# Patient Record
Sex: FEMALE | Race: BLACK OR AFRICAN AMERICAN | NOT HISPANIC OR LATINO | Employment: FULL TIME | URBAN - METROPOLITAN AREA
[De-identification: names, ages, dates, MRNs, and addresses within clinical notes are randomized per-mention and may not be internally consistent; named-entity substitution may affect disease eponyms.]

---
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
## 2019-01-04 ENCOUNTER — HOSPITAL ENCOUNTER (OUTPATIENT)
Dept: RADIOLOGY | Facility: HOSPITAL | Age: 19
Discharge: HOME | End: 2019-01-04
Attending: OBSTETRICS & GYNECOLOGY
Payer: COMMERCIAL

## 2019-01-04 DIAGNOSIS — N92.6 IRREGULAR MENSES: ICD-10-CM

## 2019-01-04 PROCEDURE — 76856 US EXAM PELVIC COMPLETE: CPT | Mod: 26,,, | Performed by: RADIOLOGY

## 2019-01-04 PROCEDURE — 76856 US PELVIS COMPLETE NON OB: ICD-10-PCS | Mod: 26,,, | Performed by: RADIOLOGY

## 2019-01-04 PROCEDURE — 76856 US EXAM PELVIC COMPLETE: CPT | Mod: TC

## 2019-01-07 ENCOUNTER — TELEPHONE (OUTPATIENT)
Dept: OBSTETRICS AND GYNECOLOGY | Facility: CLINIC | Age: 19
End: 2019-01-07

## 2019-01-07 NOTE — TELEPHONE ENCOUNTER
Called patient:    Message left to call us.    [Pelvic ultrasound without any abnormal findings, but limited study secondary to lack of vaginal ultrasound]

## 2019-01-08 NOTE — TELEPHONE ENCOUNTER
Called patient:    Spoke to patient's mother / patient:    Aware of negative sono.  Discussed that fact that findings were limited secondary to lack of vaginal aspect of study.

## 2020-01-03 ENCOUNTER — OFFICE VISIT (OUTPATIENT)
Dept: OBSTETRICS AND GYNECOLOGY | Facility: CLINIC | Age: 20
End: 2020-01-03
Attending: OBSTETRICS & GYNECOLOGY
Payer: COMMERCIAL

## 2020-01-03 VITALS
BODY MASS INDEX: 28.87 KG/M2 | SYSTOLIC BLOOD PRESSURE: 114 MMHG | HEIGHT: 65 IN | WEIGHT: 173.31 LBS | DIASTOLIC BLOOD PRESSURE: 72 MMHG

## 2020-01-03 DIAGNOSIS — Z01.419 WELL WOMAN EXAM WITH ROUTINE GYNECOLOGICAL EXAM: Primary | ICD-10-CM

## 2020-01-03 DIAGNOSIS — Z11.3 SCREEN FOR STD (SEXUALLY TRANSMITTED DISEASE): ICD-10-CM

## 2020-01-03 LAB
C TRACH DNA SPEC QL NAA+PROBE: NOT DETECTED
N GONORRHOEA DNA SPEC QL NAA+PROBE: NOT DETECTED

## 2020-01-03 PROCEDURE — 99395 PREV VISIT EST AGE 18-39: CPT | Mod: S$GLB,,, | Performed by: OBSTETRICS & GYNECOLOGY

## 2020-01-03 PROCEDURE — 99999 PR PBB SHADOW E&M-EST. PATIENT-LVL III: ICD-10-PCS | Mod: PBBFAC,,, | Performed by: OBSTETRICS & GYNECOLOGY

## 2020-01-03 PROCEDURE — 99395 PR PREVENTIVE VISIT,EST,18-39: ICD-10-PCS | Mod: S$GLB,,, | Performed by: OBSTETRICS & GYNECOLOGY

## 2020-01-03 PROCEDURE — 99999 PR PBB SHADOW E&M-EST. PATIENT-LVL III: CPT | Mod: PBBFAC,,, | Performed by: OBSTETRICS & GYNECOLOGY

## 2020-01-03 PROCEDURE — 87491 CHLMYD TRACH DNA AMP PROBE: CPT

## 2020-01-03 NOTE — PROGRESS NOTES
Karla Price is a 19 y.o. female  who presents for annual exam.  Previously, she had been experiencing irregular, infrequent periods.  However, with weight loss, her periods have become regular.  For the past year, she describes having menses occurring monthly, lasting 5 days in duration, without intermenstrual bleeding. She is now sexually active, using condoms for contraception.  Requests STD screening.  No gyn complaints.  Patient's last menstrual period was 2019 (exact date).       Sono 19:  FINDINGS:  Exam is markedly limited.  Transvaginal imaging was not obtained given the patient's sexually inactive state.  Transabdominal images demonstrate partial collapse of the urinary bladder.  Exam detail is limited secondary to habitus and shadowing from bowel gas.  Neither ovary could be visualized.  No free fluid was identified.  Uterus is poorly delineated.  Endometrial cavity could not be identified.      Impression       Markedly limited exam as described above.  Consider correlation with cross-sectional imaging as clinically warranted.         History reviewed. No pertinent past medical history.    History reviewed. No pertinent surgical history.    OB History        0    Para   0    Term   0       0    AB   0    Living   0       SAB   0    TAB   0    Ectopic   0    Multiple   0    Live Births                     ROS:  GENERAL: Reports weight loss with diet / exercise.   SKIN: Denies rash or lesions.   HEAD: Denies head injury or headache.   NODES: Denies enlarged lymph nodes.   CHEST: Denies chest pain or shortness of breath.   CARDIOVASCULAR: Denies palpitations or left sided chest pain.   ABDOMEN: No abdominal pain, nausea, vomiting or rectal bleeding.   URINARY: No dysuria or hematuria.  REPRODUCTIVE: See HPI.   BREASTS: Denies pain, lumps, or nipple discharge.   HEMATOLOGIC: No easy bruisability or excessive bleeding.   MUSCULOSKELETAL: Denies joint pain or swelling.    NEUROLOGIC: Denies syncope or weakness.   PSYCHIATRIC: Denies depression.    PE:   (chaperone present during entire exam)  APPEARANCE: Well nourished, well developed, in no acute distress.  BREASTS: Symmetrical, no skin changes or visible lesions. No palpable masses, nipple discharge or adenopathy bilaterally.  ABDOMEN: Soft. No tenderness or masses. No hernias. No CVA tenderness.  VULVA: No lesions. Normal female genitalia.  URETHRAL MEATUS: Normal size and location, no lesions, no prolapse.  URETHRA: No masses, tenderness, prolapse or scarring.  VAGINA: Moist and well rugated, no abnormal discharge, no significant cystocele or rectocele.  CERVIX: No lesions and discharge.   UTERUS: Normal size, regular shape, mobile, non-tender, bladder base nontender.  ADNEXA: No masses, tenderness or CDS nodularity.  ANUS PERINEUM: Normal.      Diagnosis:  1. Well woman exam with routine gynecological exam    2. Screen for STD (sexually transmitted disease)          PLAN:    Orders Placed This Encounter    C. trachomatis/N. gonorrhoeae by AMP DNA [facility]; Cervix       Patient was counseled today on the need for annual gyn exams.  We reviewed contraceptive options and she plans to continue with condoms.  We will contact her with results of today's cervical culture.    Follow-up in 1 year.

## 2020-01-04 ENCOUNTER — PATIENT MESSAGE (OUTPATIENT)
Dept: OBSTETRICS AND GYNECOLOGY | Facility: CLINIC | Age: 20
End: 2020-01-04

## 2020-12-21 ENCOUNTER — TELEPHONE (OUTPATIENT)
Dept: OBSTETRICS AND GYNECOLOGY | Facility: CLINIC | Age: 20
End: 2020-12-21

## 2021-04-26 ENCOUNTER — PATIENT MESSAGE (OUTPATIENT)
Dept: RESEARCH | Facility: HOSPITAL | Age: 21
End: 2021-04-26

## 2021-05-27 ENCOUNTER — OFFICE VISIT (OUTPATIENT)
Dept: OPTOMETRY | Facility: CLINIC | Age: 21
End: 2021-05-27
Payer: COMMERCIAL

## 2021-05-27 DIAGNOSIS — H11.442 CONJUNCTIVAL CYST, LEFT: Primary | ICD-10-CM

## 2021-05-27 PROCEDURE — 99999 PR PBB SHADOW E&M-EST. PATIENT-LVL II: CPT | Mod: PBBFAC,,, | Performed by: OPTOMETRIST

## 2021-05-27 PROCEDURE — 1126F AMNT PAIN NOTED NONE PRSNT: CPT | Mod: S$GLB,,, | Performed by: OPTOMETRIST

## 2021-05-27 PROCEDURE — 92002 INTRM OPH EXAM NEW PATIENT: CPT | Mod: S$GLB,,, | Performed by: OPTOMETRIST

## 2021-05-27 PROCEDURE — 1126F PR PAIN SEVERITY QUANTIFIED, NO PAIN PRESENT: ICD-10-PCS | Mod: S$GLB,,, | Performed by: OPTOMETRIST

## 2021-05-27 PROCEDURE — 92002 PR EYE EXAM, NEW PATIENT,INTERMED: ICD-10-PCS | Mod: S$GLB,,, | Performed by: OPTOMETRIST

## 2021-05-27 PROCEDURE — 99999 PR PBB SHADOW E&M-EST. PATIENT-LVL II: ICD-10-PCS | Mod: PBBFAC,,, | Performed by: OPTOMETRIST

## 2021-08-19 ENCOUNTER — LAB VISIT (OUTPATIENT)
Dept: PRIMARY CARE CLINIC | Facility: OTHER | Age: 21
End: 2021-08-19
Payer: COMMERCIAL

## 2021-08-19 DIAGNOSIS — Z20.822 ENCOUNTER FOR LABORATORY TESTING FOR COVID-19 VIRUS: ICD-10-CM

## 2021-08-19 PROCEDURE — U0003 INFECTIOUS AGENT DETECTION BY NUCLEIC ACID (DNA OR RNA); SEVERE ACUTE RESPIRATORY SYNDROME CORONAVIRUS 2 (SARS-COV-2) (CORONAVIRUS DISEASE [COVID-19]), AMPLIFIED PROBE TECHNIQUE, MAKING USE OF HIGH THROUGHPUT TECHNOLOGIES AS DESCRIBED BY CMS-2020-01-R: HCPCS | Performed by: INTERNAL MEDICINE

## 2021-08-21 LAB
SARS-COV-2 RNA RESP QL NAA+PROBE: NOT DETECTED
SARS-COV-2- CYCLE NUMBER: -1

## 2022-04-18 ENCOUNTER — OFFICE VISIT (OUTPATIENT)
Dept: FAMILY MEDICINE | Facility: CLINIC | Age: 22
End: 2022-04-18
Payer: COMMERCIAL

## 2022-04-18 VITALS
OXYGEN SATURATION: 97 % | WEIGHT: 194.69 LBS | BODY MASS INDEX: 36.76 KG/M2 | SYSTOLIC BLOOD PRESSURE: 100 MMHG | TEMPERATURE: 99 F | HEART RATE: 61 BPM | DIASTOLIC BLOOD PRESSURE: 60 MMHG | HEIGHT: 61 IN

## 2022-04-18 DIAGNOSIS — M25.50 ARTHRALGIA, UNSPECIFIED JOINT: ICD-10-CM

## 2022-04-18 DIAGNOSIS — L30.9 ECZEMA, UNSPECIFIED TYPE: ICD-10-CM

## 2022-04-18 DIAGNOSIS — N92.6 MISSED PERIODS: ICD-10-CM

## 2022-04-18 DIAGNOSIS — M25.562 LEFT KNEE PAIN, UNSPECIFIED CHRONICITY: ICD-10-CM

## 2022-04-18 DIAGNOSIS — Z00.00 ROUTINE PHYSICAL EXAMINATION: Primary | ICD-10-CM

## 2022-04-18 DIAGNOSIS — Z71.85 HPV VACCINE COUNSELING: ICD-10-CM

## 2022-04-18 PROCEDURE — 90651 HPV VACCINE 9-VALENT 3 DOSE IM: ICD-10-PCS | Mod: S$GLB,,, | Performed by: FAMILY MEDICINE

## 2022-04-18 PROCEDURE — 99203 PR OFFICE/OUTPT VISIT, NEW, LEVL III, 30-44 MIN: ICD-10-PCS | Mod: 25,S$GLB,, | Performed by: FAMILY MEDICINE

## 2022-04-18 PROCEDURE — 99385 PR PREVENTIVE VISIT,NEW,18-39: ICD-10-PCS | Mod: 25,1E,GY,S$GLB | Performed by: FAMILY MEDICINE

## 2022-04-18 PROCEDURE — 99999 PR PBB SHADOW E&M-EST. PATIENT-LVL III: ICD-10-PCS | Mod: PBBFAC,,, | Performed by: FAMILY MEDICINE

## 2022-04-18 PROCEDURE — 99385 PREV VISIT NEW AGE 18-39: CPT | Mod: 25,1E,GY,S$GLB | Performed by: FAMILY MEDICINE

## 2022-04-18 PROCEDURE — 90471 IMMUNIZATION ADMIN: CPT | Mod: S$GLB,,, | Performed by: FAMILY MEDICINE

## 2022-04-18 PROCEDURE — 90471 HPV VACCINE 9-VALENT 3 DOSE IM: ICD-10-PCS | Mod: S$GLB,,, | Performed by: FAMILY MEDICINE

## 2022-04-18 PROCEDURE — 90651 9VHPV VACCINE 2/3 DOSE IM: CPT | Mod: S$GLB,,, | Performed by: FAMILY MEDICINE

## 2022-04-18 PROCEDURE — 99999 PR PBB SHADOW E&M-EST. PATIENT-LVL III: CPT | Mod: PBBFAC,,, | Performed by: FAMILY MEDICINE

## 2022-04-18 PROCEDURE — 99203 OFFICE O/P NEW LOW 30 MIN: CPT | Mod: 25,S$GLB,, | Performed by: FAMILY MEDICINE

## 2022-04-18 RX ORDER — NAPROXEN 500 MG/1
500 TABLET ORAL 2 TIMES DAILY PRN
Qty: 30 TABLET | Refills: 0 | Status: SHIPPED | OUTPATIENT
Start: 2022-04-18 | End: 2023-03-13 | Stop reason: ALTCHOICE

## 2022-04-18 RX ORDER — TRIAMCINOLONE ACETONIDE 1 MG/G
CREAM TOPICAL 2 TIMES DAILY
Qty: 30 G | Refills: 0 | Status: SHIPPED | OUTPATIENT
Start: 2022-04-18 | End: 2023-03-13 | Stop reason: ALTCHOICE

## 2022-04-18 NOTE — PROGRESS NOTES
[facility]  Progress Note    SUBJECTIVE:     Chief Complaint   Patient presents with    Annual Exam       HPI   Karla Price  is a 21 y.o. female     Review of patient's allergies indicates:   Allergen Reactions    Nuts [tree nut] Hives       No past medical history on file.  No past surgical history on file.  Family History   Problem Relation Age of Onset    Breast cancer Maternal Aunt 30        maternal great aunt with breast cancer    Fibrocystic breast disease Mother     No Known Problems Sister     Liver cancer Maternal Grandmother     No Known Problems Father     No Known Problems Brother     No Known Problems Maternal Uncle     No Known Problems Paternal Aunt     No Known Problems Paternal Uncle     No Known Problems Maternal Grandfather     No Known Problems Paternal Grandmother     No Known Problems Paternal Grandfather     Colon cancer Neg Hx     Ovarian cancer Neg Hx     Amblyopia Neg Hx     Blindness Neg Hx     Cancer Neg Hx     Cataracts Neg Hx     Diabetes Neg Hx     Glaucoma Neg Hx     Hypertension Neg Hx     Macular degeneration Neg Hx     Retinal detachment Neg Hx     Strabismus Neg Hx     Stroke Neg Hx     Thyroid disease Neg Hx      Social History     Tobacco Use    Smoking status: Never Smoker    Smokeless tobacco: Never Used   Substance Use Topics    Alcohol use: No    Drug use: No        Review of Systems   Constitutional: Negative for chills, diaphoresis and fever.   HENT: Negative for congestion, ear pain and sore throat.    Eyes: Negative for photophobia and discharge.   Respiratory: Negative for cough, shortness of breath and wheezing.    Cardiovascular: Negative for chest pain and palpitations.   Gastrointestinal: Negative for abdominal pain, constipation, diarrhea, nausea and vomiting.   Genitourinary: Negative for dysuria and hematuria.   Musculoskeletal: Negative for back pain and myalgias.   Skin: Positive for itching and rash (behind knee).    Neurological: Negative for dizziness, sensory change, focal weakness, weakness and headaches.   All other systems reviewed and are negative.    OBJECTIVE:     Vitals:    04/18/22 1302   BP: 100/60   Pulse: 61   Temp: 98.7 °F (37.1 °C)     Body mass index is 36.78 kg/m².    Physical Exam  Constitutional:       General: She is not in acute distress.     Appearance: She is not diaphoretic.   HENT:      Head: Normocephalic and atraumatic.      Right Ear: Tympanic membrane and ear canal normal. No hemotympanum. Tympanic membrane is not perforated, erythematous or bulging.      Left Ear: Tympanic membrane and ear canal normal. No hemotympanum. Tympanic membrane is not perforated, erythematous or bulging.      Mouth/Throat:      Pharynx: No oropharyngeal exudate.   Eyes:      Conjunctiva/sclera: Conjunctivae normal.      Pupils: Pupils are equal, round, and reactive to light.   Neck:      Thyroid: No thyromegaly.   Cardiovascular:      Rate and Rhythm: Normal rate and regular rhythm.      Heart sounds: Normal heart sounds. No murmur heard.    No friction rub. No gallop.   Pulmonary:      Effort: Pulmonary effort is normal. No respiratory distress.      Breath sounds: Normal breath sounds. No wheezing or rales.   Abdominal:      General: Bowel sounds are normal. There is no distension.      Palpations: Abdomen is soft.      Tenderness: There is no abdominal tenderness. There is no guarding or rebound.   Musculoskeletal:         General: No tenderness. Normal range of motion.   Lymphadenopathy:      Cervical: No cervical adenopathy.   Skin:     General: Skin is warm.      Findings: Rash (behind R knee. dry, scaly.) present. No erythema.   Neurological:      Mental Status: She is alert and oriented to person, place, and time.         Old records were reviewed. Labs and/or images were independently reviewed.    ASSESSMENT     1. Routine physical examination    2. Eczema, unspecified type    3. Arthralgia, unspecified joint     4. Missed periods    5. HPV vaccine counseling    6. Left knee pain, unspecified chronicity        PLAN:     Routine physical examination  -     CBC Auto Differential; Future  -     Comprehensive Metabolic Panel; Future  -     Hemoglobin A1C; Future  -     Lipid Panel; Future  -     TSH; Future  -     T4, Free; Future  -     Hepatitis Panel, Acute; Future; Expected date: 04/18/2022  -     HIV 1/2 Ag/Ab (4th Gen); Future; Expected date: 04/18/2022  -     We briefly discussed diet, exercise, and routine preventive exams. All questions and comments addressed.    RTC PRCRISS Francisco MD  04/18/2022 2:20 PM    Additional Evaluation & Management issues:    In addition to today's Annual Physical, patient has other medical issues that need to be addressed, as well as their associated prescription management that is separate from today's physical, as documented separately below.     HPI  1. Pt c/o having skin rash especially behind R knee. It is very itchy. Tried otc creams without help. Has eczema,   2. Hasn't had a period in 4 months. Not on any birth control.   3. L knee pain. Had an injury many years ago. Rates pain as moderate, but does not radiate. Walking/standing on it does make it worst. Wearing knee brace which huang helps.     Review of Systems   Constitutional: Negative for chills, diaphoresis and fever.   HENT: Negative for congestion, ear pain and sore throat.    Eyes: Negative for photophobia and discharge.   Respiratory: Negative for cough, shortness of breath and wheezing.    Cardiovascular: Negative for chest pain and palpitations.   Gastrointestinal: Negative for abdominal pain, constipation, diarrhea, nausea and vomiting.   Genitourinary: Negative for dysuria and hematuria.   Musculoskeletal: Negative for back pain and myalgias.   Skin: Positive for itching and rash (behind knee).   Neurological: Negative for dizziness, sensory change, focal weakness, weakness and headaches.   All other systems  reviewed and are negative.    Physical Exam  Constitutional:       General: She is not in acute distress.     Appearance: She is not diaphoretic.   HENT:      Head: Normocephalic and atraumatic.      Right Ear: Tympanic membrane and ear canal normal. No hemotympanum. Tympanic membrane is not perforated, erythematous or bulging.      Left Ear: Tympanic membrane and ear canal normal. No hemotympanum. Tympanic membrane is not perforated, erythematous or bulging.      Mouth/Throat:      Pharynx: No oropharyngeal exudate.   Eyes:      Conjunctiva/sclera: Conjunctivae normal.      Pupils: Pupils are equal, round, and reactive to light.   Neck:      Thyroid: No thyromegaly.   Cardiovascular:      Rate and Rhythm: Normal rate and regular rhythm.      Heart sounds: Normal heart sounds. No murmur heard.    No friction rub. No gallop.   Pulmonary:      Effort: Pulmonary effort is normal. No respiratory distress.      Breath sounds: Normal breath sounds. No wheezing or rales.   Abdominal:      General: Bowel sounds are normal. There is no distension.      Palpations: Abdomen is soft.      Tenderness: There is no abdominal tenderness. There is no guarding or rebound.   Musculoskeletal:         General: No tenderness. Normal range of motion.   Lymphadenopathy:      Cervical: No cervical adenopathy.   Skin:     General: Skin is warm.      Findings: Rash (behind R knee. dry, scaly.) present. No erythema.   Neurological:      Mental Status: She is alert and oriented to person, place, and time.     Eczema, unspecified type  -     Start triamcinolone acetonide 0.1% (KENALOG) 0.1 % cream; Apply topically 2 (two) times daily.  Dispense: 30 g; Refill: 0    Arthralgia, unspecified joint  -     EDWINA Screen w/Reflex; Future; Expected date: 04/18/2022  -     Sedimentation rate; Future; Expected date: 04/18/2022  -     C-reactive protein; Future; Expected date: 04/18/2022  -     R/o autoimmune issues.    Missed periods  -     HCG,  Quantitative; Future; Expected date: 04/18/2022  -     Ambulatory referral/consult to Obstetrics / Gynecology; Future; Expected date: 04/25/2022  -     Check thyroid function. Also check pregnancy test. Refer to GYN for PAP.    HPV vaccine counseling  -     HPV Vaccine (9-Valent) (3 Dose) (IM)    Left knee pain, unspecified chronicity  -     naproxen (NAPROSYN) 500 MG tablet; Take 1 tablet (500 mg total) by mouth 2 (two) times daily as needed.  Dispense: 30 tablet; Refill: 0  -    I have discussed the common side effects of this medication with the patient and answered all of the questions they had at the time of this visit regarding this medication.  -    ICE, rest, knee brace.    RTC PRN

## 2022-04-19 ENCOUNTER — TELEPHONE (OUTPATIENT)
Dept: FAMILY MEDICINE | Facility: CLINIC | Age: 22
End: 2022-04-19
Payer: COMMERCIAL

## 2022-04-20 ENCOUNTER — TELEPHONE (OUTPATIENT)
Dept: FAMILY MEDICINE | Facility: CLINIC | Age: 22
End: 2022-04-20
Payer: COMMERCIAL

## 2022-05-16 ENCOUNTER — CLINICAL SUPPORT (OUTPATIENT)
Dept: FAMILY MEDICINE | Facility: CLINIC | Age: 22
End: 2022-05-16
Payer: COMMERCIAL

## 2022-05-16 DIAGNOSIS — Z23 NEED FOR HPV VACCINATION: Primary | ICD-10-CM

## 2022-05-16 PROCEDURE — 90651 9VHPV VACCINE 2/3 DOSE IM: CPT | Mod: S$GLB,,, | Performed by: FAMILY MEDICINE

## 2022-05-16 PROCEDURE — 90651 HPV VACCINE 9-VALENT 3 DOSE IM: ICD-10-PCS | Mod: S$GLB,,, | Performed by: FAMILY MEDICINE

## 2022-05-16 PROCEDURE — 90471 IMMUNIZATION ADMIN: CPT | Mod: S$GLB,,, | Performed by: FAMILY MEDICINE

## 2022-05-16 PROCEDURE — 90471 HPV VACCINE 9-VALENT 3 DOSE IM: ICD-10-PCS | Mod: S$GLB,,, | Performed by: FAMILY MEDICINE

## 2022-05-16 PROCEDURE — 99999 PR PBB SHADOW E&M-EST. PATIENT-LVL I: ICD-10-PCS | Mod: PBBFAC,,,

## 2022-05-16 PROCEDURE — 99999 PR PBB SHADOW E&M-EST. PATIENT-LVL I: CPT | Mod: PBBFAC,,,

## 2022-08-08 ENCOUNTER — OFFICE VISIT (OUTPATIENT)
Dept: DERMATOLOGY | Facility: CLINIC | Age: 22
End: 2022-08-08
Payer: COMMERCIAL

## 2022-08-08 DIAGNOSIS — Z76.89 ENCOUNTER FOR SKIN CARE: ICD-10-CM

## 2022-08-08 DIAGNOSIS — L65.9 ALOPECIA: ICD-10-CM

## 2022-08-08 DIAGNOSIS — L30.9 ECZEMA, UNSPECIFIED TYPE: Primary | ICD-10-CM

## 2022-08-08 DIAGNOSIS — L65.8 TRACTION ALOPECIA: ICD-10-CM

## 2022-08-08 PROCEDURE — 99204 PR OFFICE/OUTPT VISIT, NEW, LEVL IV, 45-59 MIN: ICD-10-PCS | Mod: S$GLB,,, | Performed by: DERMATOLOGY

## 2022-08-08 PROCEDURE — 99999 PR PBB SHADOW E&M-EST. PATIENT-LVL III: CPT | Mod: PBBFAC,,, | Performed by: DERMATOLOGY

## 2022-08-08 PROCEDURE — 99999 PR PBB SHADOW E&M-EST. PATIENT-LVL III: ICD-10-PCS | Mod: PBBFAC,,, | Performed by: DERMATOLOGY

## 2022-08-08 PROCEDURE — 99204 OFFICE O/P NEW MOD 45 MIN: CPT | Mod: S$GLB,,, | Performed by: DERMATOLOGY

## 2022-08-08 RX ORDER — TRIAMCINOLONE ACETONIDE 1 MG/G
CREAM TOPICAL
Qty: 60 G | Refills: 0 | Status: SHIPPED | OUTPATIENT
Start: 2022-08-08 | End: 2023-03-13 | Stop reason: ALTCHOICE

## 2022-08-08 NOTE — PROGRESS NOTES
Subjective:       Patient ID:  Karla Price is a 21 y.o. female who presents for   Chief Complaint   Patient presents with    Hair Loss     Scalp     Urticaria     Hair Loss - Initial  Affected locations: scalp  Duration: 3 weeks  Severity: mild to moderate  Timing: constant    Urticaria - Initial  Severity: mild to moderate  Timing: constant        Review of Systems   Constitutional: Negative.    HENT: Negative.    Respiratory: Negative.    Musculoskeletal: Negative.    Skin: Positive for itching, rash and dry skin.        Objective:    Physical Exam   Constitutional: She appears well-developed and well-nourished.   Eyes: No conjunctival no injection.   Neurological: She is alert and oriented to person, place, and time.   Psychiatric: She has a normal mood and affect.   Skin:   Areas Examined (abnormalities noted in diagram):   RUE Inspected                   Diagram Legend     Erythematous scaling macule/papule c/w actinic keratosis       Vascular papule c/w angioma      Pigmented verrucoid papule/plaque c/w seborrheic keratosis      Yellow umbilicated papule c/w sebaceous hyperplasia      Irregularly shaped tan macule c/w lentigo     1-2 mm smooth white papules consistent with Milia      Movable subcutaneous cyst with punctum c/w epidermal inclusion cyst      Subcutaneous movable cyst c/w pilar cyst      Firm pink to brown papule c/w dermatofibroma      Pedunculated fleshy papule(s) c/w skin tag(s)      Evenly pigmented macule c/w junctional nevus     Mildly variegated pigmented, slightly irregular-bordered macule c/w mildly atypical nevus      Flesh colored to evenly pigmented papule c/w intradermal nevus       Pink pearly papule/plaque c/w basal cell carcinoma      Erythematous hyperkeratotic cursted plaque c/w SCC      Surgical scar with no sign of skin cancer recurrence      Open and closed comedones      Inflammatory papules and pustules      Verrucoid papule consistent consistent with wart      Erythematous eczematous patches and plaques     Dystrophic onycholytic nail with subungual debris c/w onychomycosis     Umbilicated papule    Erythematous-base heme-crusted tan verrucoid plaque consistent with inflamed seborrheic keratosis     Erythematous Silvery Scaling Plaque c/w Psoriasis     See annotation          Assessment / Plan:        Eczema, unspecified type  -     triamcinolone acetonide 0.1% (KENALOG) 0.1 % cream; AAA bid prn rash arms and bid prn thin hair patches scalp  Dispense: 60 g; Refill: 0  Mild.  Reviewed with patient different treatment options and associated risks.  Proper application of medications and or care for affected area(s) and condition(s) reviewed.  Discussed with patient the risks of topical and injectable steroids, including, but not limited, to atrophy, rosacea, acne, glaucoma, cataracts, adrenal suppression, striae.  Do not touch eyes with medication.  Pt reports long hx.    Traction alopecia  Reviewed with patient etiology of traction and the need for looser hairstyles with less tension.  Patient and or guardian to monitor this area/lesion or these areas/lesions for changes or worsening or darkening (for moles and freckles).  Patient and or guardian to contact us if any changes are noted for such.    Alopecia  -     triamcinolone acetonide 0.1% (KENALOG) 0.1 % cream; AAA bid prn rash arms and bid prn thin hair patches scalp  Dispense: 60 g; Refill: 0  Reviewed with patient to eat protein daily, get labs done, wash with recommended shampoo or soap for the scalp, avoid hair coloring and chemicals and hot treatments, and potentially consider topical 5% minoxidil.  Avoid hot water.  Watch for AA but regrowing now.  Proper application of medications and or care for affected area(s) and condition(s) reviewed.  Reviewed with patient different treatment options and associated risks.  Patient and or guardian to monitor this area/lesion or these areas/lesions for changes or worsening or  darkening (for moles and freckles).  Patient and or guardian to contact us if any changes are noted for such.  Offered rogaine but pt doesn't want right now.    Encounter for skin care  No hot water bathing reviewed.             Follow up in about 3 months (around 11/8/2022).

## 2022-08-08 NOTE — PATIENT INSTRUCTIONS
Avoid hot water   Moisturize with coconut oil   Mild soap like Dove   Apply Triamcinolone cream as needed     40 grams of protein a day     Wash hair with Head and shoulders shampoo. Soak for 10mins once a week then rinse.     Loose hairstyles     Avoid hair coloring

## 2022-11-08 ENCOUNTER — OFFICE VISIT (OUTPATIENT)
Dept: DERMATOLOGY | Facility: CLINIC | Age: 22
End: 2022-11-08

## 2022-11-08 DIAGNOSIS — L65.9 ALOPECIA: ICD-10-CM

## 2022-11-08 DIAGNOSIS — L65.8 TRACTION ALOPECIA: Primary | ICD-10-CM

## 2022-11-08 PROCEDURE — 99213 PR OFFICE/OUTPT VISIT, EST, LEVL III, 20-29 MIN: ICD-10-PCS | Mod: S$GLB,,, | Performed by: DERMATOLOGY

## 2022-11-08 PROCEDURE — 99213 OFFICE O/P EST LOW 20 MIN: CPT | Mod: S$GLB,,, | Performed by: DERMATOLOGY

## 2022-11-08 PROCEDURE — 99213 OFFICE O/P EST LOW 20 MIN: CPT | Mod: PBBFAC,PN | Performed by: DERMATOLOGY

## 2022-11-08 PROCEDURE — 99999 PR PBB SHADOW E&M-EST. PATIENT-LVL III: CPT | Mod: PBBFAC,,, | Performed by: DERMATOLOGY

## 2022-11-08 PROCEDURE — 99999 PR PBB SHADOW E&M-EST. PATIENT-LVL III: ICD-10-PCS | Mod: PBBFAC,,, | Performed by: DERMATOLOGY

## 2022-11-08 NOTE — PATIENT INSTRUCTIONS
40 grams of protein daily     Avoid hot water     Loose hairstyles     Avoid hair coloring and heat

## 2022-11-08 NOTE — PROGRESS NOTES
Subjective:       Patient ID:  Karla Price is a 21 y.o. female who presents for   Chief Complaint   Patient presents with    Hair Loss     Follow up      Hair Loss - Follow-up  Symptom course: improving  Affected locations: scalp    Review of Systems   Constitutional: Negative.    HENT: Negative.     Respiratory: Negative.     Musculoskeletal: Negative.    Skin:  Negative for itching.      Objective:    Physical Exam   Constitutional: She appears well-developed and well-nourished.   Eyes: No conjunctival no injection.   Neurological: She is alert and oriented to person, place, and time.   Psychiatric: She has a normal mood and affect.   Skin:   Areas Examined (abnormalities noted in diagram):   Scalp / Hair Palpated and Inspected       Diagram Legend     Erythematous scaling macule/papule c/w actinic keratosis       Vascular papule c/w angioma      Pigmented verrucoid papule/plaque c/w seborrheic keratosis      Yellow umbilicated papule c/w sebaceous hyperplasia      Irregularly shaped tan macule c/w lentigo     1-2 mm smooth white papules consistent with Milia      Movable subcutaneous cyst with punctum c/w epidermal inclusion cyst      Subcutaneous movable cyst c/w pilar cyst      Firm pink to brown papule c/w dermatofibroma      Pedunculated fleshy papule(s) c/w skin tag(s)      Evenly pigmented macule c/w junctional nevus     Mildly variegated pigmented, slightly irregular-bordered macule c/w mildly atypical nevus      Flesh colored to evenly pigmented papule c/w intradermal nevus       Pink pearly papule/plaque c/w basal cell carcinoma      Erythematous hyperkeratotic cursted plaque c/w SCC      Surgical scar with no sign of skin cancer recurrence      Open and closed comedones      Inflammatory papules and pustules      Verrucoid papule consistent consistent with wart     Erythematous eczematous patches and plaques     Dystrophic onycholytic nail with subungual debris c/w onychomycosis     Umbilicated  papule    Erythematous-base heme-crusted tan verrucoid plaque consistent with inflamed seborrheic keratosis     Erythematous Silvery Scaling Plaque c/w Psoriasis     See annotation            Assessment / Plan:        Traction alopecia  Reviewed with patient etiology of traction and the need for looser hairstyles with less tension.  Some improvement.  Okay to stay with pt's tac at 3xwk.  Proper application of medications and or care for affected area(s) and condition(s) reviewed.  Patient and or guardian to monitor this area/lesion or these areas/lesions for changes or worsening or darkening (for moles and freckles).  Patient and or guardian to contact us if any changes are noted for such.    Alopecia  Reviewed with patient to eat protein daily, get labs done, wash with recommended shampoo or soap for the scalp, avoid hair coloring and chemicals and hot treatments, and potentially consider topical 5% minoxidil.  Avoid hot water.  Doing better with some regrowth of the perimeter.  Patient and or guardian to monitor this area/lesion or these areas/lesions for changes or worsening or darkening (for moles and freckles).  Patient and or guardian to contact us if any changes are noted for such.  Consider full lab rodgers if worsens.           Follow up in about 9 months (around 8/8/2023).

## 2022-11-15 ENCOUNTER — LAB VISIT (OUTPATIENT)
Dept: LAB | Facility: HOSPITAL | Age: 22
End: 2022-11-15
Attending: OBSTETRICS & GYNECOLOGY
Payer: COMMERCIAL

## 2022-11-15 ENCOUNTER — OFFICE VISIT (OUTPATIENT)
Dept: OBSTETRICS AND GYNECOLOGY | Facility: CLINIC | Age: 22
End: 2022-11-15
Payer: COMMERCIAL

## 2022-11-15 VITALS
BODY MASS INDEX: 38.22 KG/M2 | SYSTOLIC BLOOD PRESSURE: 124 MMHG | WEIGHT: 202.25 LBS | DIASTOLIC BLOOD PRESSURE: 70 MMHG

## 2022-11-15 DIAGNOSIS — N92.6 IRREGULAR PERIODS: ICD-10-CM

## 2022-11-15 DIAGNOSIS — Z11.3 SCREENING EXAMINATION FOR STD (SEXUALLY TRANSMITTED DISEASE): ICD-10-CM

## 2022-11-15 DIAGNOSIS — Z01.419 WELL WOMAN EXAM WITH ROUTINE GYNECOLOGICAL EXAM: Primary | ICD-10-CM

## 2022-11-15 PROCEDURE — 82670 ASSAY OF TOTAL ESTRADIOL: CPT | Performed by: OBSTETRICS & GYNECOLOGY

## 2022-11-15 PROCEDURE — 86592 SYPHILIS TEST NON-TREP QUAL: CPT | Performed by: OBSTETRICS & GYNECOLOGY

## 2022-11-15 PROCEDURE — 81514 NFCT DS BV&VAGINITIS DNA ALG: CPT | Performed by: OBSTETRICS & GYNECOLOGY

## 2022-11-15 PROCEDURE — 87389 HIV-1 AG W/HIV-1&-2 AB AG IA: CPT | Performed by: OBSTETRICS & GYNECOLOGY

## 2022-11-15 PROCEDURE — 80074 ACUTE HEPATITIS PANEL: CPT | Performed by: OBSTETRICS & GYNECOLOGY

## 2022-11-15 PROCEDURE — 82627 DEHYDROEPIANDROSTERONE: CPT | Performed by: OBSTETRICS & GYNECOLOGY

## 2022-11-15 PROCEDURE — 88175 CYTOPATH C/V AUTO FLUID REDO: CPT | Performed by: OBSTETRICS & GYNECOLOGY

## 2022-11-15 PROCEDURE — 99395 PR PREVENTIVE VISIT,EST,18-39: ICD-10-PCS | Mod: 25,S$GLB,, | Performed by: OBSTETRICS & GYNECOLOGY

## 2022-11-15 PROCEDURE — 83001 ASSAY OF GONADOTROPIN (FSH): CPT | Performed by: OBSTETRICS & GYNECOLOGY

## 2022-11-15 PROCEDURE — 36415 COLL VENOUS BLD VENIPUNCTURE: CPT | Performed by: OBSTETRICS & GYNECOLOGY

## 2022-11-15 PROCEDURE — 99999 PR PBB SHADOW E&M-EST. PATIENT-LVL III: ICD-10-PCS | Mod: PBBFAC,,, | Performed by: OBSTETRICS & GYNECOLOGY

## 2022-11-15 PROCEDURE — 99213 OFFICE O/P EST LOW 20 MIN: CPT | Mod: PBBFAC | Performed by: OBSTETRICS & GYNECOLOGY

## 2022-11-15 PROCEDURE — 84402 ASSAY OF FREE TESTOSTERONE: CPT | Performed by: OBSTETRICS & GYNECOLOGY

## 2022-11-15 PROCEDURE — 83002 ASSAY OF GONADOTROPIN (LH): CPT | Performed by: OBSTETRICS & GYNECOLOGY

## 2022-11-15 PROCEDURE — 87491 CHLMYD TRACH DNA AMP PROBE: CPT | Performed by: OBSTETRICS & GYNECOLOGY

## 2022-11-15 PROCEDURE — 99395 PREV VISIT EST AGE 18-39: CPT | Mod: 25,S$GLB,, | Performed by: OBSTETRICS & GYNECOLOGY

## 2022-11-15 PROCEDURE — 83498 ASY HYDROXYPROGESTERONE 17-D: CPT | Performed by: OBSTETRICS & GYNECOLOGY

## 2022-11-15 PROCEDURE — 87591 N.GONORRHOEAE DNA AMP PROB: CPT | Performed by: OBSTETRICS & GYNECOLOGY

## 2022-11-15 PROCEDURE — 99999 PR PBB SHADOW E&M-EST. PATIENT-LVL III: CPT | Mod: PBBFAC,,, | Performed by: OBSTETRICS & GYNECOLOGY

## 2022-11-15 NOTE — PROGRESS NOTES
History & Physical  Gynecology      SUBJECTIVE:     Chief Complaint: STD CHECK and Annual Exam       History of Present Illness:  Annual Exam-Premenopausal  Ms. Price is a 22 y/o female who presents for annual exam. The patient reports that she missed her period last month with only light spotting. She reports that she thinks that it was because of stress but does have a history of irregular periods in the past. She was last seen in  at which she was 173lbs and reported that her periods had normalized since the weight loss. She also reports that she has noticed some facial hair growth.    The patient is sexually active GYN screening history: no prior history of gyn screening tests. The patient wears seatbelts: yes. The patient participates in regular exercise: not asked. Has the patient ever been transfused or tattooed?: yes. The patient reports that there is not domestic violence in her life.      Review of patient's allergies indicates:   Allergen Reactions    Nuts [tree nut] Hives       History reviewed. No pertinent past medical history.  History reviewed. No pertinent surgical history.  OB History          0    Para   0    Term   0       0    AB   0    Living   0         SAB   0    IAB   0    Ectopic   0    Multiple   0    Live Births                   Family History   Problem Relation Age of Onset    Breast cancer Maternal Aunt 30        maternal great aunt with breast cancer    Fibrocystic breast disease Mother     No Known Problems Sister     Liver cancer Maternal Grandmother     No Known Problems Father     No Known Problems Brother     No Known Problems Maternal Uncle     No Known Problems Paternal Aunt     No Known Problems Paternal Uncle     No Known Problems Maternal Grandfather     No Known Problems Paternal Grandmother     No Known Problems Paternal Grandfather     Colon cancer Neg Hx     Ovarian cancer Neg Hx     Amblyopia Neg Hx     Blindness Neg Hx     Cancer Neg Hx     Cataracts  Neg Hx     Diabetes Neg Hx     Glaucoma Neg Hx     Hypertension Neg Hx     Macular degeneration Neg Hx     Retinal detachment Neg Hx     Strabismus Neg Hx     Stroke Neg Hx     Thyroid disease Neg Hx      Social History     Tobacco Use    Smoking status: Never    Smokeless tobacco: Never   Substance Use Topics    Alcohol use: No    Drug use: No       Current Outpatient Medications   Medication Sig    naproxen (NAPROSYN) 500 MG tablet Take 1 tablet (500 mg total) by mouth 2 (two) times daily as needed.    triamcinolone acetonide 0.1% (KENALOG) 0.1 % cream Apply topically 2 (two) times daily.    triamcinolone acetonide 0.1% (KENALOG) 0.1 % cream AAA bid prn rash arms and bid prn thin hair patches scalp     No current facility-administered medications for this visit.         Review of Systems:  Review of Systems   Constitutional:  Negative for chills and fever.   Eyes:  Negative for visual disturbance.   Respiratory:  Negative for cough and wheezing.    Cardiovascular:  Negative for chest pain and palpitations.   Gastrointestinal:  Negative for abdominal pain, nausea and vomiting.   Genitourinary:  Positive for menstrual problem. Negative for dysuria, frequency, hematuria, pelvic pain, vaginal bleeding, vaginal discharge and vaginal pain.   Integumentary:  Positive for hair changes.        Hair growth on her chin   Neurological:  Negative for headaches.   Psychiatric/Behavioral:  Negative for depression.       OBJECTIVE:     Physical Exam:  Physical Exam  Vitals and nursing note reviewed. Exam conducted with a chaperone present.   Constitutional:       Appearance: She is well-developed.   Cardiovascular:      Rate and Rhythm: Normal rate.   Pulmonary:      Effort: Pulmonary effort is normal. No respiratory distress.   Chest:   Breasts:     Breasts are symmetrical.   Abdominal:      General: There is no distension.      Palpations: Abdomen is soft.      Tenderness: There is no abdominal tenderness.   Genitourinary:      Vagina: No vaginal discharge.   Skin:     General: Skin is warm and dry.   Neurological:      Mental Status: She is alert and oriented to person, place, and time.      Sensory: Sensory deficit: .myplan.       ASSESSMENT:       ICD-10-CM ICD-9-CM    1. Well woman exam with routine gynecological exam  Z01.419 V72.31 Liquid-Based Pap Smear, Screening      2. Irregular periods  N92.6 626.4 17-HYDROXYPROGESTERONE      Testosterone, Free      Follicle Stimulating Hormone      LUTEINIZING HORMONE      Estradiol      DHEA-SULFATE      US Pelvis Comp with Transvag NON-OB (xpd      3. Screening examination for STD (sexually transmitted disease)  Z11.3 V74.5 HIV 1/2 Ag/Ab (4th Gen)      RPR      Hepatitis Panel, Acute      Vaginosis Screen by DNA Probe      C. trachomatis/N. gonorrhoeae by AMP DNA         Plan:      Karla was seen today for std check and annual exam.    Diagnoses and all orders for this visit:    Well woman exam with routine gynecological exam  -     Liquid-Based Pap Smear, Screening  - Contraception not needed    Irregular periods  -     17-HYDROXYPROGESTERONE; Future  -     Testosterone, Free; Future  -     Follicle Stimulating Hormone; Future  -     LUTEINIZING HORMONE; Future  -     Estradiol; Future  -     DHEA-SULFATE; Future  -     US Pelvis Comp with Transvag NON-OB (xpd; Future  - Spironolactone possible for facial hair growth  - Discussed PCOS with patient using [place] Criteria. Patient with facial hair growth and oligomenorrhea. Discussed lab tests and TVUS ordered to further explore the diagnosis.   - Discussed the possibility of OCPs to assists if periods do not improve. Patient understands that OCPs would assist with hair growth and menstrual abnormalities.  - Discussed insulin resistance and cardiovascular disease that has been linked to PCOS. Discussed the most important step to take at this time is lifestyle modifications with exercise and diet modification.       Screening examination for  STD (sexually transmitted disease)  -     HIV 1/2 Ag/Ab (4th Gen); Future  -     RPR; Future  -     Hepatitis Panel, Acute; Future  - Vaginosis and GC/CT done      Orders Placed This Encounter   Procedures    Vaginosis Screen by DNA Probe    C. trachomatis/N. gonorrhoeae by AMP DNA    US Pelvis Comp with Transvag NON-OB (xpd    17-HYDROXYPROGESTERONE    Testosterone, Free    Follicle Stimulating Hormone    LUTEINIZING HORMONE    Estradiol    DHEA-SULFATE    HIV 1/2 Ag/Ab (4th Gen)    RPR    Hepatitis Panel, Acute       Follow up if symptoms worsen or fail to improve, for Follow up.    Counseling time: 45 minutes    Ricco Perez

## 2022-11-16 LAB
C TRACH DNA SPEC QL NAA+PROBE: NOT DETECTED
DHEA-S SERPL-MCNC: 176.2 UG/DL (ref 134.2–407.4)
ESTRADIOL SERPL-MCNC: 38 PG/ML
FSH SERPL-ACNC: 4.45 MIU/ML
HAV IGM SERPL QL IA: NORMAL
HBV CORE IGM SERPL QL IA: NORMAL
HBV SURFACE AG SERPL QL IA: NORMAL
HCV AB SERPL QL IA: NORMAL
HIV 1+2 AB+HIV1 P24 AG SERPL QL IA: NORMAL
LH SERPL-ACNC: 5.3 MIU/ML
N GONORRHOEA DNA SPEC QL NAA+PROBE: NOT DETECTED

## 2022-11-17 LAB
BACTERIAL VAGINOSIS DNA: NEGATIVE
CANDIDA GLABRATA DNA: NEGATIVE
CANDIDA KRUSEI DNA: NEGATIVE
CANDIDA RRNA VAG QL PROBE: NEGATIVE
RPR SER QL: NORMAL
T VAGINALIS RRNA GENITAL QL PROBE: NEGATIVE

## 2022-11-18 LAB
17OHP SERPL-MCNC: 87 NG/DL (ref 35–413)
TESTOST FREE SERPL-MCNC: 1.1 PG/ML

## 2022-11-22 LAB
FINAL PATHOLOGIC DIAGNOSIS: NORMAL
Lab: NORMAL

## 2022-12-01 ENCOUNTER — PATIENT MESSAGE (OUTPATIENT)
Dept: OBSTETRICS AND GYNECOLOGY | Facility: CLINIC | Age: 22
End: 2022-12-01
Payer: COMMERCIAL

## 2022-12-05 ENCOUNTER — HOSPITAL ENCOUNTER (OUTPATIENT)
Dept: RADIOLOGY | Facility: HOSPITAL | Age: 22
Discharge: HOME | End: 2022-12-05
Attending: OBSTETRICS & GYNECOLOGY
Payer: COMMERCIAL

## 2022-12-05 DIAGNOSIS — N92.6 IRREGULAR PERIODS: ICD-10-CM

## 2022-12-05 PROCEDURE — 76830 TRANSVAGINAL US NON-OB: CPT | Mod: TC

## 2022-12-05 PROCEDURE — 76856 US PELVIS COMP WITH TRANSVAG NON-OB (XPD): ICD-10-PCS | Mod: 26,,, | Performed by: RADIOLOGY

## 2022-12-05 PROCEDURE — 76830 TRANSVAGINAL US NON-OB: CPT | Mod: 26,,, | Performed by: RADIOLOGY

## 2022-12-05 PROCEDURE — 76830 US PELVIS COMP WITH TRANSVAG NON-OB (XPD): ICD-10-PCS | Mod: 26,,, | Performed by: RADIOLOGY

## 2022-12-05 PROCEDURE — 76856 US EXAM PELVIC COMPLETE: CPT | Mod: 26,,, | Performed by: RADIOLOGY

## 2023-03-13 ENCOUNTER — HOSPITAL ENCOUNTER (OUTPATIENT)
Dept: RADIOLOGY | Facility: HOSPITAL | Age: 23
Discharge: HOME | End: 2023-03-13
Attending: FAMILY MEDICINE
Payer: COMMERCIAL

## 2023-03-13 ENCOUNTER — OFFICE VISIT (OUTPATIENT)
Dept: ORTHOPEDICS | Facility: CLINIC | Age: 23
End: 2023-03-13
Payer: COMMERCIAL

## 2023-03-13 ENCOUNTER — OFFICE VISIT (OUTPATIENT)
Dept: FAMILY MEDICINE | Facility: CLINIC | Age: 23
End: 2023-03-13
Payer: COMMERCIAL

## 2023-03-13 VITALS
SYSTOLIC BLOOD PRESSURE: 122 MMHG | BODY MASS INDEX: 38.92 KG/M2 | TEMPERATURE: 99 F | WEIGHT: 206.13 LBS | DIASTOLIC BLOOD PRESSURE: 88 MMHG | HEART RATE: 92 BPM | HEIGHT: 61 IN | OXYGEN SATURATION: 97 %

## 2023-03-13 VITALS — HEIGHT: 61 IN | BODY MASS INDEX: 38.89 KG/M2 | WEIGHT: 206 LBS

## 2023-03-13 DIAGNOSIS — Z00.00 ANNUAL PHYSICAL EXAM: Primary | ICD-10-CM

## 2023-03-13 DIAGNOSIS — M25.562 LEFT KNEE PAIN, UNSPECIFIED CHRONICITY: ICD-10-CM

## 2023-03-13 DIAGNOSIS — S83.282A: ICD-10-CM

## 2023-03-13 DIAGNOSIS — M23.52 RECURRENT LEFT KNEE INSTABILITY: Primary | ICD-10-CM

## 2023-03-13 PROCEDURE — 3074F SYST BP LT 130 MM HG: CPT | Mod: CPTII,S$GLB,, | Performed by: FAMILY MEDICINE

## 2023-03-13 PROCEDURE — 73562 X-RAY EXAM OF KNEE 3: CPT | Mod: TC,FY,PO,LT

## 2023-03-13 PROCEDURE — 3079F PR MOST RECENT DIASTOLIC BLOOD PRESSURE 80-89 MM HG: ICD-10-PCS | Mod: CPTII,S$GLB,, | Performed by: FAMILY MEDICINE

## 2023-03-13 PROCEDURE — 73562 XR KNEE 3 VIEW LEFT: ICD-10-PCS | Mod: 26,LT,, | Performed by: RADIOLOGY

## 2023-03-13 PROCEDURE — 99213 PR OFFICE/OUTPT VISIT, EST, LEVL III, 20-29 MIN: ICD-10-PCS | Mod: 25,S$GLB,, | Performed by: FAMILY MEDICINE

## 2023-03-13 PROCEDURE — 99999 PR PBB SHADOW E&M-EST. PATIENT-LVL V: ICD-10-PCS | Mod: PBBFAC,,, | Performed by: FAMILY MEDICINE

## 2023-03-13 PROCEDURE — 1159F PR MEDICATION LIST DOCUMENTED IN MEDICAL RECORD: ICD-10-PCS | Mod: CPTII,S$GLB,, | Performed by: FAMILY MEDICINE

## 2023-03-13 PROCEDURE — 1159F MED LIST DOCD IN RCRD: CPT | Mod: CPTII,S$GLB,, | Performed by: ORTHOPAEDIC SURGERY

## 2023-03-13 PROCEDURE — 99999 PR PBB SHADOW E&M-EST. PATIENT-LVL III: ICD-10-PCS | Mod: PBBFAC,,, | Performed by: ORTHOPAEDIC SURGERY

## 2023-03-13 PROCEDURE — 3008F BODY MASS INDEX DOCD: CPT | Mod: CPTII,S$GLB,, | Performed by: FAMILY MEDICINE

## 2023-03-13 PROCEDURE — 99395 PR PREVENTIVE VISIT,EST,18-39: ICD-10-PCS | Mod: S$GLB,,, | Performed by: FAMILY MEDICINE

## 2023-03-13 PROCEDURE — 3008F PR BODY MASS INDEX (BMI) DOCUMENTED: ICD-10-PCS | Mod: CPTII,S$GLB,, | Performed by: FAMILY MEDICINE

## 2023-03-13 PROCEDURE — 1160F PR REVIEW ALL MEDS BY PRESCRIBER/CLIN PHARMACIST DOCUMENTED: ICD-10-PCS | Mod: CPTII,S$GLB,, | Performed by: FAMILY MEDICINE

## 2023-03-13 PROCEDURE — 99999 PR PBB SHADOW E&M-EST. PATIENT-LVL III: CPT | Mod: PBBFAC,,, | Performed by: ORTHOPAEDIC SURGERY

## 2023-03-13 PROCEDURE — 99395 PREV VISIT EST AGE 18-39: CPT | Mod: S$GLB,,, | Performed by: FAMILY MEDICINE

## 2023-03-13 PROCEDURE — 1159F MED LIST DOCD IN RCRD: CPT | Mod: CPTII,S$GLB,, | Performed by: FAMILY MEDICINE

## 2023-03-13 PROCEDURE — 1160F RVW MEDS BY RX/DR IN RCRD: CPT | Mod: CPTII,S$GLB,, | Performed by: FAMILY MEDICINE

## 2023-03-13 PROCEDURE — 1159F PR MEDICATION LIST DOCUMENTED IN MEDICAL RECORD: ICD-10-PCS | Mod: CPTII,S$GLB,, | Performed by: ORTHOPAEDIC SURGERY

## 2023-03-13 PROCEDURE — 99203 PR OFFICE/OUTPT VISIT, NEW, LEVL III, 30-44 MIN: ICD-10-PCS | Mod: S$GLB,,, | Performed by: ORTHOPAEDIC SURGERY

## 2023-03-13 PROCEDURE — 73562 X-RAY EXAM OF KNEE 3: CPT | Mod: 26,LT,, | Performed by: RADIOLOGY

## 2023-03-13 PROCEDURE — 3079F DIAST BP 80-89 MM HG: CPT | Mod: CPTII,S$GLB,, | Performed by: FAMILY MEDICINE

## 2023-03-13 PROCEDURE — 3008F PR BODY MASS INDEX (BMI) DOCUMENTED: ICD-10-PCS | Mod: CPTII,S$GLB,, | Performed by: ORTHOPAEDIC SURGERY

## 2023-03-13 PROCEDURE — 99203 OFFICE O/P NEW LOW 30 MIN: CPT | Mod: S$GLB,,, | Performed by: ORTHOPAEDIC SURGERY

## 2023-03-13 PROCEDURE — 99213 OFFICE O/P EST LOW 20 MIN: CPT | Mod: 25,S$GLB,, | Performed by: FAMILY MEDICINE

## 2023-03-13 PROCEDURE — 99999 PR PBB SHADOW E&M-EST. PATIENT-LVL V: CPT | Mod: PBBFAC,,, | Performed by: FAMILY MEDICINE

## 2023-03-13 PROCEDURE — 3008F BODY MASS INDEX DOCD: CPT | Mod: CPTII,S$GLB,, | Performed by: ORTHOPAEDIC SURGERY

## 2023-03-13 PROCEDURE — 3074F PR MOST RECENT SYSTOLIC BLOOD PRESSURE < 130 MM HG: ICD-10-PCS | Mod: CPTII,S$GLB,, | Performed by: FAMILY MEDICINE

## 2023-03-13 RX ORDER — MELOXICAM 15 MG/1
15 TABLET ORAL DAILY
Qty: 30 TABLET | Refills: 1 | Status: SHIPPED | OUTPATIENT
Start: 2023-03-13 | End: 2023-05-12

## 2023-03-13 NOTE — PROGRESS NOTES
NEW PATIENT ORTHOPAEDIC: Knee    PRIMARY CARE PHYSICIAN: Hans Francisco MD   REFERRING PROVIDER: Camila Holcomb MD  [id number]  [place] [id number]     ASSESSMENT & PLAN:    Impression:  Left Knee Instability  Left Knee pain   Left knee concern for ACL injury  Left knee concern for MCL injury    Follow Up Plan: After MRI    Non operative care:    Karla Price has physical exam evidence of above and wishes to pursue an non-operative care. I am recommending the following: MRI, Brace, Mobic    Patient comes in with a acute on chronic issue.  She sustained a fall in 2016 onto her knee which she sought evaluation and was diagnosed with a knee sprain.  Ever since that period of time she is been in have episodes of recurrent knee buckling and instability.  The most recent event was a few days ago when she fell landing onto her knee.  Today she is in a fair amount of pain with associated swelling.  She has medial and lateral joint line pain.  She has pain with valgus stress.  She has subtle increased AP translation of her tibia on her femur concerning for possible ACL injury.  She has radiographs obtained that demonstrate possible avulsion fractures to her MCL and ACL.  I think with her concerning exam and radiographs along with this recurrent instability I think MRI is the next best step.  In the interim I will put her in a brace and treated with Mobic.  Pending the results of the MRI she may be somebody that benefits from surgical intervention in the future vs PT    The patient has been ordered:  Brace (DME)  Pain Prescription    CONSULTS:   None    ACTIVE PROBLEM LIST  Patient Active Problem List   Diagnosis   (none) - all problems resolved or deleted           SUBJECTIVE    CHIEF COMPLAINT: Knee Pain    HPI:   Karla Price is a 22 y.o. female here for evaluation and management of left knee pain. There is a specific incident that brought about this pain. she has had progressive problems with the knee(s) starting 8  years ago, acutely worsened yesterday but is now progressing to interfere with activities which include: walking, functional household ADL's, enjoying hobbies, exercise, rising from a sitting position, standing for prolonged periods of time, and climbing stairs     Currently the pain in the joint is rated at severe with activity. The pain is constant and is located in the knee, at level of joint line, located medially, located laterally, and with radiation. The pain is described as aching, severe, sharp, stiffness, shooting , and throbbing. Relieving factors include rest, ice or heat, and over the counter medication .     There is associated Catching and Clicking with instability      Karla Price has no additional complaints.     PROGRESSIVE SYMPTOMS:  Pain worsened by weight bearing  Pain limiting ability to stay fit and healthy    FUNCTIONAL STATUS:   Participate in recreational activities     PREVIOUS TREATMENTS:  Medical: OTC NSAIDS  Physical Therapy: Braces, Activities Modified , and Home Exercise Program  Previous Orthopaedic Surgery: None    REVIEW OF SYSTEMS:  PAIN ASSESSMENT:  See HPI.  MUSCULOSKELETAL: See HPI.  OTHER 10 point review of systems is negative except as stated in HPI above    PAST MEDICAL HISTORY   has no past medical history on file.     PAST SURGICAL HISTORY   has no past surgical history on file.     FAMILY HISTORY  family history includes Breast cancer (age of onset: 30) in her maternal aunt; Fibrocystic breast disease in her mother; Liver cancer in her maternal grandmother; No Known Problems in her brother, father, maternal grandfather, maternal uncle, paternal aunt, paternal grandfather, paternal grandmother, paternal uncle, and sister.     SOCIAL HISTORY   reports that she has never smoked. She has never used smokeless tobacco. She reports that she does not drink alcohol and does not use drugs.     ALLERGIES   Review of patient's allergies indicates:   Allergen Reactions    Nuts [tree  "nut] Hives        MEDICATIONS  Current Outpatient Medications on File Prior to Visit   Medication Sig Dispense Refill    [DISCONTINUED] naproxen (NAPROSYN) 500 MG tablet Take 1 tablet (500 mg total) by mouth 2 (two) times daily as needed. (Patient not taking: Reported on 3/13/2023) 30 tablet 0    [DISCONTINUED] triamcinolone acetonide 0.1% (KENALOG) 0.1 % cream Apply topically 2 (two) times daily. (Patient not taking: Reported on 3/13/2023) 30 g 0    [DISCONTINUED] triamcinolone acetonide 0.1% (KENALOG) 0.1 % cream AAA bid prn rash arms and bid prn thin hair patches scalp 60 g 0     No current facility-administered medications on file prior to visit.          PHYSICAL EXAM   height is 5' 1" (1.549 m) and weight is 93.4 kg (206 lb).   Body mass index is 38.92 kg/m².      All other systems deferred.  GENERAL:  No acute distress  HABITUS: Obese  GAIT: Antalgic  SKIN: Normal     KNEE EXAM:    left:   Effusion: Small joint effusion  TTP: yes over Medial Joint Line, Lateral Joint Line, and MCL   no crepitus with passive knee ROM  Passive ROM: Extension 5, Flexion 115  No pain with manipulation of patella  Stable to varus/valgus stress but pain with valgus stress. subtle increased laxity to anterior drawer testing  positive Andreina's test  No pain with IR/ER rotation of the hip  4/5 strength in knee flexion and extension, 5/5 ankle plantarflexion and dorsiflexion  Neurovascular Status: Sensation intact to light touch in Sural, Saphenous, SPN, DPN, Tibial nerve distribution  2+ pulse DP/PT, normal capillary refill, foot has normal warmth    DATA:  Diagnostic tests reviewed for today's visit:     4v of the knee reveal chronic appear avulsion fractures of the medial tibial plateau and tibial eminence. Otherwise, the obtained knee radiographs appears normal for age.  "

## 2023-03-13 NOTE — PROGRESS NOTES
"Routine Office Visit    Karla Price  2000  [id number]      Subjective     Karla is a 22 y.o. female who presents today for:    Annual physical   Modified visit - topics discussed outside of annual physical   Re-injury of left knee - Patient has history of left knee injury in the past.  Previous injury occurred in 2019 - imaging done, noted on care everywhere. She does not recally, how she fell exactly, but last night, she thinks, she had a pivotal shift, then fell forward and hit her knee onto the ground. She had immediate swelling of her left knee and severe pain. She iced her knee. It is difficult for her to bear weight. It is difficult for her to fully extend or flex her knee. No cuts or laceration are noted. She has been taking nsaids with minimal relief of symptoms.     Objective     Review of Systems   Constitutional:  Negative for chills and fever.   HENT:  Negative for congestion.    Eyes:  Negative for blurred vision.   Respiratory:  Negative for cough.    Cardiovascular:  Negative for chest pain.   Gastrointestinal:  Negative for abdominal pain, constipation, diarrhea, heartburn, nausea and vomiting.   Genitourinary:  Negative for dysuria.   Musculoskeletal:  Negative for myalgias.   Skin:  Negative for itching and rash.   Neurological:  Negative for dizziness and headaches.   Psychiatric/Behavioral:  Negative for depression.      /88   Pulse 92   Temp 98.6 °F (37 °C) (Oral)   Ht 5' 1" (1.549 m)   Wt 93.5 kg (206 lb 2.1 oz)   LMP 07/01/2022 (Approximate)   SpO2 97%   BMI 38.95 kg/m²   Physical Exam  Constitutional:       Appearance: She is well-developed.   HENT:      Head: Normocephalic and atraumatic.   Eyes:      Conjunctiva/sclera: Conjunctivae normal.      Pupils: Pupils are equal, round, and reactive to light.   Cardiovascular:      Rate and Rhythm: Normal rate and regular rhythm.      Heart sounds: Normal heart sounds. No murmur heard.    No friction rub. No gallop.   Pulmonary:      " Effort: No respiratory distress.      Breath sounds: Normal breath sounds.   Abdominal:      General: Bowel sounds are normal. There is no distension.      Palpations: Abdomen is soft.      Tenderness: There is no abdominal tenderness.   Musculoskeletal:      Cervical back: Normal range of motion and neck supple.      Left knee: Crepitus present. Decreased range of motion. Tenderness present over the lateral joint line. Abnormal alignment and abnormal meniscus.   Lymphadenopathy:      Cervical: No cervical adenopathy.   Skin:     General: Skin is warm.   Neurological:      Mental Status: She is alert and oriented to person, place, and time.           Assessment     Health Maintenance         Date Due Completion Date    Lipid Panel Never done ---    TETANUS VACCINE 12/28/2020 12/28/2010    COVID-19 Vaccine (4 - Booster for Moderna series) 03/01/2022 1/4/2022    Influenza Vaccine (1) 09/01/2022 11/1/2018    HPV Vaccines (3 - 3-dose series) 10/18/2022 5/16/2022    Chlamydia Screening 11/15/2023 11/15/2022    Pap Smear 11/15/2025 11/15/2022              Problem List Items Addressed This Visit    None  Visit Diagnoses       Annual physical exam    -  Primary    Relevant Orders    CBC Auto Differential    Lipid Panel    Comprehensive Metabolic Panel    Hemoglobin A1C    TSH  I addressed all major concerns as it related to health maintenance.  All were ordered and scheduled based on the patients wishes.  Any additional health maintenance will be readdressed at the next physical if declined or deferred by the patient.    Modified visit - topics discussed outside of annual physical   ROS: knee pain   PE: as above    Left knee pain, unspecified chronicity        Relevant Orders    X-Ray Knee 3 View Left (Completed)    Ambulatory referral/consult to Orthopedics    Ambulatory referral/consult to Physical/Occupational Therapy    RHEUMATOID FACTOR    URIC ACID    MRI Knee Without Contrast Left  RICE   Concern for meniscal  tear  Mom reports concern for hx of joint laxity      Oth tear of lat mensc, current injury, left knee, init        Relevant Orders    MRI Knee Without Contrast Left                  Follow up if symptoms worsen or fail to improve, for yearly exam.

## 2023-03-14 ENCOUNTER — LAB VISIT (OUTPATIENT)
Dept: LAB | Facility: HOSPITAL | Age: 23
End: 2023-03-14
Attending: FAMILY MEDICINE
Payer: COMMERCIAL

## 2023-03-14 DIAGNOSIS — M25.50 ARTHRALGIA, UNSPECIFIED JOINT: ICD-10-CM

## 2023-03-14 DIAGNOSIS — Z00.00 ROUTINE PHYSICAL EXAMINATION: ICD-10-CM

## 2023-03-14 DIAGNOSIS — N92.6 MISSED PERIODS: ICD-10-CM

## 2023-03-14 LAB
ALBUMIN SERPL BCP-MCNC: 3.4 G/DL (ref 3.5–5.2)
ALP SERPL-CCNC: 85 U/L (ref 55–135)
ALT SERPL W/O P-5'-P-CCNC: 29 U/L (ref 10–44)
ANION GAP SERPL CALC-SCNC: 9 MMOL/L (ref 8–16)
AST SERPL-CCNC: 29 U/L (ref 10–40)
BASOPHILS # BLD AUTO: 0.05 K/UL (ref 0–0.2)
BASOPHILS NFR BLD: 0.4 % (ref 0–1.9)
BILIRUB SERPL-MCNC: 0.5 MG/DL (ref 0.1–1)
BUN SERPL-MCNC: 9 MG/DL (ref 6–20)
CALCIUM SERPL-MCNC: 9.5 MG/DL (ref 8.7–10.5)
CHLORIDE SERPL-SCNC: 109 MMOL/L (ref 95–110)
CHOLEST SERPL-MCNC: 179 MG/DL (ref 120–199)
CHOLEST/HDLC SERPL: 3.4 {RATIO} (ref 2–5)
CO2 SERPL-SCNC: 23 MMOL/L (ref 23–29)
CREAT SERPL-MCNC: 0.9 MG/DL (ref 0.5–1.4)
CRP SERPL-MCNC: 31.8 MG/L (ref 0–8.2)
DIFFERENTIAL METHOD: ABNORMAL
EOSINOPHIL # BLD AUTO: 0.2 K/UL (ref 0–0.5)
EOSINOPHIL NFR BLD: 1.1 % (ref 0–8)
ERYTHROCYTE [DISTWIDTH] IN BLOOD BY AUTOMATED COUNT: 13.5 % (ref 11.5–14.5)
EST. GFR  (NO RACE VARIABLE): >60 ML/MIN/1.73 M^2
ESTIMATED AVG GLUCOSE: 103 MG/DL (ref 68–131)
GLUCOSE SERPL-MCNC: 77 MG/DL (ref 70–110)
HBA1C MFR BLD: 5.2 % (ref 4–5.6)
HCG INTACT+B SERPL-ACNC: <2.4 MIU/ML
HCT VFR BLD AUTO: 44.5 % (ref 37–48.5)
HDLC SERPL-MCNC: 53 MG/DL (ref 40–75)
HDLC SERPL: 29.6 % (ref 20–50)
HGB BLD-MCNC: 14 G/DL (ref 12–16)
IMM GRANULOCYTES # BLD AUTO: 0.12 K/UL (ref 0–0.04)
IMM GRANULOCYTES NFR BLD AUTO: 0.9 % (ref 0–0.5)
LDLC SERPL CALC-MCNC: 99.6 MG/DL (ref 63–159)
LYMPHOCYTES # BLD AUTO: 2.8 K/UL (ref 1–4.8)
LYMPHOCYTES NFR BLD: 19.7 % (ref 18–48)
MCH RBC QN AUTO: 30 PG (ref 27–31)
MCHC RBC AUTO-ENTMCNC: 31.5 G/DL (ref 32–36)
MCV RBC AUTO: 95 FL (ref 82–98)
MONOCYTES # BLD AUTO: 1.1 K/UL (ref 0.3–1)
MONOCYTES NFR BLD: 8.2 % (ref 4–15)
NEUTROPHILS # BLD AUTO: 9.8 K/UL (ref 1.8–7.7)
NEUTROPHILS NFR BLD: 69.7 % (ref 38–73)
NONHDLC SERPL-MCNC: 126 MG/DL
NRBC BLD-RTO: 0 /100 WBC
PLATELET # BLD AUTO: 294 K/UL (ref 150–450)
PMV BLD AUTO: 12.5 FL (ref 9.2–12.9)
POTASSIUM SERPL-SCNC: 4 MMOL/L (ref 3.5–5.1)
PROT SERPL-MCNC: 7.8 G/DL (ref 6–8.4)
RBC # BLD AUTO: 4.67 M/UL (ref 4–5.4)
SODIUM SERPL-SCNC: 141 MMOL/L (ref 136–145)
T4 FREE SERPL-MCNC: 0.74 NG/DL (ref 0.71–1.51)
TRIGL SERPL-MCNC: 132 MG/DL (ref 30–150)
TSH SERPL DL<=0.005 MIU/L-ACNC: 1.64 UIU/ML (ref 0.4–4)
WBC # BLD AUTO: 13.97 K/UL (ref 3.9–12.7)

## 2023-03-14 PROCEDURE — 85652 RBC SED RATE AUTOMATED: CPT | Performed by: FAMILY MEDICINE

## 2023-03-14 PROCEDURE — 86140 C-REACTIVE PROTEIN: CPT | Performed by: FAMILY MEDICINE

## 2023-03-14 PROCEDURE — 86235 NUCLEAR ANTIGEN ANTIBODY: CPT | Mod: 59 | Performed by: FAMILY MEDICINE

## 2023-03-14 PROCEDURE — 36415 COLL VENOUS BLD VENIPUNCTURE: CPT | Mod: PO | Performed by: FAMILY MEDICINE

## 2023-03-14 PROCEDURE — 80053 COMPREHEN METABOLIC PANEL: CPT | Performed by: FAMILY MEDICINE

## 2023-03-14 PROCEDURE — 84702 CHORIONIC GONADOTROPIN TEST: CPT | Performed by: FAMILY MEDICINE

## 2023-03-14 PROCEDURE — 84443 ASSAY THYROID STIM HORMONE: CPT | Performed by: FAMILY MEDICINE

## 2023-03-14 PROCEDURE — 85025 COMPLETE CBC W/AUTO DIFF WBC: CPT | Performed by: FAMILY MEDICINE

## 2023-03-14 PROCEDURE — 84439 ASSAY OF FREE THYROXINE: CPT | Performed by: FAMILY MEDICINE

## 2023-03-14 PROCEDURE — 83036 HEMOGLOBIN GLYCOSYLATED A1C: CPT | Performed by: FAMILY MEDICINE

## 2023-03-14 PROCEDURE — 80061 LIPID PANEL: CPT | Performed by: FAMILY MEDICINE

## 2023-03-14 PROCEDURE — 86039 ANTINUCLEAR ANTIBODIES (ANA): CPT | Performed by: FAMILY MEDICINE

## 2023-03-14 PROCEDURE — 86038 ANTINUCLEAR ANTIBODIES: CPT | Performed by: FAMILY MEDICINE

## 2023-03-15 ENCOUNTER — PATIENT MESSAGE (OUTPATIENT)
Dept: FAMILY MEDICINE | Facility: CLINIC | Age: 23
End: 2023-03-15
Payer: COMMERCIAL

## 2023-03-15 LAB
ANA PATTERN 1: NORMAL
ANA SER QL IF: POSITIVE
ANA TITR SER IF: NORMAL {TITER}
ERYTHROCYTE [SEDIMENTATION RATE] IN BLOOD BY PHOTOMETRIC METHOD: 30 MM/HR (ref 0–36)

## 2023-03-20 LAB
ANTI SM ANTIBODY: 0.09 RATIO (ref 0–0.99)
ANTI SM/RNP ANTIBODY: 0.1 RATIO (ref 0–0.99)
ANTI-SM INTERPRETATION: NEGATIVE
ANTI-SM/RNP INTERPRETATION: NEGATIVE
ANTI-SSA ANTIBODY: 0.06 RATIO (ref 0–0.99)
ANTI-SSA INTERPRETATION: NEGATIVE
ANTI-SSB ANTIBODY: 0.06 RATIO (ref 0–0.99)
ANTI-SSB INTERPRETATION: NEGATIVE
DSDNA AB SER-ACNC: NORMAL [IU]/ML

## 2023-03-22 ENCOUNTER — HOSPITAL ENCOUNTER (OUTPATIENT)
Dept: RADIOLOGY | Facility: HOSPITAL | Age: 23
Discharge: HOME | End: 2023-03-22
Attending: FAMILY MEDICINE
Payer: COMMERCIAL

## 2023-03-22 DIAGNOSIS — S83.282A: ICD-10-CM

## 2023-03-22 DIAGNOSIS — M25.562 LEFT KNEE PAIN, UNSPECIFIED CHRONICITY: ICD-10-CM

## 2023-03-22 PROCEDURE — 73721 MRI KNEE WITHOUT CONTRAST LEFT: ICD-10-PCS | Mod: 26,LT,, | Performed by: RADIOLOGY

## 2023-03-22 PROCEDURE — 73721 MRI JNT OF LWR EXTRE W/O DYE: CPT | Mod: 26,LT,, | Performed by: RADIOLOGY

## 2023-03-22 PROCEDURE — 73721 MRI JNT OF LWR EXTRE W/O DYE: CPT | Mod: TC,LT

## 2023-03-23 ENCOUNTER — TELEPHONE (OUTPATIENT)
Dept: ORTHOPEDICS | Facility: CLINIC | Age: 23
End: 2023-03-23
Payer: COMMERCIAL

## 2023-03-23 DIAGNOSIS — S83.519A RUPTURE OF ANTERIOR CRUCIATE LIGAMENT OF KNEE, UNSPECIFIED LATERALITY, INITIAL ENCOUNTER: Primary | ICD-10-CM

## 2023-03-23 NOTE — TELEPHONE ENCOUNTER
----- Message from Jessica Carolina sent at 3/23/2023 10:17 AM CDT -----  Type: Patient Call Back    Who called:pt     What is the request in detail pt calling to speak to nurse in regards to recent mri taken and she found out she tore her acl. Please call pt     Can the clinic reply by [facility]?    Would the patient rather a call back or a response via [facility]? call    Best call back number:[phone] (home)       Additional Information:

## 2023-03-24 ENCOUNTER — TELEPHONE (OUTPATIENT)
Dept: ORTHOPEDICS | Facility: CLINIC | Age: 23
End: 2023-03-24
Payer: COMMERCIAL

## 2023-03-24 ENCOUNTER — TELEPHONE (OUTPATIENT)
Dept: FAMILY MEDICINE | Facility: CLINIC | Age: 23
End: 2023-03-24
Payer: COMMERCIAL

## 2023-03-24 NOTE — TELEPHONE ENCOUNTER
[facility] for patient that MRI results have been received, and that Dr. Fraser has placed a referral to see Dr. Payton.  If the patient has any questions she can call the clinic at [phone].

## 2023-03-27 ENCOUNTER — TELEPHONE (OUTPATIENT)
Dept: SPORTS MEDICINE | Facility: CLINIC | Age: 23
End: 2023-03-27
Payer: COMMERCIAL

## 2023-03-27 NOTE — TELEPHONE ENCOUNTER
----- Message from Georgi Harvey MA sent at 3/23/2023  4:43 PM CDT -----  Regarding: Referral from Dr. Fraser  Can you schedule this with [facility] if ya haven't already please!  ----- Message -----  From: [facility]  Sent: 3/23/2023   3:34 PM CDT  To: [facility] Staff    Dr Fraser has placed a referral can you please call this patient to schedule.  Thanks,  Laura  ----- Message -----  From: Blake Fraser MD  Sent: 3/23/2023  12:39 PM CDT  To: [facility]    Ill send to [facility]   ----- Message -----  From: [facility]  Sent: 3/23/2023  12:27 PM CDT  To: Blake Fraser MD    Who do you want to refer her to?  Put the referral in and ill message their staff.  ----- Message -----  From: Jocelynn Salter MA  Sent: 3/23/2023  10:39 AM CDT  To: [facility]    I called her she said she just wanted to update dr fraser on her MRI results.   ----- Message -----  From: Jessica Carolina  Sent: 3/23/2023  10:18 AM CDT  To: [facility] Staff    Type: Patient Call Back    Who called:pt     What is the request in detail pt calling to speak to nurse in regards to recent mri taken and she found out she tore her acl. Please call pt     Can the clinic reply by [facility]?    Would the patient rather a call back or a response via [facility]? call    Best call back number:[phone] (home)       Additional Information:

## 2023-03-29 ENCOUNTER — OFFICE VISIT (OUTPATIENT)
Dept: SPORTS MEDICINE | Facility: CLINIC | Age: 23
End: 2023-03-29
Payer: COMMERCIAL

## 2023-03-29 VITALS
HEART RATE: 83 BPM | WEIGHT: 205 LBS | DIASTOLIC BLOOD PRESSURE: 83 MMHG | SYSTOLIC BLOOD PRESSURE: 128 MMHG | HEIGHT: 61 IN | BODY MASS INDEX: 38.71 KG/M2

## 2023-03-29 DIAGNOSIS — M25.362 INSTABILITY OF LEFT KNEE JOINT: ICD-10-CM

## 2023-03-29 DIAGNOSIS — S83.282A TEAR OF LATERAL MENISCUS OF LEFT KNEE, CURRENT, UNSPECIFIED TEAR TYPE, INITIAL ENCOUNTER: ICD-10-CM

## 2023-03-29 DIAGNOSIS — S83.519A RUPTURE OF ANTERIOR CRUCIATE LIGAMENT OF KNEE, UNSPECIFIED LATERALITY, INITIAL ENCOUNTER: Primary | ICD-10-CM

## 2023-03-29 DIAGNOSIS — S83.242A TEAR OF MEDIAL MENISCUS OF LEFT KNEE, CURRENT, UNSPECIFIED TEAR TYPE, INITIAL ENCOUNTER: ICD-10-CM

## 2023-03-29 PROCEDURE — 99999 PR PBB SHADOW E&M-EST. PATIENT-LVL III: ICD-10-PCS | Mod: PBBFAC,,, | Performed by: ORTHOPAEDIC SURGERY

## 2023-03-29 PROCEDURE — 1159F MED LIST DOCD IN RCRD: CPT | Mod: CPTII,S$GLB,, | Performed by: ORTHOPAEDIC SURGERY

## 2023-03-29 PROCEDURE — 3008F BODY MASS INDEX DOCD: CPT | Mod: CPTII,S$GLB,, | Performed by: ORTHOPAEDIC SURGERY

## 2023-03-29 PROCEDURE — 3074F SYST BP LT 130 MM HG: CPT | Mod: CPTII,S$GLB,, | Performed by: ORTHOPAEDIC SURGERY

## 2023-03-29 PROCEDURE — 3079F PR MOST RECENT DIASTOLIC BLOOD PRESSURE 80-89 MM HG: ICD-10-PCS | Mod: CPTII,S$GLB,, | Performed by: ORTHOPAEDIC SURGERY

## 2023-03-29 PROCEDURE — 3008F PR BODY MASS INDEX (BMI) DOCUMENTED: ICD-10-PCS | Mod: CPTII,S$GLB,, | Performed by: ORTHOPAEDIC SURGERY

## 2023-03-29 PROCEDURE — 99213 PR OFFICE/OUTPT VISIT, EST, LEVL III, 20-29 MIN: ICD-10-PCS | Mod: S$GLB,,, | Performed by: ORTHOPAEDIC SURGERY

## 2023-03-29 PROCEDURE — 3074F PR MOST RECENT SYSTOLIC BLOOD PRESSURE < 130 MM HG: ICD-10-PCS | Mod: CPTII,S$GLB,, | Performed by: ORTHOPAEDIC SURGERY

## 2023-03-29 PROCEDURE — 99213 OFFICE O/P EST LOW 20 MIN: CPT | Mod: S$GLB,,, | Performed by: ORTHOPAEDIC SURGERY

## 2023-03-29 PROCEDURE — 1159F PR MEDICATION LIST DOCUMENTED IN MEDICAL RECORD: ICD-10-PCS | Mod: CPTII,S$GLB,, | Performed by: ORTHOPAEDIC SURGERY

## 2023-03-29 PROCEDURE — 3079F DIAST BP 80-89 MM HG: CPT | Mod: CPTII,S$GLB,, | Performed by: ORTHOPAEDIC SURGERY

## 2023-03-29 PROCEDURE — 3044F PR MOST RECENT HEMOGLOBIN A1C LEVEL <7.0%: ICD-10-PCS | Mod: CPTII,S$GLB,, | Performed by: ORTHOPAEDIC SURGERY

## 2023-03-29 PROCEDURE — 3044F HG A1C LEVEL LT 7.0%: CPT | Mod: CPTII,S$GLB,, | Performed by: ORTHOPAEDIC SURGERY

## 2023-03-29 PROCEDURE — 99999 PR PBB SHADOW E&M-EST. PATIENT-LVL III: CPT | Mod: PBBFAC,,, | Performed by: ORTHOPAEDIC SURGERY

## 2023-03-29 NOTE — PROGRESS NOTES
NEW PATIENT    HISTORY OF PRESENT ILLNESS   22 y.o. Female with a history of left knee pain who is an . She states she as walking with her friends and her knee buckled on her. This is not the first occurrence. In 2016 she fell for the first time while a dog was chasing her. She states she has felt unstable ever since. She has been unable to play sports because of her knee and instability. She had another fall after the fall in 2016 and was put on crutches. She presents to clinic today in a brace that she has been wearing for a few weeks.        - mechanical symptoms, + instability    Is affecting ADLs.  Pain is 5/10 at it's worst.        PAST MEDICAL HISTORY    History reviewed. No pertinent past medical history.    PAST SURGICAL HISTORY     History reviewed. No pertinent surgical history.    FAMILY HISTORY    Family History   Problem Relation Age of Onset    Breast cancer Maternal Aunt 30        maternal great aunt with breast cancer    Fibrocystic breast disease Mother     No Known Problems Sister     Liver cancer Maternal Grandmother     No Known Problems Father     No Known Problems Brother     No Known Problems Maternal Uncle     No Known Problems Paternal Aunt     No Known Problems Paternal Uncle     No Known Problems Maternal Grandfather     No Known Problems Paternal Grandmother     No Known Problems Paternal Grandfather     Colon cancer Neg Hx     Ovarian cancer Neg Hx     Amblyopia Neg Hx     Blindness Neg Hx     Cancer Neg Hx     Cataracts Neg Hx     Diabetes Neg Hx     Glaucoma Neg Hx     Hypertension Neg Hx     Macular degeneration Neg Hx     Retinal detachment Neg Hx     Strabismus Neg Hx     Stroke Neg Hx     Thyroid disease Neg Hx        SOCIAL HISTORY    Social History     Socioeconomic History    Marital status: Single   Tobacco Use    Smoking status: Never    Smokeless tobacco: Never   Substance and Sexual Activity    Alcohol use: No    Drug use: No    Sexual activity:  "Never       MEDICATIONS      Current Outpatient Medications:     meloxicam (MOBIC) 15 MG tablet, Take 1 tablet (15 mg total) by mouth once daily., Disp: 30 tablet, Rfl: 1    ALLERGIES     Review of patient's allergies indicates:   Allergen Reactions    Nuts [tree nut] Hives         REVIEW OF SYSTEMS   Constitution: Negative. Negative for chills, fever and night sweats.   HENT: Negative for congestion and headaches.    Eyes: Negative for blurred vision, left vision loss and right vision loss.   Cardiovascular: Negative for chest pain and syncope.   Respiratory: Negative for cough and shortness of breath.    Endocrine: Negative for polydipsia, polyphagia and polyuria.   Hematologic/Lymphatic: Negative for bleeding problem. Does not bruise/bleed easily.   Skin: Negative for dry skin, itching and rash.   Musculoskeletal: Negative for falls. Positive for left knee pain  Gastrointestinal: Negative for abdominal pain and bowel incontinence.   Genitourinary: Negative for bladder incontinence and nocturia.   Neurological: Negative for disturbances in coordination, loss of balance and seizures.   Psychiatric/Behavioral: Negative for depression. The patient does not have insomnia.    Allergic/Immunologic: Negative for hives and persistent infections.     PHYSICAL EXAMINATION    Vitals: /83   Pulse 83   Ht 5' 1" (1.549 m)   Wt 93 kg (205 lb)   LMP 07/01/2022 (Approximate)   BMI 38.73 kg/m²     General: The patient appears active and healthy with no apparent physical problems.  No disturbance of mood or affect is demonstrated. Alert and Oriented.    HEENT: Eyes normal, pupils equally round, nose normal.    Resp: Equal and symmetrical chest rises. No wheezing    CV: Regular rate    Neck: Supple; nonpainful range of motion.    Extremities: no cyanosis, clubbing, edema, or diffuse swelling.  Palpable pulses, good capillary refill of the digits.  No coolness, discoloration, edema or obvious varicosities.    Skin: no " lesions noted.    Lymphatic: no detected adenopathy in the upper or lower extremities.    Neurologic: normal mental status, normal reflexes, normal gait and balance.  Patient is alert and oriented to person, place and time.  No flaccidity or spasticity is noted.  No motor or sensory deficits are noted.  Light touch is intact    Orthopaedic:     KNEE EXAM - LEFT    Inspection:   Normal skin color and appearance with no scars, no ecchymosis and no effusion. Valgus alignment noted      ROM:   5° - 145°.  *pain with terminal extension, hyperextension noted on the right    Palpation:   There is no tenderness along medial joint line, medial plica, medial collateral ligament, pes bursa, lateral joint line, iliotibial band, lateral collateral ligament, popliteal fossa, patellar tendon, or quadriceps tendon.    Stability: + anterior drawer, + Lachman, - pivot shift and - posterior drawer.      Grade II Varus stress testing at 0 and 30 degrees    Tests:   - Andreinas test.  - patellar compression - grind test, - crepitus.  There is no patellar apprehension.     - J Sign. - Victor Manuel's. - patellar tilt. - Ana. Lateral patella translation  2 quadrants. Medial patella translation 2 quadrants    Motor:   Quadriceps strength is 5/5 and hamstrings strength is 5/5. Hamstrings show no tightness.      Neuro:   Distal neurovascular status is normal    Vascular: Negative Homans and no palpable popliteal cords. 2+ pedal pulse with brisk cap refill    Gait Slightly antalgic    Beighton scale: 8/9    IMAGING    MRI Knee Without Contrast Left  Narrative: EXAMINATION:  MRI KNEE WITHOUT CONTRAST LEFT    CLINICAL HISTORY:  Meniscal tear, untreated, new symptoms;Pain in left knee    TECHNIQUE:  Multiplanar, multisequence images were performed of the left knee.    COMPARISON:  X-ray 03/13/2023    FINDINGS:  Menisci:Vertical longitudinal tear posterior horn medial meniscus extending to the root attachment disrupting the superior articular surface.   There is attenuation of the free edge of the lateral meniscus concerning for a radial tear.    Ligaments:Complete tear ACL.  PCL fibers are intact.  Fibular collateral distal biceps femoris and IT band are intact.  Small posterolateral corner ligaments not identifiable with certainty and prominent pericapsular soft tissue edema raises suspicion for posterolateral corner injury.    Extensor Mechanism:The quadriceps tendon is intact.  There is complete disruption of the medial patellofemoral ligament at the MCL attachment associated with mild/minimal medial subluxation of the patella.    Bone and Joint including articular cartilage:There is a displaced osteochondral fracture of the inferomedial pole of patella with the cortical fragment seen inferiorly in the anterior aspect of the intercondylar notch.  Associated contrecoup osteochondral impaction injury of the lateral trochlear surface of the femur noted.  Fracture lines extend through the articular aspect of the medial and lateral tibial plateaus, with approximately 5 mm of articular surface depression of the posteromedial tibial plateau.  Large joint effusion with lipohemarthrosis.    Soft Tissues:Prominent periarticular soft tissue edema and grade 1 popliteus strain.    Miscellaneous:None  Impression: Complete ACL tear with associated posterolateral corner injury.    Mildly depressed medial tibial plateau fracture and nondepressed posterolateral tibial plateau fracture    Findings of transient lateral patellar dislocation with large displaced osteochondral fragment from the inferomedial pole of the patella.  Partial tear of the MPFL at the MCL origin.    Medial and lateral meniscal tears    This report was flagged in Epic as abnormal.    Electronically signed by: Josh Dupree Jr  Date:    03/22/2023  Time:    15:46        IMPRESSION       ICD-10-CM ICD-9-CM   1. Rupture of anterior cruciate ligament of knee, unspecified laterality, initial encounter   S83.519A 844.2   2. Tear of medial meniscus of left knee, current, unspecified tear type, initial encounter  S83.242A 836.0   3. Tear of lateral meniscus of left knee, current, unspecified tear type, initial encounter  S83.282A 836.1   4. Instability of left knee joint  M25.362 718.86       MEDICATIONS PRESCRIBED      None    RECOMMENDATIONS     Surgical versus non-surgical options discussed today; left knee arthroscopy with anterior cruciate ligament reconstruction using bone patellar tendon bone autograft with medial and lateral meniscus repair versus menisectomy, loose body removal and possible posterolateral corner reconstruction  Referral to physical therapy placed today  Patient given T-Scope brace  RTC in 1 month.  I advised her that we need to get her nondisplaced tibial plateau fracture to heal 1st.  We will then reassess.  She has a very complex knee injury in addition to significant hyperlaxity.  I advised her as well that this could be a staged procedure.      All questions were answered, pt will contact us for questions or concerns in the interim.

## 2023-03-31 ENCOUNTER — CLINICAL SUPPORT (OUTPATIENT)
Dept: REHABILITATION | Facility: HOSPITAL | Age: 23
End: 2023-03-31
Attending: FAMILY MEDICINE
Payer: COMMERCIAL

## 2023-03-31 DIAGNOSIS — M62.81 MUSCLE WEAKNESS: ICD-10-CM

## 2023-03-31 DIAGNOSIS — M25.662 KNEE STIFFNESS, LEFT: ICD-10-CM

## 2023-03-31 DIAGNOSIS — M25.562 LEFT KNEE PAIN, UNSPECIFIED CHRONICITY: ICD-10-CM

## 2023-03-31 PROCEDURE — 97110 THERAPEUTIC EXERCISES: CPT | Mod: PN

## 2023-03-31 PROCEDURE — 97161 PT EVAL LOW COMPLEX 20 MIN: CPT | Mod: PN

## 2023-03-31 PROCEDURE — 97530 THERAPEUTIC ACTIVITIES: CPT | Mod: PN

## 2023-03-31 NOTE — PROGRESS NOTES
[facility]   Physical Therapy Initial Evaluation     Date: 3/31/2023   Name: Karla Price  [facility] Number: [id number]    Therapy Diagnosis:   Encounter Diagnoses   Name Primary?    Left knee pain, unspecified chronicity     Knee stiffness, left     Muscle weakness      Physician: Camila Holcomb MD    Physician Orders: PT Eval and Treat   Medical Diagnosis from Referral:   S83.519A (ICD-10-CM) - Rupture of anterior cruciate ligament of knee, unspecified laterality, initial encounter   S83.242A (ICD-10-CM) - Tear of medial meniscus of left knee, current, unspecified tear type, initial encounter   S83.282A (ICD-10-CM) - Tear of lateral meniscus of left knee, current, unspecified tear type, initial encounter     Evaluation Date: 3/31/2023  Authorization Period Expiration: 3/12/2024  Plan of Care Expiration: 6/23/2023  Progress Note Due: 5/1/2023  Visit # / Visits authorized: 1/ 1     Precautions: Standard     Time In: 200p  Time Out: 300p  Total Appointment Time (timed & untimed codes): 60 minutes      SUBJECTIVE     Pt presents to PT w/ reports of L knee. She has confirmed L ACL complete rupture and medial and lateral meniscus tear. Pt is to complete PT over the next month to try and improve L knee ROM and strength before undergoing surgery to repair torn structures. States she also has an osteochondral lesion to which the Dr wants to see how much this can heel on its own while having her braced before surgery as well. She is unsure of exactly when symptoms began but states in 2016 she fell tripping over a water grate and ever since then has had instability in her knee resulting in several falls. Currently states she has pain along the medial/anterior knee. Main aggravating factors include stairs, walking, and prolonged sitting. She has been wearing a compression sleeve on her own and avoiding activity to help with the symptoms. Pt works as an  which requires a lot of  desk work primarily but also includes carrying boxes, walking, and going up/down stairs. She does not play any sports but wants to be able to return to doing normal activity at the gym. Goes back to see the Dr on 5/5.     Imaging, MRI studies: L knee, 3/22/23- Complete ACL tear with associated posterolateral corner injury.     Mildly depressed medial tibial plateau fracture and nondepressed posterolateral tibial plateau fracture     Findings of transient lateral patellar dislocation with large displaced osteochondral fragment from the inferomedial pole of the patella.  Partial tear of the MPFL at the MCL origin.     Medial and lateral meniscal tears        Pain:  Current 4/10, worst 10/10, best 4/10     Patients goals: To return to normal walking and go back to the gym     Medical History:   No past medical history on file.    Surgical History:   Karla Price  has no past surgical history on file.    Medications:   Karla has a current medication list which includes the following prescription(s): meloxicam.    Allergies:   Review of patient's allergies indicates:   Allergen Reactions    Nuts [tree nut] Hives          OBJECTIVE     Observation: gait, wearing long-runner brace on L knee w/ decreased L knee TKE during midstance and terminal stance      Range of Motion (Active):   Knee Right  Left    Flexion 135 95   Extension +12 -5       Lower Extremity Strength  Right LE  Left LE    Quadriceps: 5/5 Quadriceps: 4/5   Hamstrings: 5/5 Hamstrings: 4/5   Hip flexion (seated): 5/5 Hip flexion (seated): 4-/5   Hip extension:  5/5 Hip extension: 4/5   PGM: 5/5 PGM:  4/5     SLS: R 30sec, L 15sec      Joint Mobility: Hypomobile L TFJ AP and IR mob     Palpation: TTP along L patella tendon, medial joint line      TREATMENT     Total Treatment time (time-based codes) separate from Evaluation: 30 minutes      Karla received the treatments listed below:      therapeutic exercises to develop strength and ROM for 20 minutes  including:  Heel prop  Quad set  HS str  SLR  S/L SLR      therapeutic activities to improve functional performance for 10  minutes, including:  Pt education about current status, what to expect with upcoming surgery, typical healing time frames and prognosis      PATIENT EDUCATION AND HOME EXERCISES     Education provided:   - Pt educated in dx, prognosis, POC, and HEP to include activity modification to aide in symptom modulation.     Written Home Exercises Provided: yes. Exercises were reviewed and Karla was able to demonstrate them prior to the end of the session.  Karla demonstrated good  understanding of the education provided. See EMR under Patient Instructions for exercises provided during therapy sessions.    ASSESSMENT     Ms. Price is a 21 y/o F presenting to PT w/ reports of L knee pain. Objective examination revealed decreased L knee ROM, strength, tenderness to palpation, aberrant gait and imaging confirmed ACL, medial, and lateral meniscus tears.  Pt is coming into PT for prehab for upcoming surgery to be performed most likely in May of 2023.  She is currently limited in ability to negotiate stairs, walk, prolonged sitting, and performing normal recreational activities. Pt would benefit from skilled PT to address above impairments to improve outcomes for upcoming surgery.     Patient prognosis is Good.   Patient will benefit from skilled outpatient Physical Therapy to address the deficits stated above and in the chart below, provide patient /family education, and to maximize patientt's level of independence.     Plan of care discussed with patient: Yes  Patient's spiritual, cultural and educational needs considered and patient is agreeable to the plan of care and goals as stated below:     Anticipated Barriers for therapy: none    Medical Necessity is demonstrated by the following  History  Co-morbidities and personal factors that may impact the plan of care Co-morbidities:   none    Personal Factors:    no deficits     low   Examination  Body Structures and Functions, activity limitations and participation restrictions that may impact the plan of care Body Regions:   lower extremities    Body Systems:    ROM  strength  gross coordinated movement  balance  gait  transfers  motor control    Participation Restrictions:   Walking, recreational activity    Activity limitations:   Learning and applying knowledge  no deficits    General Tasks and Commands  no deficits    Communication  no deficits    Mobility  lifting and carrying objects  walking    Self care  washing oneself (bathing, drying, washing hands)  dressing    Domestic Life  shopping  cooking  doing house work (cleaning house, washing dishes, laundry)    Interactions/Relationships  no deficits    Life Areas  no deficits    Community and Social Life  no deficits         low   Clinical Presentation stable and uncomplicated low   Decision Making/ Complexity Score: low     Goals:  Short Term Goals:  Pt will be ind w/ HEP w/in 2 wks  Pt will report a dec of at least 2pts on 0-10 scale (per MCID) in L knee pain within 3 wks for symptom modulation  3.   Pt will demonstrate at least -10deg L knee ext for improved ROM to better outcomes for surgery within 3 weeks.     Long Term Goals:  Pt will demonstrate an inc of at least 1 MMT grade in L LE strength within 6 wks   Pt will demonstrate improved L knee flexion ROM to at least 130deg to better outcomes for surgery within 6 wks.   Pt will demonstrate ability to perform 20 Sup SLR w/ 2 pound weight showing improved quad activation and strength for L knee support within 6 wks.      PLAN   Plan of care Certification: 3/31/2023 to 6/23/2023.    Outpatient Physical Therapy 2 times weekly for 12 weeks to include the following interventions: Electrical Stimulation  , Gait Training, Manual Therapy, Moist Heat/ Ice, Neuromuscular Re-ed, Patient Education, Self Care, Therapeutic Activities, and Therapeutic Exercise.     Gildardo  Eren, PT, DPT, OCS      I CERTIFY THE NEED FOR THESE SERVICES FURNISHED UNDER THIS PLAN OF TREATMENT AND WHILE UNDER MY CARE   Physician's comments:     Physician's Signature: ___________________________________________________

## 2023-04-03 PROBLEM — M25.662 KNEE STIFFNESS, LEFT: Status: ACTIVE | Noted: 2023-04-03

## 2023-04-03 PROBLEM — M62.81 MUSCLE WEAKNESS: Status: ACTIVE | Noted: 2023-04-03

## 2023-04-03 NOTE — PLAN OF CARE
[facility]   Physical Therapy Initial Evaluation     Date: 3/31/2023   Name: Karla Price  [facility] Number: [id number]    Therapy Diagnosis:   Encounter Diagnoses   Name Primary?    Left knee pain, unspecified chronicity     Knee stiffness, left     Muscle weakness      Physician: Camila Holcomb MD    Physician Orders: PT Eval and Treat   Medical Diagnosis from Referral:   S83.519A (ICD-10-CM) - Rupture of anterior cruciate ligament of knee, unspecified laterality, initial encounter   S83.242A (ICD-10-CM) - Tear of medial meniscus of left knee, current, unspecified tear type, initial encounter   S83.282A (ICD-10-CM) - Tear of lateral meniscus of left knee, current, unspecified tear type, initial encounter     Evaluation Date: 3/31/2023  Authorization Period Expiration: 3/12/2024  Plan of Care Expiration: 6/23/2023  Progress Note Due: 5/1/2023  Visit # / Visits authorized: 1/ 1     Precautions: Standard     Time In: 200p  Time Out: 300p  Total Appointment Time (timed & untimed codes): 60 minutes      SUBJECTIVE     Pt presents to PT w/ reports of L knee. She has confirmed L ACL complete rupture and medial and lateral meniscus tear. Pt is to complete PT over the next month to try and improve L knee ROM and strength before undergoing surgery to repair torn structures. States she also has an osteochondral lesion to which the Dr wants to see how much this can heel on its own while having her braced before surgery as well. She is unsure of exactly when symptoms began but states in 2016 she fell tripping over a water grate and ever since then has had instability in her knee resulting in several falls. Currently states she has pain along the medial/anterior knee. Main aggravating factors include stairs, walking, and prolonged sitting. She has been wearing a compression sleeve on her own and avoiding activity to help with the symptoms. Pt works as an  which requires a lot of  desk work primarily but also includes carrying boxes, walking, and going up/down stairs. She does not play any sports but wants to be able to return to doing normal activity at the gym. Goes back to see the Dr on 5/5.     Imaging, MRI studies: L knee, 3/22/23- Complete ACL tear with associated posterolateral corner injury.     Mildly depressed medial tibial plateau fracture and nondepressed posterolateral tibial plateau fracture     Findings of transient lateral patellar dislocation with large displaced osteochondral fragment from the inferomedial pole of the patella.  Partial tear of the MPFL at the MCL origin.     Medial and lateral meniscal tears        Pain:  Current 4/10, worst 10/10, best 4/10     Patients goals: To return to normal walking and go back to the gym     Medical History:   No past medical history on file.    Surgical History:   Karla Price  has no past surgical history on file.    Medications:   Karla has a current medication list which includes the following prescription(s): meloxicam.    Allergies:   Review of patient's allergies indicates:   Allergen Reactions    Nuts [tree nut] Hives          OBJECTIVE     Observation: gait, wearing long-runner brace on L knee w/ decreased L knee TKE during midstance and terminal stance      Range of Motion (Active):   Knee Right  Left    Flexion 135 95   Extension +12 -5       Lower Extremity Strength  Right LE  Left LE    Quadriceps: 5/5 Quadriceps: 4/5   Hamstrings: 5/5 Hamstrings: 4/5   Hip flexion (seated): 5/5 Hip flexion (seated): 4-/5   Hip extension:  5/5 Hip extension: 4/5   PGM: 5/5 PGM:  4/5     SLS: R 30sec, L 15sec      Joint Mobility: Hypomobile L TFJ AP and IR mob     Palpation: TTP along L patella tendon, medial joint line      TREATMENT     Total Treatment time (time-based codes) separate from Evaluation: 30 minutes      Karla received the treatments listed below:      therapeutic exercises to develop strength and ROM for 20 minutes  including:  Heel prop  Quad set  HS str  SLR  S/L SLR      therapeutic activities to improve functional performance for 10  minutes, including:  Pt education about current status, what to expect with upcoming surgery, typical healing time frames and prognosis      PATIENT EDUCATION AND HOME EXERCISES     Education provided:   - Pt educated in dx, prognosis, POC, and HEP to include activity modification to aide in symptom modulation.     Written Home Exercises Provided: yes. Exercises were reviewed and Karla was able to demonstrate them prior to the end of the session.  Karla demonstrated good  understanding of the education provided. See EMR under Patient Instructions for exercises provided during therapy sessions.    ASSESSMENT     Ms. Price is a 23 y/o F presenting to PT w/ reports of L knee pain. Objective examination revealed decreased L knee ROM, strength, tenderness to palpation, aberrant gait and imaging confirmed ACL, medial, and lateral meniscus tears.  Pt is coming into PT for prehab for upcoming surgery to be performed most likely in May of 2023.  She is currently limited in ability to negotiate stairs, walk, prolonged sitting, and performing normal recreational activities. Pt would benefit from skilled PT to address above impairments to improve outcomes for upcoming surgery.     Patient prognosis is Good.   Patient will benefit from skilled outpatient Physical Therapy to address the deficits stated above and in the chart below, provide patient /family education, and to maximize patientt's level of independence.     Plan of care discussed with patient: Yes  Patient's spiritual, cultural and educational needs considered and patient is agreeable to the plan of care and goals as stated below:     Anticipated Barriers for therapy: none    Medical Necessity is demonstrated by the following  History  Co-morbidities and personal factors that may impact the plan of care Co-morbidities:   none    Personal Factors:    no deficits     low   Examination  Body Structures and Functions, activity limitations and participation restrictions that may impact the plan of care Body Regions:   lower extremities    Body Systems:    ROM  strength  gross coordinated movement  balance  gait  transfers  motor control    Participation Restrictions:   Walking, recreational activity    Activity limitations:   Learning and applying knowledge  no deficits    General Tasks and Commands  no deficits    Communication  no deficits    Mobility  lifting and carrying objects  walking    Self care  washing oneself (bathing, drying, washing hands)  dressing    Domestic Life  shopping  cooking  doing house work (cleaning house, washing dishes, laundry)    Interactions/Relationships  no deficits    Life Areas  no deficits    Community and Social Life  no deficits         low   Clinical Presentation stable and uncomplicated low   Decision Making/ Complexity Score: low     Goals:  Short Term Goals:  Pt will be ind w/ HEP w/in 2 wks  Pt will report a dec of at least 2pts on 0-10 scale (per MCID) in L knee pain within 3 wks for symptom modulation  3.   Pt will demonstrate at least -10deg L knee ext for improved ROM to better outcomes for surgery within 3 weeks.     Long Term Goals:  Pt will demonstrate an inc of at least 1 MMT grade in L LE strength within 6 wks   Pt will demonstrate improved L knee flexion ROM to at least 130deg to better outcomes for surgery within 6 wks.   Pt will demonstrate ability to perform 20 Sup SLR w/ 2 pound weight showing improved quad activation and strength for L knee support within 6 wks.      PLAN   Plan of care Certification: 3/31/2023 to 6/23/2023.    Outpatient Physical Therapy 2 times weekly for 12 weeks to include the following interventions: Electrical Stimulation , Gait Training, Manual Therapy, Moist Heat/ Ice, Neuromuscular Re-ed, Patient Education, Self Care, Therapeutic Activities, and Therapeutic Exercise.     Gildardo  Eren, PT, DPT, OCS      I CERTIFY THE NEED FOR THESE SERVICES FURNISHED UNDER THIS PLAN OF TREATMENT AND WHILE UNDER MY CARE   Physician's comments:     Physician's Signature: ___________________________________________________

## 2023-04-05 ENCOUNTER — CLINICAL SUPPORT (OUTPATIENT)
Dept: REHABILITATION | Facility: HOSPITAL | Age: 23
End: 2023-04-05
Payer: COMMERCIAL

## 2023-04-05 DIAGNOSIS — M62.81 MUSCLE WEAKNESS: Primary | ICD-10-CM

## 2023-04-05 DIAGNOSIS — M25.662 KNEE STIFFNESS, LEFT: ICD-10-CM

## 2023-04-05 DIAGNOSIS — G89.29 CHRONIC PAIN OF LEFT KNEE: ICD-10-CM

## 2023-04-05 DIAGNOSIS — M25.562 CHRONIC PAIN OF LEFT KNEE: ICD-10-CM

## 2023-04-05 PROCEDURE — 97110 THERAPEUTIC EXERCISES: CPT | Mod: PN

## 2023-04-05 PROCEDURE — 97140 MANUAL THERAPY 1/> REGIONS: CPT | Mod: PN

## 2023-04-05 PROCEDURE — 97112 NEUROMUSCULAR REEDUCATION: CPT | Mod: PN

## 2023-04-05 NOTE — PROGRESS NOTES
"[facility]   Physical Therapy Treatment Note     Name: Karla Price  [facility] Number: [id number]    Therapy Diagnosis:   Encounter Diagnoses   Name Primary?    Chronic pain of left knee     Knee stiffness, left     Muscle weakness Yes   Physician: Camila Holcomb MD     Physician Orders: PT Eval and Treat   Medical Diagnosis from Referral:   S83.519A (ICD-10-CM) - Rupture of anterior cruciate ligament of knee, unspecified laterality, initial encounter   S83.242A (ICD-10-CM) - Tear of medial meniscus of left knee, current, unspecified tear type, initial encounter   S83.282A (ICD-10-CM) - Tear of lateral meniscus of left knee, current, unspecified tear type, initial encounter      Evaluation Date: 3/31/2023  Authorization Period Expiration: 3/12/2024  Plan of Care Expiration: 6/23/2023  Progress Note Due: 5/1/2023  Visit # / Visits authorized: 1/ 1      Precautions: Standard       Time In: 1220p  Time Out: 120p  Total Billable Time: 60 minutes    SUBJECTIVE     Pt reports: Pt states that walking is doing a little better. Was able to do her HEP a couple of times but admits wasn't super consistent with it. Knee is feeling so-so today.  She was compliant with home exercise program.  Response to previous treatment: improved gait  Functional change: first follow up    Pain: 6/10  Location: left knee      OBJECTIVE     L knee AROM: 0:6:111    Treatment     Karla received the treatments listed below:      therapeutic exercises to develop strength and ROM for 30 minutes including:  Upright bike 10'  Slant board str 3x30"  HS Str 3x30"  Heel prop: 3'  Heel slides 5"x15  SB HS curl 3x10  TRX Squat 3x8    manual therapy techniques: Joint mobilizations and Soft tissue Mobilization were applied to the: L knee for 15 minutes, including:  L knee STM  L TFJ ext mob gr4    neuromuscular re-education activities to improve: Balance and Proprioception for 15 minutes. The following activities were included:  Quad set " "5"x20x  SLR: 2x10  S/L SLR 2x10  SLS 3x30"        Patient Education and Home Exercises     Home Exercises Provided and Patient Education Provided     Education provided:   - To continue with current HEP    Written Home Exercises Provided: Patient instructed to cont prior HEP. Exercises were reviewed and Karla was able to demonstrate them prior to the end of the session.  Karla demonstrated good  understanding of the education provided. See EMR under Patient Instructions for exercises provided during therapy sessions    ASSESSMENT     Pt showing improved flexion ROM following stretching today but not much change into ext. Believe she is primarily limited by pain as she will continue to get a sharp ant knee pain at end range flexion. This to be due to underlying bone fracture present. Will continue to work on building up quad strength and working on improving to full knee ext to improve outcomes for upcoming knee surgery.     Karla Is progressing well towards her goals.   Pt prognosis is Good.     Pt will continue to benefit from skilled outpatient physical therapy to address the deficits listed in the problem list box on initial evaluation, provide pt/family education and to maximize pt's level of independence in the home and community environment.     Pt's spiritual, cultural and educational needs considered and pt agreeable to plan of care and goals.     Anticipated barriers to physical therapy: none    Goals:   Short Term Goals:  Pt will be ind w/ HEP w/in 2 wks  Pt will report a dec of at least 2pts on 0-10 scale (per MCID) in L knee pain within 3 wks for symptom modulation  3.   Pt will demonstrate at least -10deg L knee ext for improved ROM to better outcomes for surgery within 3 weeks.      Long Term Goals:  Pt will demonstrate an inc of at least 1 MMT grade in L LE strength within 6 wks   Pt will demonstrate improved L knee flexion ROM to at least 130deg to better outcomes for surgery within 6 wks.   Pt will " demonstrate ability to perform 20 Sup SLR w/ 2 pound weight showing improved quad activation and strength for L knee support within 6 wks.    PLAN     Plan of care Certification: 3/31/2023 to 6/23/2023.    Gildardo Jason, PT

## 2023-04-12 ENCOUNTER — CLINICAL SUPPORT (OUTPATIENT)
Dept: REHABILITATION | Facility: HOSPITAL | Age: 23
End: 2023-04-12
Payer: COMMERCIAL

## 2023-04-12 DIAGNOSIS — M25.562 CHRONIC PAIN OF LEFT KNEE: ICD-10-CM

## 2023-04-12 DIAGNOSIS — M62.81 MUSCLE WEAKNESS: Primary | ICD-10-CM

## 2023-04-12 DIAGNOSIS — G89.29 CHRONIC PAIN OF LEFT KNEE: ICD-10-CM

## 2023-04-12 PROCEDURE — 97112 NEUROMUSCULAR REEDUCATION: CPT | Mod: PN

## 2023-04-12 PROCEDURE — 97110 THERAPEUTIC EXERCISES: CPT | Mod: PN

## 2023-04-12 NOTE — PROGRESS NOTES
"[facility]   Physical Therapy Treatment Note     Name: Karla Price  [facility] Number: [id number]    Therapy Diagnosis:   Encounter Diagnoses   Name Primary?    Muscle weakness Yes    Chronic pain of left knee    Physician: Camila Holcomb MD     Physician Orders: PT Eval and Treat   Medical Diagnosis from Referral:   S83.519A (ICD-10-CM) - Rupture of anterior cruciate ligament of knee, unspecified laterality, initial encounter   S83.242A (ICD-10-CM) - Tear of medial meniscus of left knee, current, unspecified tear type, initial encounter   S83.282A (ICD-10-CM) - Tear of lateral meniscus of left knee, current, unspecified tear type, initial encounter      Evaluation Date: 3/31/2023  Authorization Period Expiration: 12/31/2023  Plan of Care Expiration: 6/23/2023  Progress Note Due: 5/1/2023  Visit # / Visits authorized: 2/20 (+Evaluation)     Precautions: Standard       Time In: 12:15 PM  Time Out: 1:20 PM  Total Billable Time: 65 minutes    SUBJECTIVE     Pt reports: She went to [facility] where she did a lot more walking than she intended to and can feel an increase in pain and swelling. Continues to have pain and difficulty with knee extension.   She was compliant with home exercise program.  Response to previous treatment: improved gait  Functional change: first follow up    Pain: 6/10  Location: left knee      OBJECTIVE     4/12/2023:    L knee AROM: 0-5-114    Treatment     Karla received the treatments listed below:      manual therapy techniques: Joint mobilizations and Soft tissue Mobilization were applied to the: L knee for 5 minutes, including:  Grade III patellar mobilizations in all directions  Infrapatellar fat pad mobilizations in extension    therapeutic exercises to develop strength and ROM for 30 minutes including:  Heel Prop: 10 minutes total with rest breaks in between, 1/2 foam  Heel Slides: 3 minutes, 5" holds in flexion, 5" quad sets in extension  +DL Gluteal bridges: 2x10, 5" " "holds  Upright bike: 6 minutes, Level 1.0, for ROM and swelling    Not performed today:  S/L SLR 2x10  Slant board str 3x30"  HS Str 3x30"  SB HS curl 3x10  TRX Squat 3x8      neuromuscular re-education activities to improve: Balance and Proprioception for 25 minutes. The following activities were included:  Quad Sets: 3x10, 5" holds, towel under knee  SAQ's: 3x10, 3" holds, 1/2 foam under knee  SLR: 3x10, 3" holds  SLS: 20"x3, on FOAM pad        Patient Education and Home Exercises     Home Exercises Provided and Patient Education Provided     Education provided:   - To continue with current HEP    Written Home Exercises Provided: Patient instructed to cont prior HEP. Exercises were reviewed and Karla was able to demonstrate them prior to the end of the session.  Karla demonstrated good  understanding of the education provided. See EMR under Patient Instructions for exercises provided during therapy sessions    ASSESSMENT     Karla presented to therapy with reports of continued pain an difficulty with obtaining full knee extension. Hesitation and some fear noted with performance of heel prop; required increased rest breaks between 10 minute bout. Full extension limited by pain near medial portion of infrapatellar fat pad, slightly improved with fat pad mobilization. Continued to focus on improving quadriceps activation and strength with above exercises. Progressed SLS to uneven surface with good control noted. Overall patient remains a good candidate for pre-hab in order to continue to address ROM deficits and improving strength.     Karla Is progressing well towards her goals.   Pt prognosis is Good.     Pt will continue to benefit from skilled outpatient physical therapy to address the deficits listed in the problem list box on initial evaluation, provide pt/family education and to maximize pt's level of independence in the home and community environment.     Pt's spiritual, cultural and educational needs " considered and pt agreeable to plan of care and goals.     Anticipated barriers to physical therapy: none    Goals:   Short Term Goals:  Pt will be ind w/ HEP w/in 2 wks  Pt will report a dec of at least 2pts on 0-10 scale (per MCID) in L knee pain within 3 wks for symptom modulation  3.   Pt will demonstrate at least -10deg L knee ext for improved ROM to better outcomes for surgery within 3 weeks.      Long Term Goals:  Pt will demonstrate an inc of at least 1 MMT grade in L LE strength within 6 wks   Pt will demonstrate improved L knee flexion ROM to at least 130deg to better outcomes for surgery within 6 wks.   Pt will demonstrate ability to perform 20 Sup SLR w/ 2 pound weight showing improved quad activation and strength for L knee support within 6 wks.    PLAN     Plan of care Certification: 3/31/2023 to 6/23/2023.    Waleska rBandon, PT

## 2023-04-14 ENCOUNTER — CLINICAL SUPPORT (OUTPATIENT)
Dept: REHABILITATION | Facility: HOSPITAL | Age: 23
End: 2023-04-14
Payer: COMMERCIAL

## 2023-04-14 ENCOUNTER — OFFICE VISIT (OUTPATIENT)
Dept: FAMILY MEDICINE | Facility: CLINIC | Age: 23
End: 2023-04-14
Payer: COMMERCIAL

## 2023-04-14 ENCOUNTER — LAB VISIT (OUTPATIENT)
Dept: LAB | Facility: HOSPITAL | Age: 23
End: 2023-04-14
Attending: FAMILY MEDICINE
Payer: COMMERCIAL

## 2023-04-14 VITALS
SYSTOLIC BLOOD PRESSURE: 118 MMHG | WEIGHT: 207.31 LBS | BODY MASS INDEX: 39.14 KG/M2 | HEART RATE: 60 BPM | HEIGHT: 61 IN | DIASTOLIC BLOOD PRESSURE: 72 MMHG | TEMPERATURE: 99 F | OXYGEN SATURATION: 99 %

## 2023-04-14 DIAGNOSIS — D72.829 LEUKOCYTOSIS, UNSPECIFIED TYPE: ICD-10-CM

## 2023-04-14 DIAGNOSIS — D72.829 LEUKOCYTOSIS, UNSPECIFIED TYPE: Primary | ICD-10-CM

## 2023-04-14 DIAGNOSIS — M62.81 MUSCLE WEAKNESS: Primary | ICD-10-CM

## 2023-04-14 DIAGNOSIS — G89.29 CHRONIC PAIN OF LEFT KNEE: ICD-10-CM

## 2023-04-14 DIAGNOSIS — L50.9 HIVES: ICD-10-CM

## 2023-04-14 DIAGNOSIS — M25.562 CHRONIC PAIN OF LEFT KNEE: ICD-10-CM

## 2023-04-14 LAB
BASOPHILS # BLD AUTO: 0.06 K/UL (ref 0–0.2)
BASOPHILS NFR BLD: 0.7 % (ref 0–1.9)
DIFFERENTIAL METHOD: ABNORMAL
EOSINOPHIL # BLD AUTO: 0.1 K/UL (ref 0–0.5)
EOSINOPHIL NFR BLD: 1.1 % (ref 0–8)
ERYTHROCYTE [DISTWIDTH] IN BLOOD BY AUTOMATED COUNT: 13.4 % (ref 11.5–14.5)
HCT VFR BLD AUTO: 49.2 % (ref 37–48.5)
HGB BLD-MCNC: 15.7 G/DL (ref 12–16)
IMM GRANULOCYTES # BLD AUTO: 0.05 K/UL (ref 0–0.04)
IMM GRANULOCYTES NFR BLD AUTO: 0.5 % (ref 0–0.5)
LYMPHOCYTES # BLD AUTO: 2.8 K/UL (ref 1–4.8)
LYMPHOCYTES NFR BLD: 30.6 % (ref 18–48)
MCH RBC QN AUTO: 30.3 PG (ref 27–31)
MCHC RBC AUTO-ENTMCNC: 31.9 G/DL (ref 32–36)
MCV RBC AUTO: 95 FL (ref 82–98)
MONOCYTES # BLD AUTO: 0.6 K/UL (ref 0.3–1)
MONOCYTES NFR BLD: 6.9 % (ref 4–15)
NEUTROPHILS # BLD AUTO: 5.5 K/UL (ref 1.8–7.7)
NEUTROPHILS NFR BLD: 60.2 % (ref 38–73)
NRBC BLD-RTO: 0 /100 WBC
PATH REV BLD -IMP: NORMAL
PLATELET # BLD AUTO: 256 K/UL (ref 150–450)
PMV BLD AUTO: 11.9 FL (ref 9.2–12.9)
RBC # BLD AUTO: 5.19 M/UL (ref 4–5.4)
WBC # BLD AUTO: 9.17 K/UL (ref 3.9–12.7)

## 2023-04-14 PROCEDURE — 3078F PR MOST RECENT DIASTOLIC BLOOD PRESSURE < 80 MM HG: ICD-10-PCS | Mod: CPTII,S$GLB,, | Performed by: FAMILY MEDICINE

## 2023-04-14 PROCEDURE — 99999 PR PBB SHADOW E&M-EST. PATIENT-LVL III: ICD-10-PCS | Mod: PBBFAC,,, | Performed by: FAMILY MEDICINE

## 2023-04-14 PROCEDURE — 3074F PR MOST RECENT SYSTOLIC BLOOD PRESSURE < 130 MM HG: ICD-10-PCS | Mod: CPTII,S$GLB,, | Performed by: FAMILY MEDICINE

## 2023-04-14 PROCEDURE — 85060 PATHOLOGIST REVIEW: ICD-10-PCS | Mod: ,,, | Performed by: PATHOLOGY

## 2023-04-14 PROCEDURE — 3074F SYST BP LT 130 MM HG: CPT | Mod: CPTII,S$GLB,, | Performed by: FAMILY MEDICINE

## 2023-04-14 PROCEDURE — 36415 COLL VENOUS BLD VENIPUNCTURE: CPT | Mod: PO | Performed by: FAMILY MEDICINE

## 2023-04-14 PROCEDURE — 99999 PR PBB SHADOW E&M-EST. PATIENT-LVL III: CPT | Mod: PBBFAC,,, | Performed by: FAMILY MEDICINE

## 2023-04-14 PROCEDURE — 1159F MED LIST DOCD IN RCRD: CPT | Mod: CPTII,S$GLB,, | Performed by: FAMILY MEDICINE

## 2023-04-14 PROCEDURE — 97112 NEUROMUSCULAR REEDUCATION: CPT | Mod: PN

## 2023-04-14 PROCEDURE — 99214 PR OFFICE/OUTPT VISIT, EST, LEVL IV, 30-39 MIN: ICD-10-PCS | Mod: S$GLB,,, | Performed by: FAMILY MEDICINE

## 2023-04-14 PROCEDURE — 97110 THERAPEUTIC EXERCISES: CPT | Mod: PN

## 2023-04-14 PROCEDURE — 85060 BLOOD SMEAR INTERPRETATION: CPT | Mod: ,,, | Performed by: PATHOLOGY

## 2023-04-14 PROCEDURE — 3044F PR MOST RECENT HEMOGLOBIN A1C LEVEL <7.0%: ICD-10-PCS | Mod: CPTII,S$GLB,, | Performed by: FAMILY MEDICINE

## 2023-04-14 PROCEDURE — 3078F DIAST BP <80 MM HG: CPT | Mod: CPTII,S$GLB,, | Performed by: FAMILY MEDICINE

## 2023-04-14 PROCEDURE — 85025 COMPLETE CBC W/AUTO DIFF WBC: CPT | Performed by: FAMILY MEDICINE

## 2023-04-14 PROCEDURE — 3008F PR BODY MASS INDEX (BMI) DOCUMENTED: ICD-10-PCS | Mod: CPTII,S$GLB,, | Performed by: FAMILY MEDICINE

## 2023-04-14 PROCEDURE — 99214 OFFICE O/P EST MOD 30 MIN: CPT | Mod: S$GLB,,, | Performed by: FAMILY MEDICINE

## 2023-04-14 PROCEDURE — 3008F BODY MASS INDEX DOCD: CPT | Mod: CPTII,S$GLB,, | Performed by: FAMILY MEDICINE

## 2023-04-14 PROCEDURE — 3044F HG A1C LEVEL LT 7.0%: CPT | Mod: CPTII,S$GLB,, | Performed by: FAMILY MEDICINE

## 2023-04-14 PROCEDURE — 1159F PR MEDICATION LIST DOCUMENTED IN MEDICAL RECORD: ICD-10-PCS | Mod: CPTII,S$GLB,, | Performed by: FAMILY MEDICINE

## 2023-04-14 NOTE — PROGRESS NOTES
"[facility]   Physical Therapy Treatment Note     Name: Karla Price  [facility] Number: [id number]    Therapy Diagnosis:   Encounter Diagnoses   Name Primary?    Muscle weakness Yes    Chronic pain of left knee    Physician: Camila Holcomb MD     Physician Orders: PT Eval and Treat   Medical Diagnosis from Referral:   S83.519A (ICD-10-CM) - Rupture of anterior cruciate ligament of knee, unspecified laterality, initial encounter   S83.242A (ICD-10-CM) - Tear of medial meniscus of left knee, current, unspecified tear type, initial encounter   S83.282A (ICD-10-CM) - Tear of lateral meniscus of left knee, current, unspecified tear type, initial encounter      Evaluation Date: 3/31/2023  Authorization Period Expiration: 12/31/2023  Plan of Care Expiration: 6/23/2023  Progress Note Due: 5/1/2023  Visit # / Visits authorized: 4/20 (+Evaluation)     Precautions: Standard       Time In: 4:00 PM  Time Out: 4:55 PM  Total Billable Time: 55 minutes    SUBJECTIVE     Pt reports: the knee is doing okay so far today.     She was compliant with home exercise program.  Response to previous treatment: improved gait  Functional change: first follow up    Pain: 6/10  Location: left knee      OBJECTIVE     4/12/2023:    L knee AROM: 0-5-114    Treatment     Karla received the treatments listed below:      manual therapy techniques: Joint mobilizations and Soft tissue Mobilization were applied to the: L knee for 00 minutes, including:    Not today:  Grade III patellar mobilizations in all directions  Infrapatellar fat pad mobilizations in extension    therapeutic exercises to develop strength and ROM for 30 minutes including:    Upright bike: 6 minutes, Level 1.0, for ROM and swelling  DL Gluteal bridges: 3x10, 5" holds  TRX Squat 2x10  SB HS curl 3x10    Not performed today:  S/L SLR 2x10  Slant board str 3x30"  HS Str 3x30"  Heel Prop: 10 minutes total with rest breaks in between, 1/2 foam  Heel Slides: 3 minutes, " "5" holds in flexion, 5" quad sets in extension    neuromuscular re-education activities to improve: Balance and Proprioception for 25 minutes. The following activities were included:    Quad Sets: 3x15, 5" holds, 1/2 foam roll under knee  SAQ's: 3x10, 3" holds, foam under knee  SLR: 3x10   SLS: 30"x3, on FOAM pad        Patient Education and Home Exercises     Home Exercises Provided and Patient Education Provided     Education provided:   - To continue with current HEP    Written Home Exercises Provided: Patient instructed to cont prior HEP. Exercises were reviewed and Karla was able to demonstrate them prior to the end of the session.  Karla demonstrated good  understanding of the education provided. See EMR under Patient Instructions for exercises provided during therapy sessions    ASSESSMENT     Karla presented to therapy with reports of continued pain but feels like getting straight is easier. She was able to complete a good SLR today w/ good quad control and contraction. She was able to complete SL stance for over 30sec and can likely progress to SL ball toss on foam at next visit. Will continue to progress quad control and ROM as able.    Karla Is progressing well towards her goals.   Pt prognosis is Good.     Pt will continue to benefit from skilled outpatient physical therapy to address the deficits listed in the problem list box on initial evaluation, provide pt/family education and to maximize pt's level of independence in the home and community environment.     Pt's spiritual, cultural and educational needs considered and pt agreeable to plan of care and goals.     Anticipated barriers to physical therapy: none    Goals:   Short Term Goals:  Pt will be ind w/ HEP w/in 2 wks  Pt will report a dec of at least 2pts on 0-10 scale (per MCID) in L knee pain within 3 wks for symptom modulation  3.   Pt will demonstrate at least -10deg L knee ext for improved ROM to better outcomes for surgery within 3 weeks. "      Long Term Goals:  Pt will demonstrate an inc of at least 1 MMT grade in L LE strength within 6 wks   Pt will demonstrate improved L knee flexion ROM to at least 130deg to better outcomes for surgery within 6 wks.   Pt will demonstrate ability to perform 20 Sup SLR w/ 2 pound weight showing improved quad activation and strength for L knee support within 6 wks.    PLAN     Plan of care Certification: 3/31/2023 to 6/23/2023.    Bernabe Curry, PT, DPT

## 2023-04-17 LAB — PATH REV BLD -IMP: NORMAL

## 2023-04-18 ENCOUNTER — CLINICAL SUPPORT (OUTPATIENT)
Dept: REHABILITATION | Facility: HOSPITAL | Age: 23
End: 2023-04-18
Payer: COMMERCIAL

## 2023-04-18 DIAGNOSIS — G89.29 CHRONIC PAIN OF LEFT KNEE: ICD-10-CM

## 2023-04-18 DIAGNOSIS — M25.562 CHRONIC PAIN OF LEFT KNEE: ICD-10-CM

## 2023-04-18 DIAGNOSIS — M62.81 MUSCLE WEAKNESS: Primary | ICD-10-CM

## 2023-04-18 PROCEDURE — 97110 THERAPEUTIC EXERCISES: CPT | Mod: PN

## 2023-04-18 PROCEDURE — 97112 NEUROMUSCULAR REEDUCATION: CPT | Mod: PN

## 2023-04-18 NOTE — PROGRESS NOTES
"[facility]   Physical Therapy Treatment Note     Name: Karla Price  [facility] Number: [id number]    Therapy Diagnosis:   Encounter Diagnoses   Name Primary?    Muscle weakness Yes    Chronic pain of left knee    Physician: Camila Holcomb MD     Physician Orders: PT Eval and Treat   Medical Diagnosis from Referral:   S83.519A (ICD-10-CM) - Rupture of anterior cruciate ligament of knee, unspecified laterality, initial encounter   S83.242A (ICD-10-CM) - Tear of medial meniscus of left knee, current, unspecified tear type, initial encounter   S83.282A (ICD-10-CM) - Tear of lateral meniscus of left knee, current, unspecified tear type, initial encounter      Evaluation Date: 3/31/2023  Authorization Period Expiration: 12/31/2023  Plan of Care Expiration: 6/23/2023  Progress Note Due: 5/1/2023  Visit # / Visits authorized: 4/20 (+Evaluation)     Precautions: Standard       Time In: 4:05 PM  Time Out: 5:00 PM  Total Billable Time: 55 minutes    SUBJECTIVE     Pt reports: knee feels good today and has been for the last few days.    She was compliant with home exercise program.  Response to previous treatment: improved gait  Functional change: first follow up    Pain: 2/10  Location: left knee      OBJECTIVE     4/12/2023:    L knee AROM: 0-5-114    Treatment     Karla received the treatments listed below:      manual therapy techniques: Joint mobilizations and Soft tissue Mobilization were applied to the: L knee for 00 minutes, including:    Not today:  Grade III patellar mobilizations in all directions  Infrapatellar fat pad mobilizations in extension    therapeutic exercises to develop strength and ROM for 30 minutes including:    Upright bike: 5 minutes, Level 1.0, for ROM and swelling  DL Gluteal bridges: 3x10, 5" holds  TRX Squat 3x10  SB HS curl 3x10  DL RDL w/ 20lb KB, 3x10    neuromuscular re-education activities to improve: Balance and Proprioception for 25 minutes. The following activities " 1/20/2020       RE: Kyara Davis  [id number] [facility] [address]     Dear Colleague,    Thank you for referring your patient, Kyara Davis, to the [facility] at [facility]. Please see a copy of my visit note below.    CHIEF COMPLAINT:  Right shoulder status post superior labral tear repair with paralabral cyst removal.  Date of surgery 07/31/2019.       HISTORY OF PRESENT ILLNESS:  Ms. Davis returns today for followup.  She has done pretty well.  She has been training for a marathon.  Overall, she is happy with the shoulder and it feels stable.  She did have an episode recently where her daughter leaned against it and it hurt when her daughter stopped.      PHYSICAL EXAMINATION:  On exam today, she has 180 degrees of forward elevation, 165 degrees of lateral elevation, 60 degrees of external rotation at side and internal rotation of the back to T10.  She has 5/5 forward elevator and external rotator strength and no pain with posteriorly directed force and good scapular stabilization.      ASSESSMENT:  Right shoulder status post above procedure, doing well.      PLAN:  I told Ms. Davis that she can do activities as tolerated and that she should continue to do her home program as this will likely improve her shoulder pain with running.  I told her that I will see her back at the anniversary of her surgery with radiographs or sooner should any additional problems arise.         Again, thank you for allowing me to participate in the care of your patient.      Sincerely,    Matheus Resendiz MD       "were included:    Quad Sets: 3x15, 5" holds  SAQ's: 3x10, 3" holds, medium bolster under knee  SLR: 3x10   SLS: 30"x3, on FOAM pad        Patient Education and Home Exercises     Home Exercises Provided and Patient Education Provided     Education provided:   - To continue with current HEP    Written Home Exercises Provided: Patient instructed to cont prior HEP. Exercises were reviewed and Karla was able to demonstrate them prior to the end of the session.  Karla demonstrated good  understanding of the education provided. See EMR under Patient Instructions for exercises provided during therapy sessions    ASSESSMENT     Karla presented to therapy with reports of decreased overall pain since last session. ROM and quad control continue to improve. Can trial SL balance on foam with ball toss at next visit.     Karla Is progressing well towards her goals.   Pt prognosis is Good.     Pt will continue to benefit from skilled outpatient physical therapy to address the deficits listed in the problem list box on initial evaluation, provide pt/family education and to maximize pt's level of independence in the home and community environment.     Pt's spiritual, cultural and educational needs considered and pt agreeable to plan of care and goals.     Anticipated barriers to physical therapy: none    Goals:   Short Term Goals:  Pt will be ind w/ HEP w/in 2 wks  Pt will report a dec of at least 2pts on 0-10 scale (per MCID) in L knee pain within 3 wks for symptom modulation  3.   Pt will demonstrate at least -10deg L knee ext for improved ROM to better outcomes for surgery within 3 weeks.      Long Term Goals:  Pt will demonstrate an inc of at least 1 MMT grade in L LE strength within 6 wks   Pt will demonstrate improved L knee flexion ROM to at least 130deg to better outcomes for surgery within 6 wks.   Pt will demonstrate ability to perform 20 Sup SLR w/ 2 pound weight showing improved quad activation and strength for L knee " support within 6 wks.    PLAN     Plan of care Certification: 3/31/2023 to 6/23/2023.    Bernabe Curry, PT, DPT

## 2023-04-18 NOTE — PROGRESS NOTES
[facility]  Progress Note    SUBJECTIVE:     Chief Complaint   Patient presents with    Results       HPI   Karla Price  is a 22 y.o. female here for follow-up of her lab results. Also has some concerns about sporadic hives. Unsure what the trigger is. No chest pain, SOB. Patient has no other new complaints/problems at this time.      Review of patient's allergies indicates:   Allergen Reactions    Nuts [tree nut] Hives       No past medical history on file.  No past surgical history on file.  Family History   Problem Relation Age of Onset    Breast cancer Maternal Aunt 30        maternal great aunt with breast cancer    Fibrocystic breast disease Mother     No Known Problems Sister     Liver cancer Maternal Grandmother     No Known Problems Father     No Known Problems Brother     No Known Problems Maternal Uncle     No Known Problems Paternal Aunt     No Known Problems Paternal Uncle     No Known Problems Maternal Grandfather     No Known Problems Paternal Grandmother     No Known Problems Paternal Grandfather     Colon cancer Neg Hx     Ovarian cancer Neg Hx     Amblyopia Neg Hx     Blindness Neg Hx     Cancer Neg Hx     Cataracts Neg Hx     Diabetes Neg Hx     Glaucoma Neg Hx     Hypertension Neg Hx     Macular degeneration Neg Hx     Retinal detachment Neg Hx     Strabismus Neg Hx     Stroke Neg Hx     Thyroid disease Neg Hx      Social History     Tobacco Use    Smoking status: Never    Smokeless tobacco: Never   Substance Use Topics    Alcohol use: No    Drug use: No        Review of Systems   Constitutional:  Negative for chills and fever.   HENT: Negative.     Respiratory: Negative.  Negative for shortness of breath.    Cardiovascular: Negative.  Negative for chest pain.   Gastrointestinal: Negative.  Negative for abdominal pain, nausea and vomiting.   Genitourinary: Negative.    Skin:         + hives   Neurological:  Negative for headaches.   All other systems reviewed and are  negative.  OBJECTIVE:     Vitals:    04/14/23 1259   BP: 118/72   Pulse: 60   Temp: 99 °F (37.2 °C)     Body mass index is 39.18 kg/m².    Physical Exam  Constitutional:       General: She is not in acute distress.     Appearance: Normal appearance. She is not diaphoretic.   HENT:      Head: Normocephalic and atraumatic.   Eyes:      Conjunctiva/sclera: Conjunctivae normal.   Pulmonary:      Effort: Pulmonary effort is normal.   Skin:     Findings: No rash (no rash today).   Neurological:      Mental Status: She is alert and oriented to person, place, and time.       Old records were reviewed. Labs and/or images were independently reviewed.    ASSESSMENT     1. Leukocytosis, unspecified type    2. Hives        PLAN:     Leukocytosis, unspecified type  -     CBC Auto Differential; Future  -     Pathologist Interpretation Differential; Future; Expected date: 04/14/2023  -     suspect reactive. Repeat CBC.    Hives  -     Ambulatory referral/consult to Allergy; Future; Expected date: 04/21/2023  -     refer to allergy for possible allergy testing. Discussed at length which patient is not using any new lotions, shampoos, fragrances, detergents. No specific food trigger known.       RTC PRN  30 minutes of total time spent on the encounter, which includes face to face time and non-face to face time preparing to see the patient (eg, review of tests), Obtaining and/or reviewing separately obtained history, Documenting clinical information in the electronic or other health record, Independently interpreting results (not separately reported), communicating results to the patient/family/caregiver, and/or Care coordination (not separately reported).       Hans Francisco MD  04/17/2023 7:33 PM

## 2023-04-21 ENCOUNTER — CLINICAL SUPPORT (OUTPATIENT)
Dept: REHABILITATION | Facility: HOSPITAL | Age: 23
End: 2023-04-21
Payer: COMMERCIAL

## 2023-04-21 DIAGNOSIS — M25.562 CHRONIC PAIN OF LEFT KNEE: ICD-10-CM

## 2023-04-21 DIAGNOSIS — M62.81 MUSCLE WEAKNESS: Primary | ICD-10-CM

## 2023-04-21 DIAGNOSIS — G89.29 CHRONIC PAIN OF LEFT KNEE: ICD-10-CM

## 2023-04-21 PROCEDURE — 97110 THERAPEUTIC EXERCISES: CPT | Mod: PN

## 2023-04-21 PROCEDURE — 97112 NEUROMUSCULAR REEDUCATION: CPT | Mod: PN

## 2023-04-21 NOTE — PROGRESS NOTES
"[facility]   Physical Therapy Treatment Note     Name: Karla Price  [facility] Number: [id number]    Therapy Diagnosis:   Encounter Diagnoses   Name Primary?    Muscle weakness Yes    Chronic pain of left knee    Physician: Camila Holcomb MD     Physician Orders: PT Eval and Treat   Medical Diagnosis from Referral:   S83.519A (ICD-10-CM) - Rupture of anterior cruciate ligament of knee, unspecified laterality, initial encounter   S83.242A (ICD-10-CM) - Tear of medial meniscus of left knee, current, unspecified tear type, initial encounter   S83.282A (ICD-10-CM) - Tear of lateral meniscus of left knee, current, unspecified tear type, initial encounter      Evaluation Date: 3/31/2023  Authorization Period Expiration: 12/31/2023  Plan of Care Expiration: 6/23/2023  Progress Note Due: 5/1/2023  Visit # / Visits authorized: 5/20 (+Evaluation)     Precautions: Standard       Time In: 11:50  Time Out: 12:45  Total Billable Time: 55 minutes    SUBJECTIVE     Pt reports: knee feels a little more pain today but just woke up and feels like the rain is making it a little more rough.     She was compliant with home exercise program.  Response to previous treatment: improved gait  Functional change: first follow up    Pain: 5/10  Location: left knee      OBJECTIVE     4/12/2023:    L knee AROM: 0-5-114    Treatment     Karla received the treatments listed below:      manual therapy techniques: Joint mobilizations and Soft tissue Mobilization were applied to the: L knee for 00 minutes, including:    Not today:  Grade III patellar mobilizations in all directions  Infrapatellar fat pad mobilizations in extension    therapeutic exercises to develop strength and ROM for 30 minutes including:    Upright bike: 5 minutes, Level 1.0, for ROM and swelling  DL Gluteal bridges: 3x10, 5" holds  TRX Squat 3x10  SB HS curl 3x10  DL RDL w/ 20lb KB, 3x10  Lateral steps w/ YTB around ankles, 3 laps along white " "tape    neuromuscular re-education activities to improve: Balance and Proprioception for 25 minutes. The following activities were included:    Quad Sets: 3x15, 5" holds  SAQ's: 3x10, 3" holds, medium bolster under knee  SLR: 3x10   SLS: 60"x3, on FOAM pad        Patient Education and Home Exercises     Home Exercises Provided and Patient Education Provided     Education provided:   - To continue with current HEP    Written Home Exercises Provided: Patient instructed to cont prior HEP. Exercises were reviewed and Karla was able to demonstrate them prior to the end of the session.  Karla demonstrated good  understanding of the education provided. See EMR under Patient Instructions for exercises provided during therapy sessions    ASSESSMENT     Karla presented to therapy with reports of mild increase in overall pain today but reports prior to waking up today has felt really good the two days after last session. ROM and quad control continue to improve. She tolerated increased time for SLS on foam today along with addition of lateral walks w/o issue. She reports 3/10 pain in L knee at end of session.    Karla Is progressing well towards her goals.   Pt prognosis is Good.     Pt will continue to benefit from skilled outpatient physical therapy to address the deficits listed in the problem list box on initial evaluation, provide pt/family education and to maximize pt's level of independence in the home and community environment.     Pt's spiritual, cultural and educational needs considered and pt agreeable to plan of care and goals.     Anticipated barriers to physical therapy: none    Goals:   Short Term Goals:  Pt will be ind w/ HEP w/in 2 wks  Pt will report a dec of at least 2pts on 0-10 scale (per MCID) in L knee pain within 3 wks for symptom modulation  3.   Pt will demonstrate at least -10deg L knee ext for improved ROM to better outcomes for surgery within 3 weeks.      Long Term Goals:  Pt will demonstrate an " inc of at least 1 MMT grade in L LE strength within 6 wks   Pt will demonstrate improved L knee flexion ROM to at least 130deg to better outcomes for surgery within 6 wks.   Pt will demonstrate ability to perform 20 Sup SLR w/ 2 pound weight showing improved quad activation and strength for L knee support within 6 wks.    PLAN     Plan of care Certification: 3/31/2023 to 6/23/2023.    Bernabe Curry, PT, DPT

## 2023-04-24 ENCOUNTER — TELEPHONE (OUTPATIENT)
Dept: DERMATOLOGY | Facility: CLINIC | Age: 23
End: 2023-04-24

## 2023-04-24 ENCOUNTER — OFFICE VISIT (OUTPATIENT)
Dept: DERMATOLOGY | Facility: CLINIC | Age: 23
End: 2023-04-24
Payer: COMMERCIAL

## 2023-04-24 DIAGNOSIS — L20.89 OTHER ATOPIC DERMATITIS: ICD-10-CM

## 2023-04-24 DIAGNOSIS — L73.9 FOLLICULITIS: Primary | ICD-10-CM

## 2023-04-24 PROCEDURE — 1159F MED LIST DOCD IN RCRD: CPT | Mod: CPTII,95,, | Performed by: DERMATOLOGY

## 2023-04-24 PROCEDURE — 1160F RVW MEDS BY RX/DR IN RCRD: CPT | Mod: CPTII,95,, | Performed by: DERMATOLOGY

## 2023-04-24 PROCEDURE — 99213 OFFICE O/P EST LOW 20 MIN: CPT | Mod: 95,,, | Performed by: DERMATOLOGY

## 2023-04-24 PROCEDURE — 1160F PR REVIEW ALL MEDS BY PRESCRIBER/CLIN PHARMACIST DOCUMENTED: ICD-10-PCS | Mod: CPTII,95,, | Performed by: DERMATOLOGY

## 2023-04-24 PROCEDURE — 99213 PR OFFICE/OUTPT VISIT, EST, LEVL III, 20-29 MIN: ICD-10-PCS | Mod: 95,,, | Performed by: DERMATOLOGY

## 2023-04-24 PROCEDURE — 3044F PR MOST RECENT HEMOGLOBIN A1C LEVEL <7.0%: ICD-10-PCS | Mod: CPTII,95,, | Performed by: DERMATOLOGY

## 2023-04-24 PROCEDURE — 1159F PR MEDICATION LIST DOCUMENTED IN MEDICAL RECORD: ICD-10-PCS | Mod: CPTII,95,, | Performed by: DERMATOLOGY

## 2023-04-24 PROCEDURE — 3044F HG A1C LEVEL LT 7.0%: CPT | Mod: CPTII,95,, | Performed by: DERMATOLOGY

## 2023-04-24 RX ORDER — BENZOYL PEROXIDE 100 MG/ML
LIQUID TOPICAL
Qty: 227 G | Refills: 12 | Status: SHIPPED | OUTPATIENT
Start: 2023-04-24 | End: 2023-08-08

## 2023-04-24 RX ORDER — CLINDAMYCIN PHOSPHATE 10 MG/ML
SOLUTION TOPICAL 2 TIMES DAILY
Qty: 60 EACH | Refills: 5 | Status: ON HOLD | OUTPATIENT
Start: 2023-04-24 | End: 2023-06-13 | Stop reason: HOSPADM

## 2023-04-24 RX ORDER — TRIAMCINOLONE ACETONIDE 1 MG/G
CREAM TOPICAL
Qty: 454 G | Refills: 3 | Status: ON HOLD | OUTPATIENT
Start: 2023-04-24 | End: 2023-06-13 | Stop reason: HOSPADM

## 2023-04-24 RX ORDER — CRISABOROLE 20 MG/G
OINTMENT TOPICAL
Qty: 60 G | Refills: 3 | Status: ON HOLD | OUTPATIENT
Start: 2023-04-24 | End: 2023-06-13 | Stop reason: HOSPADM

## 2023-04-24 NOTE — TELEPHONE ENCOUNTER
Called and spoke to pt. Pt scheduled for another virtual visit with Dr. Kinsey in June. Verbalized understanding.   ----- Message from Radha Kinsey MD sent at 4/24/2023  1:58 PM CDT -----  Please call to set up 6 week f/u

## 2023-04-24 NOTE — PATIENT INSTRUCTIONS
New Skin Care Regimen  Soap: Dove sensitive skin bar or liquid  Moisturizer: vanicream, ceraVe or cetaphil cream  Detergent: Tide Free, All Free, or Cheer Free   Fabric softener: do not use  Colognes/Perfumes/Fragrances: do not use  Bathing: shower or bathe with lukewarm water < 10 minutes    If your pharmacy does not carry benzoyl peroxide wash 10%, then purchase over the counter Pan-Oxyl 10% cleanser

## 2023-04-24 NOTE — PROGRESS NOTES
Subjective:      Patient ID:  Karla Price is a 22 y.o. female who presents for No chief complaint on file.    The patient location is: car  The chief complaint leading to consultation is: recurrent boils    Visit type: audiovisual    Face to Face time with patient: 20  25 minutes of total time spent on the encounter, which includes face to face time and non-face to face time preparing to see the patient (eg, review of tests), Obtaining and/or reviewing separately obtained history, Documenting clinical information in the electronic or other health record, Independently interpreting results (not separately reported) and communicating results to the patient/family/caregiver, or Care coordination (not separately reported).         Each patient to whom he or she provides medical services by telemedicine is:  (1) informed of the relationship between the physician and patient and the respective role of any other health care provider with respect to management of the patient; and (2) notified that he or she may decline to receive medical services by telemedicine and may withdraw from such care at any time.    Notes:     History of Present Illness: The patient presents with chief complaint of boils.  Location: groin/thighs  Duration: several years  Signs/Symptoms: sweating    Prior treatments: increased showers    She also c/o hair loss and facial hair and eczema    Current Skin Care Regimen  Soap: Dr. Carrera, oil of olay  Moisturizer: cocoa butter, vaseline body oil  Detergent:suncertain   Fabric softener: bounce dryer sheets  Colognes/Perfumes/Fragrances: unknown  Bathing: showers, 10 min, hot          Review of Systems   Constitutional:  Negative for fever and chills.   Gastrointestinal:  Negative for nausea and vomiting.   Skin:  Positive for activity-related sunscreen use. Negative for daily sunscreen use and recent sunburn.   Hematologic/Lymphatic: Does not bruise/bleed easily.     Objective:   Physical Exam    Constitutional: She appears well-developed and well-nourished. No distress.   Neurological: She is alert and oriented to person, place, and time. She is not disoriented.   Psychiatric: She has a normal mood and affect.   Skin:   Areas Examined (abnormalities noted in diagram):   Head / Face Inspection Performed  Neck Inspection Performed  RUE Inspected  LUE Inspection Performed  Nails and Digits Inspection Performed                        Assessment / Plan:        Folliculitis  -     benzoyl peroxide (BP WASH) 10 % external wash; Use daily as wash to affected areas. For boils. Rinse completely.  May bleach clothing  Dispense: 227 g; Refill: 12  -     clindamycin (CLEOCIN T) 1 % Swab; Apply topically 2 (two) times daily. For boils  Dispense: 60 each; Refill: 5  -     will start above above medications. Discussed diagnosis.    Other atopic dermatitis  -     triamcinolone acetonide 0.1% (KENALOG) 0.1 % cream; AAA bid prn for eczema. Do not use on face, underarms or groin.  Dispense: 454 g; Refill: 3  -     crisaborole (EUCRISA) 2 % Oint; AAA bid prn for eczema.  Non-steroid.  Safe to use long-term.  Dispense: 60 g; Refill: 3  -     will start above med, reviewed sensitive skin care, recommend change in soap, lotion, detergent. D/c fabric softeners and perfumes.  Will start above meds.     Hair loss  Discussed addressing at future visit.  Discussed start of OTC biotin. The patient acknowledged understanding.          Follow up in about 4 weeks (around 5/22/2023).

## 2023-04-26 ENCOUNTER — CLINICAL SUPPORT (OUTPATIENT)
Dept: REHABILITATION | Facility: HOSPITAL | Age: 23
End: 2023-04-26
Payer: COMMERCIAL

## 2023-04-26 DIAGNOSIS — M62.81 MUSCLE WEAKNESS: Primary | ICD-10-CM

## 2023-04-26 DIAGNOSIS — M25.562 CHRONIC PAIN OF LEFT KNEE: ICD-10-CM

## 2023-04-26 DIAGNOSIS — G89.29 CHRONIC PAIN OF LEFT KNEE: ICD-10-CM

## 2023-04-26 PROCEDURE — 97112 NEUROMUSCULAR REEDUCATION: CPT | Mod: PN

## 2023-04-26 PROCEDURE — 97110 THERAPEUTIC EXERCISES: CPT | Mod: PN

## 2023-04-26 NOTE — PROGRESS NOTES
"[facility]   Physical Therapy Treatment Note     Name: Karla [facility] Number: [id number]    Therapy Diagnosis:   Encounter Diagnoses   Name Primary?    Muscle weakness Yes    Chronic pain of left knee    Physician: Camila Holcomb MD     Physician Orders: PT Eval and Treat   Medical Diagnosis from Referral:   S83.519A (ICD-10-CM) - Rupture of anterior cruciate ligament of knee, unspecified laterality, initial encounter   S83.242A (ICD-10-CM) - Tear of medial meniscus of left knee, current, unspecified tear type, initial encounter   S83.282A (ICD-10-CM) - Tear of lateral meniscus of left knee, current, unspecified tear type, initial encounter      Evaluation Date: 3/31/2023  Authorization Period Expiration: 12/31/2023  Plan of Care Expiration: 6/23/2023  Progress Note Due: 5/1/2023  Visit # / Visits authorized: 6/20 (+Evaluation)     Precautions: Standard       Time In: 12:30  Time Out: 1:30  Total Billable Time: 60 minutes    SUBJECTIVE     Pt reports: knee feels fine today. No pain just stiffness upon standing.     She was compliant with home exercise program.  Response to previous treatment: improved gait  Functional change: first follow up    Pain: 0/10  Location: left knee      OBJECTIVE     4/12/2023:    L knee AROM: 0-5-114    Treatment     Karla received the treatments listed below:      manual therapy techniques: Joint mobilizations and Soft tissue Mobilization were applied to the: L knee for 00 minutes, including:    Not today:  Grade III patellar mobilizations in all directions  Infrapatellar fat pad mobilizations in extension    therapeutic exercises to develop strength and ROM for 30 minutes including:    Upright bike: 5 minutes, Level 1.0, for ROM and swelling  DL Gluteal bridges: 3x10, 5" holds  TRX Squat 3x10  Machine HS curl , 35lbs  DL RDL w/ 20lb KB, 3x10  Lateral steps w/ RTB around ankles, 3 laps along white tape    neuromuscular re-education activities to improve: " "Balance and Proprioception for 30 minutes. The following activities were included:    Quad Sets: 3x15, 5" holds  SAQ's: 3x10, 3" holds, medium bolster under knee  SLR: 3x10   SLS: 60"x3, on FOAM pad        Patient Education and Home Exercises     Home Exercises Provided and Patient Education Provided     Education provided:   - To continue with current HEP    Written Home Exercises Provided: Patient instructed to cont prior HEP. Exercises were reviewed and Karla was able to demonstrate them prior to the end of the session.  Karla demonstrated good  understanding of the education provided. See EMR under Patient Instructions for exercises provided during therapy sessions    ASSESSMENT     Karla presented to therapy without pain just some stiffness after sitting for awhile. ROM and quad control continue to improve. She tolerated  machine hamstring curls today without issue. She reports 0/10 pain in L knee at end of session. We scheduled more sessions today in preporation for reassessment by sports medicine team and likely nearing surgery.     Karla Is progressing well towards her goals.   Pt prognosis is Good.     Pt will continue to benefit from skilled outpatient physical therapy to address the deficits listed in the problem list box on initial evaluation, provide pt/family education and to maximize pt's level of independence in the home and community environment.     Pt's spiritual, cultural and educational needs considered and pt agreeable to plan of care and goals.     Anticipated barriers to physical therapy: none    Goals:   Short Term Goals:  Pt will be ind w/ HEP w/in 2 wks  Pt will report a dec of at least 2pts on 0-10 scale (per MCID) in L knee pain within 3 wks for symptom modulation  3.   Pt will demonstrate at least -10deg L knee ext for improved ROM to better outcomes for surgery within 3 weeks.      Long Term Goals:  Pt will demonstrate an inc of at least 1 MMT grade in L LE strength within 6 wks   Pt " will demonstrate improved L knee flexion ROM to at least 130deg to better outcomes for surgery within 6 wks.   Pt will demonstrate ability to perform 20 Sup SLR w/ 2 pound weight showing improved quad activation and strength for L knee support within 6 wks.    PLAN     Plan of care Certification: 3/31/2023 to 6/23/2023.    Bernabe Curry, PT, DPT

## 2023-04-28 ENCOUNTER — CLINICAL SUPPORT (OUTPATIENT)
Dept: REHABILITATION | Facility: HOSPITAL | Age: 23
End: 2023-04-28
Payer: COMMERCIAL

## 2023-04-28 DIAGNOSIS — G89.29 CHRONIC PAIN OF LEFT KNEE: ICD-10-CM

## 2023-04-28 DIAGNOSIS — M25.562 CHRONIC PAIN OF LEFT KNEE: ICD-10-CM

## 2023-04-28 DIAGNOSIS — M62.81 MUSCLE WEAKNESS: Primary | ICD-10-CM

## 2023-04-28 PROCEDURE — 97110 THERAPEUTIC EXERCISES: CPT | Mod: PN

## 2023-04-28 PROCEDURE — 97112 NEUROMUSCULAR REEDUCATION: CPT | Mod: PN

## 2023-04-28 NOTE — PROGRESS NOTES
"[facility]   Physical Therapy Treatment Note     Name: Karla Price  [facility] Number: [id number]    Therapy Diagnosis:   Encounter Diagnoses   Name Primary?    Muscle weakness Yes    Chronic pain of left knee    Physician: Camila Holcomb MD     Physician Orders: PT Eval and Treat   Medical Diagnosis from Referral:   S83.519A (ICD-10-CM) - Rupture of anterior cruciate ligament of knee, unspecified laterality, initial encounter   S83.242A (ICD-10-CM) - Tear of medial meniscus of left knee, current, unspecified tear type, initial encounter   S83.282A (ICD-10-CM) - Tear of lateral meniscus of left knee, current, unspecified tear type, initial encounter      Evaluation Date: 3/31/2023  Authorization Period Expiration: 12/31/2023  Plan of Care Expiration: 6/23/2023  Progress Note Due: 5/1/2023  Visit # / Visits authorized: 7/20 (+Evaluation)     Precautions: Standard       Time In: 9:15  Time Out: 10:15  Total Billable Time: 60 minutes    SUBJECTIVE     Pt reports: knee feels fine today. No pain.     She was compliant with home exercise program.  Response to previous treatment: improved gait  Functional change: first follow up    Pain: 0/10  Location: left knee      OBJECTIVE     4/12/2023:    L knee AROM: 0-5-114    Treatment     Karla received the treatments listed below:      manual therapy techniques: Joint mobilizations and Soft tissue Mobilization were applied to the: L knee for 00 minutes, including:    Not today:  Grade III patellar mobilizations in all directions  Infrapatellar fat pad mobilizations in extension    therapeutic exercises to develop strength and ROM for 30 minutes including:    Upright bike: 5 minutes, Level 1.0, for ROM and swelling  DL Gluteal bridges: 3x10, 5" holds  TRX Squat 3x10  Machine HS curl , 35lbs  DL RDL w/ 20lb KB, 3x10  Lateral steps w/ BTB around ankles, 3 laps along white tape    neuromuscular re-education activities to improve: Balance and Proprioception for " "30 minutes. The following activities were included:    Quad Sets: 3x15, 5" holds  SAQ's: 3x10, 3" holds, medium bolster under knee  SLR: 3x10   SLS: 60"x3, on FOAM pad        Patient Education and Home Exercises     Home Exercises Provided and Patient Education Provided     Education provided:   - To continue with current HEP    Written Home Exercises Provided: Patient instructed to cont prior HEP. Exercises were reviewed and Karla was able to demonstrate them prior to the end of the session.  Karla demonstrated good  understanding of the education provided. See EMR under Patient Instructions for exercises provided during therapy sessions    ASSESSMENT     Karla presented to therapy without pain jtoday. ROM and quad control continue to improve. She reports 0/10 pain in L knee at end of session. She will see surgeon for follow up next Friday after PT session to determine surgical date.    Karla Is progressing well towards her goals.   Pt prognosis is Good.     Pt will continue to benefit from skilled outpatient physical therapy to address the deficits listed in the problem list box on initial evaluation, provide pt/family education and to maximize pt's level of independence in the home and community environment.     Pt's spiritual, cultural and educational needs considered and pt agreeable to plan of care and goals.     Anticipated barriers to physical therapy: none    Goals:   Short Term Goals:  Pt will be ind w/ HEP w/in 2 wks  Pt will report a dec of at least 2pts on 0-10 scale (per MCID) in L knee pain within 3 wks for symptom modulation  3.   Pt will demonstrate at least -10deg L knee ext for improved ROM to better outcomes for surgery within 3 weeks.      Long Term Goals:  Pt will demonstrate an inc of at least 1 MMT grade in L LE strength within 6 wks   Pt will demonstrate improved L knee flexion ROM to at least 130deg to better outcomes for surgery within 6 wks.   Pt will demonstrate ability to perform 20 Sup " SLR w/ 2 pound weight showing improved quad activation and strength for L knee support within 6 wks.    PLAN     Plan of care Certification: 3/31/2023 to 6/23/2023.    Bernabe Curry, PT, DPT

## 2023-05-03 ENCOUNTER — CLINICAL SUPPORT (OUTPATIENT)
Dept: REHABILITATION | Facility: HOSPITAL | Age: 23
End: 2023-05-03
Payer: COMMERCIAL

## 2023-05-03 DIAGNOSIS — M62.81 MUSCLE WEAKNESS: Primary | ICD-10-CM

## 2023-05-03 DIAGNOSIS — G89.29 CHRONIC PAIN OF LEFT KNEE: ICD-10-CM

## 2023-05-03 DIAGNOSIS — M25.562 CHRONIC PAIN OF LEFT KNEE: ICD-10-CM

## 2023-05-03 PROCEDURE — 97110 THERAPEUTIC EXERCISES: CPT | Mod: PN

## 2023-05-03 NOTE — PROGRESS NOTES
[facility]   Physical Therapy Treatment Note     Name: Karla [facility] Number: [id number]    Therapy Diagnosis:   Encounter Diagnoses   Name Primary?    Muscle weakness Yes    Chronic pain of left knee    Physician: Camila Holcomb MD     Physician Orders: PT Eval and Treat   Medical Diagnosis from Referral:   S83.519A (ICD-10-CM) - Rupture of anterior cruciate ligament of knee, unspecified laterality, initial encounter   S83.242A (ICD-10-CM) - Tear of medial meniscus of left knee, current, unspecified tear type, initial encounter   S83.282A (ICD-10-CM) - Tear of lateral meniscus of left knee, current, unspecified tear type, initial encounter      Evaluation Date: 3/31/2023  Authorization Period Expiration: 2023  Plan of Care Expiration: 2023  Progress Note Due: 2023  Visit # / Visits authorized:  (+Evaluation)     Precautions: Standard     Time In: 12:20 PM  Time Out: 1:15 PM  Total Billable Time: 55 minutes    SUBJECTIVE     Pt reports: Has follow-up appointment on Friday to see about potentially scheduling her surgery.     She was compliant with home exercise program.  Response to previous treatment: No adverse reactions  Functional change: Ongoing    Pain: 0/10  Location: left knee      OBJECTIVE     5/3/2023:    L knee AROM: 0-3-130    All below testing performed in seated position. Gait belt used for quadriceps testing.    Lower extremity strength with GistET handheld dynamometer Right  (Kgf) Left  (Kgf) Pain/dysfunction with movement   (approx 4 sec hold w/ max contraction)   Hip abduction 20.7   21.3  19.8    AV.6 21.0   20.0  19.4    AV.1 98%   Quadriceps 25.7  24.9  24.4    AV.0 13.5  12.0  15.2    AV.6 55%   Hamstrings 18.1  17.3  18.5    AV.0 13.6   13.1  12.5    AV.1 73%         Treatment     Karla received the treatments listed below:      Therapeutic exercises to develop strength and ROM for 55 minutes including:  Upright  "bike: 8 minutes, Level 5.0, for ROM and swelling  Objective measurements (see above)  Upright SLR's: 3x10, 3#  SL Bridges: 3x5, alternating, 3" holds  +DL Matrix Knee Extension: 3x10, 10#, increased effort from LLE  DL Matrix HS Curls: 3x10, 35#, increased effort from LLE  DL TRX Squats: 3x10  Patient education        Patient Education and Home Exercises     Home Exercises Provided and Patient Education Provided     Education provided:   - To continue with current HEP    Written Home Exercises Provided: Patient instructed to cont prior HEP. Exercises were reviewed and Karla was able to demonstrate them prior to the end of the session.  Karla demonstrated good  understanding of the education provided. See EMR under Patient Instructions for exercises provided during therapy sessions    ASSESSMENT     Karla presented to therapy with reports of an overall improvement in pain and function. Objective measurements of ROM and strength LSI taken today. Patient displayed improved ROM to 130 degrees of flexion actively and near full extension. Assessed strength LSI with patient displaying 98% LSI for hip abduction, 55% LSI for quadriceps, and 73% LSI for hamstrings, indicated continued hamstring and quadriceps weakness, but improvements. Tolerated remainder of session well with continued focus on improving quad and hamstring strength. Progressed to SL strengthening as tolerated this session.     Karla Is progressing well towards her goals.   Pt prognosis is Good.     Pt will continue to benefit from skilled outpatient physical therapy to address the deficits listed in the problem list box on initial evaluation, provide pt/family education and to maximize pt's level of independence in the home and community environment.     Pt's spiritual, cultural and educational needs considered and pt agreeable to plan of care and goals.     Anticipated barriers to physical therapy: none    Goals:   Short Term Goals:  Pt will be ind w/ HEP " "w/in 2 wks  Pt will report a dec of at least 2pts on 0-10 scale (per MCID) in L knee pain within 3 wks for symptom modulation. Progressing, Not Met  3.   Pt will demonstrate at least -10deg L knee ext for improved ROM to better outcomes for surgery within 3 weeks. Progressing, Not Met     Long Term Goals:  Pt will demonstrate an inc of at least 1 MMT grade in L LE strength within 6 wks. Progressing, Not Met  Pt will demonstrate improved L knee flexion ROM to at least 130deg to better outcomes for surgery within 6 wks. Progressing, Not Met  Pt will demonstrate ability to perform 20 Sup SLR w/ 2 pound weight showing improved quad activation and strength for L knee support within 6 wks.Progressing, Not Met    PLAN     Plan of care Certification: 3/31/2023 to 6/23/2023.    Not Performed Today:     DL RDL w/ 20lb KB, 3x10  Lateral steps w/ BTB around ankles, 3 laps along white tape    neuromuscular re-education activities to improve: Balance and Proprioception for 0 minutes. The following activities were included:  SLS: 60"x3, on FOAM pad      Waleska Brandon, PT, DPT    "

## 2023-05-05 ENCOUNTER — CLINICAL SUPPORT (OUTPATIENT)
Dept: REHABILITATION | Facility: HOSPITAL | Age: 23
End: 2023-05-05
Payer: COMMERCIAL

## 2023-05-05 ENCOUNTER — TELEPHONE (OUTPATIENT)
Dept: OBSTETRICS AND GYNECOLOGY | Facility: CLINIC | Age: 23
End: 2023-05-05
Payer: COMMERCIAL

## 2023-05-05 ENCOUNTER — OFFICE VISIT (OUTPATIENT)
Dept: SPORTS MEDICINE | Facility: CLINIC | Age: 23
End: 2023-05-05
Payer: COMMERCIAL

## 2023-05-05 VITALS — BODY MASS INDEX: 39.08 KG/M2 | HEIGHT: 61 IN | WEIGHT: 207 LBS

## 2023-05-05 DIAGNOSIS — M62.81 MUSCLE WEAKNESS: Primary | ICD-10-CM

## 2023-05-05 DIAGNOSIS — G89.29 CHRONIC PAIN OF LEFT KNEE: ICD-10-CM

## 2023-05-05 DIAGNOSIS — S83.512A RUPTURE OF ANTERIOR CRUCIATE LIGAMENT OF LEFT KNEE, INITIAL ENCOUNTER: Primary | ICD-10-CM

## 2023-05-05 DIAGNOSIS — M25.562 CHRONIC PAIN OF LEFT KNEE: ICD-10-CM

## 2023-05-05 DIAGNOSIS — M25.362 INSTABILITY OF LEFT KNEE JOINT: ICD-10-CM

## 2023-05-05 DIAGNOSIS — S83.519A RUPTURE OF ANTERIOR CRUCIATE LIGAMENT OF KNEE, UNSPECIFIED LATERALITY, INITIAL ENCOUNTER: Primary | ICD-10-CM

## 2023-05-05 DIAGNOSIS — S83.282A TEAR OF LATERAL MENISCUS OF LEFT KNEE, CURRENT, UNSPECIFIED TEAR TYPE, INITIAL ENCOUNTER: ICD-10-CM

## 2023-05-05 DIAGNOSIS — S83.242A TEAR OF MEDIAL MENISCUS OF LEFT KNEE, CURRENT, UNSPECIFIED TEAR TYPE, INITIAL ENCOUNTER: ICD-10-CM

## 2023-05-05 PROCEDURE — 97110 THERAPEUTIC EXERCISES: CPT | Mod: PN

## 2023-05-05 PROCEDURE — 3008F PR BODY MASS INDEX (BMI) DOCUMENTED: ICD-10-PCS | Mod: CPTII,S$GLB,, | Performed by: ORTHOPAEDIC SURGERY

## 2023-05-05 PROCEDURE — 99215 OFFICE O/P EST HI 40 MIN: CPT | Mod: S$GLB,,, | Performed by: ORTHOPAEDIC SURGERY

## 2023-05-05 PROCEDURE — 3008F BODY MASS INDEX DOCD: CPT | Mod: CPTII,S$GLB,, | Performed by: ORTHOPAEDIC SURGERY

## 2023-05-05 PROCEDURE — 3044F HG A1C LEVEL LT 7.0%: CPT | Mod: CPTII,S$GLB,, | Performed by: ORTHOPAEDIC SURGERY

## 2023-05-05 PROCEDURE — 99999 PR PBB SHADOW E&M-EST. PATIENT-LVL III: ICD-10-PCS | Mod: PBBFAC,,, | Performed by: ORTHOPAEDIC SURGERY

## 2023-05-05 PROCEDURE — 1159F PR MEDICATION LIST DOCUMENTED IN MEDICAL RECORD: ICD-10-PCS | Mod: CPTII,S$GLB,, | Performed by: ORTHOPAEDIC SURGERY

## 2023-05-05 PROCEDURE — 99999 PR PBB SHADOW E&M-EST. PATIENT-LVL III: CPT | Mod: PBBFAC,,, | Performed by: ORTHOPAEDIC SURGERY

## 2023-05-05 PROCEDURE — 1159F MED LIST DOCD IN RCRD: CPT | Mod: CPTII,S$GLB,, | Performed by: ORTHOPAEDIC SURGERY

## 2023-05-05 PROCEDURE — 3044F PR MOST RECENT HEMOGLOBIN A1C LEVEL <7.0%: ICD-10-PCS | Mod: CPTII,S$GLB,, | Performed by: ORTHOPAEDIC SURGERY

## 2023-05-05 PROCEDURE — 99215 PR OFFICE/OUTPT VISIT, EST, LEVL V, 40-54 MIN: ICD-10-PCS | Mod: S$GLB,,, | Performed by: ORTHOPAEDIC SURGERY

## 2023-05-05 NOTE — TELEPHONE ENCOUNTER
----- Message from Jewell Royal sent at 5/5/2023 11:08 AM CDT -----  Regarding: medication concerns  Name of caller: karlie       What is the requesting detail: pt is requesting a call back regarding medication. Please give her a call back to further discuss.      Can the clinic reply by [facility]: yes       What number to call back:[phone]

## 2023-05-05 NOTE — PROGRESS NOTES
"ESTABLISHED PATIENT    History 5/5/2023:  Karla returns to clinic today for follow-up of left knee pain. She has been doing physical therapy and is ready to proceed with surgery.    History 3/29/2023:   22 y.o. Female with a history of left knee pain who is an . She states she as walking with her friends and her knee buckled on her. This is not the first occurrence. In 2016 she fell for the first time while a dog was chasing her. She states she has felt unstable ever since. She has been unable to play sports because of her knee and instability. She had another fall after the fall in 2016 and was put on crutches. She presents to clinic today in a brace that she has been wearing for a few weeks.        - mechanical symptoms, + instability    Is affecting ADLs.  Pain is 5/10 at it's worst.      REVIEW OF SYSTEMS   Constitution: Negative. Negative for chills, fever and night sweats.   HENT: Negative for congestion and headaches.    Eyes: Negative for blurred vision, left vision loss and right vision loss.   Cardiovascular: Negative for chest pain and syncope.   Respiratory: Negative for cough and shortness of breath.    Endocrine: Negative for polydipsia, polyphagia and polyuria.   Hematologic/Lymphatic: Negative for bleeding problem. Does not bruise/bleed easily.   Skin: Negative for dry skin, itching and rash.   Musculoskeletal: Negative for falls. Positive for left knee pain  Gastrointestinal: Negative for abdominal pain and bowel incontinence.   Genitourinary: Negative for bladder incontinence and nocturia.   Neurological: Negative for disturbances in coordination, loss of balance and seizures.   Psychiatric/Behavioral: Negative for depression. The patient does not have insomnia.    Allergic/Immunologic: Negative for hives and persistent infections.     PHYSICAL EXAMINATION    Vitals: Ht 5' 1" (1.549 m)   Wt 93.9 kg (207 lb)   BMI 39.11 kg/m²     General: The patient appears active and " healthy with no apparent physical problems.  No disturbance of mood or affect is demonstrated. Alert and Oriented.    HEENT: Eyes normal, pupils equally round, nose normal.    Resp: Equal and symmetrical chest rises. No wheezing    CV: Regular rate    Neck: Supple; nonpainful range of motion.    Extremities: no cyanosis, clubbing, edema, or diffuse swelling.  Palpable pulses, good capillary refill of the digits.  No coolness, discoloration, edema or obvious varicosities.    Skin: no lesions noted.    Lymphatic: no detected adenopathy in the upper or lower extremities.    Neurologic: normal mental status, normal reflexes, normal gait and balance.  Patient is alert and oriented to person, place and time.  No flaccidity or spasticity is noted.  No motor or sensory deficits are noted.  Light touch is intact    Orthopaedic:     KNEE EXAM - LEFT    Inspection:   Normal skin color and appearance with no scars, no ecchymosis and no effusion. Valgus alignment noted      ROM:   5° - 145°.  *pain with terminal extension, hyperextension noted on the right    Palpation:   There is no tenderness along medial joint line, medial plica, medial collateral ligament, pes bursa, lateral joint line, iliotibial band, lateral collateral ligament, popliteal fossa, patellar tendon, or quadriceps tendon.    Stability: + anterior drawer, + Lachman, - pivot shift and - posterior drawer.      Grade II Varus stress testing at 0 and 30 degrees which is slightly asymmetric to her right lower extremity    Tests:   - Andreinas test.  - patellar compression - grind test, - crepitus.  There is no patellar apprehension.     - J Sign. - Victor Manuel's. - patellar tilt. - Ana. Lateral patella translation  2 quadrants. Medial patella translation 2 quadrants    Motor:   Quadriceps strength is 5/5 and hamstrings strength is 5/5. Hamstrings show no tightness.      Neuro:   Distal neurovascular status is normal    Vascular: Negative Homans and no palpable popliteal  cords. 2+ pedal pulse with brisk cap refill    Gait Slightly antalgic    Beighton scale: 8/9    IMAGING    MRI Knee Without Contrast Left  Narrative: EXAMINATION:  MRI KNEE WITHOUT CONTRAST LEFT    CLINICAL HISTORY:  Meniscal tear, untreated, new symptoms;Pain in left knee    TECHNIQUE:  Multiplanar, multisequence images were performed of the left knee.    COMPARISON:  X-ray 03/13/2023    FINDINGS:  Menisci:Vertical longitudinal tear posterior horn medial meniscus extending to the root attachment disrupting the superior articular surface.  There is attenuation of the free edge of the lateral meniscus concerning for a radial tear.    Ligaments:Complete tear ACL.  PCL fibers are intact.  Fibular collateral distal biceps femoris and IT band are intact.  Small posterolateral corner ligaments not identifiable with certainty and prominent pericapsular soft tissue edema raises suspicion for posterolateral corner injury.    Extensor Mechanism:The quadriceps tendon is intact.  There is complete disruption of the medial patellofemoral ligament at the MCL attachment associated with mild/minimal medial subluxation of the patella.    Bone and Joint including articular cartilage:There is a displaced osteochondral fracture of the inferomedial pole of patella with the cortical fragment seen inferiorly in the anterior aspect of the intercondylar notch.  Associated contrecoup osteochondral impaction injury of the lateral trochlear surface of the femur noted.  Fracture lines extend through the articular aspect of the medial and lateral tibial plateaus, with approximately 5 mm of articular surface depression of the posteromedial tibial plateau.  Large joint effusion with lipohemarthrosis.    Soft Tissues:Prominent periarticular soft tissue edema and grade 1 popliteus strain.    Miscellaneous:None  Impression: Complete ACL tear with associated posterolateral corner injury.    Mildly depressed medial tibial plateau fracture and  nondepressed posterolateral tibial plateau fracture    Findings of transient lateral patellar dislocation with large displaced osteochondral fragment from the inferomedial pole of the patella.  Partial tear of the MPFL at the MCL origin.    Medial and lateral meniscal tears    This report was flagged in Epic as abnormal.    Electronically signed by: Josh Dupree Jr  Date:    03/22/2023  Time:    15:46        IMPRESSION       ICD-10-CM ICD-9-CM   1. Rupture of anterior cruciate ligament of knee, unspecified laterality, initial encounter  S83.519A 844.2   2. Tear of medial meniscus of left knee, current, unspecified tear type, initial encounter  S83.242A 836.0   3. Tear of lateral meniscus of left knee, current, unspecified tear type, initial encounter  S83.282A 836.1   4. Instability of left knee joint  M25.362 718.86         MEDICATIONS PRESCRIBED      None    RECOMMENDATIONS     Surgical versus non-surgical options discussed today; left knee arthroscopy with anterior cruciate ligament reconstruction using quadriceps tendon autograft, lateral extra-articular tenodesis with a iliotibial band autograft, medial and lateral meniscus repair versus menisectomy, loose body removal and possible posterolateral corner reconstruction using allografts.  RTC for pre-op with Bernabe Allen PA-C  We had a long discussion concerning staging the procedure.  I advised her that we will do the majority of the surgery and only reconstruct her posterolateral corner if indicated at the time of surgery.  The patient elected to proceed with operative intervention after all risks, benefits, and alternatives were discussed with the patient.  The risks of surgery include bleeding, scarring, injuries to nerves, arteries and veins, need for additional surgeries, blood clots, infections, and the risk of anesthesia.  I personally obtained informed consent from the patient and documented full history and physical, which has been placed on the  chart.        All questions were answered, pt will contact us for questions or concerns in the interim.

## 2023-05-05 NOTE — PROGRESS NOTES
"[facility]   Physical Therapy Treatment Note     Name: Karla [facility] Number: [id number]    Therapy Diagnosis:   Encounter Diagnoses   Name Primary?    Muscle weakness Yes    Chronic pain of left knee    Physician: Camila Holcomb MD     Physician Orders: PT Eval and Treat   Medical Diagnosis from Referral:   S83.519A (ICD-10-CM) - Rupture of anterior cruciate ligament of knee, unspecified laterality, initial encounter   S83.242A (ICD-10-CM) - Tear of medial meniscus of left knee, current, unspecified tear type, initial encounter   S83.282A (ICD-10-CM) - Tear of lateral meniscus of left knee, current, unspecified tear type, initial encounter      Evaluation Date: 3/31/2023  Authorization Period Expiration: 12/31/2023  Plan of Care Expiration: 6/23/2023  Progress Note Due: 5/1/2023  Visit # / Visits authorized: 8/20 (+Evaluation)     Precautions: Standard     Time In: 7:15 AM  Time Out: 8:00 AM  Total Billable Time: 45 minutes    SUBJECTIVE     Pt reports: Was a little sore after last session but no increase in pain or discomfort.     She was compliant with home exercise program.  Response to previous treatment: No adverse reactions  Functional change: Ongoing    Pain: 0/10  Location: left knee      OBJECTIVE     5/3/2023:    L knee AROM: 0-3-130    Strength last tested on 5/3/2023.       Treatment     Karla received the treatments listed below:      Therapeutic exercises to develop strength and ROM for 40 minutes including:  Upright bike: 8 minutes, Level 5.0, for ROM and swelling  Upright SLR's: 3x10, 3#  SL Bridges: 3x5, alternating, 3" holds  DL RDL w/ 20lb KB, 2x20  Lateral steps w/ BTB around ankles, 3 laps along white tape  Patient education    Not performed today/OOT:  DL Matrix Knee Extension: 3x10, 10#, increased effort from LLE  DL Matrix HS Curls: 3x10, 35#, increased effort from LLE  DL TRX Squats: 3x10      neuromuscular re-education activities to improve: Balance and " Proprioception for 5 minutes. The following activities were included:  Single Leg ball toss to trampoline: 4x10, red weighted ball        Patient Education and Home Exercises     Home Exercises Provided and Patient Education Provided     Education provided:   - To continue with current HEP    Written Home Exercises Provided: Patient instructed to cont prior HEP. Exercises were reviewed and Karla was able to demonstrate them prior to the end of the session.  Karla demonstrated good  understanding of the education provided. See EMR under Patient Instructions for exercises provided during therapy sessions    ASSESSMENT     Session limited due to patient late arrival. Tolerated session well with continued focus on improving quadriceps, hamstring, and gluteal strength. Tolerated increases in repetitions and resistance without increase in pain. Some cueing for proper body mechanics required, but able to correct upon cueing. Patient has follow-up with MD to discuss progress and possible surgery.     Karla Is progressing well towards her goals.   Pt prognosis is Good.     Pt will continue to benefit from skilled outpatient physical therapy to address the deficits listed in the problem list box on initial evaluation, provide pt/family education and to maximize pt's level of independence in the home and community environment.     Pt's spiritual, cultural and educational needs considered and pt agreeable to plan of care and goals.     Anticipated barriers to physical therapy: none    Goals:   Short Term Goals:  Pt will be ind w/ HEP w/in 2 wks  Pt will report a dec of at least 2pts on 0-10 scale (per MCID) in L knee pain within 3 wks for symptom modulation. Progressing, Not Met  3.   Pt will demonstrate at least -10deg L knee ext for improved ROM to better outcomes for surgery within 3 weeks. Progressing, Not Met     Long Term Goals:  Pt will demonstrate an inc of at least 1 MMT grade in L LE strength within 6 wks.  Progressing, Not Met  Pt will demonstrate improved L knee flexion ROM to at least 130deg to better outcomes for surgery within 6 wks. Progressing, Not Met  Pt will demonstrate ability to perform 20 Sup SLR w/ 2 pound weight showing improved quad activation and strength for L knee support within 6 wks.Progressing, Not Met    PLAN     Plan of care Certification: 3/31/2023 to 6/23/2023.      Waleska Brandon, PT, DPT

## 2023-05-09 ENCOUNTER — CLINICAL SUPPORT (OUTPATIENT)
Dept: REHABILITATION | Facility: HOSPITAL | Age: 23
End: 2023-05-09
Payer: COMMERCIAL

## 2023-05-09 DIAGNOSIS — M62.81 MUSCLE WEAKNESS: Primary | ICD-10-CM

## 2023-05-09 DIAGNOSIS — G89.29 CHRONIC PAIN OF LEFT KNEE: ICD-10-CM

## 2023-05-09 DIAGNOSIS — M25.562 CHRONIC PAIN OF LEFT KNEE: ICD-10-CM

## 2023-05-09 PROCEDURE — 97112 NEUROMUSCULAR REEDUCATION: CPT | Mod: PN

## 2023-05-09 PROCEDURE — 97110 THERAPEUTIC EXERCISES: CPT | Mod: PN

## 2023-05-09 NOTE — PROGRESS NOTES
"[facility]   Physical Therapy Treatment Note     Name: Karla [facility] Number: [id number]    Therapy Diagnosis:   Encounter Diagnoses   Name Primary?    Muscle weakness Yes    Chronic pain of left knee    Physician: Camila Holcomb MD     Physician Orders: PT Eval and Treat   Medical Diagnosis from Referral:   S83.519A (ICD-10-CM) - Rupture of anterior cruciate ligament of knee, unspecified laterality, initial encounter   S83.242A (ICD-10-CM) - Tear of medial meniscus of left knee, current, unspecified tear type, initial encounter   S83.282A (ICD-10-CM) - Tear of lateral meniscus of left knee, current, unspecified tear type, initial encounter      Evaluation Date: 3/31/2023  Authorization Period Expiration: 12/31/2023  Plan of Care Expiration: 6/23/2023  Progress Note Due: 5/1/2023  Visit # / Visits authorized: 10/20 (+Evaluation)     Precautions: Standard     Time In: 4:25 PM  Time Out: 5:20 PM  Total Billable Time: 55 minutes    SUBJECTIVE     Pt reports: No pain or other issues, surgery date is set for the middle of June.     She was compliant with home exercise program.  Response to previous treatment: No adverse reactions  Functional change: Ongoing    Pain: 0/10  Location: left knee      OBJECTIVE           Treatment     Karla received the treatments listed below:      Therapeutic exercises to develop strength and ROM for 45 minutes including:  Upright bike: 5 minutes, Level 5.0, for ROM and swelling  SLR's: 3x10, 3#  SL Bridges: 3x10,  3" holds  DL RDL w/ 25lb KB, 3x15  Lateral steps w/ BTB around ankles, 3 laps along white tape  DL Matrix Knee Extension: 3x10, 30#, increased effort from LLE  DL Matrix HS Curls: 3x10, 40#, increased effort from LLE  DL TRX Squats: 3x15      neuromuscular re-education activities to improve: Balance and Proprioception for 10 minutes. The following activities were included:  Single Leg ball toss to trampoline: 4x20, red weighted ball        Patient " Education and Home Exercises     Home Exercises Provided and Patient Education Provided     Education provided:   - To continue with current HEP    Written Home Exercises Provided: Patient instructed to cont prior HEP. Exercises were reviewed and Karla was able to demonstrate them prior to the end of the session.  Karla demonstrated good  understanding of the education provided. See EMR under Patient Instructions for exercises provided during therapy sessions    ASSESSMENT     Patient tolerated session well without issues. Strength/stability in RLE seems to be improving as she was able to improve weight or reps for each exercise today. Knee surgery scheduled for middle of June at this time.     Karla Is progressing well towards her goals.   Pt prognosis is Good.     Pt will continue to benefit from skilled outpatient physical therapy to address the deficits listed in the problem list box on initial evaluation, provide pt/family education and to maximize pt's level of independence in the home and community environment.     Pt's spiritual, cultural and educational needs considered and pt agreeable to plan of care and goals.     Anticipated barriers to physical therapy: none    Goals:   Short Term Goals:  Pt will be ind w/ HEP w/in 2 wks  Pt will report a dec of at least 2pts on 0-10 scale (per MCID) in L knee pain within 3 wks for symptom modulation. MET  3.   Pt will demonstrate at least -10deg L knee ext for improved ROM to better outcomes for surgery within 3 weeks. MET     Long Term Goals:  Pt will demonstrate an inc of at least 1 MMT grade in L LE strength within 6 wks. Progressing, Not Met  Pt will demonstrate improved L knee flexion ROM to at least 130deg to better outcomes for surgery within 6 wks. Progressing, Not Met  Pt will demonstrate ability to perform 20 Sup SLR w/ 2 pound weight showing improved quad activation and strength for L knee support within 6 wks.Progressing, Not Met    PLAN     Plan of care  Certification: 3/31/2023 to 6/23/2023.      Bernabe Curry, PT, DPT

## 2023-05-12 ENCOUNTER — OFFICE VISIT (OUTPATIENT)
Dept: OBSTETRICS AND GYNECOLOGY | Facility: CLINIC | Age: 23
End: 2023-05-12
Attending: OBSTETRICS & GYNECOLOGY
Payer: COMMERCIAL

## 2023-05-12 ENCOUNTER — CLINICAL SUPPORT (OUTPATIENT)
Dept: REHABILITATION | Facility: HOSPITAL | Age: 23
End: 2023-05-12
Payer: COMMERCIAL

## 2023-05-12 DIAGNOSIS — N92.6 IRREGULAR PERIODS: Primary | ICD-10-CM

## 2023-05-12 DIAGNOSIS — M25.562 CHRONIC PAIN OF LEFT KNEE: ICD-10-CM

## 2023-05-12 DIAGNOSIS — G89.29 CHRONIC PAIN OF LEFT KNEE: ICD-10-CM

## 2023-05-12 DIAGNOSIS — M62.81 MUSCLE WEAKNESS: Primary | ICD-10-CM

## 2023-05-12 PROCEDURE — 1160F PR REVIEW ALL MEDS BY PRESCRIBER/CLIN PHARMACIST DOCUMENTED: ICD-10-PCS | Mod: CPTII,95,, | Performed by: OBSTETRICS & GYNECOLOGY

## 2023-05-12 PROCEDURE — 1160F RVW MEDS BY RX/DR IN RCRD: CPT | Mod: CPTII,95,, | Performed by: OBSTETRICS & GYNECOLOGY

## 2023-05-12 PROCEDURE — 99213 PR OFFICE/OUTPT VISIT, EST, LEVL III, 20-29 MIN: ICD-10-PCS | Mod: 95,,, | Performed by: OBSTETRICS & GYNECOLOGY

## 2023-05-12 PROCEDURE — 97110 THERAPEUTIC EXERCISES: CPT | Mod: PN

## 2023-05-12 PROCEDURE — 1159F MED LIST DOCD IN RCRD: CPT | Mod: CPTII,95,, | Performed by: OBSTETRICS & GYNECOLOGY

## 2023-05-12 PROCEDURE — 3044F PR MOST RECENT HEMOGLOBIN A1C LEVEL <7.0%: ICD-10-PCS | Mod: CPTII,95,, | Performed by: OBSTETRICS & GYNECOLOGY

## 2023-05-12 PROCEDURE — 99213 OFFICE O/P EST LOW 20 MIN: CPT | Mod: 95,,, | Performed by: OBSTETRICS & GYNECOLOGY

## 2023-05-12 PROCEDURE — 3044F HG A1C LEVEL LT 7.0%: CPT | Mod: CPTII,95,, | Performed by: OBSTETRICS & GYNECOLOGY

## 2023-05-12 PROCEDURE — 97112 NEUROMUSCULAR REEDUCATION: CPT | Mod: PN

## 2023-05-12 PROCEDURE — 1159F PR MEDICATION LIST DOCUMENTED IN MEDICAL RECORD: ICD-10-PCS | Mod: CPTII,95,, | Performed by: OBSTETRICS & GYNECOLOGY

## 2023-05-12 RX ORDER — MEDROXYPROGESTERONE ACETATE 10 MG/1
10 TABLET ORAL DAILY
Qty: 7 TABLET | Refills: 6 | Status: ON HOLD | OUTPATIENT
Start: 2023-05-12 | End: 2023-06-13 | Stop reason: HOSPADM

## 2023-05-12 NOTE — PROGRESS NOTES
"[facility]   Physical Therapy Treatment Note     Name: Karla [facility] Number: [id number]    Therapy Diagnosis:   Encounter Diagnoses   Name Primary?    Muscle weakness Yes    Chronic pain of left knee    Physician: Camila Holcomb MD     Physician Orders: PT Eval and Treat   Medical Diagnosis from Referral:   S83.519A (ICD-10-CM) - Rupture of anterior cruciate ligament of knee, unspecified laterality, initial encounter   S83.242A (ICD-10-CM) - Tear of medial meniscus of left knee, current, unspecified tear type, initial encounter   S83.282A (ICD-10-CM) - Tear of lateral meniscus of left knee, current, unspecified tear type, initial encounter      Evaluation Date: 3/31/2023  Authorization Period Expiration: 12/31/2023  Plan of Care Expiration: 6/23/2023  Progress Note Due: 5/1/2023  Visit # / Visits authorized: 12/20 (+Evaluation)     Precautions: Standard     Time In: 11:55 PM  Time Out:  12:50 PM  Total Billable Time: 40 minutes    SUBJECTIVE     Pt reports: No pain or other issues    She was compliant with home exercise program.  Response to previous treatment: No adverse reactions  Functional change: Ongoing    Pain: 0/10  Location: left knee      OBJECTIVE           Treatment     Karla received the treatments listed below:      Therapeutic exercises to develop strength and ROM for 45 minutes including:    Upright bike: 5 minutes, Level 5.0, for ROM and swelling  SLR's: 3x10, 3#  SL Bridges: 3x10,  3" holds  DL RDL w/ 25lb KB, 3x15  Lateral steps w/ BTB around ankles, 3 laps along white tape  DL Matrix Knee Extension: 3x10, 30#, increased effort from LLE  DL Matrix HS Curls: 3x10, 40#, increased effort from LLE  DL TRX Squats: 3x15      neuromuscular re-education activities to improve: Balance and Proprioception for 10 minutes. The following activities were included:  Single Leg ball toss to trampoline: 4x20, red weighted ball        Patient Education and Home Exercises     Home " Exercises Provided and Patient Education Provided     Education provided:   - To continue with current HEP    Written Home Exercises Provided: Patient instructed to cont prior HEP. Exercises were reviewed and Karla was able to demonstrate them prior to the end of the session.  Karla demonstrated good  understanding of the education provided. See EMR under Patient Instructions for exercises provided during therapy sessions    ASSESSMENT     Patient tolerated session well without issues. Continue strengthening LLE. Can look at implementing SL activities next session.    Karla Is progressing well towards her goals.   Pt prognosis is Good.     Pt will continue to benefit from skilled outpatient physical therapy to address the deficits listed in the problem list box on initial evaluation, provide pt/family education and to maximize pt's level of independence in the home and community environment.     Pt's spiritual, cultural and educational needs considered and pt agreeable to plan of care and goals.     Anticipated barriers to physical therapy: none    Goals:   Short Term Goals:  Pt will be ind w/ HEP w/in 2 wks  Pt will report a dec of at least 2pts on 0-10 scale (per MCID) in L knee pain within 3 wks for symptom modulation. MET  3.   Pt will demonstrate at least -10deg L knee ext for improved ROM to better outcomes for surgery within 3 weeks. MET     Long Term Goals:  Pt will demonstrate an inc of at least 1 MMT grade in L LE strength within 6 wks. Progressing, Not Met  Pt will demonstrate improved L knee flexion ROM to at least 130deg to better outcomes for surgery within 6 wks. Progressing, Not Met  Pt will demonstrate ability to perform 20 Sup SLR w/ 2 pound weight showing improved quad activation and strength for L knee support within 6 wks.Progressing, Not Met    PLAN     Plan of care Certification: 3/31/2023 to 6/23/2023.      Bernabe Curry, PT, DPT

## 2023-05-12 NOTE — PROGRESS NOTES
TELEMEDICINE VISIT:    The patient location is: home  The chief complaint leading to consultation is: Irregular periods  Visit type: audiovisual  Each patient to whom he or she provides medical services by telemedicine is:  (1) informed of the relationship between the physician and patient and the respective role of any other health care provider with respect to management of the patient; and (2) notified that he or she may decline to receive medical services by telemedicine and may withdraw from such care at any time.    CC: Irregular periods    HPI:  Karla Price is a 22 y.o. female patient  who requests a telemedicine visit to discuss management options for her irregular periods.  She has a long history of irregular periods, sometimes occurring every 4-6 months, for which, in the past, she has used Provera for menstrual regulation.  She reports having a period 2022 and then experiencing some light bleeding since 23.  Denies pelvic pain.  She is in a same-sex relationship and is sure that pregnancy is impossible.  Reports some recent weight gain.  Plans to have ACL repair 23.    Patient's last menstrual period was 2023.    23 H/H 15.7/49.2    3/14/23 BHCG: Negative    11/15/22   E2 38,  FSH 4.45,  LH 5.3,  E2 38,  free testosterone 1.1,  Pap: Negative    History reviewed. No pertinent past medical history.    History reviewed. No pertinent surgical history.      Diagnosis:  1. Irregular periods          PLAN:    Orders Placed This Encounter    medroxyPROGESTERone (PROVERA) 10 MG tablet       Patient was counseled today on her irregular periods and the various etiologies.  She has recently had hormonal evaluation.  We reviewed management options for menstrual regulation and she would like to return to Provera.  We discussed Provera: r / b / correct usage.  She will take Provera 10 mg daily for 7 days each month and let us know her progress in several months.    Follow-up with  progress in 3 months    This note was created with voice recognition software.  Grammatical, syntax and spelling errors may be inevitable.

## 2023-05-16 ENCOUNTER — CLINICAL SUPPORT (OUTPATIENT)
Dept: REHABILITATION | Facility: HOSPITAL | Age: 23
End: 2023-05-16
Payer: COMMERCIAL

## 2023-05-16 DIAGNOSIS — M25.562 CHRONIC PAIN OF LEFT KNEE: ICD-10-CM

## 2023-05-16 DIAGNOSIS — M62.81 MUSCLE WEAKNESS: Primary | ICD-10-CM

## 2023-05-16 DIAGNOSIS — G89.29 CHRONIC PAIN OF LEFT KNEE: ICD-10-CM

## 2023-05-16 PROCEDURE — 97112 NEUROMUSCULAR REEDUCATION: CPT | Mod: PN

## 2023-05-16 PROCEDURE — 97110 THERAPEUTIC EXERCISES: CPT | Mod: PN

## 2023-05-16 NOTE — PROGRESS NOTES
"[facility]   Physical Therapy Treatment Note     Name: Karla [facility] Number: [id number]    Therapy Diagnosis:   Encounter Diagnoses   Name Primary?    Muscle weakness Yes    Chronic pain of left knee    Physician: Camila Holcomb MD     Physician Orders: PT Eval and Treat   Medical Diagnosis from Referral:   S83.519A (ICD-10-CM) - Rupture of anterior cruciate ligament of knee, unspecified laterality, initial encounter   S83.242A (ICD-10-CM) - Tear of medial meniscus of left knee, current, unspecified tear type, initial encounter   S83.282A (ICD-10-CM) - Tear of lateral meniscus of left knee, current, unspecified tear type, initial encounter      Evaluation Date: 3/31/2023  Authorization Period Expiration: 12/31/2023  Plan of Care Expiration: 6/23/2023  Progress Note Due: 5/1/2023  Visit # / Visits authorized: 12/20 (+Evaluation)     Precautions: Standard     Time In: 4:15 PM  Time Out: 5:10 PM  Total Billable Time: 55 minutes    SUBJECTIVE     Pt reports: No pain or instability. Tired from work.    She was compliant with home exercise program.  Response to previous treatment: No adverse reactions  Functional change: Ongoing    Pain: 0/10  Location: left knee      OBJECTIVE           Treatment     Karla received the treatments listed below:      Therapeutic exercises to develop strength and ROM for 45 minutes including:    Upright bike: 5 minutes, Level 5.0, for ROM and swelling  SLR's: 3x10, 5#  SL Bridges: 3x10,  3" holds  SL RDL w/ 10lb KB, 3x10 (progressed to SL today)  Lateral steps w/ green theraloop around ankles, 3 laps along white tape  DL Matrix Knee Extension: 3x10, 30#, increased effort from LLE  DL Matrix HS Curls: 3x10, 40#, increased effort from LLE  SL leg press, 3x10 15lbs    neuromuscular re-education activities to improve: Balance and Proprioception for 10 minutes. The following activities were included:  Single Leg ball toss to trampoline: 4x20, red weighted " ball        Patient Education and Home Exercises     Home Exercises Provided and Patient Education Provided     Education provided:   - To continue with current HEP    Written Home Exercises Provided: Patient instructed to cont prior HEP. Exercises were reviewed and Karla was able to demonstrate them prior to the end of the session.  Karla demonstrated good  understanding of the education provided. See EMR under Patient Instructions for exercises provided during therapy sessions    ASSESSMENT     Patient tolerated session well without issues. We added SL leg press and SL RDL without issue today.  Continue strengthening LLE.  Karla Is progressing well towards her goals.   Pt prognosis is Good.     Pt will continue to benefit from skilled outpatient physical therapy to address the deficits listed in the problem list box on initial evaluation, provide pt/family education and to maximize pt's level of independence in the home and community environment.     Pt's spiritual, cultural and educational needs considered and pt agreeable to plan of care and goals.     Anticipated barriers to physical therapy: none    Goals:   Short Term Goals:  Pt will be ind w/ HEP w/in 2 wks  Pt will report a dec of at least 2pts on 0-10 scale (per MCID) in L knee pain within 3 wks for symptom modulation. MET  3.   Pt will demonstrate at least -10deg L knee ext for improved ROM to better outcomes for surgery within 3 weeks. MET     Long Term Goals:  Pt will demonstrate an inc of at least 1 MMT grade in L LE strength within 6 wks. Progressing, Not Met  Pt will demonstrate improved L knee flexion ROM to at least 130deg to better outcomes for surgery within 6 wks. Progressing, Not Met  Pt will demonstrate ability to perform 20 Sup SLR w/ 2 pound weight showing improved quad activation and strength for L knee support within 6 wks.Progressing, Not Met    PLAN     Plan of care Certification: 3/31/2023 to 6/23/2023.      Bernabe Curry, PT,  DPT

## 2023-05-19 ENCOUNTER — HOSPITAL ENCOUNTER (EMERGENCY)
Facility: HOSPITAL | Age: 23
Discharge: HOME | End: 2023-05-19
Attending: STUDENT IN AN ORGANIZED HEALTH CARE EDUCATION/TRAINING PROGRAM
Payer: COMMERCIAL

## 2023-05-19 ENCOUNTER — CLINICAL SUPPORT (OUTPATIENT)
Dept: REHABILITATION | Facility: HOSPITAL | Age: 23
End: 2023-05-19
Payer: COMMERCIAL

## 2023-05-19 VITALS
HEIGHT: 61 IN | HEART RATE: 56 BPM | TEMPERATURE: 98 F | WEIGHT: 205 LBS | BODY MASS INDEX: 38.71 KG/M2 | OXYGEN SATURATION: 98 % | DIASTOLIC BLOOD PRESSURE: 92 MMHG | SYSTOLIC BLOOD PRESSURE: 162 MMHG | RESPIRATION RATE: 18 BRPM

## 2023-05-19 DIAGNOSIS — T78.3XXA ANGIOEDEMA, INITIAL ENCOUNTER: Primary | ICD-10-CM

## 2023-05-19 DIAGNOSIS — M25.562 CHRONIC PAIN OF LEFT KNEE: ICD-10-CM

## 2023-05-19 DIAGNOSIS — M62.81 MUSCLE WEAKNESS: Primary | ICD-10-CM

## 2023-05-19 DIAGNOSIS — G89.29 CHRONIC PAIN OF LEFT KNEE: ICD-10-CM

## 2023-05-19 DIAGNOSIS — T78.3XXA ANGIOEDEMA OF LIPS, INITIAL ENCOUNTER: ICD-10-CM

## 2023-05-19 LAB
ALBUMIN SERPL BCP-MCNC: 3.7 G/DL (ref 3.5–5.2)
ALP SERPL-CCNC: 85 U/L (ref 55–135)
ALT SERPL W/O P-5'-P-CCNC: 42 U/L (ref 10–44)
ANION GAP SERPL CALC-SCNC: 12 MMOL/L (ref 8–16)
AST SERPL-CCNC: 36 U/L (ref 10–40)
BASOPHILS # BLD AUTO: 0.06 K/UL (ref 0–0.2)
BASOPHILS NFR BLD: 0.4 % (ref 0–1.9)
BILIRUB SERPL-MCNC: 0.1 MG/DL (ref 0.1–1)
BUN SERPL-MCNC: 14 MG/DL (ref 6–20)
CALCIUM SERPL-MCNC: 9.3 MG/DL (ref 8.7–10.5)
CHLORIDE SERPL-SCNC: 109 MMOL/L (ref 95–110)
CO2 SERPL-SCNC: 18 MMOL/L (ref 23–29)
CREAT SERPL-MCNC: 0.7 MG/DL (ref 0.5–1.4)
DIFFERENTIAL METHOD: ABNORMAL
EOSINOPHIL # BLD AUTO: 0.3 K/UL (ref 0–0.5)
EOSINOPHIL NFR BLD: 1.6 % (ref 0–8)
ERYTHROCYTE [DISTWIDTH] IN BLOOD BY AUTOMATED COUNT: 13.2 % (ref 11.5–14.5)
EST. GFR  (NO RACE VARIABLE): >60 ML/MIN/1.73 M^2
GLUCOSE SERPL-MCNC: 77 MG/DL (ref 70–110)
HCT VFR BLD AUTO: 40.9 % (ref 37–48.5)
HGB BLD-MCNC: 13.8 G/DL (ref 12–16)
IMM GRANULOCYTES # BLD AUTO: 0.14 K/UL (ref 0–0.04)
IMM GRANULOCYTES NFR BLD AUTO: 0.9 % (ref 0–0.5)
LYMPHOCYTES # BLD AUTO: 3.9 K/UL (ref 1–4.8)
LYMPHOCYTES NFR BLD: 25.6 % (ref 18–48)
MCH RBC QN AUTO: 30.8 PG (ref 27–31)
MCHC RBC AUTO-ENTMCNC: 33.7 G/DL (ref 32–36)
MCV RBC AUTO: 91 FL (ref 82–98)
MONOCYTES # BLD AUTO: 1 K/UL (ref 0.3–1)
MONOCYTES NFR BLD: 6.3 % (ref 4–15)
NEUTROPHILS # BLD AUTO: 9.9 K/UL (ref 1.8–7.7)
NEUTROPHILS NFR BLD: 65.2 % (ref 38–73)
NRBC BLD-RTO: 0 /100 WBC
PLATELET # BLD AUTO: 309 K/UL (ref 150–450)
PMV BLD AUTO: 11.3 FL (ref 9.2–12.9)
POTASSIUM SERPL-SCNC: 4.2 MMOL/L (ref 3.5–5.1)
PROT SERPL-MCNC: 8.3 G/DL (ref 6–8.4)
RBC # BLD AUTO: 4.48 M/UL (ref 4–5.4)
SODIUM SERPL-SCNC: 139 MMOL/L (ref 136–145)
WBC # BLD AUTO: 15.18 K/UL (ref 3.9–12.7)

## 2023-05-19 PROCEDURE — 85025 COMPLETE CBC W/AUTO DIFF WBC: CPT | Performed by: PHYSICIAN ASSISTANT

## 2023-05-19 PROCEDURE — 97110 THERAPEUTIC EXERCISES: CPT | Mod: PN

## 2023-05-19 PROCEDURE — 96374 THER/PROPH/DIAG INJ IV PUSH: CPT

## 2023-05-19 PROCEDURE — 86160 COMPLEMENT ANTIGEN: CPT | Performed by: PHYSICIAN ASSISTANT

## 2023-05-19 PROCEDURE — 86160 COMPLEMENT ANTIGEN: CPT | Mod: 59 | Performed by: PHYSICIAN ASSISTANT

## 2023-05-19 PROCEDURE — 63600175 PHARM REV CODE 636 W HCPCS: Performed by: PHYSICIAN ASSISTANT

## 2023-05-19 PROCEDURE — 99284 EMERGENCY DEPT VISIT MOD MDM: CPT

## 2023-05-19 PROCEDURE — 80053 COMPREHEN METABOLIC PANEL: CPT | Performed by: PHYSICIAN ASSISTANT

## 2023-05-19 PROCEDURE — 25000003 PHARM REV CODE 250: Performed by: PHYSICIAN ASSISTANT

## 2023-05-19 PROCEDURE — 96375 TX/PRO/DX INJ NEW DRUG ADDON: CPT

## 2023-05-19 RX ORDER — DIPHENHYDRAMINE HCL 25 MG
50 CAPSULE ORAL EVERY 6 HOURS PRN
Qty: 30 CAPSULE | Refills: 0 | Status: SHIPPED | OUTPATIENT
Start: 2023-05-19 | End: 2023-05-24

## 2023-05-19 RX ORDER — FAMOTIDINE 10 MG/ML
20 INJECTION INTRAVENOUS
Status: COMPLETED | OUTPATIENT
Start: 2023-05-19 | End: 2023-05-19

## 2023-05-19 RX ORDER — PREDNISONE 20 MG/1
60 TABLET ORAL DAILY
Qty: 15 TABLET | Refills: 0 | Status: SHIPPED | OUTPATIENT
Start: 2023-05-19 | End: 2023-05-24

## 2023-05-19 RX ORDER — EPINEPHRINE 0.3 MG/.3ML
1 INJECTION SUBCUTANEOUS
Qty: 1 EACH | Refills: 0 | Status: SHIPPED | OUTPATIENT
Start: 2023-05-19 | End: 2024-05-18

## 2023-05-19 RX ORDER — METHYLPREDNISOLONE SOD SUCC 125 MG
125 VIAL (EA) INJECTION
Status: COMPLETED | OUTPATIENT
Start: 2023-05-19 | End: 2023-05-19

## 2023-05-19 RX ORDER — FAMOTIDINE 20 MG/1
20 TABLET, FILM COATED ORAL 2 TIMES DAILY
Qty: 10 TABLET | Refills: 0 | Status: ON HOLD | OUTPATIENT
Start: 2023-05-19 | End: 2023-06-13 | Stop reason: HOSPADM

## 2023-05-19 RX ADMIN — FAMOTIDINE 20 MG: 10 INJECTION INTRAVENOUS at 09:05

## 2023-05-19 RX ADMIN — METHYLPREDNISOLONE SODIUM SUCCINATE 125 MG: 125 INJECTION, POWDER, FOR SOLUTION INTRAMUSCULAR; INTRAVENOUS at 09:05

## 2023-05-19 NOTE — PROGRESS NOTES
"[facility]   Physical Therapy Treatment Note     Name: Karla [facility] Number: [id number]    Therapy Diagnosis:   Encounter Diagnoses   Name Primary?    Muscle weakness Yes    Chronic pain of left knee    Physician: Camila Holcomb MD     Physician Orders: PT Eval and Treat   Medical Diagnosis from Referral:   S83.519A (ICD-10-CM) - Rupture of anterior cruciate ligament of knee, unspecified laterality, initial encounter   S83.242A (ICD-10-CM) - Tear of medial meniscus of left knee, current, unspecified tear type, initial encounter   S83.282A (ICD-10-CM) - Tear of lateral meniscus of left knee, current, unspecified tear type, initial encounter      Evaluation Date: 3/31/2023  Authorization Period Expiration: 12/31/2023  Plan of Care Expiration: 6/23/2023  Progress Note Due: 5/1/2023  Visit # / Visits authorized: 12/20 (+Evaluation)     Precautions: Standard     Time In: 12:05  Time Out: 12:55  Total Billable Time: 40 minutes    SUBJECTIVE     Pt reports: No pain or instability.    She was compliant with home exercise program.  Response to previous treatment: No adverse reactions  Functional change: Ongoing    Pain: 0/10  Location: left knee      OBJECTIVE           Treatment     Karla received the treatments listed below:      Therapeutic exercises to develop strength and ROM for 50 minutes including:    Upright bike: 5 minutes, Level 5.0, for ROM and swelling  SLR's: 3x10, 5#  SL Bridges: 3x10,  3" holds  SL RDL w/ 25lb KB, 3x10 (progressed to SL today)  Lateral steps w/ green theraloop around ankles, 3 laps along white tape  DL Matrix Knee Extension: 3x10, 30#, increased effort from LLE  DL Matrix HS Curls: 3x10, 40#, increased effort from LLE  SL leg press, 3x10 15lbs    neuromuscular re-education activities to improve: Balance and Proprioception for 00 minutes. The following activities were included:    Not today:  Single Leg ball toss to trampoline: 4x20, red weighted " ball        Patient Education and Home Exercises     Home Exercises Provided and Patient Education Provided     Education provided:   - To continue with current HEP    Written Home Exercises Provided: Patient instructed to cont prior HEP. Exercises were reviewed and Karla was able to demonstrate them prior to the end of the session.  Karal demonstrated good  understanding of the education provided. See EMR under Patient Instructions for exercises provided during therapy sessions    ASSESSMENT     Patient tolerated session well without issues. Continues to improve SL strength and balance. Can increase resistance next week.     Karla Is progressing well towards her goals.   Pt prognosis is Good.     Pt will continue to benefit from skilled outpatient physical therapy to address the deficits listed in the problem list box on initial evaluation, provide pt/family education and to maximize pt's level of independence in the home and community environment.     Pt's spiritual, cultural and educational needs considered and pt agreeable to plan of care and goals.     Anticipated barriers to physical therapy: none    Goals:   Short Term Goals:  Pt will be ind w/ HEP w/in 2 wks  Pt will report a dec of at least 2pts on 0-10 scale (per MCID) in L knee pain within 3 wks for symptom modulation. MET  3.   Pt will demonstrate at least -10deg L knee ext for improved ROM to better outcomes for surgery within 3 weeks. MET     Long Term Goals:  Pt will demonstrate an inc of at least 1 MMT grade in L LE strength within 6 wks. Progressing, Not Met  Pt will demonstrate improved L knee flexion ROM to at least 130deg to better outcomes for surgery within 6 wks. Progressing, Not Met  Pt will demonstrate ability to perform 20 Sup SLR w/ 2 pound weight showing improved quad activation and strength for L knee support within 6 wks.Progressing, Not Met    PLAN     Plan of care Certification: 3/31/2023 to 6/23/2023.      Bernabe Curry, PT,  DPT

## 2023-05-20 LAB
C3 SERPL-MCNC: 137 MG/DL (ref 50–180)
C4 SERPL-MCNC: 38 MG/DL (ref 11–44)

## 2023-05-20 NOTE — ED TRIAGE NOTES
Pt presents to ED via POV with c/o lip swelling x1.5 hours. Denies any allergic contact that she knows of. No new products or foods used. Took 15mL benadryl PTA. Denies tongue swelling, SOB, difficulty swallowing.

## 2023-05-20 NOTE — ED PROVIDER NOTES
Encounter Date: 5/19/2023    SCRIBE #1 NOTE: I, Nito Carnes, am scribing for, and in the presence of,  Barbara Mccauley MD. I have scribed the following portions of the note - Other sections scribed: HPI, ROS, PE.     History     Chief Complaint   Patient presents with    Oral Swelling     Pt presents to the ED with c/o lip swelling x20 minutes. Denies exposure to any known allergens. Denies swelling to tongue or airway or SOB. Endorses taking around 15ml of benadryl pta     Karla Price is a 22 y.o female with a PMHx of eczema, who presents to the ED for evaluation of lip swelling beginning 1.5 hours ago. Patient reports she was doing her hair when she started having complaints of swelling and itchiness to her lip . She reports attempting treatment with benadryl PTA with no immediate relief noted. This morning she did notice swelling and itching to the dorsum of her R hand at approx 0900 which is now resolving.     She denies any recent chemical exposure, new make -up products, new soaps or detergents, and no new changes in her diet. She additionally reports she is on amoxicillin for an ear infection, noting she has been taking it for a week. Additional history provided by independent historian, patient's mother, who reports patient has a history of eczema and environmental allergies, which she endorses have exacerbated in the past couple of years, with most recent episodes presenting with hives. She also notes the patient was recently tested 2 months ago and had a positive EDWINA and elevated WBC. Family history of SLE in maternal great aunt. No other medications taken PTA. No alleviating or exacerbating factors noted. Denies CP, SOB, sore throat, or other associated symptoms. NKDA.    The history is provided by the patient and a parent. No  was used.   Review of patient's allergies indicates:   Allergen Reactions    Nuts [tree nut] Hives     History reviewed. No pertinent past medical  history.  No past surgical history on file.  Family History   Problem Relation Age of Onset    Breast cancer Maternal Aunt 30        maternal great aunt with breast cancer    Fibrocystic breast disease Mother     No Known Problems Sister     Liver cancer Maternal Grandmother     No Known Problems Father     No Known Problems Brother     No Known Problems Maternal Uncle     No Known Problems Paternal Aunt     No Known Problems Paternal Uncle     No Known Problems Maternal Grandfather     No Known Problems Paternal Grandmother     No Known Problems Paternal Grandfather     Colon cancer Neg Hx     Ovarian cancer Neg Hx     Amblyopia Neg Hx     Blindness Neg Hx     Cancer Neg Hx     Cataracts Neg Hx     Diabetes Neg Hx     Glaucoma Neg Hx     Hypertension Neg Hx     Macular degeneration Neg Hx     Retinal detachment Neg Hx     Strabismus Neg Hx     Stroke Neg Hx     Thyroid disease Neg Hx      Social History     Tobacco Use    Smoking status: Never    Smokeless tobacco: Never   Substance Use Topics    Alcohol use: No    Drug use: No     Review of Systems   Constitutional:  Negative for fever.   HENT:  Positive for facial swelling (lip). Negative for congestion, sore throat and trouble swallowing.    Respiratory:  Negative for cough and shortness of breath.    Cardiovascular:  Negative for chest pain.   Gastrointestinal:  Negative for abdominal pain, constipation, diarrhea, nausea and vomiting.   Genitourinary:  Negative for dysuria, flank pain, frequency and urgency.   Musculoskeletal:  Negative for back pain.   Skin:  Negative for rash.   Neurological:  Negative for headaches.     Physical Exam     Initial Vitals [05/19/23 2036]   BP Pulse Resp Temp SpO2   (!) 142/98 (!) 56 16 98.9 °F (37.2 °C) 99 %      MAP       --         Physical Exam    Nursing note and vitals reviewed.  Constitutional: She appears well-developed and well-nourished.   HENT:   Head: Normocephalic.   Right Ear: External ear normal.   Left Ear:  External ear normal.   Nose: Nose normal.   Mouth/Throat: Uvula is midline, oropharynx is clear and moist and mucous membranes are normal. No oropharyngeal exudate, posterior oropharyngeal edema, posterior oropharyngeal erythema or tonsillar abscesses.   Swelling to upper and lower lipsTolerating secretions. No peritonsillar swelling ir uvular deviation    Eyes: Conjunctivae are normal.   Cardiovascular:  Normal rate and regular rhythm.     Exam reveals no gallop and no friction rub.       No murmur heard.  Pulmonary/Chest: Breath sounds normal. She has no wheezes. She has no rhonchi. She has no rales.   Abdominal: Abdomen is soft. Bowel sounds are normal. She exhibits no distension. There is no abdominal tenderness. There is no rebound, no guarding, no tenderness at McBurney's point and negative Moore's sign.   Musculoskeletal:         General: Normal range of motion.     Lymphadenopathy:     She has no cervical adenopathy.   Neurological: She is alert. She has normal strength. No cranial nerve deficit or sensory deficit.   Skin: Skin is warm and dry.   Psychiatric: She has a normal mood and affect.       ED Course   Critical Care    Date/Time: 5/19/2023 9:15 PM  Performed by: Barbara Mccauley PA-C  Authorized by: Matthew Mooney MD   Direct patient critical care time: 15 minutes  Additional history critical care time: 5 minutes  Ordering / reviewing critical care time: 5 minutes  Documentation critical care time: 5 minutes  Total critical care time (exclusive of procedural time) : 30 minutes  Critical care time was exclusive of separately billable procedures and treating other patients and teaching time.  Critical care was necessary to treat or prevent imminent or life-threatening deterioration of the following conditions: cardiac failure, circulatory failure, respiratory failure and shock.  Critical care was time spent personally by me on the following activities: blood draw for specimens, development  of treatment plan with patient or surrogate, evaluation of patient's response to treatment, examination of patient, obtaining history from patient or surrogate, ordering and performing treatments and interventions, ordering and review of laboratory studies, ordering and review of radiographic studies, pulse oximetry, re-evaluation of patient's condition and review of old charts.      Labs Reviewed   CBC W/ AUTO DIFFERENTIAL - Abnormal; Notable for the following components:       Result Value    WBC 15.18 (*)     Immature Granulocytes 0.9 (*)     Gran # (ANC) 9.9 (*)     Immature Grans (Abs) 0.14 (*)     All other components within normal limits   COMPREHENSIVE METABOLIC PANEL - Abnormal; Notable for the following components:    CO2 18 (*)     All other components within normal limits   C3 COMPLEMENT   C4 COMPLEMENT          Imaging Results    None          Medications   famotidine (PF) injection 20 mg (20 mg Intravenous Given 5/19/23 2111)   methylPREDNISolone sodium succinate injection 125 mg (125 mg Intravenous Given 5/19/23 2111)     Medical Decision Making:   History:   Old Medical Records: I decided to obtain old medical records.  Clinical Tests:   Lab Tests: Ordered and Reviewed  ED Management:   22-year-old female presenting for lip swelling that began approximately 1.5 hours prior to arrival.  Patient did notice itching and swelling to the dorsum of the right hand this morning.  She is been on amoxicillin for ear infection for the past week.  Denies any rash, chest pain shortness breath, difficulty breathing or swallowing, abdominal pain, nausea vomiting, diarrhea or other associated symptoms.  Patient has attempted treatment with 15 cc of Benadryl prior to arrival.  She does have swelling to the lips consistent with what appears to be angioedema.  No personal or family history of angioedema or hereditary angioedema.  Mother provides additional history saying that the patient has been having hives  intermittently for years.  Has not been evaluated by Allergy immunology.  History and exam consistent with angioedema.  IV Solu-Medrol and Pepcid given in the ED as well as CBC CMP C3 and C4 ordered.  CBC with mild leukocytosis.  CMP with no significant electrolyte abnormality or renal insufficiency.  C3-C4 pending.  Patient was re-evaluated multiple times throughout ED visit for cardiac pulmonary and oropharyngeal exam.  No deterioration.  No further complaints.  She does feel that her lip swelling has improved.  Will refer to Allergy immunology.  She is not anaphylactic.  Will discharge with an EpiPen if symptoms of anaphylaxis develop.  Will have the patient follow-up with primary care in allergy and return to the ER for worsening symptoms or as needed.        Scribe Attestation:   Scribe #1: I performed the above scribed service and the documentation accurately describes the services I performed. I attest to the accuracy of the note.                   Clinical Impression:   Final diagnoses:  [T78.3XXA] Angioedema, initial encounter (Primary)  [T78.3XXA] Angioedema of lips, initial encounter        ED Disposition Condition    Discharge Stable          ED Prescriptions       Medication Sig Dispense Start Date End Date Auth. Provider    diphenhydrAMINE (BENADRYL) 25 mg capsule Take 2 capsules (50 mg total) by mouth every 6 (six) hours as needed for Itching or Allergies. 30 capsule 5/19/2023 5/24/2023 Barbara Mccauley PA-C    famotidine (PEPCID) 20 MG tablet Take 1 tablet (20 mg total) by mouth 2 (two) times daily. for 5 days 10 tablet 5/19/2023 5/24/2023 Barbara Mccauley PA-C    predniSONE (DELTASONE) 20 MG tablet Take 3 tablets (60 mg total) by mouth once daily. for 5 days 15 tablet 5/19/2023 5/24/2023 Barbara Mccauley PA-C    EPINEPHrine (EPIPEN) 0.3 mg/0.3 mL AtIn Inject 0.3 mLs (0.3 mg total) into the muscle as needed (for anaphylaxis). 1 each 5/19/2023 5/18/2024 Barbara Mccauley PA-C           Follow-up Information       Follow up With Specialties Details Why Contact Info    Hans Francisco MD Family Medicine Schedule an appointment as soon as possible for a visit in 2 days for follow up [address] [facility]  [place] [address]  [phone]      [facility] Emergency [facility] Emergency Medicine Go to  As needed, If symptoms worsen [address] [place] [address] [phone]            I, Barbara Mccauley PA-C, personally performed the services described in this documentation. All medical record entries made by the scribe were at my direction and in my presence. I have reviewed the chart and agree that the record reflects my personal performance and is accurate and complete.       Barbara Mccauley PA-C  05/20/23 1310

## 2023-05-20 NOTE — DISCHARGE INSTRUCTIONS

## 2023-05-23 ENCOUNTER — OFFICE VISIT (OUTPATIENT)
Dept: ALLERGY | Facility: CLINIC | Age: 23
End: 2023-05-23
Payer: COMMERCIAL

## 2023-05-23 ENCOUNTER — LAB VISIT (OUTPATIENT)
Dept: LAB | Facility: HOSPITAL | Age: 23
End: 2023-05-23
Payer: COMMERCIAL

## 2023-05-23 VITALS
DIASTOLIC BLOOD PRESSURE: 90 MMHG | HEART RATE: 81 BPM | HEIGHT: 61 IN | WEIGHT: 213.88 LBS | SYSTOLIC BLOOD PRESSURE: 162 MMHG | BODY MASS INDEX: 40.38 KG/M2

## 2023-05-23 DIAGNOSIS — L20.9 ATOPIC DERMATITIS, UNSPECIFIED TYPE: Primary | ICD-10-CM

## 2023-05-23 DIAGNOSIS — Z91.018 HISTORY OF FOOD ALLERGY: ICD-10-CM

## 2023-05-23 DIAGNOSIS — T78.3XXA ANGIOEDEMA, INITIAL ENCOUNTER: ICD-10-CM

## 2023-05-23 DIAGNOSIS — L20.9 ATOPIC DERMATITIS, UNSPECIFIED TYPE: ICD-10-CM

## 2023-05-23 PROCEDURE — 99999 PR PBB SHADOW E&M-EST. PATIENT-LVL III: ICD-10-PCS | Mod: PBBFAC,,, | Performed by: STUDENT IN AN ORGANIZED HEALTH CARE EDUCATION/TRAINING PROGRAM

## 2023-05-23 PROCEDURE — 3008F BODY MASS INDEX DOCD: CPT | Mod: CPTII,S$GLB,, | Performed by: STUDENT IN AN ORGANIZED HEALTH CARE EDUCATION/TRAINING PROGRAM

## 2023-05-23 PROCEDURE — 3044F HG A1C LEVEL LT 7.0%: CPT | Mod: CPTII,S$GLB,, | Performed by: STUDENT IN AN ORGANIZED HEALTH CARE EDUCATION/TRAINING PROGRAM

## 2023-05-23 PROCEDURE — 99999 PR PBB SHADOW E&M-EST. PATIENT-LVL III: CPT | Mod: PBBFAC,,, | Performed by: STUDENT IN AN ORGANIZED HEALTH CARE EDUCATION/TRAINING PROGRAM

## 2023-05-23 PROCEDURE — 1159F PR MEDICATION LIST DOCUMENTED IN MEDICAL RECORD: ICD-10-PCS | Mod: CPTII,S$GLB,, | Performed by: STUDENT IN AN ORGANIZED HEALTH CARE EDUCATION/TRAINING PROGRAM

## 2023-05-23 PROCEDURE — 86003 ALLG SPEC IGE CRUDE XTRC EA: CPT | Mod: 59 | Performed by: STUDENT IN AN ORGANIZED HEALTH CARE EDUCATION/TRAINING PROGRAM

## 2023-05-23 PROCEDURE — 1160F PR REVIEW ALL MEDS BY PRESCRIBER/CLIN PHARMACIST DOCUMENTED: ICD-10-PCS | Mod: CPTII,S$GLB,, | Performed by: STUDENT IN AN ORGANIZED HEALTH CARE EDUCATION/TRAINING PROGRAM

## 2023-05-23 PROCEDURE — 3080F DIAST BP >= 90 MM HG: CPT | Mod: CPTII,S$GLB,, | Performed by: STUDENT IN AN ORGANIZED HEALTH CARE EDUCATION/TRAINING PROGRAM

## 2023-05-23 PROCEDURE — 3008F PR BODY MASS INDEX (BMI) DOCUMENTED: ICD-10-PCS | Mod: CPTII,S$GLB,, | Performed by: STUDENT IN AN ORGANIZED HEALTH CARE EDUCATION/TRAINING PROGRAM

## 2023-05-23 PROCEDURE — 86003 ALLG SPEC IGE CRUDE XTRC EA: CPT | Performed by: STUDENT IN AN ORGANIZED HEALTH CARE EDUCATION/TRAINING PROGRAM

## 2023-05-23 PROCEDURE — 3044F PR MOST RECENT HEMOGLOBIN A1C LEVEL <7.0%: ICD-10-PCS | Mod: CPTII,S$GLB,, | Performed by: STUDENT IN AN ORGANIZED HEALTH CARE EDUCATION/TRAINING PROGRAM

## 2023-05-23 PROCEDURE — 3077F PR MOST RECENT SYSTOLIC BLOOD PRESSURE >= 140 MM HG: ICD-10-PCS | Mod: CPTII,S$GLB,, | Performed by: STUDENT IN AN ORGANIZED HEALTH CARE EDUCATION/TRAINING PROGRAM

## 2023-05-23 PROCEDURE — 1159F MED LIST DOCD IN RCRD: CPT | Mod: CPTII,S$GLB,, | Performed by: STUDENT IN AN ORGANIZED HEALTH CARE EDUCATION/TRAINING PROGRAM

## 2023-05-23 PROCEDURE — 36415 COLL VENOUS BLD VENIPUNCTURE: CPT | Mod: PO | Performed by: STUDENT IN AN ORGANIZED HEALTH CARE EDUCATION/TRAINING PROGRAM

## 2023-05-23 PROCEDURE — 99205 OFFICE O/P NEW HI 60 MIN: CPT | Mod: S$GLB,,, | Performed by: STUDENT IN AN ORGANIZED HEALTH CARE EDUCATION/TRAINING PROGRAM

## 2023-05-23 PROCEDURE — 99205 PR OFFICE/OUTPT VISIT, NEW, LEVL V, 60-74 MIN: ICD-10-PCS | Mod: S$GLB,,, | Performed by: STUDENT IN AN ORGANIZED HEALTH CARE EDUCATION/TRAINING PROGRAM

## 2023-05-23 PROCEDURE — 1160F RVW MEDS BY RX/DR IN RCRD: CPT | Mod: CPTII,S$GLB,, | Performed by: STUDENT IN AN ORGANIZED HEALTH CARE EDUCATION/TRAINING PROGRAM

## 2023-05-23 PROCEDURE — 3077F SYST BP >= 140 MM HG: CPT | Mod: CPTII,S$GLB,, | Performed by: STUDENT IN AN ORGANIZED HEALTH CARE EDUCATION/TRAINING PROGRAM

## 2023-05-23 PROCEDURE — 3080F PR MOST RECENT DIASTOLIC BLOOD PRESSURE >= 90 MM HG: ICD-10-PCS | Mod: CPTII,S$GLB,, | Performed by: STUDENT IN AN ORGANIZED HEALTH CARE EDUCATION/TRAINING PROGRAM

## 2023-05-23 NOTE — PROGRESS NOTES
ALLERGY & IMMUNOLOGY CLINIC - INITIAL CONSULTATION      HISTORY OF PRESENT ILLNESS     Patient ID: Karla Price is a 22 y.o. female    CC: atopic dermatitis, angioedema     HPI: Karla Price is a 22 y.o. female with a history of food allergy, presenting for atopic dermatitis and recent angioedema.  She was referred by Barbara Mccauley PA-C (EM) and Hans Francisco MD (pcp).  Patient is here with her mother. History is from both the patient and her mother.     Per chart review.   She went to ED on 5/19/23 for lip angioedema.  She has a history of atopic dermatitis, prescribed triamcinolone 0.1% cream and eucrisa by derm 4/24/23.    Mom says she has been diagnosed with eczema since a young child, and within the last 4 years, her eczema worsened. She would also get boils on inside of thighs. Thick dark eczema patches. Her skin would be sensitive to sun, burned easily. Currently, her eczema is not flaring. Question of possible hives, but they describe eczematous patches rather than urticaria. She says the flares would last a adolfo days typically.  The eczema gets worse in spring, summer, and winter when her skin gets dry. Fall seems a little better.   Hydrocortisone helps sometimes. She was prescribed triamcinolone cream about a month ago. She is finding it helpful. She was using it BID, now about daily. She doesn't think she got the eucrisa.   She has been using aveeno baby lotion.   She has used benadryl before, but she doesn't think it helped.   Mom wonders if dairy is worsening her eczema, because she eats it so much.     She also describes episodes where her face would get red puffy, inflamed looking. Had a burning sensation, felt very dry and itchy. She recalls it not lasting very long. She would put hydrocortisone. She thinks it helped. She doesn't think these symptoms lasted more than a day, but symptoms would return.     Recently, she got an ear ache. Treated for ear infection with augmentin (prescribed on  5/10/23 for a 10 day course). She got the angioedema toward the end of her amoxicillin course.   The lip swelling had started Friday night (5/19/23). Her wrist and hand were swollen that morning upon wakening, and it went away by that afternoon. The hand swelling was itchy. Then a few hours later after the hand swelling resolved, the lip swelling started. This had never happened before. She is still on prednisone from the ED visit. She is also taking benadryl.   She didn't eat anything within the hour before symptom onset.     She has a history of tree nut allergy. When she was 13 or 14 yo, she was told she had a mild allergy to tree nuts, even though she used to tolerate them. She thinks she might have been tested due to random hives she got. She still eats pistachios which she tolerates, but avoids other tree nuts. She avoids peanuts, because she wasn't sure if it was okay to eat them.     She doesn't get rhinitis often.  No shortness of breath or wheezing.    Mom says she also has irregular menstrual cycles. She can go a long time without having a period.     MEDICAL HISTORY     Vaccines:   Immunization History   Administered Date(s) Administered    COVID-19, MRNA, LN-S, PF (MODERNA FULL 0.5 ML DOSE) 04/09/2021, 05/07/2021, 01/04/2022    DTaP 02/12/2000, 04/03/2000, 06/12/2000, 06/18/2001, 12/23/2003    HPV 9-Valent 04/18/2022, 05/16/2022    Hepatitis A, Pediatric/Adolescent, 2 Dose 10/11/2010, 07/30/2015    Hepatitis B 06/12/2000, 09/29/2000, 03/20/2001    Hib-HbOC 02/12/2000, 04/03/2000, 06/12/2000, 03/20/2001    IPV 02/12/2000, 04/03/2000, 06/18/2001, 12/23/2003    Influenza - Quadrivalent - PF *Preferred* (6 months and older) 11/09/2002, 10/11/2010, 11/01/2018    Influenza - Trivalent (ADULT) 11/09/2002    Influenza - Trivalent - PF (ADULT) 10/11/2010    MMR 12/18/2000, 12/23/2003    Meningococcal B, OMV 10/13/2016, 04/19/2018    Meningococcal B, recombinant 07/12/2016, 10/13/2016    Meningococcal Conjugate  (MCV4P) 12/28/2010, 07/12/2016    PPD Test 06/18/2001, 07/02/2002, 09/03/2004    Pneumococcal Conjugate - 7 Valent 09/29/2000, 12/18/2000, 03/20/2001    Tdap 12/28/2010    Varicella 12/18/2000, 10/11/2010     Medical Hx:   Patient Active Problem List   Diagnosis    Knee stiffness, left    Muscle weakness    Left knee pain     Surgical Hx:   History reviewed. No pertinent surgical history.    Medications:   Current Outpatient Medications on File Prior to Visit   Medication Sig Dispense Refill    benzoyl peroxide (BP WASH) 10 % external wash Use daily as wash to affected areas. For boils. Rinse completely.  May bleach clothing 227 g 12    clindamycin (CLEOCIN T) 1 % Swab Apply topically 2 (two) times daily. For boils 60 each 5    crisaborole (EUCRISA) 2 % Oint AAA bid prn for eczema.  Non-steroid.  Safe to use long-term. 60 g 3    diphenhydrAMINE (BENADRYL) 25 mg capsule Take 2 capsules (50 mg total) by mouth every 6 (six) hours as needed for Itching or Allergies. 30 capsule 0    EPINEPHrine (EPIPEN) 0.3 mg/0.3 mL AtIn Inject 0.3 mLs (0.3 mg total) into the muscle as needed (for anaphylaxis). 1 each 0    famotidine (PEPCID) 20 MG tablet Take 1 tablet (20 mg total) by mouth 2 (two) times daily. for 5 days 10 tablet 0    medroxyPROGESTERone (PROVERA) 10 MG tablet Take 1 tablet (10 mg total) by mouth once daily. for 7 days 7 tablet 6    predniSONE (DELTASONE) 20 MG tablet Take 3 tablets (60 mg total) by mouth once daily. for 5 days 15 tablet 0    triamcinolone acetonide 0.1% (KENALOG) 0.1 % cream AAA bid prn for eczema. Do not use on face, underarms or groin. 454 g 3     No current facility-administered medications on file prior to visit.     H/o Eczema: endorses  H/o Rhinitis: denies    Drug Allergies:   Review of patient's allergies indicates:   Allergen Reactions    Nuts [tree nut] Hives     Social Hx:   Social History     Tobacco Use    Smoking status: Never    Smokeless tobacco: Never   Substance Use Topics     "Alcohol use: No    Drug use: No     Family Hx:   Family History   Problem Relation Age of Onset    Breast cancer Maternal Aunt 30        maternal great aunt with breast cancer    Fibrocystic breast disease Mother     No Known Problems Sister     Liver cancer Maternal Grandmother     No Known Problems Father     No Known Problems Brother     No Known Problems Maternal Uncle     No Known Problems Paternal Aunt     No Known Problems Paternal Uncle     No Known Problems Maternal Grandfather     No Known Problems Paternal Grandmother     No Known Problems Paternal Grandfather     Colon cancer Neg Hx     Ovarian cancer Neg Hx     Amblyopia Neg Hx     Blindness Neg Hx     Cancer Neg Hx     Cataracts Neg Hx     Diabetes Neg Hx     Glaucoma Neg Hx     Hypertension Neg Hx     Macular degeneration Neg Hx     Retinal detachment Neg Hx     Strabismus Neg Hx     Stroke Neg Hx     Thyroid disease Neg Hx       PHYSICAL EXAM     VS: BP (!) 162/90   Pulse 81   Ht 5' 1" (1.549 m)   Wt 97 kg (213 lb 13.5 oz)   LMP 05/09/2023   BMI 40.41 kg/m²   GENERAL: Alert, NAD, well-appearing  EYES: EOMI, no conjunctival injection, no discharge, no infraorbital shiners  EARS: external auditory canals normal B/L, TM normal B/L  ORAL: MMM, no ulcers, no thrush  LUNGS: CTAB, no w/r/c, no increased WOB  HEART: RRR, normal S1/S2, no m/g/r  DERM: no rashes, no skin breaks  NEURO: normal speech, normal gait, no facial asymmetry     LABORATORY STUDIES     Component      Latest Ref Rng & Units 5/19/2023 4/14/2023 3/14/2023   WBC      3.90 - 12.70 K/uL 15.18 (H) 9.17 13.97 (H)   RBC      4.00 - 5.40 M/uL 4.48 5.19 4.67   Hemoglobin      12.0 - 16.0 g/dL 13.8 15.7 14.0   Hematocrit      37.0 - 48.5 % 40.9 49.2 (H) 44.5   MCV      82 - 98 fL 91 95 95   MCH      27.0 - 31.0 pg 30.8 30.3 30.0   MCHC      32.0 - 36.0 g/dL 33.7 31.9 (L) 31.5 (L)   RDW      11.5 - 14.5 % 13.2 13.4 13.5   Platelets      150 - 450 K/uL 309 256 294   MPV      9.2 - 12.9 fL 11.3 " 11.9 12.5   Immature Granulocytes      0.0 - 0.5 % 0.9 (H) 0.5 0.9 (H)   Gran # (ANC)      1.8 - 7.7 K/uL 9.9 (H) 5.5 9.8 (H)   Immature Grans (Abs)      0.00 - 0.04 K/uL 0.14 (H) 0.05 (H) 0.12 (H)   Lymph #      1.0 - 4.8 K/uL 3.9 2.8 2.8   Mono #      0.3 - 1.0 K/uL 1.0 0.6 1.1 (H)   Eos #      0.0 - 0.5 K/uL 0.3 0.1 0.2   Baso #      0.00 - 0.20 K/uL 0.06 0.06 0.05   Differential Method       Automated Automated Automated   Sodium      136 - 145 mmol/L 139  141   Potassium      3.5 - 5.1 mmol/L 4.2  4.0   Chloride      95 - 110 mmol/L 109  109   CO2      23 - 29 mmol/L 18 (L)  23   Glucose      70 - 110 mg/dL 77  77   BUN      6 - 20 mg/dL 14  9   Creatinine      0.5 - 1.4 mg/dL 0.7  0.9   Calcium      8.7 - 10.5 mg/dL 9.3  9.5   PROTEIN TOTAL      6.0 - 8.4 g/dL 8.3  7.8   Albumin      3.5 - 5.2 g/dL 3.7  3.4 (L)   BILIRUBIN TOTAL      0.1 - 1.0 mg/dL 0.1  0.5   Alkaline Phosphatase      55 - 135 U/L 85  85   AST      10 - 40 U/L 36  29   ALT      10 - 44 U/L 42  29   Hemoglobin A1C External      4.0 - 5.6 %   5.2   TSH      0.400 - 4.000 uIU/mL   1.641   Free T4      0.71 - 1.51 ng/dL   0.74     Pathologist interpretation peripheral blood smear 4/14/23:      Mild leukocytosis shows absolute and relative neutrophilia, favor   reactive. Red cells and platelets show no diagnostic morphologic abnormalities   on scanning.     Component      Latest Ref Rng & Units 5/19/2023 3/14/2023   Anti Sm Antibody      0.00 - 0.99 Ratio  0.09   Anti-Sm Interpretation      Negative  Negative   Anti-SSA Antibody      0.00 - 0.99 Ratio  0.06   Anti-SSA Interpretation      Negative  Negative   Anti-SSB Antibody      0.00 - 0.99 Ratio  0.06   Anti-SSB Interpretation      Negative  Negative   ds DNA Ab      Negative 1:10  Negative 1:10   Anti Sm/RNP Antibody      0.00 - 0.99 Ratio  0.10   Anti-Sm/RNP Interpretation      Negative  Negative   EDWINA Screen      Negative <1:80  Positive (A)   Sed Rate      0 - 36 mm/Hr  30   CRP      0.0 -  8.2 mg/L  31.8 (H)   EDWINA PATTERN 1        Speckled   EDWINA Titer 1        1:160   Complement (C-3)      50 - 180 mg/dL 137    Complement (C-4)      11 - 44 mg/dL 38       ALLERGEN TESTING     Immunocaps: ordered     CHART REVIEW     Reviewed ED note, pcp note, derm note, labs.     ASSESSMENT & PLAN     Karla Price is a 22 y.o. female with     # Atopic dermatitis: Worse in spring, summer, and winter (better in fall). She was using hydrocortisone cream. She saw derm 1 month ago (4/2023), who prescribed triamcinolone cream and eucrisa. She doesn't think she has the eucrisa, but has noticed improvement with triamcinolone, which she was using BID, now daily.   -recommend she decrease use of triamcinolone 0.1% cream to prn, but okay to use on hot spots a couple of times per week as preventative measure.   -recommend frequent moisturization with thick creams and ointments.  -immunocaps for aeroallergens ordered.     # Angioedema: Occurred 4 days ago on 5/19/23 (at the end of a 10-day augmentin course for OM). She woke up with pruritic wrist and hand swelling which resolved after a few hours, and then she got lip angioedema that evening, for which she went to ED. Still on prednisone and benadryl, and symptoms haven't returned since. Low suspicion for amoxicillin allergy given timing of symptoms. Timing isn't consistent with food allergy either. Likely spontaneous angioedema, possibly related to her infection.   -advised her that if symptoms return after finishing prednisone, she should start cetirizine 10 mg daily, up to 20 mg BID if needed (instead of benadryl).     # History of tree nut allergy: She reports she was tested for food allergies around 14 yo after she had an episode of urticaria, and was told she has a mild allergy to tree nuts, which she had always previously tolerated. She continues to tolerate pistachios, but avoids other tree nuts and peanuts. Low suspicion for clinical allergy based on this  history.  -immunocaps for peanuts and tree nuts ordered. Counseled that positive result doesn't equate to clinical allergy.   -if immunocaps negative, can reintroduce peanuts and tree nuts at home. If positive, will further discuss.       Follow up: 2 months.     I spent a total of 60 minutes on the day of the visit.  This includes face to face time and non-face to face time preparing to see the patient (eg, review of tests), obtaining and/or reviewing separately obtained history, documenting clinical information in the electronic or other health record, independently interpreting results and communicating results to the patient/family/caregiver, or care coordinator.    Magalys Steen MD  Allergy/Immunology       English

## 2023-05-26 ENCOUNTER — PATIENT MESSAGE (OUTPATIENT)
Dept: PREADMISSION TESTING | Facility: HOSPITAL | Age: 23
End: 2023-05-26
Payer: COMMERCIAL

## 2023-05-26 LAB
A ALTERNATA IGE QN: <0.1 KU/L
A FUMIGATUS IGE QN: <0.1 KU/L
ALLERGEN BOXELDER MAPLE TREE IGE: <0.1 KU/L
ALLERGEN MAPLE (BOX ELDER) CLASS: NORMAL
ALLERGEN PIGWEED IGE: <0.1 KU/L
ALLERGEN WHITE ASH TREE IGE: <0.1 KU/L
ALMOND IGE QN: <0.1 KU/L
BAHIA GRASS IGE QN: <0.1 KU/L
BERMUDA GRASS IGE QN: <0.1 KU/L
CASHEW NUT IGE QN: <0.1 KU/L
CAT DANDER IGE QN: <0.1 KU/L
CEDAR IGE QN: <0.1 KU/L
COMMON PIGWEED CLASS: NORMAL
COTTONWOOD IGE QN: <0.1 KU/L
D FARINAE IGE QN: 30.2 KU/L
D PTERONYSS IGE QN: 21.5 KU/L
DEPRECATED A ALTERNATA IGE RAST QL: NORMAL
DEPRECATED A FUMIGATUS IGE RAST QL: NORMAL
DEPRECATED ALMOND IGE RAST QL: NORMAL
DEPRECATED BAHIA GRASS IGE RAST QL: NORMAL
DEPRECATED BERMUDA GRASS IGE RAST QL: NORMAL
DEPRECATED CASHEW NUT IGE RAST QL: NORMAL
DEPRECATED CAT DANDER IGE RAST QL: NORMAL
DEPRECATED CEDAR IGE RAST QL: NORMAL
DEPRECATED COTTONWOOD IGE RAST QL: NORMAL
DEPRECATED D FARINAE IGE RAST QL: ABNORMAL
DEPRECATED D PTERONYSS IGE RAST QL: ABNORMAL
DEPRECATED DOG DANDER IGE RAST QL: ABNORMAL
DEPRECATED ELDER IGE RAST QL: NORMAL
DEPRECATED ENGL PLANTAIN IGE RAST QL: NORMAL
DEPRECATED HAZELNUT IGE RAST QL: NORMAL
DEPRECATED JOHNSON GRASS IGE RAST QL: NORMAL
DEPRECATED MACADAMIA IGE RAST QL: NORMAL
DEPRECATED PEANUT IGE RAST QL: NORMAL
DEPRECATED PECAN/HICK TREE IGE RAST QL: NORMAL
DEPRECATED ROACH IGE RAST QL: ABNORMAL
DEPRECATED SALTWORT IGE RAST QL: NORMAL
DEPRECATED SHEEP SORREL IGE RAST QL: NORMAL
DEPRECATED SILVER BIRCH IGE RAST QL: NORMAL
DEPRECATED TIMOTHY IGE RAST QL: NORMAL
DEPRECATED WALNUT IGE RAST QL: NORMAL
DEPRECATED WEST RAGWEED IGE RAST QL: NORMAL
DEPRECATED WHITE OAK IGE RAST QL: NORMAL
DOG DANDER IGE QN: 0.53 KU/L
ELDER IGE QN: <0.1 KU/L
ENGL PLANTAIN IGE QN: <0.1 KU/L
HAZELNUT IGE QN: <0.1 KU/L
JOHNSON GRASS IGE QN: <0.1 KU/L
MACADAMIA IGE QN: <0.1 KU/L
PEANUT IGE QN: <0.1 KU/L
PECAN/HICK TREE IGE QN: <0.1 KU/L
ROACH IGE QN: 0.16 KU/L
SALTWORT IGE QN: <0.1 KU/L
SHEEP SORREL IGE QN: <0.1 KU/L
SILVER BIRCH IGE QN: <0.1 KU/L
TIMOTHY IGE QN: <0.1 KU/L
WALNUT IGE QN: <0.1 KU/L
WEST RAGWEED IGE QN: <0.1 KU/L
WHITE ASH CLASS: NORMAL
WHITE OAK IGE QN: <0.1 KU/L

## 2023-05-26 RX ORDER — NAPROXEN 500 MG/1
500 TABLET ORAL 2 TIMES DAILY
COMMUNITY
Start: 2023-05-10 | End: 2023-08-08

## 2023-05-26 NOTE — ANESTHESIA PAT ROS NOTE
05/26/2023  Karla Price is a 22 y.o., female.      Pre-op Assessment    I have reviewed the Patient Summary Reports.       I have reviewed the Medications.     Review of Systems  Anesthesia Hx:  No previous Anesthesia   Neg history of prior surgery.             Social:  Non-Smoker, No Alcohol Use       Hematology/Oncology:  Hematology Normal   Oncology Normal                                   EENT/Dental:   Environmental allergies,   Angioedema, History of food allergy            Cardiovascular:  Cardiovascular Normal Exercise tolerance: good        Denies Dysrhythmias.         Denies ALONSO.                            Pulmonary:     Denies Asthma.   Denies Shortness of breath.                  Renal/:   Denies Chronic Renal Disease.                Hepatic/GI:  Hepatic/GI Normal     Denies GERD. Denies Liver Disease.            Neurological:  Neurology Normal      Denies Headaches. Denies Seizures.                                Endocrine:  Denies Diabetes. Denies Hypothyroidism.        Morbid Obesity / BMI > 40  Dermatological:  Eczema, Atopic dermatitis   Psych:  Psychiatric Normal                   Past Medical History:   Diagnosis Date    Eczema      No past surgical history on file.    Anesthesia Assessment: Preoperative EQUATION    Planned Procedure: Procedure(s) (LRB):  RECONSTRUCTION, LIGAMENT (Left)  RECONSTRUCTION, LIGAMENT, MEDIAL PATELLOFEMORAL (Left)  ARTHROSCOPY,KNEE,WITH MENISCUS REPAIR (Left)  Requested Anesthesia Type:General  Surgeon: Cecilio Payton MD  Service: Orthopedics  Known or anticipated Date of Surgery:6/13/2023    Surgeon notes: reviewed    Electronic QUestionnaire Assessment completed via nurse interview with patient.        Triage considerations:     The patient has no apparent active cardiac condition (No unstable coronary Syndrome such as severe unstable angina or recent [<1  month] myocardial infarction, decompensated CHF, severe valvular   disease or significant arrhythmia)    Previous anesthesia records:None    Last PCP note: within 3 months , within [facility]   Subspecialty notes: Allergy    Other important co-morbidities: Morbid Obesity                Instructions given. (See in Nurse's note)      Ht: 5'1  Wt: 213 lb  BMI: 40.41  Vaccinated

## 2023-05-30 ENCOUNTER — CLINICAL SUPPORT (OUTPATIENT)
Dept: REHABILITATION | Facility: HOSPITAL | Age: 23
End: 2023-05-30
Payer: COMMERCIAL

## 2023-05-30 DIAGNOSIS — M25.562 CHRONIC PAIN OF LEFT KNEE: ICD-10-CM

## 2023-05-30 DIAGNOSIS — G89.29 CHRONIC PAIN OF LEFT KNEE: ICD-10-CM

## 2023-05-30 DIAGNOSIS — M62.81 MUSCLE WEAKNESS: Primary | ICD-10-CM

## 2023-05-30 PROCEDURE — 97110 THERAPEUTIC EXERCISES: CPT | Mod: PN

## 2023-05-30 PROCEDURE — 97112 NEUROMUSCULAR REEDUCATION: CPT | Mod: PN

## 2023-05-30 NOTE — PROGRESS NOTES
"[facility]   Physical Therapy Treatment Note     Name: Karla [facility] Number: [id number]    Therapy Diagnosis:   Encounter Diagnoses   Name Primary?    Muscle weakness Yes    Chronic pain of left knee    Physician: Camila Holcomb MD     Physician Orders: PT Eval and Treat   Medical Diagnosis from Referral:   S83.519A (ICD-10-CM) - Rupture of anterior cruciate ligament of knee, unspecified laterality, initial encounter   S83.242A (ICD-10-CM) - Tear of medial meniscus of left knee, current, unspecified tear type, initial encounter   S83.282A (ICD-10-CM) - Tear of lateral meniscus of left knee, current, unspecified tear type, initial encounter      Evaluation Date: 3/31/2023  Authorization Period Expiration: 12/31/2023  Plan of Care Expiration: 6/23/2023  Progress Note Due: 5/1/2023  Visit # / Visits authorized: 14/20 (+Evaluation)     Precautions: Standard     Time In: 3:00  Time Out: 3:55  Total Billable Time: 55 minutes    SUBJECTIVE     Pt reports: No pain or instability.    She was compliant with home exercise program.  Response to previous treatment: No adverse reactions  Functional change: Ongoing    Pain: 0/10  Location: left knee      OBJECTIVE           Treatment     Karla received the treatments listed below:      Therapeutic exercises to develop strength and ROM for 45 minutes including:    Upright bike: 5 minutes, Level 5.0, for ROM and swelling  SLR's: 3x10, 5#  SL Bridges: 3x10,  3" holds  SL RDL w/ 25lb KB, 3x10   Lateral steps w/ green theraloop around ankles, 3 laps along white tape  DL Matrix Knee Extension: 3x10, 40#, increased effort from LLE  DL Matrix HS Curls: 3x10, 40#, increased effort from LLE  SL leg press, 3x10 40lbs  SL extension on shuttle, 1 black band 1 red band, 3x10    neuromuscular re-education activities to improve: Balance and Proprioception for 10 minutes. The following activities were included:    Single Leg ball toss to trampoline: 4x20, red " weighted ball        Patient Education and Home Exercises     Home Exercises Provided and Patient Education Provided     Education provided:   - To continue with current HEP    Written Home Exercises Provided: Patient instructed to cont prior HEP. Exercises were reviewed and Karla was able to demonstrate them prior to the end of the session.  Karla demonstrated good  understanding of the education provided. See EMR under Patient Instructions for exercises provided during therapy sessions    ASSESSMENT     Patient tolerated session well without issues. Continues to improve SL strength and balance. She was able to handle increased resistance this session. We scheduled her post-op evaluation today.    Karla Is progressing well towards her goals.   Pt prognosis is Good.     Pt will continue to benefit from skilled outpatient physical therapy to address the deficits listed in the problem list box on initial evaluation, provide pt/family education and to maximize pt's level of independence in the home and community environment.     Pt's spiritual, cultural and educational needs considered and pt agreeable to plan of care and goals.     Anticipated barriers to physical therapy: none    Goals:   Short Term Goals:  Pt will be ind w/ HEP w/in 2 wks  Pt will report a dec of at least 2pts on 0-10 scale (per MCID) in L knee pain within 3 wks for symptom modulation. MET  3.   Pt will demonstrate at least -10deg L knee ext for improved ROM to better outcomes for surgery within 3 weeks. MET     Long Term Goals:  Pt will demonstrate an inc of at least 1 MMT grade in L LE strength within 6 wks. Progressing, Not Met  Pt will demonstrate improved L knee flexion ROM to at least 130deg to better outcomes for surgery within 6 wks. Progressing, Not Met  Pt will demonstrate ability to perform 20 Sup SLR w/ 2 pound weight showing improved quad activation and strength for L knee support within 6 wks.Progressing, Not Met    PLAN     Plan of  care Certification: 3/31/2023 to 6/23/2023.      Bernabe Curry, PT, DPT

## 2023-06-02 ENCOUNTER — PATIENT MESSAGE (OUTPATIENT)
Dept: ALLERGY | Facility: CLINIC | Age: 23
End: 2023-06-02
Payer: COMMERCIAL

## 2023-06-02 ENCOUNTER — CLINICAL SUPPORT (OUTPATIENT)
Dept: REHABILITATION | Facility: HOSPITAL | Age: 23
End: 2023-06-02
Payer: COMMERCIAL

## 2023-06-02 DIAGNOSIS — M25.562 CHRONIC PAIN OF LEFT KNEE: ICD-10-CM

## 2023-06-02 DIAGNOSIS — M62.81 MUSCLE WEAKNESS: Primary | ICD-10-CM

## 2023-06-02 DIAGNOSIS — G89.29 CHRONIC PAIN OF LEFT KNEE: ICD-10-CM

## 2023-06-02 PROCEDURE — 97110 THERAPEUTIC EXERCISES: CPT | Mod: PN

## 2023-06-02 PROCEDURE — 97112 NEUROMUSCULAR REEDUCATION: CPT | Mod: PN

## 2023-06-02 NOTE — PROGRESS NOTES
"[facility]   Physical Therapy Treatment Note     Name: Karla [facility] Number: [id number]    Therapy Diagnosis:   Encounter Diagnoses   Name Primary?    Muscle weakness Yes    Chronic pain of left knee    Physician: Camila Holcomb MD     Physician Orders: PT Eval and Treat   Medical Diagnosis from Referral:   S83.519A (ICD-10-CM) - Rupture of anterior cruciate ligament of knee, unspecified laterality, initial encounter   S83.242A (ICD-10-CM) - Tear of medial meniscus of left knee, current, unspecified tear type, initial encounter   S83.282A (ICD-10-CM) - Tear of lateral meniscus of left knee, current, unspecified tear type, initial encounter      Evaluation Date: 3/31/2023  Authorization Period Expiration: 12/31/2023  Plan of Care Expiration: 6/23/2023  Progress Note Due: 5/1/2023  Visit # / Visits authorized: 14/20 (+Evaluation)     Precautions: Standard     Time In: 11:55  Time Out: 12:50  Total Billable Time: 40 minutes    SUBJECTIVE     Pt reports: No pain or instability.    She was compliant with home exercise program.  Response to previous treatment: No adverse reactions  Functional change: Ongoing    Pain: 0/10  Location: left knee      OBJECTIVE         Treatment     Karla received the treatments listed below:      Therapeutic exercises to develop strength and ROM for 45 minutes including:    Upright bike: 5 minutes, Level 5.0, for ROM and swelling  SLR's: 3x10, 5#  SL Bridges: 3x10,  3" holds  SL RDL w/ 25lb KB, 3x10   Lateral steps w/ green theraloop around ankles, 3 laps along white tape  DL Matrix Knee Extension: 3x10, 40#, increased effort from LLE  DL Matrix HS Curls: 3x10, 45#, increased effort from LLE  SL leg press, 3x10 40lbs  SL glute kick back on shuttle, 1 black band 1 red band, 3x15    neuromuscular re-education activities to improve: Balance and Proprioception for 10 minutes. The following activities were included:    Single Leg ball toss to trampoline: 4x30, red " weighted ball        Patient Education and Home Exercises     Home Exercises Provided and Patient Education Provided     Education provided:   - To continue with current HEP    Written Home Exercises Provided: Patient instructed to cont prior HEP. Exercises were reviewed and Karla was able to demonstrate them prior to the end of the session.  Karla demonstrated good  understanding of the education provided. See EMR under Patient Instructions for exercises provided during therapy sessions    ASSESSMENT     Patient tolerated session well without issues. Continues to improve SL strength, coordination, and balance. She was able to handle increased resistance this session. She has post op evaluation scheduled for 2 days after surgery.     Karla Is progressing well towards her goals.   Pt prognosis is Good.     Pt will continue to benefit from skilled outpatient physical therapy to address the deficits listed in the problem list box on initial evaluation, provide pt/family education and to maximize pt's level of independence in the home and community environment.     Pt's spiritual, cultural and educational needs considered and pt agreeable to plan of care and goals.     Anticipated barriers to physical therapy: none    Goals:   Short Term Goals:  Pt will be ind w/ HEP w/in 2 wks  Pt will report a dec of at least 2pts on 0-10 scale (per MCID) in L knee pain within 3 wks for symptom modulation. MET  3.   Pt will demonstrate at least -10deg L knee ext for improved ROM to better outcomes for surgery within 3 weeks. MET     Long Term Goals:  Pt will demonstrate an inc of at least 1 MMT grade in L LE strength within 6 wks. Progressing, Not Met  Pt will demonstrate improved L knee flexion ROM to at least 130deg to better outcomes for surgery within 6 wks. Progressing, Not Met  Pt will demonstrate ability to perform 20 Sup SLR w/ 2 pound weight showing improved quad activation and strength for L knee support within 6  wks.Progressing, Not Met    PLAN     Plan of care Certification: 3/31/2023 to 6/23/2023.      Bernabe Curry, PT, DPT

## 2023-06-09 ENCOUNTER — OFFICE VISIT (OUTPATIENT)
Dept: SPORTS MEDICINE | Facility: CLINIC | Age: 23
End: 2023-06-09
Payer: COMMERCIAL

## 2023-06-09 VITALS
HEIGHT: 61 IN | BODY MASS INDEX: 39.65 KG/M2 | WEIGHT: 210 LBS | HEART RATE: 75 BPM | DIASTOLIC BLOOD PRESSURE: 75 MMHG | SYSTOLIC BLOOD PRESSURE: 114 MMHG

## 2023-06-09 DIAGNOSIS — S83.282A TEAR OF LATERAL MENISCUS OF LEFT KNEE, CURRENT, UNSPECIFIED TEAR TYPE, INITIAL ENCOUNTER: ICD-10-CM

## 2023-06-09 DIAGNOSIS — S83.242A TEAR OF MEDIAL MENISCUS OF LEFT KNEE, CURRENT, UNSPECIFIED TEAR TYPE, INITIAL ENCOUNTER: ICD-10-CM

## 2023-06-09 DIAGNOSIS — S83.512A RUPTURE OF ANTERIOR CRUCIATE LIGAMENT OF LEFT KNEE, INITIAL ENCOUNTER: Primary | ICD-10-CM

## 2023-06-09 DIAGNOSIS — M25.362 INSTABILITY OF LEFT KNEE JOINT: ICD-10-CM

## 2023-06-09 PROCEDURE — 99499 UNLISTED E&M SERVICE: CPT | Mod: S$GLB,,, | Performed by: PHYSICIAN ASSISTANT

## 2023-06-09 PROCEDURE — 99499 NO LOS: ICD-10-PCS | Mod: S$GLB,,, | Performed by: PHYSICIAN ASSISTANT

## 2023-06-09 PROCEDURE — 99999 PR PBB SHADOW E&M-EST. PATIENT-LVL III: ICD-10-PCS | Mod: PBBFAC,,, | Performed by: PHYSICIAN ASSISTANT

## 2023-06-09 PROCEDURE — 99999 PR PBB SHADOW E&M-EST. PATIENT-LVL III: CPT | Mod: PBBFAC,,, | Performed by: PHYSICIAN ASSISTANT

## 2023-06-09 RX ORDER — HYDROCODONE BITARTRATE AND ACETAMINOPHEN 7.5; 325 MG/1; MG/1
1 TABLET ORAL EVERY 6 HOURS PRN
Qty: 20 TABLET | Refills: 0 | Status: SHIPPED | OUTPATIENT
Start: 2023-06-09 | End: 2023-08-08

## 2023-06-09 RX ORDER — HYDROCODONE BITARTRATE AND ACETAMINOPHEN 7.5; 325 MG/1; MG/1
1 TABLET ORAL EVERY 6 HOURS PRN
Qty: 20 TABLET | Refills: 0 | Status: SHIPPED | OUTPATIENT
Start: 2023-06-09 | End: 2023-06-09

## 2023-06-09 RX ORDER — MUPIROCIN 20 MG/G
OINTMENT TOPICAL
Status: CANCELLED | OUTPATIENT
Start: 2023-06-09

## 2023-06-09 RX ORDER — ASPIRIN 81 MG/1
81 TABLET ORAL DAILY
Qty: 28 TABLET | Refills: 0 | Status: SHIPPED | OUTPATIENT
Start: 2023-06-14 | End: 2023-06-09

## 2023-06-09 RX ORDER — CEFAZOLIN SODIUM 2 G/50ML
2 SOLUTION INTRAVENOUS
Status: CANCELLED | OUTPATIENT
Start: 2023-06-09

## 2023-06-09 RX ORDER — ASPIRIN 81 MG/1
81 TABLET ORAL DAILY
Qty: 28 TABLET | Refills: 0 | Status: SHIPPED | OUTPATIENT
Start: 2023-06-14 | End: 2023-08-08

## 2023-06-09 NOTE — PROGRESS NOTES
PREOPERATIVE APPOINTMENT    History 6/9/2023:  Karla returns here today for follow-up of her left knee and to complete her preoperative paperwork.  She continues to have pain and functional limitations despite conservative treatment.  Surgical intervention has been recommended and she is scheduled to undergo a left knee arthroscopy with anterior cruciate ligament reconstruction using quadriceps tendon autograft, lateral extra-articular tenodesis with a iliotibial band autograft, medial and lateral meniscus repair versus menisectomy, loose body removal and possible posterolateral corner reconstruction using allografts on 6/13/2023.    History 5/5/2023:  Karla returns to clinic today for follow-up of left knee pain. She has been doing physical therapy and is ready to proceed with surgery.    History 3/29/2023:   22 y.o. Female with a history of left knee pain who is an . She states she as walking with her friends and her knee buckled on her. This is not the first occurrence. In 2016 she fell for the first time while a dog was chasing her. She states she has felt unstable ever since. She has been unable to play sports because of her knee and instability. She had another fall after the fall in 2016 and was put on crutches. She presents to clinic today in a brace that she has been wearing for a few weeks.        - mechanical symptoms, + instability    Is affecting ADLs.  Pain is 5/10 at it's worst.      REVIEW OF SYSTEMS   Constitution: Negative. Negative for chills, fever and night sweats.   HENT: Negative for congestion and headaches.    Eyes: Negative for blurred vision, left vision loss and right vision loss.   Cardiovascular: Negative for chest pain and syncope.   Respiratory: Negative for cough and shortness of breath.    Endocrine: Negative for polydipsia, polyphagia and polyuria.   Hematologic/Lymphatic: Negative for bleeding problem. Does not bruise/bleed easily.   Skin: Negative for dry  "skin, itching and rash.   Musculoskeletal: Negative for falls. Positive for left knee pain  Gastrointestinal: Negative for abdominal pain and bowel incontinence.   Genitourinary: Negative for bladder incontinence and nocturia.   Neurological: Negative for disturbances in coordination, loss of balance and seizures.   Psychiatric/Behavioral: Negative for depression. The patient does not have insomnia.    Allergic/Immunologic: Negative for hives and persistent infections.     PHYSICAL EXAMINATION    Vitals: /75   Pulse 75   Ht 5' 1" (1.549 m)   Wt 95.3 kg (210 lb)   LMP 05/09/2023   BMI 39.68 kg/m²     General: The patient appears active and healthy with no apparent physical problems.  No disturbance of mood or affect is demonstrated. Alert and Oriented.    HEENT: Eyes normal, pupils equally round, nose normal.    Resp: Equal and symmetrical chest rises. No wheezing    CV: Regular rate    Neck: Supple; nonpainful range of motion.    Extremities: no cyanosis, clubbing, edema, or diffuse swelling.  Palpable pulses, good capillary refill of the digits.  No coolness, discoloration, edema or obvious varicosities.    Skin: no lesions noted.    Lymphatic: no detected adenopathy in the upper or lower extremities.    Neurologic: normal mental status, normal reflexes, normal gait and balance.  Patient is alert and oriented to person, place and time.  No flaccidity or spasticity is noted.  No motor or sensory deficits are noted.  Light touch is intact    Orthopaedic:     KNEE EXAM - LEFT    Inspection:   Normal skin color and appearance with no scars, no ecchymosis and no effusion. Valgus alignment noted      ROM:   5° - 145°.  *pain with terminal extension, hyperextension noted on the right    Palpation:   There is no tenderness along medial joint line, medial plica, medial collateral ligament, pes bursa, lateral joint line, iliotibial band, lateral collateral ligament, popliteal fossa, patellar tendon, or quadriceps " tendon.    Stability: + anterior drawer, + Lachman, - pivot shift and - posterior drawer.      Grade II Varus stress testing at 0 and 30 degrees which is slightly asymmetric to her right lower extremity    Tests:   - Andreinas test.  - patellar compression - grind test, - crepitus.  There is no patellar apprehension.     - J Sign. - Victor Manuel's. - patellar tilt. - Ana. Lateral patella translation  2 quadrants. Medial patella translation 2 quadrants    Motor:   Quadriceps strength is 5/5 and hamstrings strength is 5/5. Hamstrings show no tightness.      Neuro:   Distal neurovascular status is normal    Vascular: Negative Homans and no palpable popliteal cords. 2+ pedal pulse with brisk cap refill    Gait Slightly antalgic    Beighton scale: 8/9    IMAGING    MRI Knee Without Contrast Left  Narrative: EXAMINATION:  MRI KNEE WITHOUT CONTRAST LEFT    CLINICAL HISTORY:  Meniscal tear, untreated, new symptoms;Pain in left knee    TECHNIQUE:  Multiplanar, multisequence images were performed of the left knee.    COMPARISON:  X-ray 03/13/2023    FINDINGS:  Menisci:Vertical longitudinal tear posterior horn medial meniscus extending to the root attachment disrupting the superior articular surface.  There is attenuation of the free edge of the lateral meniscus concerning for a radial tear.    Ligaments:Complete tear ACL.  PCL fibers are intact.  Fibular collateral distal biceps femoris and IT band are intact.  Small posterolateral corner ligaments not identifiable with certainty and prominent pericapsular soft tissue edema raises suspicion for posterolateral corner injury.    Extensor Mechanism:The quadriceps tendon is intact.  There is complete disruption of the medial patellofemoral ligament at the MCL attachment associated with mild/minimal medial subluxation of the patella.    Bone and Joint including articular cartilage:There is a displaced osteochondral fracture of the inferomedial pole of patella with the cortical fragment  seen inferiorly in the anterior aspect of the intercondylar notch.  Associated contrecoup osteochondral impaction injury of the lateral trochlear surface of the femur noted.  Fracture lines extend through the articular aspect of the medial and lateral tibial plateaus, with approximately 5 mm of articular surface depression of the posteromedial tibial plateau.  Large joint effusion with lipohemarthrosis.    Soft Tissues:Prominent periarticular soft tissue edema and grade 1 popliteus strain.    Miscellaneous:None  Impression: Complete ACL tear with associated posterolateral corner injury.    Mildly depressed medial tibial plateau fracture and nondepressed posterolateral tibial plateau fracture    Findings of transient lateral patellar dislocation with large displaced osteochondral fragment from the inferomedial pole of the patella.  Partial tear of the MPFL at the MCL origin.    Medial and lateral meniscal tears    This report was flagged in Epic as abnormal.    Electronically signed by: Josh Dupree Jr  Date:    03/22/2023  Time:    15:46        IMPRESSION       ICD-10-CM ICD-9-CM   1. Rupture of anterior cruciate ligament of left knee, initial encounter  S83.512A 844.2   2. Tear of medial meniscus of left knee, current, unspecified tear type, initial encounter  S83.242A 836.0   3. Tear of lateral meniscus of left knee, current, unspecified tear type, initial encounter  S83.282A 836.1   4. Instability of left knee joint  M25.362 718.86           MEDICATIONS PRESCRIBED      Aspirin 81 mg  Hydrocodone 7.5/325 mg    RECOMMENDATIONS     Surgical versus non-surgical options discussed today; left knee arthroscopy with anterior cruciate ligament reconstruction using quadriceps tendon autograft, lateral extra-articular tenodesis with a iliotibial band autograft, medial and lateral meniscus repair versus menisectomy, loose body removal and possible posterolateral corner reconstruction using allografts.  The patient  elected to proceed with operative intervention after all risks, benefits, and alternatives were discussed with the patient.  The risks of surgery include bleeding, scar formation, injuries to nerves, arteries and veins, need for additional surgeries, blood clots, infections and the risk of anesthesia. I personally obtained informed consent from the patient and documented a full history and physical.  Physical therapy ordered - [facility]  We had a long discussion concerning staging the procedure.  I advised her that we will do the majority of the surgery and only reconstruct her posterolateral corner if indicated at the time of surgery.      All questions were answered, pt will contact us for questions or concerns in the interim.

## 2023-06-09 NOTE — H&P
H&P    History 6/9/2023:  Karla returns here today for follow-up of her left knee and to complete her preoperative paperwork.  She continues to have pain and functional limitations despite conservative treatment.  Surgical intervention has been recommended and she is scheduled to undergo a left knee arthroscopy with anterior cruciate ligament reconstruction using quadriceps tendon autograft, lateral extra-articular tenodesis with a iliotibial band autograft, medial and lateral meniscus repair versus menisectomy, loose body removal and possible posterolateral corner reconstruction using allografts on 6/13/2023.    History 5/5/2023:  Karla returns to clinic today for follow-up of left knee pain. She has been doing physical therapy and is ready to proceed with surgery.    History 3/29/2023:   22 y.o. Female with a history of left knee pain who is an . She states she as walking with her friends and her knee buckled on her. This is not the first occurrence. In 2016 she fell for the first time while a dog was chasing her. She states she has felt unstable ever since. She has been unable to play sports because of her knee and instability. She had another fall after the fall in 2016 and was put on crutches. She presents to clinic today in a brace that she has been wearing for a few weeks.        - mechanical symptoms, + instability    Is affecting ADLs.  Pain is 5/10 at it's worst.      REVIEW OF SYSTEMS   Constitution: Negative. Negative for chills, fever and night sweats.   HENT: Negative for congestion and headaches.    Eyes: Negative for blurred vision, left vision loss and right vision loss.   Cardiovascular: Negative for chest pain and syncope.   Respiratory: Negative for cough and shortness of breath.    Endocrine: Negative for polydipsia, polyphagia and polyuria.   Hematologic/Lymphatic: Negative for bleeding problem. Does not bruise/bleed easily.   Skin: Negative for dry skin, itching and  "rash.   Musculoskeletal: Negative for falls. Positive for left knee pain  Gastrointestinal: Negative for abdominal pain and bowel incontinence.   Genitourinary: Negative for bladder incontinence and nocturia.   Neurological: Negative for disturbances in coordination, loss of balance and seizures.   Psychiatric/Behavioral: Negative for depression. The patient does not have insomnia.    Allergic/Immunologic: Negative for hives and persistent infections.     PHYSICAL EXAMINATION    Vitals: /75   Pulse 75   Ht 5' 1" (1.549 m)   Wt 95.3 kg (210 lb)   LMP 05/09/2023   BMI 39.68 kg/m²     General: The patient appears active and healthy with no apparent physical problems.  No disturbance of mood or affect is demonstrated. Alert and Oriented.    HEENT: Eyes normal, pupils equally round, nose normal.    Resp: Equal and symmetrical chest rises. No wheezing    CV: Regular rate    Neck: Supple; nonpainful range of motion.    Extremities: no cyanosis, clubbing, edema, or diffuse swelling.  Palpable pulses, good capillary refill of the digits.  No coolness, discoloration, edema or obvious varicosities.    Skin: no lesions noted.    Lymphatic: no detected adenopathy in the upper or lower extremities.    Neurologic: normal mental status, normal reflexes, normal gait and balance.  Patient is alert and oriented to person, place and time.  No flaccidity or spasticity is noted.  No motor or sensory deficits are noted.  Light touch is intact    Orthopaedic:     KNEE EXAM - LEFT    Inspection:   Normal skin color and appearance with no scars, no ecchymosis and no effusion. Valgus alignment noted      ROM:   5° - 145°.  *pain with terminal extension, hyperextension noted on the right    Palpation:   There is no tenderness along medial joint line, medial plica, medial collateral ligament, pes bursa, lateral joint line, iliotibial band, lateral collateral ligament, popliteal fossa, patellar tendon, or quadriceps " tendon.    Stability: + anterior drawer, + Lachman, - pivot shift and - posterior drawer.      Grade II Varus stress testing at 0 and 30 degrees which is slightly asymmetric to her right lower extremity    Tests:   - Andreinas test.  - patellar compression - grind test, - crepitus.  There is no patellar apprehension.     - J Sign. - Victor Manuel's. - patellar tilt. - Ana. Lateral patella translation  2 quadrants. Medial patella translation 2 quadrants    Motor:   Quadriceps strength is 5/5 and hamstrings strength is 5/5. Hamstrings show no tightness.      Neuro:   Distal neurovascular status is normal    Vascular: Negative Homans and no palpable popliteal cords. 2+ pedal pulse with brisk cap refill    Gait Slightly antalgic    Beighton scale: 8/9    IMAGING    MRI Knee Without Contrast Left  Narrative: EXAMINATION:  MRI KNEE WITHOUT CONTRAST LEFT    CLINICAL HISTORY:  Meniscal tear, untreated, new symptoms;Pain in left knee    TECHNIQUE:  Multiplanar, multisequence images were performed of the left knee.    COMPARISON:  X-ray 03/13/2023    FINDINGS:  Menisci:Vertical longitudinal tear posterior horn medial meniscus extending to the root attachment disrupting the superior articular surface.  There is attenuation of the free edge of the lateral meniscus concerning for a radial tear.    Ligaments:Complete tear ACL.  PCL fibers are intact.  Fibular collateral distal biceps femoris and IT band are intact.  Small posterolateral corner ligaments not identifiable with certainty and prominent pericapsular soft tissue edema raises suspicion for posterolateral corner injury.    Extensor Mechanism:The quadriceps tendon is intact.  There is complete disruption of the medial patellofemoral ligament at the MCL attachment associated with mild/minimal medial subluxation of the patella.    Bone and Joint including articular cartilage:There is a displaced osteochondral fracture of the inferomedial pole of patella with the cortical fragment  seen inferiorly in the anterior aspect of the intercondylar notch.  Associated contrecoup osteochondral impaction injury of the lateral trochlear surface of the femur noted.  Fracture lines extend through the articular aspect of the medial and lateral tibial plateaus, with approximately 5 mm of articular surface depression of the posteromedial tibial plateau.  Large joint effusion with lipohemarthrosis.    Soft Tissues:Prominent periarticular soft tissue edema and grade 1 popliteus strain.    Miscellaneous:None  Impression: Complete ACL tear with associated posterolateral corner injury.    Mildly depressed medial tibial plateau fracture and nondepressed posterolateral tibial plateau fracture    Findings of transient lateral patellar dislocation with large displaced osteochondral fragment from the inferomedial pole of the patella.  Partial tear of the MPFL at the MCL origin.    Medial and lateral meniscal tears    This report was flagged in Epic as abnormal.    Electronically signed by: Josh Dupree Jr  Date:    03/22/2023  Time:    15:46        IMPRESSION       ICD-10-CM ICD-9-CM   1. Rupture of anterior cruciate ligament of left knee, initial encounter  S83.512A 844.2   2. Tear of medial meniscus of left knee, current, unspecified tear type, initial encounter  S83.242A 836.0   3. Tear of lateral meniscus of left knee, current, unspecified tear type, initial encounter  S83.282A 836.1   4. Instability of left knee joint  M25.362 718.86           MEDICATIONS PRESCRIBED      Aspirin 81 mg  Hydrocodone 7.5/325 mg    RECOMMENDATIONS     Surgical versus non-surgical options discussed today; left knee arthroscopy with anterior cruciate ligament reconstruction using quadriceps tendon autograft, lateral extra-articular tenodesis with a iliotibial band autograft, medial and lateral meniscus repair versus menisectomy, loose body removal and possible posterolateral corner reconstruction using allografts.  The patient  elected to proceed with operative intervention after all risks, benefits, and alternatives were discussed with the patient.  The risks of surgery include bleeding, scar formation, injuries to nerves, arteries and veins, need for additional surgeries, blood clots, infections and the risk of anesthesia. I personally obtained informed consent from the patient and documented a full history and physical.  Physical therapy ordered - [facility]  We had a long discussion concerning staging the procedure.  I advised her that we will do the majority of the surgery and only reconstruct her posterolateral corner if indicated at the time of surgery.      All questions were answered, pt will contact us for questions or concerns in the interim.

## 2023-06-12 ENCOUNTER — TELEPHONE (OUTPATIENT)
Dept: SPORTS MEDICINE | Facility: CLINIC | Age: 23
End: 2023-06-12
Payer: COMMERCIAL

## 2023-06-12 ENCOUNTER — ANESTHESIA EVENT (OUTPATIENT)
Dept: SURGERY | Facility: HOSPITAL | Age: 23
End: 2023-06-12
Payer: COMMERCIAL

## 2023-06-12 NOTE — TELEPHONE ENCOUNTER
Spoke with the patient. Notified of 10:30 AM arrival time to the [facility], [facility]. Informed of current visitor policy.  Reminded of NPO. Patient verbalized understanding to all.    Missy Shea MS, OTC  Clinical/OR Assistant to Cecilio Payton MD  [facility]

## 2023-06-13 ENCOUNTER — HOSPITAL ENCOUNTER (OUTPATIENT)
Facility: HOSPITAL | Age: 23
Discharge: HOME | End: 2023-06-13
Attending: ORTHOPAEDIC SURGERY | Admitting: ORTHOPAEDIC SURGERY
Payer: COMMERCIAL

## 2023-06-13 ENCOUNTER — ANESTHESIA (OUTPATIENT)
Dept: SURGERY | Facility: HOSPITAL | Age: 23
End: 2023-06-13
Payer: COMMERCIAL

## 2023-06-13 VITALS
TEMPERATURE: 98 F | HEIGHT: 61 IN | DIASTOLIC BLOOD PRESSURE: 83 MMHG | SYSTOLIC BLOOD PRESSURE: 138 MMHG | BODY MASS INDEX: 39.65 KG/M2 | WEIGHT: 210 LBS | HEART RATE: 99 BPM | RESPIRATION RATE: 15 BRPM | OXYGEN SATURATION: 100 %

## 2023-06-13 DIAGNOSIS — S83.242A TEAR OF MEDIAL MENISCUS OF LEFT KNEE, CURRENT, UNSPECIFIED TEAR TYPE, INITIAL ENCOUNTER: ICD-10-CM

## 2023-06-13 DIAGNOSIS — S83.282A TEAR OF LATERAL MENISCUS OF LEFT KNEE, CURRENT, UNSPECIFIED TEAR TYPE, INITIAL ENCOUNTER: ICD-10-CM

## 2023-06-13 DIAGNOSIS — M23.52: Chronic | ICD-10-CM

## 2023-06-13 DIAGNOSIS — S83.512A RUPTURE OF ANTERIOR CRUCIATE LIGAMENT OF LEFT KNEE, INITIAL ENCOUNTER: Primary | ICD-10-CM

## 2023-06-13 LAB
B-HCG UR QL: NEGATIVE
CTP QC/QA: YES

## 2023-06-13 PROCEDURE — 25000003 PHARM REV CODE 250: Performed by: NURSE ANESTHETIST, CERTIFIED REGISTERED

## 2023-06-13 PROCEDURE — 37000009 HC ANESTHESIA EA ADD 15 MINS: Performed by: ORTHOPAEDIC SURGERY

## 2023-06-13 PROCEDURE — 99900035 HC TECH TIME PER 15 MIN (STAT)

## 2023-06-13 PROCEDURE — 63600175 PHARM REV CODE 636 W HCPCS: Performed by: ANESTHESIOLOGY

## 2023-06-13 PROCEDURE — 64448 LEFT ADDUCTOR CANAL CATHETER: ICD-10-PCS | Mod: 59,LT,, | Performed by: ANESTHESIOLOGY

## 2023-06-13 PROCEDURE — 81025 URINE PREGNANCY TEST: CPT | Performed by: ORTHOPAEDIC SURGERY

## 2023-06-13 PROCEDURE — 71000033 HC RECOVERY, INTIAL HOUR: Performed by: ORTHOPAEDIC SURGERY

## 2023-06-13 PROCEDURE — 94761 N-INVAS EAR/PLS OXIMETRY MLT: CPT

## 2023-06-13 PROCEDURE — 71000015 HC POSTOP RECOV 1ST HR: Performed by: ORTHOPAEDIC SURGERY

## 2023-06-13 PROCEDURE — 27405 REPAIR OF KNEE LIGAMENT: CPT | Mod: 51,LT,, | Performed by: ORTHOPAEDIC SURGERY

## 2023-06-13 PROCEDURE — 29888 ARTHRS AID ACL RPR/AGMNTJ: CPT | Mod: AS,LT,, | Performed by: PHYSICIAN ASSISTANT

## 2023-06-13 PROCEDURE — D9220A PRA ANESTHESIA: ICD-10-PCS | Mod: ANES,,, | Performed by: STUDENT IN AN ORGANIZED HEALTH CARE EDUCATION/TRAINING PROGRAM

## 2023-06-13 PROCEDURE — D9220A PRA ANESTHESIA: Mod: CRNA,,, | Performed by: NURSE ANESTHETIST, CERTIFIED REGISTERED

## 2023-06-13 PROCEDURE — C1713 ANCHOR/SCREW BN/BN,TIS/BN: HCPCS | Performed by: ORTHOPAEDIC SURGERY

## 2023-06-13 PROCEDURE — 29888 PR KNEE SCOPE,AID ANT CRUCIATE REPAIR: ICD-10-PCS | Mod: LT,,, | Performed by: ORTHOPAEDIC SURGERY

## 2023-06-13 PROCEDURE — 63600175 PHARM REV CODE 636 W HCPCS: Performed by: STUDENT IN AN ORGANIZED HEALTH CARE EDUCATION/TRAINING PROGRAM

## 2023-06-13 PROCEDURE — 63600175 PHARM REV CODE 636 W HCPCS: Performed by: ORTHOPAEDIC SURGERY

## 2023-06-13 PROCEDURE — 63600175 PHARM REV CODE 636 W HCPCS: Performed by: NURSE ANESTHETIST, CERTIFIED REGISTERED

## 2023-06-13 PROCEDURE — 29888 ARTHRS AID ACL RPR/AGMNTJ: CPT | Mod: LT,,, | Performed by: ORTHOPAEDIC SURGERY

## 2023-06-13 PROCEDURE — 29883 PR KNEE SCOPE,MED+LAT MENIS REPAIR: ICD-10-PCS | Mod: 51,LT,, | Performed by: ORTHOPAEDIC SURGERY

## 2023-06-13 PROCEDURE — 25000003 PHARM REV CODE 250: Performed by: ORTHOPAEDIC SURGERY

## 2023-06-13 PROCEDURE — D9220A PRA ANESTHESIA: ICD-10-PCS | Mod: CRNA,,, | Performed by: NURSE ANESTHETIST, CERTIFIED REGISTERED

## 2023-06-13 PROCEDURE — 29888 PR KNEE SCOPE,AID ANT CRUCIATE REPAIR: ICD-10-PCS | Mod: AS,LT,, | Performed by: PHYSICIAN ASSISTANT

## 2023-06-13 PROCEDURE — 27201423 OPTIME MED/SURG SUP & DEVICES STERILE SUPPLY: Performed by: ORTHOPAEDIC SURGERY

## 2023-06-13 PROCEDURE — 64448 NJX AA&/STRD FEM NRV NFS IMG: CPT | Mod: 59,LT,, | Performed by: ANESTHESIOLOGY

## 2023-06-13 PROCEDURE — 29883 ARTHRS KNE SRG MNISC RPR M&L: CPT | Mod: 51,LT,, | Performed by: ORTHOPAEDIC SURGERY

## 2023-06-13 PROCEDURE — 37000008 HC ANESTHESIA 1ST 15 MINUTES: Performed by: ORTHOPAEDIC SURGERY

## 2023-06-13 PROCEDURE — 27405 PR REPAIR COLLAT LIGAMT/CAPSULE,KNEE: ICD-10-PCS | Mod: 51,LT,, | Performed by: ORTHOPAEDIC SURGERY

## 2023-06-13 PROCEDURE — D9220A PRA ANESTHESIA: Mod: ANES,,, | Performed by: STUDENT IN AN ORGANIZED HEALTH CARE EDUCATION/TRAINING PROGRAM

## 2023-06-13 PROCEDURE — 64448 NJX AA&/STRD FEM NRV NFS IMG: CPT | Performed by: STUDENT IN AN ORGANIZED HEALTH CARE EDUCATION/TRAINING PROGRAM

## 2023-06-13 PROCEDURE — 36000710: Performed by: ORTHOPAEDIC SURGERY

## 2023-06-13 PROCEDURE — 25000003 PHARM REV CODE 250: Performed by: STUDENT IN AN ORGANIZED HEALTH CARE EDUCATION/TRAINING PROGRAM

## 2023-06-13 PROCEDURE — 36000711: Performed by: ORTHOPAEDIC SURGERY

## 2023-06-13 PROCEDURE — 25000003 PHARM REV CODE 250: Performed by: ANESTHESIOLOGY

## 2023-06-13 PROCEDURE — C1889 IMPLANT/INSERT DEVICE, NOC: HCPCS | Performed by: ORTHOPAEDIC SURGERY

## 2023-06-13 PROCEDURE — C1769 GUIDE WIRE: HCPCS | Performed by: ORTHOPAEDIC SURGERY

## 2023-06-13 DEVICE — IMPLANTABLE DEVICE: Type: IMPLANTABLE DEVICE | Site: KNEE | Status: FUNCTIONAL

## 2023-06-13 DEVICE — DEVICE FIX TIGHTROPE FIBERTAG: Type: IMPLANTABLE DEVICE | Site: KNEE | Status: FUNCTIONAL

## 2023-06-13 DEVICE — IMP ACL FIBER TAG TIGHTROPE: Type: IMPLANTABLE DEVICE | Site: KNEE | Status: FUNCTIONAL

## 2023-06-13 DEVICE — SYS NDL DELIVERY FAST FIX 360: Type: IMPLANTABLE DEVICE | Site: KNEE | Status: FUNCTIONAL

## 2023-06-13 DEVICE — CARTRIDGE NOVOSTITCH PLUS 2-0: Type: IMPLANTABLE DEVICE | Site: KNEE | Status: FUNCTIONAL

## 2023-06-13 DEVICE — SYS NOVOSTITCH PRO MENIS 2-0: Type: IMPLANTABLE DEVICE | Site: KNEE | Status: FUNCTIONAL

## 2023-06-13 DEVICE — IMP SEC FIX PEEK SWIVEL LOCK: Type: IMPLANTABLE DEVICE | Site: KNEE | Status: FUNCTIONAL

## 2023-06-13 DEVICE — KIT FIBERTAK ANCHR DRILL 1.9MM: Type: IMPLANTABLE DEVICE | Site: KNEE | Status: FUNCTIONAL

## 2023-06-13 RX ORDER — DIAZEPAM 5 MG/1
5 TABLET ORAL EVERY 6 HOURS PRN
Status: DISCONTINUED | OUTPATIENT
Start: 2023-06-13 | End: 2023-06-13 | Stop reason: HOSPADM

## 2023-06-13 RX ORDER — SODIUM CHLORIDE 0.9 % (FLUSH) 0.9 %
3 SYRINGE (ML) INJECTION
Status: DISCONTINUED | OUTPATIENT
Start: 2023-06-13 | End: 2023-06-13 | Stop reason: HOSPADM

## 2023-06-13 RX ORDER — CEFAZOLIN SODIUM 1 G/3ML
INJECTION, POWDER, FOR SOLUTION INTRAMUSCULAR; INTRAVENOUS
Status: DISCONTINUED | OUTPATIENT
Start: 2023-06-13 | End: 2023-06-13

## 2023-06-13 RX ORDER — OXYCODONE HYDROCHLORIDE 5 MG/1
10 TABLET ORAL EVERY 4 HOURS PRN
Status: DISCONTINUED | OUTPATIENT
Start: 2023-06-13 | End: 2023-06-13 | Stop reason: HOSPADM

## 2023-06-13 RX ORDER — KETAMINE HCL IN 0.9 % NACL 50 MG/5 ML
SYRINGE (ML) INTRAVENOUS
Status: DISCONTINUED | OUTPATIENT
Start: 2023-06-13 | End: 2023-06-13

## 2023-06-13 RX ORDER — BACITRACIN ZINC 500 UNIT/G
OINTMENT (GRAM) TOPICAL
Status: DISCONTINUED | OUTPATIENT
Start: 2023-06-13 | End: 2023-06-13 | Stop reason: HOSPADM

## 2023-06-13 RX ORDER — DEXMEDETOMIDINE HYDROCHLORIDE 100 UG/ML
INJECTION, SOLUTION INTRAVENOUS
Status: DISCONTINUED | OUTPATIENT
Start: 2023-06-13 | End: 2023-06-13

## 2023-06-13 RX ORDER — DEXAMETHASONE SODIUM PHOSPHATE 4 MG/ML
INJECTION, SOLUTION INTRA-ARTICULAR; INTRALESIONAL; INTRAMUSCULAR; INTRAVENOUS; SOFT TISSUE
Status: DISCONTINUED | OUTPATIENT
Start: 2023-06-13 | End: 2023-06-13

## 2023-06-13 RX ORDER — FENTANYL CITRATE 50 UG/ML
25-200 INJECTION, SOLUTION INTRAMUSCULAR; INTRAVENOUS
Status: DISCONTINUED | OUTPATIENT
Start: 2023-06-13 | End: 2023-06-13 | Stop reason: HOSPADM

## 2023-06-13 RX ORDER — MIDAZOLAM HYDROCHLORIDE 1 MG/ML
.5-4 INJECTION INTRAMUSCULAR; INTRAVENOUS
Status: DISCONTINUED | OUTPATIENT
Start: 2023-06-13 | End: 2023-06-13 | Stop reason: HOSPADM

## 2023-06-13 RX ORDER — ACETAMINOPHEN 500 MG
1000 TABLET ORAL
Status: COMPLETED | OUTPATIENT
Start: 2023-06-13 | End: 2023-06-13

## 2023-06-13 RX ORDER — NEOSTIGMINE METHYLSULFATE 0.5 MG/ML
INJECTION, SOLUTION INTRAVENOUS
Status: DISCONTINUED | OUTPATIENT
Start: 2023-06-13 | End: 2023-06-13

## 2023-06-13 RX ORDER — SODIUM CHLORIDE 9 MG/ML
INJECTION, SOLUTION INTRAVENOUS CONTINUOUS
Status: DISCONTINUED | OUTPATIENT
Start: 2023-06-13 | End: 2023-06-13 | Stop reason: HOSPADM

## 2023-06-13 RX ORDER — ONDANSETRON 2 MG/ML
4 INJECTION INTRAMUSCULAR; INTRAVENOUS ONCE AS NEEDED
Status: DISCONTINUED | OUTPATIENT
Start: 2023-06-13 | End: 2023-06-13 | Stop reason: HOSPADM

## 2023-06-13 RX ORDER — ONDANSETRON 2 MG/ML
INJECTION INTRAMUSCULAR; INTRAVENOUS
Status: DISCONTINUED | OUTPATIENT
Start: 2023-06-13 | End: 2023-06-13

## 2023-06-13 RX ORDER — MIDAZOLAM HYDROCHLORIDE 1 MG/ML
INJECTION INTRAMUSCULAR; INTRAVENOUS
Status: DISCONTINUED | OUTPATIENT
Start: 2023-06-13 | End: 2023-06-13

## 2023-06-13 RX ORDER — ESMOLOL HYDROCHLORIDE 10 MG/ML
INJECTION INTRAVENOUS
Status: DISCONTINUED | OUTPATIENT
Start: 2023-06-13 | End: 2023-06-13

## 2023-06-13 RX ORDER — FENTANYL CITRATE 50 UG/ML
INJECTION, SOLUTION INTRAMUSCULAR; INTRAVENOUS
Status: DISCONTINUED | OUTPATIENT
Start: 2023-06-13 | End: 2023-06-13

## 2023-06-13 RX ORDER — HYDROMORPHONE HYDROCHLORIDE 1 MG/ML
0.2 INJECTION, SOLUTION INTRAMUSCULAR; INTRAVENOUS; SUBCUTANEOUS EVERY 5 MIN PRN
Status: DISCONTINUED | OUTPATIENT
Start: 2023-06-13 | End: 2023-06-13 | Stop reason: HOSPADM

## 2023-06-13 RX ORDER — PROPOFOL 10 MG/ML
VIAL (ML) INTRAVENOUS
Status: DISCONTINUED | OUTPATIENT
Start: 2023-06-13 | End: 2023-06-13

## 2023-06-13 RX ORDER — LIDOCAINE HYDROCHLORIDE 10 MG/ML
INJECTION, SOLUTION INTRAVENOUS
Status: DISCONTINUED | OUTPATIENT
Start: 2023-06-13 | End: 2023-06-13

## 2023-06-13 RX ORDER — FAMOTIDINE 10 MG/ML
INJECTION INTRAVENOUS
Status: DISCONTINUED | OUTPATIENT
Start: 2023-06-13 | End: 2023-06-13

## 2023-06-13 RX ORDER — ROPIVACAINE HYDROCHLORIDE 5 MG/ML
INJECTION, SOLUTION EPIDURAL; INFILTRATION; PERINEURAL
Status: COMPLETED | OUTPATIENT
Start: 2023-06-13 | End: 2023-06-13

## 2023-06-13 RX ORDER — ROCURONIUM BROMIDE 10 MG/ML
INJECTION, SOLUTION INTRAVENOUS
Status: DISCONTINUED | OUTPATIENT
Start: 2023-06-13 | End: 2023-06-13

## 2023-06-13 RX ORDER — METHOCARBAMOL 500 MG/1
1000 TABLET, FILM COATED ORAL ONCE AS NEEDED
Status: COMPLETED | OUTPATIENT
Start: 2023-06-13 | End: 2023-06-13

## 2023-06-13 RX ORDER — HYDROMORPHONE HYDROCHLORIDE 2 MG/ML
INJECTION, SOLUTION INTRAMUSCULAR; INTRAVENOUS; SUBCUTANEOUS
Status: DISCONTINUED | OUTPATIENT
Start: 2023-06-13 | End: 2023-06-13

## 2023-06-13 RX ORDER — MUPIROCIN 20 MG/G
OINTMENT TOPICAL
Status: DISCONTINUED | OUTPATIENT
Start: 2023-06-13 | End: 2023-06-13 | Stop reason: HOSPADM

## 2023-06-13 RX ORDER — KETOROLAC TROMETHAMINE 30 MG/ML
30 INJECTION, SOLUTION INTRAMUSCULAR; INTRAVENOUS ONCE
Status: COMPLETED | OUTPATIENT
Start: 2023-06-13 | End: 2023-06-13

## 2023-06-13 RX ORDER — EPINEPHRINE 1 MG/ML
INJECTION, SOLUTION, CONCENTRATE INTRAVENOUS
Status: DISCONTINUED | OUTPATIENT
Start: 2023-06-13 | End: 2023-06-13 | Stop reason: HOSPADM

## 2023-06-13 RX ORDER — ROPIVACAINE HYDROCHLORIDE 2 MG/ML
INJECTION, SOLUTION EPIDURAL; INFILTRATION; PERINEURAL CONTINUOUS
Status: DISCONTINUED | OUTPATIENT
Start: 2023-06-13 | End: 2023-06-13 | Stop reason: HOSPADM

## 2023-06-13 RX ADMIN — HYDROMORPHONE HYDROCHLORIDE 0.4 MG: 2 INJECTION, SOLUTION INTRAMUSCULAR; INTRAVENOUS; SUBCUTANEOUS at 03:06

## 2023-06-13 RX ADMIN — CEFAZOLIN 2 G: 330 INJECTION, POWDER, FOR SOLUTION INTRAMUSCULAR; INTRAVENOUS at 01:06

## 2023-06-13 RX ADMIN — LIDOCAINE HYDROCHLORIDE 100 MG: 10 INJECTION, SOLUTION INTRAVENOUS at 01:06

## 2023-06-13 RX ADMIN — FENTANYL CITRATE 100 MCG: 50 INJECTION, SOLUTION INTRAMUSCULAR; INTRAVENOUS at 11:06

## 2023-06-13 RX ADMIN — HYDROMORPHONE HYDROCHLORIDE 0.4 MG: 2 INJECTION, SOLUTION INTRAMUSCULAR; INTRAVENOUS; SUBCUTANEOUS at 02:06

## 2023-06-13 RX ADMIN — PROPOFOL 150 MG: 10 INJECTION, EMULSION INTRAVENOUS at 01:06

## 2023-06-13 RX ADMIN — FENTANYL CITRATE 100 MCG: 50 INJECTION, SOLUTION INTRAMUSCULAR; INTRAVENOUS at 01:06

## 2023-06-13 RX ADMIN — ROPIVACAINE HYDROCHLORIDE 10 ML: 5 INJECTION EPIDURAL; INFILTRATION; PERINEURAL at 12:06

## 2023-06-13 RX ADMIN — SODIUM CHLORIDE: 0.9 INJECTION, SOLUTION INTRAVENOUS at 10:06

## 2023-06-13 RX ADMIN — DEXAMETHASONE SODIUM PHOSPHATE 8 MG: 4 INJECTION, SOLUTION INTRAMUSCULAR; INTRAVENOUS at 01:06

## 2023-06-13 RX ADMIN — DIAZEPAM 5 MG: 5 TABLET ORAL at 04:06

## 2023-06-13 RX ADMIN — SODIUM CHLORIDE, SODIUM GLUCONATE, SODIUM ACETATE, POTASSIUM CHLORIDE, MAGNESIUM CHLORIDE, SODIUM PHOSPHATE, DIBASIC, AND POTASSIUM PHOSPHATE: .53; .5; .37; .037; .03; .012; .00082 INJECTION, SOLUTION INTRAVENOUS at 03:06

## 2023-06-13 RX ADMIN — Medication 30 MG: at 01:06

## 2023-06-13 RX ADMIN — HYDROMORPHONE HYDROCHLORIDE 0.2 MG: 2 INJECTION, SOLUTION INTRAMUSCULAR; INTRAVENOUS; SUBCUTANEOUS at 02:06

## 2023-06-13 RX ADMIN — PROPOFOL 40 MG: 10 INJECTION, EMULSION INTRAVENOUS at 01:06

## 2023-06-13 RX ADMIN — MIDAZOLAM HYDROCHLORIDE 2 MG: 2 INJECTION, SOLUTION INTRAMUSCULAR; INTRAVENOUS at 11:06

## 2023-06-13 RX ADMIN — SODIUM CHLORIDE, SODIUM GLUCONATE, SODIUM ACETATE, POTASSIUM CHLORIDE, MAGNESIUM CHLORIDE, SODIUM PHOSPHATE, DIBASIC, AND POTASSIUM PHOSPHATE: .53; .5; .37; .037; .03; .012; .00082 INJECTION, SOLUTION INTRAVENOUS at 01:06

## 2023-06-13 RX ADMIN — OXYCODONE HYDROCHLORIDE 5 MG: 5 TABLET ORAL at 04:06

## 2023-06-13 RX ADMIN — Medication 10 MG: at 03:06

## 2023-06-13 RX ADMIN — FAMOTIDINE 20 MG: 10 INJECTION, SOLUTION INTRAVENOUS at 01:06

## 2023-06-13 RX ADMIN — DEXMEDETOMIDINE HYDROCHLORIDE 20 MCG: 100 INJECTION, SOLUTION INTRAVENOUS at 01:06

## 2023-06-13 RX ADMIN — Medication: at 04:06

## 2023-06-13 RX ADMIN — SODIUM CHLORIDE: 9 INJECTION, SOLUTION INTRAVENOUS at 12:06

## 2023-06-13 RX ADMIN — ACETAMINOPHEN 1000 MG: 500 TABLET ORAL at 10:06

## 2023-06-13 RX ADMIN — KETOROLAC TROMETHAMINE 30 MG: 30 INJECTION, SOLUTION INTRAMUSCULAR; INTRAVENOUS at 04:06

## 2023-06-13 RX ADMIN — ESMOLOL HYDROCHLORIDE 30 MG: 100 INJECTION, SOLUTION INTRAVENOUS at 02:06

## 2023-06-13 RX ADMIN — FENTANYL CITRATE 50 MCG: 50 INJECTION, SOLUTION INTRAMUSCULAR; INTRAVENOUS at 02:06

## 2023-06-13 RX ADMIN — SUGAMMADEX 200 MG: 100 INJECTION, SOLUTION INTRAVENOUS at 04:06

## 2023-06-13 RX ADMIN — NEOSTIGMINE METHYLSULFATE 4 MG: 0.5 INJECTION INTRAVENOUS at 03:06

## 2023-06-13 RX ADMIN — DEXMEDETOMIDINE HYDROCHLORIDE 8 MCG: 100 INJECTION, SOLUTION INTRAVENOUS at 03:06

## 2023-06-13 RX ADMIN — ROCURONIUM BROMIDE 50 MG: 10 INJECTION, SOLUTION INTRAVENOUS at 01:06

## 2023-06-13 RX ADMIN — GLYCOPYRROLATE 0.4 MG: 0.2 INJECTION, SOLUTION INTRAMUSCULAR; INTRAVENOUS at 03:06

## 2023-06-13 RX ADMIN — FENTANYL CITRATE 50 MCG: 50 INJECTION, SOLUTION INTRAMUSCULAR; INTRAVENOUS at 01:06

## 2023-06-13 RX ADMIN — Medication 10 MG: at 02:06

## 2023-06-13 RX ADMIN — METHOCARBAMOL 1000 MG: 500 TABLET ORAL at 04:06

## 2023-06-13 RX ADMIN — ROCURONIUM BROMIDE 10 MG: 10 INJECTION, SOLUTION INTRAVENOUS at 01:06

## 2023-06-13 RX ADMIN — ONDANSETRON 4 MG: 2 INJECTION, SOLUTION INTRAMUSCULAR; INTRAVENOUS at 01:06

## 2023-06-13 RX ADMIN — MIDAZOLAM HYDROCHLORIDE 2 MG: 1 INJECTION, SOLUTION INTRAMUSCULAR; INTRAVENOUS at 12:06

## 2023-06-13 NOTE — TRANSFER OF CARE
"Anesthesia Transfer of Care Note    Patient: Karla Price    Procedure(s) Performed: Procedure(s) (LRB):  RECONSTRUCTION, ACL-QUAD AUTO (Left)  FIXATION, TENDON-LATERAL EXTRA ARTICULAR TENODESIS (Left)  ARTHROSCOPY,KNEE,WITH MENISCUS REPAIR (Left)    Patient location: PACU    Anesthesia Type: general    Transport from OR: Transported from OR on 6-10 L/min O2 by face mask with adequate spontaneous ventilation    Post pain: adequate analgesia    Post assessment: no apparent anesthetic complications and tolerated procedure well    Post vital signs: stable    Level of consciousness: sedated    Nausea/Vomiting: no nausea/vomiting    Complications: none    Transfer of care protocol was followed      Last vitals:   Visit Vitals  /79 (BP Location: Right arm, Patient Position: Lying)   Pulse 62   Temp 36.5 °C (97.7 °F) (Oral)   Resp 16   Ht 5' 1" (1.549 m)   Wt 95.3 kg (210 lb)   LMP 05/02/2023 (Approximate)   SpO2 99%   Breastfeeding No   BMI 39.68 kg/m²     "

## 2023-06-13 NOTE — PLAN OF CARE
Patient is AAO and VSS. Tolerating PO and states pain is tolerable. Dressing CDI.  Patient states they are ready for d/c.  IV removed. Catheter tip intact. Family at bedside. Discharge instructions reviewed and copy given to the patient and family.  uestions answered. Both verbalized understanding. Medication delivered to bedside. DME provided prior(crutches). Patient wheeled to car by Shira JIMENEZ

## 2023-06-13 NOTE — ANESTHESIA POSTPROCEDURE EVALUATION
Anesthesia Post Evaluation    Patient: Karla Price    Procedure(s) Performed: Procedure(s) (LRB):  RECONSTRUCTION, ACL-QUAD AUTO (Left)  FIXATION, TENDON-LATERAL EXTRA ARTICULAR TENODESIS (Left)  ARTHROSCOPY,KNEE,WITH MENISCUS REPAIR (Left)    Final Anesthesia Type: general      Patient location during evaluation: PACU  Patient participation: Yes- Able to Participate  Level of consciousness: awake and alert  Post-procedure vital signs: reviewed and stable  Pain management: adequate  Airway patency: patent    PONV status at discharge: No PONV  Anesthetic complications: no      Cardiovascular status: blood pressure returned to baseline  Respiratory status: unassisted  Hydration status: euvolemic  Follow-up not needed.          Vitals Value Taken Time   /83 06/13/23 1701   Temp 36.4 °C (97.6 °F) 06/13/23 1612   Pulse 98 06/13/23 1703   Resp 13 06/13/23 1703   SpO2 99 % 06/13/23 1703   Vitals shown include unvalidated device data.      Event Time   Out of Recovery 16:45:00         Pain/Joshua Score: Pain Rating Prior to Med Admin: 5 (6/13/2023  4:35 PM)  Joshua Score: 9 (6/13/2023  4:30 PM)

## 2023-06-13 NOTE — PLAN OF CARE
Preop complete. Pt resting comfortably. Mother at BS. Call light in reach. Bed in lowest position. Side rails up x2. Pt states she will need crutches for discharge

## 2023-06-13 NOTE — ANESTHESIA PREPROCEDURE EVALUATION
2023  Pre-operative evaluation for Procedure(s) (LRB):  RECONSTRUCTION, ACL-QUAD AUTO (Left)  FIXATION, TENDON-LATERAL EXTRA ARTICULAR TENODESIS (Left)  ARTHROSCOPY,KNEE,WITH MENISCUS REPAIR (Left)    Karla Price is a 22 y.o. female     Patient Active Problem List   Diagnosis    Knee stiffness, left    Muscle weakness    Left knee pain       Review of patient's allergies indicates:   Allergen Reactions    Nuts [tree nut] Hives       No current facility-administered medications on file prior to encounter.     Current Outpatient Medications on File Prior to Encounter   Medication Sig Dispense Refill    naproxen (NAPROSYN) 500 MG tablet Take 500 mg by mouth 2 (two) times daily.      benzoyl peroxide (BP WASH) 10 % external wash Use daily as wash to affected areas. For boils. Rinse completely.  May bleach clothing (Patient not taking: Reported on 2023) 227 g 12    clindamycin (CLEOCIN T) 1 % Swab Apply topically 2 (two) times daily. For boils (Patient not taking: Reported on 2023) 60 each 5    crisaborole (EUCRISA) 2 % Oint AAA bid prn for eczema.  Non-steroid.  Safe to use long-term. (Patient not taking: Reported on 2023) 60 g 3    triamcinolone acetonide 0.1% (KENALOG) 0.1 % cream AAA bid prn for eczema. Do not use on face, underarms or groin. (Patient not taking: Reported on 2023) 454 g 3       History reviewed. No pertinent surgical history.    Social History     Socioeconomic History    Marital status: Single   Tobacco Use    Smoking status: Never    Smokeless tobacco: Never   Substance and Sexual Activity    Alcohol use: No    Drug use: No    Sexual activity: Never     EKD Echo:  No results found for this or any previous visit.        Pre-op Assessment    I have reviewed the Patient Summary Reports.     I have reviewed the Nursing Notes. I have reviewed the NPO  Status.   I have reviewed the Medications.     Review of Systems  Anesthesia Hx:  Denies Family Hx of Anesthesia complications.   Denies Personal Hx of Anesthesia complications.   Cardiovascular:   Exercise tolerance: good Denies Dysrhythmias.   Denies Angina.  Denies ALONSO.    Pulmonary:   Denies COPD.  Denies Asthma.    Renal/:   Denies Chronic Renal Disease.     Hepatic/GI:   Denies GERD.    Neurological:   Denies CVA. Denies Seizures.    Endocrine:   Denies Diabetes.  Obesity / BMI > 30      Physical Exam  General: Well nourished, Cooperative, Alert and Oriented    Airway:  Mallampati: II / I  Mouth Opening: Normal  TM Distance: Normal  Tongue: Normal  Neck ROM: Normal ROM    Dental:  Intact    Chest/Lungs:  Clear to auscultation, Normal Respiratory Rate    Heart:  Rate: Normal  Rhythm: Regular Rhythm        Anesthesia Plan  Type of Anesthesia, risks & benefits discussed:    Anesthesia Type: Gen ETT, Regional  Intra-op Monitoring Plan: Standard ASA Monitors  Post Op Pain Control Plan: multimodal analgesia and IV/PO Opioids PRN  Induction:  IV  Airway Plan: Direct, Post-Induction  Informed Consent: Informed consent signed with the Patient and all parties understand the risks and agree with anesthesia plan.  All questions answered.   ASA Score: 2  Day of Surgery Review of History & Physical: H&P Update referred to the surgeon/provider.    Ready For Surgery From Anesthesia Perspective.     .

## 2023-06-13 NOTE — ANESTHESIA PROCEDURE NOTES
Left adductor canal catheter    Patient location during procedure: pre-op   Block not for primary anesthetic.  Reason for block: at surgeon's request and post-op pain management   Post-op Pain Location: Left leg pain   Start time: 6/13/2023 12:11 PM  Timeout: 6/13/2023 12:10 PM   End time: 6/13/2023 12:20 PM    Staffing  Authorizing Provider: Albert Harding MD  Performing Provider: Chucky Bruner MD    Preanesthetic Checklist  Completed: patient identified, IV checked, site marked, risks and benefits discussed, surgical consent, monitors and equipment checked, pre-op evaluation and timeout performed  Peripheral Block  Patient position: supine  Prep: ChloraPrep and site prepped and draped  Patient monitoring: heart rate, cardiac monitor, continuous pulse ox, continuous capnometry and frequent blood pressure checks  Block type: adductor canal  Laterality: left  Injection technique: continuous  Needle  Needle type: Tuohy   Needle gauge: 17 G  Needle length: 3.5 in  Needle localization: anatomical landmarks and ultrasound guidance  Catheter type: spring wound  Catheter size: 19 G  Test dose: lidocaine 1.5% with Epi 1-to-200,000 and negative   -ultrasound image captured on disc.  Assessment  Injection assessment: negative aspiration, negative parasthesia and local visualized surrounding nerve  Paresthesia pain: none  Heart rate change: no  Slow fractionated injection: yes  Pain Tolerance: comfortable throughout block and no complaints  Medications:    Medications: ropivacaine (NAROPIN) injection 0.5% - Perineural   10 mL - 6/13/2023 12:20:00 PM    Additional Notes  VSS.  [facility] RN monitoring vitals throughout procedure.  Patient tolerated procedure well.

## 2023-06-13 NOTE — OP NOTE
[facility])  Surgery Department  Operative Note    SUMMARY     Date of Procedure: 6/13/2023     Pre-Operative Diagnosis:   Left Knee Anterior Cruciate Ligament Tear S83.510  Left Knee  chronic lateral collateral ligament sprain  Left Knee Tear, Lateral meniscus, old M23.202  Left Knee Tear, Medial meniscus, old M23.205  Left Knee Loose Body M23.40      Post-Operative Diagnosis:   Left Knee Anterior Cruciate Ligament Tear S83.510  Left Knee chronic lateral collateral ligament sprain  Left Knee Tear, Lateral meniscus, old M23.202  Left Knee Tear, Medial meniscus, old M23.205  Left Knee Loose Body M23.40    Procedure:  1. Left Knee Arthroscopy, anterior cruciate ligament reconstruction 16880  2.  Left Knee open lateral extra-articular tenodesis (modified Irma procedure)  3.  Left Knee Arthroscopy, with meniscus repair (medial AND lateral) 30393  4.  Left Knee open lateral collateral ligament repair  5.  Left Knee  arthroscopic loose body removal      Surgeon(s) and Role:     * Cecilio Payton MD - Primary    Assistant: Bernabe Allen PA-C    No resident or fellow was available throughout the entire procedure as a result it was medically necessary for Bernabe Allen PA-C to perform first assist duties.    Anesthesia: General    Complications: None    Tourniquet: 120 minutes    Implants:   Implant Name Type Inv. Item Serial No.  Lot No. LRB No. Used Action   ENDOBUTTON 1.2 CL ULTRA DEVICE - [id number]  ENDOBUTTON 1.2 CL ULTRA DEVICE  [employer]  Left 1 Implanted and Explanted   ENDOBUTTON DRILLTIP [id number] - [id number]  ENDOBUTTON DRILLTIP [id number]  [employer] [id number] Left 1 Implanted and Explanted   GUIDEWIRE 1.2MM X 18 STER - [id number]  GUIDEWIRE 1.2MM X 18 STER  [employer] [id number] Left 1 Implanted and Explanted   IMP ACL FIBER TAG TIGHTROPE - [id number]  IMP ACL FIBER TAG TIGHTROPE  [employer] [id number] Left 1 Implanted   DEVICE FIX TIGHTROPE FIBERTAG - [id number]   DEVICE FIX TIGHTROPE FIBERTAG  [employer] [id number] Left 1 Implanted   DEVICE FIX TIGHTROPE FIBERTAG - [id number]  DEVICE FIX TIGHTROPE FIBERTAG  [employer] [id number] Left 1 Implanted   SYS NDL DELIVERY FAST  - [id number]  SYS NDL DELIVERY FAST   [employer] [id number] Left 1 Implanted   SYS NOVOSTITCH PRO MENIS 2-0 - [id number]  SYS NOVOSTITCH PRO MENIS 2-0  [employer] [id number] Left 1 Implanted   CARTRIDGE NOVOSTITCH PLUS 2-0 - [id number]  CARTRIDGE NOVOSTITCH PLUS 2-0  [employer] [id number] Left 1 Implanted   4mm drill pin, ACL tightrope    ARTHREX [id number] Left 1 Implanted   BIORCI-HA screw [id number]    [employer] [id number] Left 1 Implanted   IMP SEC FIX PEEK SWIVEL LOCK - [id number]  IMP SEC FIX PEEK SWIVEL LOCK  [employer] [id number] Left 1 Implanted   self-punching knotless 2.6 fibertak anchor    [employer] [id number] Left 1 Implanted   KIT FIBERTAK ANCHR DRILL 1.9MM - [id number]  KIT FIBERTAK ANCHR DRILL 1.9MM  [employer] [id number] Left 1 Implanted   PEEK swivelock suture anchor double loaded 4.75 x 22mm     [id number] Left 1 Implanted       Arthroscopic Findings:   Patellofemoral Compartment  Medial Patella Cartilage: Intact  Central Patella Cartilage:Intact  Lateral Patella Cartilage: Intact  Trochlea Cartilage:Intact  Plica:  None  Medial Gutter: Clear of loose bodies or debris  Lateral Gutter:Clear of loose bodies or debris  Overall lateral tracking of the patella with chronic incompetency of the medial patellofemoral ligament    Ligaments  ACL chronically torn ACL with only a remnant stump at the tibial insertion  PCL:Intact    Medial Compartment:  Femoral Cartilage: Intact  Tibial Cartilage:Intact  Meniscus:  Vertical tear posterior horn medial meniscus involving the red-white zone for proximally 1.5 cm    Lateral Compartment:  Femoral Cartilage: Intact  Tibial Cartilage:Intact  Meniscus:  Vertical tear involving the posterior horn of the lateral meniscus from the near root attachment extending to  the popliteal hiatus involving the red-white zone.    Exam Under Anesthesia:  3+ pivot shift, 2B Lachman's, positive anterior drawer, negative posterior drawer.  Stable to varus and valgus stress at 0 and at 30°.  Mildly increased laxity at 30° compared to the right contralateral lower extremity.  Negative dial test.    Indication for Procedure: 22 y.o. Female with a history of left knee pain who is an . She states she as walking with her friends and her knee buckled on her. This is not the first occurrence. In 2016 she fell for the first time while a dog was chasing her. She states she has felt unstable ever since. She has been unable to play sports because of her knee and instability. She had another fall after the fall in 2016 and was put on crutches. She presents to clinic today in a brace that she has been wearing for a few weeks.  She states she is tired of her knee giving out.  She states instability as her primary complaint versus pain.  She states she does not think anything new has occurred since 2016 and is confident that the injury was back in 2016.  She is just never been formally treated or had any further imaging prior to her visit with me.  Her history, physical and imaging were consistent with appeared to be a chronic ACL rupture, medial and lateral meniscus tear, possible laxity to the posterolateral corner and possible evidence of patellofemoral instability.  She was significantly hyper lax with beighton score of 8/9.  We discussed the complexity of her knee problem and discussed the risks, benefits and alternatives of surgical intervention.  She elected proceed with surgery .       Surgery in Detail:  The patient was marked and identified in the holding area room.  She was brought back to the operating room placed in supine position.  After general endotracheal anesthesia was administered his .  lower extremity was prepped and draped in usual fashion for an ACL  reconstruction.  Preoperative antibiotics were given within 1 hour of the procedure.  A time-out was taken prior starting.      Using a small midline incision at the superior pole of the patella incision was carried down through the subcutaneous tissue.  Prepatellar fat was excised.  We marked a 10 mm width using our quad probe.  Full-thickness incisions were made down to the capsule.  Quad probe was then used to harvest a 75 mm near full-thickness quad tendon harvest.  This was taken to the back table and prepared with the Arthrex quad tendon system.  Quad tendon harvest site was closed with 0 Vicryl sutures.    A diagnostic arthroscopy was completed with the above-stated findings.  We then brought in our Asim and debrided the stump of the ACL.  A notchplasty was completed in usual fashion.  We moved to the medial compartment and identified the vertical tear in the medial meniscus.  A gold handle rasp was brought in to prepare the area.  We then placed 1 fast fix suture in a vertical mattress fashion.    We then moved to the lateral compartment and brought in a gold handle rasp to debride the area of the vertical tear of the lateral meniscus.  We also used needle trephination around the periphery for better biologic healing. We placed 2 vertical sutures around the tear site using a Riana stitch.  We had good compression of the tear site.      A loose body was encountered underneath the posterolateral horn of the meniscus near the popliteal hiatus.  The loose body was removed via the anterolateral portal by extending the lateral portal a few mm to get the approximately 6 mm loose body out.    We then placed our tibial guide and reamed our tibial tunnel with a 10 mm Reamer.  We then freehanded femoral tunnel with a 10 mm Reamer in a curved fashion.  The graft was then passed with assistance of a Beath pin.  It was then secured on the femoral side with a tight rope button.  At this point we had a good tug test with  great stability of the graft.  The knee was cycled multiple times to make sure there was no impingement on the lateral wall or the roof.  The knee was able to be brought to full passive extension without any impingement of the graft.  We then secured the graft on the tibial side with an 10 x 25 mm BioComposite interference screw with reverse Lachman.  We also backed up the fixation using a 4.75 mm peek SwiveLock anchor at the anterior medial face of the tibia.  This was done in approximately 20-30 degrees of knee flexion.      We made an open incision on the lateral aspect of the knee.  Incision was carried down through the subcutaneous tissue.  The iliotibial band was identified and a 10 mm graft knife was used to harvest a 10 mm strip of the iliotibial band from Gerdy's tubercle extending proximally for 10 cm.  We then dissected out the lateral collateral ligament and found it to be incompetent but intact.  It was stretched out.  At this point we decided to place a 4.75 peek SwiveLock anchor at the lateral epicondyle origin and ran a Krackow suture along the lateral collateral ligament to help imbricate and take the redundancy out of the ligament.  This was then secured at the lateral epicondyle and proximally 30° of knee flexion with varus stress.  We then secured with the same anchor are iliotibial band to assist in rotational control in addition to protect her ACL considering her significant preoperative hyperlaxity.    The deep layers were then closed with 0 Vicryl suture.  The subcutaneous layer was closed with 2-0 Vicryl suture and the skin was closed with a running 3-0 Monocryl suture. Adaptic bacitracin 4 x 4 Steri-Strips were then placed to dress the wound.  She was then placed in a knee brace locked in extension and extubated taken recovery.    Post-Op Plan:  ACL reconstruction with quad tendon autograft and medial and lateral meniscal repairs.  Toe-touch weight-bearing for 2-4 weeks followed by partial  and full weight-bearing by 6 weeks.  She will require a hinged brace for 6 weeks.  We will start a CPM with passive motion immediately.    Attestation: I was present and scrubbed for the entire procedure.

## 2023-06-13 NOTE — PATIENT INSTRUCTIONS
POST-OPERATIVE DISCHARGE INSTRUCTIONS    Diet: Ice chips, clear liquids, and then diet as tolerated.  Drink plenty of liquids after surgery.  Ice the area at least three times a day (20 minutes per session) if possible.    Elevate the extremity above the level of the heart to help reduce swelling.  Remain non-weightbearing with left leg in brace locked in extension until further notice.  Pain medication can be taken every four to six hours as needed.  It is helpful to take pain medication prior to physical therapy. If pain is not controlled, please take 800 mg ibuprofen every 8 hours as needed unless contraindicated (NSAID allergy, kidney disease, heart disease, currently taking blood thinners, etc.).  Any activity that requires precise thinking or accuracy should be avoided for a minimum of 72 hours after surgery and while on narcotic pain medication.  This includes operating machinery and/or driving a vehicle.  All sutures will be removed approximately 10-14 days from the time of surgery. Leave steri-strips (skin tapes) in place until sutures are removed.  If skin glue is used instead of stitches, do not apply any ointments or solutions to the incision.  Keep the incision dry.  The skin glue will peel off in 3-4 weeks.  Dressing change directions, unless otherwise instructed:   ?  Anterior Cruciate Ligament: Change dressing on post op day #3 and then daily thereafter. Use gauze with an ACE wrap, and then the brace.    All casts, splints, braces, slings, crutches, abduction pillows, etc. are to be worn as instructed.  Franklin Hose or Sequential Compression Devices are often used following surgery to help with swelling and prevent blood clots.  They can be removed for 2-3 hours daily as needed.  If the hose becomes uncomfortable after a few days, switch to an Ace Wrap if desired.  Keep the incision dry for 10-14 days.  A waterproof dressing ([facility] or [facility]) can be used to shower.  No bath, pool, or hot tub until  instructed.  You should notify our office if you notice any of the following:  A temperature greater than 101 F  Severe or increasing pain  Excessive drainage or redness of the incision  Calf swelling and pain  Chest pain or shortness of breath

## 2023-06-13 NOTE — BRIEF OP NOTE
[facility]  Brief Operative Note    Surgery Date: 6/13/2023     Surgeon(s) and Role:     * Cecilio Payton MD - Primary    Assisting Provider:     * Bernabe Allen PA-C - First Assist    No resident or fellow was available throughout the entire procedure as a result it was medically necessary for Bernabe Allen PA-C to perform first assistant duties.    Pre-Operative Diagnosis: Rupture of anterior cruciate ligament of left knee, initial encounter [S83.512A]    Post-Operative Diagnosis: Post-Op Diagnosis Codes:     * Rupture of anterior cruciate ligament of left knee, initial encounter [S83.512A]    Procedure(s) (LRB):  RECONSTRUCTION, ACL-QUAD AUTO (Left)  FIXATION, TENDON-LATERAL EXTRA ARTICULAR TENODESIS (Left)  ARTHROSCOPY,KNEE,WITH MENISCUS REPAIR (Left)    Anesthesia: General    Operative Findings: See Op note.    Estimated Blood Loss: * No values recorded between 6/13/2023  1:17 PM and 6/13/2023  3:55 PM *         Specimens:   Specimen (24h ago, onward)      None              Discharge Note    OUTCOME: Patient tolerated treatment/procedure well without complication and is now ready for discharge.    DISPOSITION: Home or Self Care    FINAL DIAGNOSIS:  Post-Op Diagnosis Codes:     * Rupture of anterior cruciate ligament of left knee, initial encounter [S83.512A]    FOLLOWUP: In clinic    DISCHARGE INSTRUCTIONS: See attached.

## 2023-06-13 NOTE — ANESTHESIA PROCEDURE NOTES
Intubation    Date/Time: 6/13/2023 1:02 PM  Performed by: Magdalena Thomas CRNA  Authorized by: Julia Bryant MD     Intubation:     Induction:  Intravenous    Intubated:  Postinduction    Mask Ventilation:  Easy mask    Attempts:  1    Attempted By:  CRNA    Method of Intubation:  Video laryngoscopy    Blade:  Hoskins 3    Laryngeal View Grade: Grade I - full view of cords      Difficult Airway Encountered?: No      Complications:  None    Airway Device:  Oral endotracheal tube    Airway Device Size:  7.0    Style/Cuff Inflation:  Cuffed    Inflation Amount (mL):  6    Tube secured:  21    Secured at:  The lips    Placement Verified By:  Capnometry    Complicating Factors:  None    Findings Post-Intubation:  BS equal bilateral

## 2023-06-14 NOTE — ANESTHESIA POST-OP PAIN MANAGEMENT
"Acute Pain Service Progress Note    6/14/2023 1346    Patient contacted regarding [facility] infusion follow up. Reports that pain has been well controlled with the infusion pump. Denies signs of local anesthetic toxicity. PNC dressing remains clean, dry, and intact. All questions answered. Reminded patient that the infusion should be discontinued and PNC removed whenever the "infusion complete" alert is seen on the display screen or by POD 5 (6/18/23) at 12pm, whichever comes first. Encouraged patient to call the [facility] 24/7 support line at [phone] or the [facility] line at [phone] for any [facility] related questions/issues. Verbalizes understanding. Will continue to follow up.        "

## 2023-06-15 ENCOUNTER — TELEPHONE (OUTPATIENT)
Dept: SPORTS MEDICINE | Facility: CLINIC | Age: 23
End: 2023-06-15
Payer: COMMERCIAL

## 2023-06-15 ENCOUNTER — CLINICAL SUPPORT (OUTPATIENT)
Dept: REHABILITATION | Facility: HOSPITAL | Age: 23
End: 2023-06-15
Payer: COMMERCIAL

## 2023-06-15 DIAGNOSIS — Z98.890 S/P ACL RECONSTRUCTION: Primary | ICD-10-CM

## 2023-06-15 DIAGNOSIS — M25.562 ACUTE PAIN OF LEFT KNEE: ICD-10-CM

## 2023-06-15 DIAGNOSIS — M25.662 KNEE STIFFNESS, LEFT: Primary | ICD-10-CM

## 2023-06-15 DIAGNOSIS — S83.512A RUPTURE OF ANTERIOR CRUCIATE LIGAMENT OF LEFT KNEE, INITIAL ENCOUNTER: ICD-10-CM

## 2023-06-15 DIAGNOSIS — S83.242A TEAR OF MEDIAL MENISCUS OF LEFT KNEE, CURRENT, UNSPECIFIED TEAR TYPE, INITIAL ENCOUNTER: ICD-10-CM

## 2023-06-15 DIAGNOSIS — S83.282A TEAR OF LATERAL MENISCUS OF LEFT KNEE, CURRENT, UNSPECIFIED TEAR TYPE, INITIAL ENCOUNTER: ICD-10-CM

## 2023-06-15 PROCEDURE — 97161 PT EVAL LOW COMPLEX 20 MIN: CPT | Mod: PN

## 2023-06-15 PROCEDURE — 97112 NEUROMUSCULAR REEDUCATION: CPT | Mod: PN

## 2023-06-15 NOTE — PLAN OF CARE
[facility]   Physical Therapy Initial Evaluation      Name: Karla Price  [facility] Number: [id number]    Therapy Diagnosis:   Encounter Diagnoses   Name Primary?    Rupture of anterior cruciate ligament of left knee, initial encounter     Tear of medial meniscus of left knee, current, unspecified tear type, initial encounter     Tear of lateral meniscus of left knee, current, unspecified tear type, initial encounter     Acute pain of left knee     Knee stiffness, left Yes        Physician: Camila Holcomb MD    Physician Orders: PT Eval and Treat  Medical Diagnosis from Referral:   S83.512A (ICD-10-CM) - Rupture of anterior cruciate ligament of left knee, initial encounter   S83.242A (ICD-10-CM) - Tear of medial meniscus of left knee, current, unspecified tear type, initial encounter   S83.282A (ICD-10-CM) - Tear of lateral meniscus of left knee, current, unspecified tear type, initial encounter     Evaluation Date: 6/15/2023  Authorization Period Expiration: 12/31/23  Plan of Care Expiration: 3/29/24  Progress Note Due: 7/15/23  Visit # / Visits authorized: 16/40  FOTO: 1/3    Precautions: DOS 6/13/23  Procedure:  1. Left Knee Arthroscopy, anterior cruciate ligament reconstruction 81220  2.  Left Knee open lateral extra-articular tenodesis (modified Irma procedure)  3.  Left Knee Arthroscopy, with meniscus repair (medial AND lateral) 63238  4.  Left Knee open lateral collateral ligament repair  5.  Left Knee  arthroscopic loose body removal     vertical tear in the medial meniscus with 1 suture, vertical tear of lateral meniscus with 2 sutures    Post-Op Plan:  ACL reconstruction with quad tendon autograft and medial and lateral meniscal repairs.  Toe-touch weight-bearing for 2-4 weeks followed by partial and full weight-bearing by 6 weeks.  She will require a hinged brace for 6 weeks.  We will start a CPM with passive motion immediately.    Time In: 12:45 pm (late arrival)  Time Out: 1:35  pm  Total Appointment Time (timed & untimed codes): 50 minutes    Subjective     Date of onset: DOS 6/13/23    History of current condition - Karla reports: 2016 she had a fall and sustained an ACL tear. She did not have PT for this until she made the decision to have an ACL repair this year and she had she did pre-hab with Bernabe. Since the surgery she is doing well but is in high levels of pain. No red flags are present at this time.     Falls: 2016    Imaging: Impression:     Complete ACL tear with associated posterolateral corner injury.     Mildly depressed medial tibial plateau fracture and nondepressed posterolateral tibial plateau fracture     Findings of transient lateral patellar dislocation with large displaced osteochondral fragment from the inferomedial pole of the patella.  Partial tear of the MPFL at the MCL origin.     Medial and lateral meniscal tears     This report was flagged in Epic as abnormal.    Prior Therapy: yes   Social History:  lives with their family  Occupation: not working  Prior Level of Function: knee pain  Current Level of Function: post operative limitations    Pain:  Current 8/10, worst 10/10, best 7/10   Location: left knee  Description: Aching and Dull  Aggravating Factors: movement  Easing Factors: pain medication    Patients goals: return to the gym     Medical History:   Past Medical History:   Diagnosis Date    Eczema        Surgical History:   Karla Price  has a past surgical history that includes Reconstruction of ligament (Left, 6/13/2023); Fixation of tendon (Left, 6/13/2023); and arthroscopy,knee,with meniscus repair (Left, 6/13/2023).    Medications:   Karla has a current medication list which includes the following prescription(s): aspirin, benzoyl peroxide, epinephrine, hydrocodone-acetaminophen, and naproxen.    Allergies:   Review of patient's allergies indicates:   Allergen Reactions    Nuts [tree nut] Hives        Objective      Observation: alert and oriented,  arrives in a w/c with a T scope brace locked in extension     Gait: arrives in w/c, able to slowly     Knee Active Range of Motion:   Right  Left    Flexion NT NT   Extension NT NT     Knee Passive Range of Motion:   Right  Left    Flexion WNL 60   Extension 10 hyper 3 hyper       Ankle Active Range of Motion: WNL      Quad Set: fair      Joint Mobility: hypomobile patella       Palpation: slight warmth around incisions      Sensation: diminished around incision as expected per post op      Pulses: normal      Edema: elevated but not excessive     Special Tests: Not performed today due to post-op status   Strength: Not performed today due to post-op status.      Intake Outcome Measure for FOTO knee Survey    Therapist reviewed FOTO scores for Karla Price on 6/15/2023.   FOTO documents entered into EPIC - see Media section.    Intake Score: see chart%         Treatment     Total Treatment time (time-based codes) separate from Evaluation: 31 minutes     Karla received the treatments listed below:      neuromuscular re-education activities to improve: Kinesthetic and Proprioception for 23 minutes. The following activities were included:  Quad sets with towel 20x5s   Heel slides with towel - x20  Self assisted knee flexion EOM - x20     therapeutic activities to improve functional performance for 08 minutes, including:  Patient education on s/s of infection and DVT, 5 post op goals, TTWB education      Patient Education and Home Exercises     Education provided:   - see above    Written Home Exercises Provided: yes. Exercises were reviewed and Karla was able to demonstrate them prior to the end of the session.  Karla demonstrated good  understanding of the education provided. See EMR under Patient Instructions for exercises provided during therapy sessions.    Assessment     Karla is a 22 y.o. female referred to outpatient Physical Therapy with a medical diagnosis of   S83.512A (ICD-10-CM) - Rupture of anterior cruciate  ligament of left knee, initial encounter   S83.242A (ICD-10-CM) - Tear of medial meniscus of left knee, current, unspecified tear type, initial encounter   S83.282A (ICD-10-CM) - Tear of lateral meniscus of left knee, current, unspecified tear type, initial encounter   Patient presents with complaints of continued L knee pain and stiffness  consistent with referring dx limiting ADL's and functional activities 2/2 post-operative status. Upon evaluation patient presents with decreased ROM, joint mobility and flexibility restrictions, decreased strength and motor control contributing to limited functional status at this time. Patient would benefit from appropriate manual therapy, mobility, flexibility, strengthening and NM re-education in order to address the before-mentioned deficits and return to PLOF with ADLs and returning to the gym.       Patient prognosis is Excellent.   Patient will benefit from skilled outpatient Physical Therapy to address the deficits stated above and in the chart below, provide patient /family education, and to maximize patientt's level of independence.     Plan of care discussed with patient: Yes  Patient's spiritual, cultural and educational needs considered and patient is agreeable to the plan of care and goals as stated below:     Anticipated Barriers for therapy: none    Medical Necessity is demonstrated by the following  History  Co-morbidities and personal factors that may impact the plan of care [x] LOW: no personal factors / co-morbidities  [] MODERATE: 1-2 personal factors / co-morbidities  [] HIGH: 3+ personal factors / co-morbidities    Moderate / High Support Documentation:   Co-morbidities affecting plan of care: none    Personal Factors:   no deficits     Examination  Body Structures and Functions, activity limitations and participation restrictions that may impact the plan of care [x] LOW: addressing 1-2 elements  [] MODERATE: 3+ elements  [] HIGH: 4+ elements (please  support below)    Moderate / High Support Documentation: N/A     Clinical Presentation [x] LOW: stable  [] MODERATE: Evolving  [] HIGH: Unstable     Decision Making/ Complexity Score: low       Post-op Knee   Short Term Goals ( 4 Weeks ):   1. Pt will report reduced pain by 20 to 40% or greater for improved mobility, sleep  and ambulation.   2. Patients knee edema reduced by 1/4 to 1/2 inch or greater to enhance flexion ROM and knee comfort.   3. Pt to report minimal to no pain to palpation for improved level of comfort.   4. Pt to demonstrate symmetrical weight bearing w/o increased pain for stability and functional ambulation .  5. Pts neuromuscular response will be enhanced for unilateral standing stability and balance   6. Pt to achieve 90 degrees of passive knee flexion for restoring functional knee mobility, ambulating with proper gait pattern and sit to stand ability.   7 Pt to report understanding of all instruction pertinent to patient safety , gait instruction and HEP.    8. Pt to exhibit correct return demonstration of exercises for self-management and independence with HEP    Long Term Goals (32 Weeks):  1. Pt will demonstrate increased knee flexion AROM to 120 degrees or greater for return to functional activity  2. Pt will demonstrate increased knee extension AROM to 0 degrees for standing stability   3. Pt will demonstrate increased RLE strength by 1/2 to 1 grade for improved functional stability  4. Pt to demonstrate standing stability unsupported on dynamic surfaces for functional return to ADL and recreational activities.   5.The patient will be independent amb with no assistive device on all surfaces for community distances.  6. Pt to demonstrate independence with HEP for self management  7. Patient will return to exercising in gym for fitness and recreation 3x/wk within 6 months.  8. Patient will improve FOTO score to </= 15% limited to decrease perceived limitation with mobility.  Plan     Plan  of care Certification: 6/15/2023 to 3/29/24.    Outpatient Physical Therapy 2 times weekly for 32 weeks to include the following interventions: Gait Training, Manual Therapy, Neuromuscular Re-ed, Therapeutic Activities, and Therapeutic Exercise.     Vernon Hart PT  Co treated with Shreyas Astorga PT

## 2023-06-15 NOTE — OPERATIVE NOTE ADDENDUM
Certification of Assistant at Surgery       Surgery Date: 6/13/2023     Participating Surgeons:  Surgeon(s) and Role:     * Cecilio Payton MD - Primary    Assistant:   Bernabe Allen PA-C    No resident or fellow was available throughout the entire procedure as a result it was medically necessary for Bernabe Allen PA-C to perform first assist duties.      Procedures:  Procedure(s) (LRB):  RECONSTRUCTION, ACL-QUAD AUTO (Left)  FIXATION, TENDON-LATERAL EXTRA ARTICULAR TENODESIS (Left)  ARTHROSCOPY,KNEE,WITH MENISCUS REPAIR (Left)    Assistant Surgeon's Certification of Necessity:  I understand that section 1842 (b) (6) (d) of the Social Security Act generally prohibits Medicare Part B reasonable charge payment for the services of assistants at surgery in teaching hospitals when qualified residents are available to furnish such services. I certify that the services for which payment is claimed were medically necessary, and that no qualified resident was available to perform the services. I further understand that these services are subject to post-payment review by the Medicare carrier.      Bernabe Allen PA-C    06/15/2023  8:06 AM

## 2023-06-18 NOTE — ANESTHESIA POST-OP PAIN MANAGEMENT
Attempted to call Karla Price at 5:00 PM on 06/18/2023 regarding the PNC and Nimbus pump and to remind her that the PNC should be removed today. I was unable to leave a voicemail encouraging the patient to call with any problems or questions.      Chucky Bruner MD   Fellow  Regional Anesthesiology and Acute Pain Medicine  [facility]

## 2023-06-20 ENCOUNTER — CLINICAL SUPPORT (OUTPATIENT)
Dept: REHABILITATION | Facility: HOSPITAL | Age: 23
End: 2023-06-20
Payer: COMMERCIAL

## 2023-06-20 DIAGNOSIS — M25.662 KNEE STIFFNESS, LEFT: ICD-10-CM

## 2023-06-20 DIAGNOSIS — M25.562 ACUTE PAIN OF LEFT KNEE: Primary | ICD-10-CM

## 2023-06-20 PROCEDURE — 97140 MANUAL THERAPY 1/> REGIONS: CPT | Mod: PN

## 2023-06-20 PROCEDURE — 97110 THERAPEUTIC EXERCISES: CPT | Mod: PN

## 2023-06-20 PROCEDURE — 97112 NEUROMUSCULAR REEDUCATION: CPT | Mod: PN

## 2023-06-20 NOTE — PROGRESS NOTES
[facility]   Physical Therapy Treatment Note      Name: Karla Price  [facility] Number: [id number]    Therapy Diagnosis:   Encounter Diagnoses   Name Primary?    Acute pain of left knee Yes    Knee stiffness, left      Physician: Camila Holcomb MD    Visit Date: 6/20/2023    Physician Orders: PT Eval and Treat  Medical Diagnosis from Referral:   S83.512A (ICD-10-CM) - Rupture of anterior cruciate ligament of left knee, initial encounter   S83.242A (ICD-10-CM) - Tear of medial meniscus of left knee, current, unspecified tear type, initial encounter   S83.282A (ICD-10-CM) - Tear of lateral meniscus of left knee, current, unspecified tear type, initial encounter      Evaluation Date: 6/15/2023  Authorization Period Expiration: 12/31/23  Plan of Care Expiration: 3/29/24  Progress Note Due: 7/15/23  Visit # / Visits authorized: 17/40  FOTO: 1/3     Precautions: DOS 6/13/23  Procedure:  1. Left Knee Arthroscopy, anterior cruciate ligament reconstruction 44304  2.  Left Knee open lateral extra-articular tenodesis (modified Irma procedure)  3.  Left Knee Arthroscopy, with meniscus repair (medial AND lateral) 96997  4.  Left Knee open lateral collateral ligament repair  5.  Left Knee  arthroscopic loose body removal      vertical tear in the medial meniscus with 1 suture, vertical tear of lateral meniscus with 2 sutures     Post-Op Plan:  ACL reconstruction with quad tendon autograft and medial and lateral meniscal repairs.  Toe-touch weight-bearing for 2-4 weeks followed by partial and full weight-bearing by 6 weeks.  She will require a hinged brace for 6 weeks.  We will start a CPM with passive motion immediately.      PTA Visit #: 0/5     Time In: 2:45 pm  Time Out: 3:45 pm  Total Billable Time: 60 minutes    Subjective     Pt reports: her pain levels are much lower and she's feeling a lot better.  She was compliant with home exercise program.  Response to previous treatment: juan murray  well  Functional change: ongoing    Pain: 2/10  Location: left knee      Objective      Objective Measures updated at progress report unless specified.     Treatment     Karla received the treatments listed below:    therapeutic exercises to develop strength and ROM for 12 minutes including:  LLLD knee extension with heel prop 8 mins   Heel slides 2x10     manual therapy techniques: Joint mobilizations and Soft tissue Mobilization were applied to the: L knee for 10 minutes, including:  Extension hinge   Patellar mobs all directions   Fat pat mobilization  PROM flex EOM    neuromuscular re-education activities to improve: Kinesthetic and Proprioception for 38 minutes. The following activities were included:  NMES 58 intensity, 10s on 10s off including the following:  -Quad sets with towel 5 mins   -Quad sets with strap hinge 5 mins  -Standing SLR (brace donned) - 5 min  -S/L hip abd (brace donned)- 5 mins (5s on 5s off)     therapeutic activities to improve functional performance for 00 minutes, including:    Patient Education and Home Exercises       Education provided:   - reviewed 5 post op goals, educated to do more quads sets for HEP    Written Home Exercises Provided: yes. Exercises were reviewed and Karla was able to demonstrate them prior to the end of the session.  Karla demonstrated good  understanding of the education provided. See EMR under Patient Instructions for exercises provided during therapy sessions    Assessment     Karla did well today. She presents today with free and easy hyper extension with no pain at end range. Flexion improved to ~80 degrees. Quad activation is still poor but was improved significantly with addition of NMES today. Pt able to recite WB precautions and post op goals at the end of session.     Karla Is progressing well towards her goals.   Pt prognosis is Excellent.     Pt will continue to benefit from skilled outpatient physical therapy to address the deficits listed in the  problem list box on initial evaluation, provide pt/family education and to maximize pt's level of independence in the home and community environment.     Pt's spiritual, cultural and educational needs considered and pt agreeable to plan of care and goals.     Anticipated barriers to physical therapy: none    Post-op Knee   Short Term Goals ( 4 Weeks ):   1. Pt will report reduced pain by 20 to 40% or greater for improved mobility, sleep  and ambulation.   2. Patients knee edema reduced by 1/4 to 1/2 inch or greater to enhance flexion ROM and knee comfort.   3. Pt to report minimal to no pain to palpation for improved level of comfort.   4. Pt to demonstrate symmetrical weight bearing w/o increased pain for stability and functional ambulation .  5. Pts neuromuscular response will be enhanced for unilateral standing stability and balance   6. Pt to achieve 90 degrees of passive knee flexion for restoring functional knee mobility, ambulating with proper gait pattern and sit to stand ability.   7 Pt to report understanding of all instruction pertinent to patient safety , gait instruction and HEP.    8. Pt to exhibit correct return demonstration of exercises for self-management and independence with HEP     Long Term Goals (32 Weeks):  1. Pt will demonstrate increased knee flexion AROM to 120 degrees or greater for return to functional activity  2. Pt will demonstrate increased knee extension AROM to 0 degrees for standing stability   3. Pt will demonstrate increased RLE strength by 1/2 to 1 grade for improved functional stability  4. Pt to demonstrate standing stability unsupported on dynamic surfaces for functional return to ADL and recreational activities.   5.The patient will be independent amb with no assistive device on all surfaces for community distances.  6. Pt to demonstrate independence with HEP for self management  7. Patient will return to exercising in gym for fitness and recreation 3x/wk within 6  months.  8. Patient will improve FOTO score to </= 15% limited to decrease perceived limitation with mobility.    Plan     Cont POC    Vernon Hart, PT

## 2023-06-22 ENCOUNTER — CLINICAL SUPPORT (OUTPATIENT)
Dept: REHABILITATION | Facility: HOSPITAL | Age: 23
End: 2023-06-22
Payer: COMMERCIAL

## 2023-06-22 DIAGNOSIS — M25.662 KNEE STIFFNESS, LEFT: ICD-10-CM

## 2023-06-22 DIAGNOSIS — M25.562 ACUTE PAIN OF LEFT KNEE: Primary | ICD-10-CM

## 2023-06-22 PROCEDURE — 97112 NEUROMUSCULAR REEDUCATION: CPT | Mod: PN

## 2023-06-22 PROCEDURE — 97110 THERAPEUTIC EXERCISES: CPT | Mod: PN

## 2023-06-22 PROCEDURE — 97140 MANUAL THERAPY 1/> REGIONS: CPT | Mod: PN

## 2023-06-22 NOTE — PROGRESS NOTES
[facility]   Physical Therapy Treatment Note      Name: Karla Price  [facility] Number: [id number]    Therapy Diagnosis:   Encounter Diagnoses   Name Primary?    Acute pain of left knee Yes    Knee stiffness, left        Physician: Camila Holcomb MD    Visit Date: 6/22/2023    Physician Orders: PT Eval and Treat  Medical Diagnosis from Referral:   S83.512A (ICD-10-CM) - Rupture of anterior cruciate ligament of left knee, initial encounter   S83.242A (ICD-10-CM) - Tear of medial meniscus of left knee, current, unspecified tear type, initial encounter   S83.282A (ICD-10-CM) - Tear of lateral meniscus of left knee, current, unspecified tear type, initial encounter      Evaluation Date: 6/15/2023  Authorization Period Expiration: 12/31/23  Plan of Care Expiration: 3/29/24  Progress Note Due: 7/15/23  Visit # / Visits authorized: 18/40 (3 new auth)  FOTO: 1/3     Precautions: DOS 6/13/23  Procedure:  1. Left Knee Arthroscopy, anterior cruciate ligament reconstruction 15810  2.  Left Knee open lateral extra-articular tenodesis (modified Irma procedure)  3.  Left Knee Arthroscopy, with meniscus repair (medial AND lateral) 05595  4.  Left Knee open lateral collateral ligament repair  5.  Left Knee  arthroscopic loose body removal      vertical tear in the medial meniscus with 1 suture, vertical tear of lateral meniscus with 2 sutures     Post-Op Plan:  ACL reconstruction with quad tendon autograft and medial and lateral meniscal repairs.  Toe-touch weight-bearing for 2-4 weeks followed by partial and full weight-bearing by 6 weeks.  She will require a hinged brace for 6 weeks.  We will start a CPM with passive motion immediately.      PTA Visit #: 0/5     Time In: 5:42 pm  Time Out: 6:30 pm  Total Billable Time: 48 minutes    Subjective     Pt reports: still feeling better  She was compliant with home exercise program.  Response to previous treatment: juan eval well  Functional change:  ongoing    Pain: 2/10  Location: left knee      Objective      Objective Measures updated at progress report unless specified.     Treatment     Karla received the treatments listed below:    therapeutic exercises to develop strength and ROM for 15 minutes including:  LLLD knee extension with heel prop 8 mins   Self assisted knee flexion at EOM x 30  Heel slides 3x10     manual therapy techniques: Joint mobilizations and Soft tissue Mobilization were applied to the: L knee for 10 minutes, including:  Extension hinge   Patellar mobs all directions   Fat pat mobilization  PROM flex EOM    neuromuscular re-education activities to improve: Kinesthetic and Proprioception for 23 minutes. The following activities were included:  NMES applied for the following, 10s on 10s off including the following:  -Quad sets with towel 5 mins   -Quad sets with strap hinge 5 mins  -supine SLR (brace donned) - 5 min  -S/L hip abd (brace donned)- 5 mins      therapeutic activities to improve functional performance for 00 minutes, including:    Patient Education and Home Exercises       Education provided:   - reviewed 5 post op goals, educated to do more quads sets for HEP    Written Home Exercises Provided: yes. Exercises were reviewed and Karla was able to demonstrate them prior to the end of the session.  Karla demonstrated good  understanding of the education provided. See EMR under Patient Instructions for exercises provided during therapy sessions    Assessment   Karla presented with excellent carry-over of extension ROM and able to achieve free and easy 85 deg of flexion off EOM. Continues to demo poor quad tone so continued with NMES to improve volitional quad control. Mild lag noted with SLR so kept brace donned but able to perform in supine position. Progressing well at this time should continue to improve as ROM and quad tone normalizes    Karla Is progressing well towards her goals.   Pt prognosis is Excellent.     Pt will  continue to benefit from skilled outpatient physical therapy to address the deficits listed in the problem list box on initial evaluation, provide pt/family education and to maximize pt's level of independence in the home and community environment.     Pt's spiritual, cultural and educational needs considered and pt agreeable to plan of care and goals.     Anticipated barriers to physical therapy: none    Post-op Knee   Short Term Goals ( 4 Weeks ):   1. Pt will report reduced pain by 20 to 40% or greater for improved mobility, sleep  and ambulation.   2. Patients knee edema reduced by 1/4 to 1/2 inch or greater to enhance flexion ROM and knee comfort.   3. Pt to report minimal to no pain to palpation for improved level of comfort.   4. Pt to demonstrate symmetrical weight bearing w/o increased pain for stability and functional ambulation .  5. Pts neuromuscular response will be enhanced for unilateral standing stability and balance   6. Pt to achieve 90 degrees of passive knee flexion for restoring functional knee mobility, ambulating with proper gait pattern and sit to stand ability.   7 Pt to report understanding of all instruction pertinent to patient safety , gait instruction and HEP.    8. Pt to exhibit correct return demonstration of exercises for self-management and independence with HEP     Long Term Goals (32 Weeks):  1. Pt will demonstrate increased knee flexion AROM to 120 degrees or greater for return to functional activity  2. Pt will demonstrate increased knee extension AROM to 0 degrees for standing stability   3. Pt will demonstrate increased RLE strength by 1/2 to 1 grade for improved functional stability  4. Pt to demonstrate standing stability unsupported on dynamic surfaces for functional return to ADL and recreational activities.   5.The patient will be independent amb with no assistive device on all surfaces for community distances.  6. Pt to demonstrate independence with HEP for self  management  7. Patient will return to exercising in gym for fitness and recreation 3x/wk within 6 months.  8. Patient will improve FOTO score to </= 15% limited to decrease perceived limitation with mobility.    Plan     Cont POC    ERIKA TOLLIVER PT

## 2023-06-27 ENCOUNTER — CLINICAL SUPPORT (OUTPATIENT)
Dept: REHABILITATION | Facility: HOSPITAL | Age: 23
End: 2023-06-27
Payer: COMMERCIAL

## 2023-06-27 DIAGNOSIS — M25.562 ACUTE PAIN OF LEFT KNEE: Primary | ICD-10-CM

## 2023-06-27 DIAGNOSIS — M25.662 KNEE STIFFNESS, LEFT: ICD-10-CM

## 2023-06-27 PROCEDURE — 97112 NEUROMUSCULAR REEDUCATION: CPT | Mod: PN

## 2023-06-27 PROCEDURE — 97140 MANUAL THERAPY 1/> REGIONS: CPT | Mod: PN

## 2023-06-27 PROCEDURE — 97110 THERAPEUTIC EXERCISES: CPT | Mod: PN

## 2023-06-27 NOTE — PROGRESS NOTES
[facility]   Physical Therapy Treatment Note      Name: Karla Price  [facility] Number: [id number]    Therapy Diagnosis:   Encounter Diagnoses   Name Primary?    Acute pain of left knee Yes    Knee stiffness, left          Physician: Camila Holcomb MD    Visit Date: 6/27/2023    Physician Orders: PT Eval and Treat  Medical Diagnosis from Referral:   S83.512A (ICD-10-CM) - Rupture of anterior cruciate ligament of left knee, initial encounter   S83.242A (ICD-10-CM) - Tear of medial meniscus of left knee, current, unspecified tear type, initial encounter   S83.282A (ICD-10-CM) - Tear of lateral meniscus of left knee, current, unspecified tear type, initial encounter      Evaluation Date: 6/15/2023  Authorization Period Expiration: 12/31/23  Plan of Care Expiration: 3/29/24  Progress Note Due: 7/15/23  Visit # / Visits authorized: 19/40 (4 new auth)  FOTO: 1/3     Precautions: DOS 6/13/23  Procedure:  1. Left Knee Arthroscopy, anterior cruciate ligament reconstruction 33318  2.  Left Knee open lateral extra-articular tenodesis (modified Irma procedure)  3.  Left Knee Arthroscopy, with meniscus repair (medial AND lateral) 48754  4.  Left Knee open lateral collateral ligament repair  5.  Left Knee  arthroscopic loose body removal      vertical tear in the medial meniscus with 1 suture, vertical tear of lateral meniscus with 2 sutures     Post-Op Plan:  ACL reconstruction with quad tendon autograft and medial and lateral meniscal repairs.  Toe-touch weight-bearing for 2-4 weeks followed by partial and full weight-bearing by 6 weeks.  She will require a hinged brace for 6 weeks.  We will start a CPM with passive motion immediately.      PTA Visit #: 0/5     Time In: 5:05 pm  Time Out: 6:10 pm  Total Billable Time: 65 minutes    Subjective     Pt reports: still feeling better. Noticed I can squeeze my quad a little better  She was compliant with home exercise program.  Response to previous treatment:  juan eval well  Functional change: ongoing    Pain: 2/10  Location: left knee      Objective      Knee Passive Range of Motion:    Right  Left    Flexion WNL 90   Extension 10 hyper 10 hyper     Treatment     Karla received the treatments listed below:    therapeutic exercises to develop strength and ROM for 15 minutes including:  LLLD knee extension with heel prop 8 mins   Self assisted knee flexion at EOM x 30  Heel slides 3x10     manual therapy techniques: Joint mobilizations and Soft tissue Mobilization were applied to the: L knee for 15 minutes, including:  Dressing change  Extension hinge   Patellar mobs all directions   Fat pat mobilization  PROM flex EOM    neuromuscular re-education activities to improve: Kinesthetic and Proprioception for 35 minutes. The following activities were included:  NMES applied for the following, 10s on 10s off including the following:  -Quad sets with towel 5 mins   -Quad sets with strap hinge 5 mins  -SAQ over 1/2 bolster x 5'  -supine SLR  - 5 min  -S/L hip abd 5 mins     Gait training with B axillary crutches     therapeutic activities to improve functional performance for 00 minutes, including:    Patient Education and Home Exercises       Education provided:   - reviewed 5 post op goals, educated to do more quads sets for HEP    Written Home Exercises Provided: yes. Exercises were reviewed and Karla was able to demonstrate them prior to the end of the session.  Karla demonstrated good  understanding of the education provided. See EMR under Patient Instructions for exercises provided during therapy sessions    Assessment   Karla presented with excellent carry-over of extension ROM and able to achieve free and easy 90 deg of flexion off EOM. Applied new dressing to wounds during session today. Continues to demo poor quad tone so continued with NMES to improve volitional quad control. Able to perform SLR in supine with min quad lag during session today. Progressing well at this  time should continue to improve as ROM and quad tone normalizes    Karla Is progressing well towards her goals.   Pt prognosis is Excellent.     Pt will continue to benefit from skilled outpatient physical therapy to address the deficits listed in the problem list box on initial evaluation, provide pt/family education and to maximize pt's level of independence in the home and community environment.     Pt's spiritual, cultural and educational needs considered and pt agreeable to plan of care and goals.     Anticipated barriers to physical therapy: none    Post-op Knee   Short Term Goals ( 4 Weeks ):   1. Pt will report reduced pain by 20 to 40% or greater for improved mobility, sleep  and ambulation.   2. Patients knee edema reduced by 1/4 to 1/2 inch or greater to enhance flexion ROM and knee comfort.   3. Pt to report minimal to no pain to palpation for improved level of comfort.   4. Pt to demonstrate symmetrical weight bearing w/o increased pain for stability and functional ambulation .  5. Pts neuromuscular response will be enhanced for unilateral standing stability and balance   6. Pt to achieve 90 degrees of passive knee flexion for restoring functional knee mobility, ambulating with proper gait pattern and sit to stand ability.   7 Pt to report understanding of all instruction pertinent to patient safety , gait instruction and HEP.    8. Pt to exhibit correct return demonstration of exercises for self-management and independence with HEP     Long Term Goals (32 Weeks):  1. Pt will demonstrate increased knee flexion AROM to 120 degrees or greater for return to functional activity  2. Pt will demonstrate increased knee extension AROM to 0 degrees for standing stability   3. Pt will demonstrate increased RLE strength by 1/2 to 1 grade for improved functional stability  4. Pt to demonstrate standing stability unsupported on dynamic surfaces for functional return to ADL and recreational activities.   5.The  patient will be independent amb with no assistive device on all surfaces for community distances.  6. Pt to demonstrate independence with HEP for self management  7. Patient will return to exercising in gym for fitness and recreation 3x/wk within 6 months.  8. Patient will improve FOTO score to </= 15% limited to decrease perceived limitation with mobility.    Plan     Cont POC    ERIKA TOLLIVER, PT, DPT

## 2023-06-28 ENCOUNTER — OFFICE VISIT (OUTPATIENT)
Dept: SPORTS MEDICINE | Facility: CLINIC | Age: 23
End: 2023-06-28
Payer: COMMERCIAL

## 2023-06-28 VITALS — SYSTOLIC BLOOD PRESSURE: 135 MMHG | DIASTOLIC BLOOD PRESSURE: 83 MMHG | HEART RATE: 104 BPM

## 2023-06-28 DIAGNOSIS — Z98.890 S/P ACL RECONSTRUCTION: Primary | ICD-10-CM

## 2023-06-28 PROCEDURE — 3044F PR MOST RECENT HEMOGLOBIN A1C LEVEL <7.0%: ICD-10-PCS | Mod: CPTII,S$GLB,, | Performed by: PHYSICIAN ASSISTANT

## 2023-06-28 PROCEDURE — 3044F HG A1C LEVEL LT 7.0%: CPT | Mod: CPTII,S$GLB,, | Performed by: PHYSICIAN ASSISTANT

## 2023-06-28 PROCEDURE — 1160F RVW MEDS BY RX/DR IN RCRD: CPT | Mod: CPTII,S$GLB,, | Performed by: PHYSICIAN ASSISTANT

## 2023-06-28 PROCEDURE — 3075F PR MOST RECENT SYSTOLIC BLOOD PRESS GE 130-139MM HG: ICD-10-PCS | Mod: CPTII,S$GLB,, | Performed by: PHYSICIAN ASSISTANT

## 2023-06-28 PROCEDURE — 99024 POSTOP FOLLOW-UP VISIT: CPT | Mod: S$GLB,,, | Performed by: PHYSICIAN ASSISTANT

## 2023-06-28 PROCEDURE — 3079F DIAST BP 80-89 MM HG: CPT | Mod: CPTII,S$GLB,, | Performed by: PHYSICIAN ASSISTANT

## 2023-06-28 PROCEDURE — 3075F SYST BP GE 130 - 139MM HG: CPT | Mod: CPTII,S$GLB,, | Performed by: PHYSICIAN ASSISTANT

## 2023-06-28 PROCEDURE — 3079F PR MOST RECENT DIASTOLIC BLOOD PRESSURE 80-89 MM HG: ICD-10-PCS | Mod: CPTII,S$GLB,, | Performed by: PHYSICIAN ASSISTANT

## 2023-06-28 PROCEDURE — 99999 PR PBB SHADOW E&M-EST. PATIENT-LVL III: ICD-10-PCS | Mod: PBBFAC,,, | Performed by: PHYSICIAN ASSISTANT

## 2023-06-28 PROCEDURE — 1160F PR REVIEW ALL MEDS BY PRESCRIBER/CLIN PHARMACIST DOCUMENTED: ICD-10-PCS | Mod: CPTII,S$GLB,, | Performed by: PHYSICIAN ASSISTANT

## 2023-06-28 PROCEDURE — 99999 PR PBB SHADOW E&M-EST. PATIENT-LVL III: CPT | Mod: PBBFAC,,, | Performed by: PHYSICIAN ASSISTANT

## 2023-06-28 PROCEDURE — 1159F MED LIST DOCD IN RCRD: CPT | Mod: CPTII,S$GLB,, | Performed by: PHYSICIAN ASSISTANT

## 2023-06-28 PROCEDURE — 1159F PR MEDICATION LIST DOCUMENTED IN MEDICAL RECORD: ICD-10-PCS | Mod: CPTII,S$GLB,, | Performed by: PHYSICIAN ASSISTANT

## 2023-06-28 PROCEDURE — 99024 PR POST-OP FOLLOW-UP VISIT: ICD-10-PCS | Mod: S$GLB,,, | Performed by: PHYSICIAN ASSISTANT

## 2023-06-28 NOTE — PROGRESS NOTES
POST-OPERATIVE EXAMINATION    22 y.o. Female who returns for follow after surgery. She is 2 weeks s/p:    Procedure:  1.  Left Knee Arthroscopy, anterior cruciate ligament reconstruction 64385  2.  Left Knee open lateral extra-articular tenodesis (modified Irma procedure)  3.  Left Knee Arthroscopy, with meniscus repair (medial AND lateral) 53255  4.  Left Knee open lateral collateral ligament repair  5.  Left Knee  arthroscopic loose body removal      She is doing well without any issues. Pain is well controlled with OTC meds.  Using CPM machine at home and undergoing PT as instructed.      PHYSICAL EXAMINATION:  /83   Pulse 104   General: Well-developed well-nourished 22 y.o. female in no acute distress   Cardiovascular: Regular rhythm   Lungs: No labored breathing or wheezing appreciated   Neuro: Alert and oriented ×3   Psychiatric: well oriented to person, place and time, demonstrates normal mood and affect   Skin: No rashes, lesions or ulcers, normal temperature, turgor, and texture on involved extremity    ORTHOPEDIC EXAM:  Normal post-operative swelling  Normal post-operative scarring  Strength: WNL  ROM: 0-70 degrees  Tests: None    ASSESSMENT:      ICD-10-CM ICD-9-CM   1. S/P ACL reconstruction  Z98.890 V45.89           PLAN:       Sutures were removed today  Continue physical therapy/CPM at home  Progress to partial/full weight-bearing with brace and single crutch, then wean from crutch when ambulating normally  RTC with Cecilio Payton MD in 4 weeks

## 2023-06-28 NOTE — ADDENDUM NOTE
Addendum  created 06/28/23 1113 by Albert Harding MD    Attestation recorded in Intraprocedure, Intraprocedure Attestations filed

## 2023-06-29 ENCOUNTER — CLINICAL SUPPORT (OUTPATIENT)
Dept: REHABILITATION | Facility: HOSPITAL | Age: 23
End: 2023-06-29
Payer: COMMERCIAL

## 2023-06-29 DIAGNOSIS — M25.562 ACUTE PAIN OF LEFT KNEE: Primary | ICD-10-CM

## 2023-06-29 DIAGNOSIS — M25.662 KNEE STIFFNESS, LEFT: ICD-10-CM

## 2023-06-29 PROCEDURE — 97140 MANUAL THERAPY 1/> REGIONS: CPT | Mod: PN

## 2023-06-29 PROCEDURE — 97110 THERAPEUTIC EXERCISES: CPT | Mod: PN

## 2023-06-29 PROCEDURE — 97112 NEUROMUSCULAR REEDUCATION: CPT | Mod: PN

## 2023-06-29 NOTE — PROGRESS NOTES
[facility]   Physical Therapy Treatment Note      Name: Karla Price  [facility] Number: [id number]    Therapy Diagnosis:   Encounter Diagnoses   Name Primary?    Acute pain of left knee Yes    Knee stiffness, left        Physician: Camila Holcomb MD    Visit Date: 6/29/2023    Physician Orders: PT Eval and Treat  Medical Diagnosis from Referral:   S83.512A (ICD-10-CM) - Rupture of anterior cruciate ligament of left knee, initial encounter   S83.242A (ICD-10-CM) - Tear of medial meniscus of left knee, current, unspecified tear type, initial encounter   S83.282A (ICD-10-CM) - Tear of lateral meniscus of left knee, current, unspecified tear type, initial encounter      Evaluation Date: 6/15/2023  Authorization Period Expiration: 12/31/23  Plan of Care Expiration: 3/29/24  Progress Note Due: 7/15/23  Visit # / Visits authorized: 19/40 (4 new auth)  FOTO: 1/3     Precautions: DOS 6/13/23  Procedure:  1. Left Knee Arthroscopy, anterior cruciate ligament reconstruction 30933  2.  Left Knee open lateral extra-articular tenodesis (modified Irma procedure)  3.  Left Knee Arthroscopy, with meniscus repair (medial AND lateral) 85560  4.  Left Knee open lateral collateral ligament repair  5.  Left Knee  arthroscopic loose body removal      vertical tear in the medial meniscus with 1 suture, vertical tear of lateral meniscus with 2 sutures     Post-Op Plan:  ACL reconstruction with quad tendon autograft and medial and lateral meniscal repairs.  Toe-touch weight-bearing for 2-4 weeks followed by partial and full weight-bearing by 6 weeks.  She will require a hinged brace for 6 weeks.  We will start a CPM with passive motion immediately.      PTA Visit #: 0/5     Time In: 5:05 pm  Time Out: 6:10 pm  Total Billable Time: 65 minutes    Subjective     Pt reports: still feeling better. Noticed I can squeeze my quad a little better  She was compliant with home exercise program.  Response to previous treatment:  juan eval well  Functional change: ongoing    Pain: 2/10  Location: left knee      Objective      Knee Passive Range of Motion:    Right  Left    Flexion WNL 90   Extension 10 hyper 10 hyper     Treatment     Karla received the treatments listed below:    therapeutic exercises to develop strength and ROM for 15 minutes including:  LLLD knee extension with heel prop 8 mins   Self assisted knee flexion at EOM x 30  Heel slides 3x10     manual therapy techniques: Joint mobilizations and Soft tissue Mobilization were applied to the: L knee for 15 minutes, including:  Dressing change  Extension hinge   Patellar mobs all directions   Fat pat mobilization  PROM flex EOM    neuromuscular re-education activities to improve: Kinesthetic and Proprioception for 35 minutes. The following activities were included:  NMES applied for the following, 10s on 10s off including the following:  -Quad sets with towel 5 mins   -Quad sets with strap hinge 5 mins  -SAQ over 1/2 bolster x 5'  -supine SLR  - 5 min  -S/L hip abd 5 mins     Gait training with B axillary crutches     therapeutic activities to improve functional performance for 00 minutes, including:    Patient Education and Home Exercises       Education provided:   - reviewed 5 post op goals, educated to do more quads sets for HEP    Written Home Exercises Provided: yes. Exercises were reviewed and Karla was able to demonstrate them prior to the end of the session.  Karla demonstrated good  understanding of the education provided. See EMR under Patient Instructions for exercises provided during therapy sessions    Assessment   Karla presented with excellent carry-over of extension ROM and able to achieve free and easy 90 deg of flexion off EOM. Applied new dressing to wounds during session today. Continues to demo poor quad tone so continued with NMES to improve volitional quad control. Able to perform SLR in supine with min quad lag during session today. Progressing well at this  time should continue to improve as ROM and quad tone normalizes    Karla Is progressing well towards her goals.   Pt prognosis is Excellent.     Pt will continue to benefit from skilled outpatient physical therapy to address the deficits listed in the problem list box on initial evaluation, provide pt/family education and to maximize pt's level of independence in the home and community environment.     Pt's spiritual, cultural and educational needs considered and pt agreeable to plan of care and goals.     Anticipated barriers to physical therapy: none    Post-op Knee   Short Term Goals ( 4 Weeks ):   1. Pt will report reduced pain by 20 to 40% or greater for improved mobility, sleep  and ambulation.   2. Patients knee edema reduced by 1/4 to 1/2 inch or greater to enhance flexion ROM and knee comfort.   3. Pt to report minimal to no pain to palpation for improved level of comfort.   4. Pt to demonstrate symmetrical weight bearing w/o increased pain for stability and functional ambulation .  5. Pts neuromuscular response will be enhanced for unilateral standing stability and balance   6. Pt to achieve 90 degrees of passive knee flexion for restoring functional knee mobility, ambulating with proper gait pattern and sit to stand ability.   7 Pt to report understanding of all instruction pertinent to patient safety , gait instruction and HEP.    8. Pt to exhibit correct return demonstration of exercises for self-management and independence with HEP     Long Term Goals (32 Weeks):  1. Pt will demonstrate increased knee flexion AROM to 120 degrees or greater for return to functional activity  2. Pt will demonstrate increased knee extension AROM to 0 degrees for standing stability   3. Pt will demonstrate increased RLE strength by 1/2 to 1 grade for improved functional stability  4. Pt to demonstrate standing stability unsupported on dynamic surfaces for functional return to ADL and recreational activities.   5.The  patient will be independent amb with no assistive device on all surfaces for community distances.  6. Pt to demonstrate independence with HEP for self management  7. Patient will return to exercising in gym for fitness and recreation 3x/wk within 6 months.  8. Patient will improve FOTO score to </= 15% limited to decrease perceived limitation with mobility.    Plan     Cont POC    ERIKA TOLLIVER, PT, DPT

## 2023-07-06 ENCOUNTER — CLINICAL SUPPORT (OUTPATIENT)
Dept: REHABILITATION | Facility: HOSPITAL | Age: 23
End: 2023-07-06
Payer: COMMERCIAL

## 2023-07-06 DIAGNOSIS — M25.662 KNEE STIFFNESS, LEFT: ICD-10-CM

## 2023-07-06 DIAGNOSIS — M25.562 ACUTE PAIN OF LEFT KNEE: Primary | ICD-10-CM

## 2023-07-06 PROCEDURE — 97530 THERAPEUTIC ACTIVITIES: CPT | Mod: PN

## 2023-07-06 PROCEDURE — 97112 NEUROMUSCULAR REEDUCATION: CPT | Mod: PN

## 2023-07-06 PROCEDURE — 97140 MANUAL THERAPY 1/> REGIONS: CPT | Mod: PN

## 2023-07-06 PROCEDURE — 97110 THERAPEUTIC EXERCISES: CPT | Mod: PN

## 2023-07-06 NOTE — PROGRESS NOTES
[facility]   Physical Therapy Treatment Note      Name: Karla Price  [facility] Number: [id number]    Therapy Diagnosis:   Encounter Diagnoses   Name Primary?    Acute pain of left knee Yes    Knee stiffness, left          Physician: Camila Holcomb MD    Visit Date: 7/6/2023    Physician Orders: PT Eval and Treat  Medical Diagnosis from Referral:   S83.512A (ICD-10-CM) - Rupture of anterior cruciate ligament of left knee, initial encounter   S83.242A (ICD-10-CM) - Tear of medial meniscus of left knee, current, unspecified tear type, initial encounter   S83.282A (ICD-10-CM) - Tear of lateral meniscus of left knee, current, unspecified tear type, initial encounter      Evaluation Date: 6/15/2023  Authorization Period Expiration: 12/31/23  Plan of Care Expiration: 3/29/24  Progress Note Due: 7/15/23  Visit # / Visits authorized: 19/40 (4 new auth)  FOTO: 1/3     Precautions: DOS 6/13/23  Procedure:  1. Left Knee Arthroscopy, anterior cruciate ligament reconstruction 56549  2.  Left Knee open lateral extra-articular tenodesis (modified Iram procedure)  3.  Left Knee Arthroscopy, with meniscus repair (medial AND lateral) 37587  4.  Left Knee open lateral collateral ligament repair  5.  Left Knee  arthroscopic loose body removal      vertical tear in the medial meniscus with 1 suture, vertical tear of lateral meniscus with 2 sutures     Post-Op Plan:  ACL reconstruction with quad tendon autograft and medial and lateral meniscal repairs.  Toe-touch weight-bearing for 2-4 weeks followed by partial and full weight-bearing by 6 weeks.  She will require a hinged brace for 6 weeks.  We will start a CPM with passive motion immediately.      PTA Visit #: 0/5     Time In: 5:02 pm  Time Out: 6:04 pm  Total Billable Time: 62 minutes    Subjective     Pt reports: saw Bernabe last week for my follow up and he said everything looks good. Said I can put 50% on my leg and slowly increase until 6 weeks  She was  compliant with home exercise program.  Response to previous treatment: improved ROM  Functional change: ongoing    Pain: 2/10  Location: left knee      Objective      Knee Passive Range of Motion:    Right  Left    Flexion WNL 90   Extension 10 hyper 10 hyper     Treatment     Karla received the treatments listed below:    therapeutic exercises to develop strength and ROM for 12 minutes including:  LLLD knee extension with heel prop 8 mins   Self assisted knee flexion at EOM x 30  Heel slides 3x10     manual therapy techniques: Joint mobilizations and Soft tissue Mobilization were applied to the: L knee for 10 minutes, including:  Dressing change  Extension hinge   Patellar mobs all directions   Fat pat mobilization  PROM flex EOM    neuromuscular re-education activities to improve: Kinesthetic and Proprioception for 25 minutes. The following activities were included:  NMES applied for the following, 10s on 10s off including the following: Not today  -Quad sets with towel 5 mins   -Quad sets with strap hinge 5 mins  -SAQ over 1/2 bolster x 5'  -supine SLR  - 5 min  -S/L hip abd 5 mins     Gait training with B axillary crutches     therapeutic activities to improve functional performance for 15 minutes, including:  Alter-G anti-gravity treadmill training @ 40% body weight speed 1.5-2.0    Patient Education and Home Exercises       Education provided:   - reviewed 5 post op goals, educated to do more quads sets for HEP    Written Home Exercises Provided: yes. Exercises were reviewed and Karla was able to demonstrate them prior to the end of the session.  Karla demonstrated good  understanding of the education provided. See EMR under Patient Instructions for exercises provided during therapy sessions    Assessment   Karla presented with continued good ROM and improvement in volitional quad control. Initially demo'd mild lag with SLR but able to correct when cues for appropriate quad set and placed over bolster.  Initiated alter-G anti gravity treadmill at 40% body weight to assist in normalizing gait mechanics within protocol weight bearing precautions. Gait analysis feature of alter-G revealed decreased weight bearing and shorter step length on surgical leg. Able to partially correct with cues to try and maintain 50/50. Overall progressing well at this stage should continue to improve as quad strength and ROM improves      Karla Is progressing well towards her goals.   Pt prognosis is Excellent.     Pt will continue to benefit from skilled outpatient physical therapy to address the deficits listed in the problem list box on initial evaluation, provide pt/family education and to maximize pt's level of independence in the home and community environment.     Pt's spiritual, cultural and educational needs considered and pt agreeable to plan of care and goals.     Anticipated barriers to physical therapy: none    Post-op Knee   Short Term Goals ( 4 Weeks ):   1. Pt will report reduced pain by 20 to 40% or greater for improved mobility, sleep  and ambulation.   2. Patients knee edema reduced by 1/4 to 1/2 inch or greater to enhance flexion ROM and knee comfort.   3. Pt to report minimal to no pain to palpation for improved level of comfort.   4. Pt to demonstrate symmetrical weight bearing w/o increased pain for stability and functional ambulation .  5. Pts neuromuscular response will be enhanced for unilateral standing stability and balance   6. Pt to achieve 90 degrees of passive knee flexion for restoring functional knee mobility, ambulating with proper gait pattern and sit to stand ability.   7 Pt to report understanding of all instruction pertinent to patient safety , gait instruction and HEP.    8. Pt to exhibit correct return demonstration of exercises for self-management and independence with HEP     Long Term Goals (32 Weeks):  1. Pt will demonstrate increased knee flexion AROM to 120 degrees or greater for return to  functional activity  2. Pt will demonstrate increased knee extension AROM to 0 degrees for standing stability   3. Pt will demonstrate increased RLE strength by 1/2 to 1 grade for improved functional stability  4. Pt to demonstrate standing stability unsupported on dynamic surfaces for functional return to ADL and recreational activities.   5.The patient will be independent amb with no assistive device on all surfaces for community distances.  6. Pt to demonstrate independence with HEP for self management  7. Patient will return to exercising in gym for fitness and recreation 3x/wk within 6 months.  8. Patient will improve FOTO score to </= 15% limited to decrease perceived limitation with mobility.    Plan     Cont POC    ERIKA TOLLIVER, PT, DPT, SCS

## 2023-07-13 ENCOUNTER — CLINICAL SUPPORT (OUTPATIENT)
Dept: REHABILITATION | Facility: HOSPITAL | Age: 23
End: 2023-07-13
Payer: COMMERCIAL

## 2023-07-13 DIAGNOSIS — M25.662 KNEE STIFFNESS, LEFT: ICD-10-CM

## 2023-07-13 DIAGNOSIS — M25.562 ACUTE PAIN OF LEFT KNEE: Primary | ICD-10-CM

## 2023-07-13 PROCEDURE — 97112 NEUROMUSCULAR REEDUCATION: CPT | Mod: PN

## 2023-07-13 PROCEDURE — 97110 THERAPEUTIC EXERCISES: CPT | Mod: PN

## 2023-07-13 PROCEDURE — 97140 MANUAL THERAPY 1/> REGIONS: CPT | Mod: PN

## 2023-07-13 NOTE — PROGRESS NOTES
[facility]   Physical Therapy Treatment Note      Name: Karla Price  [facility] Number: [id number]    Therapy Diagnosis:   Encounter Diagnoses   Name Primary?    Acute pain of left knee Yes    Knee stiffness, left        Physician: Camila Holcomb MD    Visit Date: 7/13/2023    Physician Orders: PT Eval and Treat  Medical Diagnosis from Referral:   S83.512A (ICD-10-CM) - Rupture of anterior cruciate ligament of left knee, initial encounter   S83.242A (ICD-10-CM) - Tear of medial meniscus of left knee, current, unspecified tear type, initial encounter   S83.282A (ICD-10-CM) - Tear of lateral meniscus of left knee, current, unspecified tear type, initial encounter      Evaluation Date: 6/15/2023  Authorization Period Expiration: 12/31/23  Plan of Care Expiration: 3/29/24  Progress Note Due: 7/15/23  Visit # / Visits authorized: 22/40 (4 new auth)  FOTO: 1/3     Precautions: DOS 6/13/23  Procedure:  1. Left Knee Arthroscopy, anterior cruciate ligament reconstruction 26331  2.  Left Knee open lateral extra-articular tenodesis (modified Irma procedure)  3.  Left Knee Arthroscopy, with meniscus repair (medial AND lateral) 09645  4.  Left Knee open lateral collateral ligament repair  5.  Left Knee  arthroscopic loose body removal      vertical tear in the medial meniscus with 1 suture, vertical tear of lateral meniscus with 2 sutures     Post-Op Plan:  ACL reconstruction with quad tendon autograft and medial and lateral meniscal repairs.  Toe-touch weight-bearing for 2-4 weeks followed by partial and full weight-bearing by 6 weeks.  She will require a hinged brace for 6 weeks.  We will start a CPM with passive motion immediately.      PTA Visit #: 0/5     Time In: 5:02 pm  Time Out: 6:04 pm  Total Billable Time: 62 minutes    Subjective     Pt reports: saw Bernabe last week for my follow up and he said everything looks good. Said I can put 50% on my leg and slowly increase until 6 weeks  She was  compliant with home exercise program.  Response to previous treatment: improved ROM  Functional change: ongoing    Pain: 2/10  Location: left knee      Objective      Knee Passive Range of Motion:    Right  Left    Flexion WNL 90   Extension 10 hyper 10 hyper     Treatment     Karla received the treatments listed below:    therapeutic exercises to develop strength and ROM for 12 minutes including:  LLLD knee extension with heel prop 8 mins   Self assisted knee flexion at EOM x 30  Heel slides 3x10     manual therapy techniques: Joint mobilizations and Soft tissue Mobilization were applied to the: L knee for 10 minutes, including:  Dressing change  Extension hinge   Patellar mobs all directions   Fat pat mobilization  PROM flex EOM    neuromuscular re-education activities to improve: Kinesthetic and Proprioception for 25 minutes. The following activities were included:  BFR applied for the following, 80% OKC reps/sets 30/15/15/15  -Quad sets with strap hinge  -SAQ over 1/2 bolster   -supine SLR    - LAQ       Not today:  -S/L hip abd  Gait training with B axillary crutches     therapeutic activities to improve functional performance for 15 minutes, including:  Alter-G anti-gravity treadmill training @ 40% body weight speed 1.5-2.0    Patient Education and Home Exercises       Education provided:   - reviewed 5 post op goals, educated to do more quads sets for HEP    Written Home Exercises Provided: yes. Exercises were reviewed and Karla was able to demonstrate them prior to the end of the session.  Karla demonstrated good  understanding of the education provided. See EMR under Patient Instructions for exercises provided during therapy sessions    Assessment   Karla presented with continued good ROM and improvement in volitional quad control. Initially demo'd mild lag with SLR but able to correct when cues for appropriate quad set and placed over bolster. Intro'd BFR training to augment quad tone and hypertrophy with  good response. Tendency to lag towards end of sets          Karla Is progressing well towards her goals.   Pt prognosis is Excellent.     Pt will continue to benefit from skilled outpatient physical therapy to address the deficits listed in the problem list box on initial evaluation, provide pt/family education and to maximize pt's level of independence in the home and community environment.     Pt's spiritual, cultural and educational needs considered and pt agreeable to plan of care and goals.     Anticipated barriers to physical therapy: none    Post-op Knee   Short Term Goals ( 4 Weeks ):   1. Pt will report reduced pain by 20 to 40% or greater for improved mobility, sleep  and ambulation.   2. Patients knee edema reduced by 1/4 to 1/2 inch or greater to enhance flexion ROM and knee comfort.   3. Pt to report minimal to no pain to palpation for improved level of comfort.   4. Pt to demonstrate symmetrical weight bearing w/o increased pain for stability and functional ambulation .  5. Pts neuromuscular response will be enhanced for unilateral standing stability and balance   6. Pt to achieve 90 degrees of passive knee flexion for restoring functional knee mobility, ambulating with proper gait pattern and sit to stand ability.   7 Pt to report understanding of all instruction pertinent to patient safety , gait instruction and HEP.    8. Pt to exhibit correct return demonstration of exercises for self-management and independence with HEP     Long Term Goals (32 Weeks):  1. Pt will demonstrate increased knee flexion AROM to 120 degrees or greater for return to functional activity  2. Pt will demonstrate increased knee extension AROM to 0 degrees for standing stability   3. Pt will demonstrate increased RLE strength by 1/2 to 1 grade for improved functional stability  4. Pt to demonstrate standing stability unsupported on dynamic surfaces for functional return to ADL and recreational activities.   5.The patient will  be independent amb with no assistive device on all surfaces for community distances.  6. Pt to demonstrate independence with HEP for self management  7. Patient will return to exercising in gym for fitness and recreation 3x/wk within 6 months.  8. Patient will improve FOTO score to </= 15% limited to decrease perceived limitation with mobility.    Plan     Cont POC    ERIKA TOLLIVER, PT, DPT, SCS

## 2023-07-18 ENCOUNTER — CLINICAL SUPPORT (OUTPATIENT)
Dept: REHABILITATION | Facility: HOSPITAL | Age: 23
End: 2023-07-18
Payer: COMMERCIAL

## 2023-07-18 DIAGNOSIS — M25.662 KNEE STIFFNESS, LEFT: ICD-10-CM

## 2023-07-18 DIAGNOSIS — M25.562 ACUTE PAIN OF LEFT KNEE: Primary | ICD-10-CM

## 2023-07-18 PROCEDURE — 97110 THERAPEUTIC EXERCISES: CPT | Mod: PN

## 2023-07-18 PROCEDURE — 97112 NEUROMUSCULAR REEDUCATION: CPT | Mod: PN

## 2023-07-18 PROCEDURE — 97140 MANUAL THERAPY 1/> REGIONS: CPT | Mod: PN

## 2023-07-18 NOTE — PROGRESS NOTES
[facility]   Physical Therapy Treatment Note      Name: Karla Price  [facility] Number: [id number]    Therapy Diagnosis:   Encounter Diagnoses   Name Primary?    Acute pain of left knee Yes    Knee stiffness, left        Physician: Camila Holcomb MD    Visit Date: 7/18/2023    Physician Orders: PT Eval and Treat  Medical Diagnosis from Referral:   S83.512A (ICD-10-CM) - Rupture of anterior cruciate ligament of left knee, initial encounter   S83.242A (ICD-10-CM) - Tear of medial meniscus of left knee, current, unspecified tear type, initial encounter   S83.282A (ICD-10-CM) - Tear of lateral meniscus of left knee, current, unspecified tear type, initial encounter      Evaluation Date: 6/15/2023  Authorization Period Expiration: 12/31/23  Plan of Care Expiration: 3/29/24  Progress Note Due: 7/15/23  Visit # / Visits authorized: 23/40 (4 new auth)  FOTO: 1/3     Precautions: DOS 6/13/23  Procedure:  1. Left Knee Arthroscopy, anterior cruciate ligament reconstruction 89046  2.  Left Knee open lateral extra-articular tenodesis (modified Irma procedure)  3.  Left Knee Arthroscopy, with meniscus repair (medial AND lateral) 92155  4.  Left Knee open lateral collateral ligament repair  5.  Left Knee  arthroscopic loose body removal      vertical tear in the medial meniscus with 1 suture, vertical tear of lateral meniscus with 2 sutures     Post-Op Plan:  ACL reconstruction with quad tendon autograft and medial and lateral meniscal repairs.  Toe-touch weight-bearing for 2-4 weeks followed by partial and full weight-bearing by 6 weeks.  She will require a hinged brace for 6 weeks.  We will start a CPM with passive motion immediately.      PTA Visit #: 0/5     Time In: 5:20 pm  Time Out: 6:15 pm  Total Billable Time: 55 minutes    Subjective     Pt reports: she is feeling good with minimal pain. 50% WB is going well  She was compliant with home exercise program.  Response to previous treatment: improved  ROM  Functional change: ongoing    Pain: 2/10  Location: left knee      Objective    Week 5 as of 7/18    Knee Passive Range of Motion:    Right  Left    Flexion WNL 90   Extension 10 hyper 10 hyper     Treatment     Karla received the treatments listed below:    therapeutic exercises to develop strength and ROM for 15 minutes including:  LLLD knee extension with heel prop 8 mins   Self assisted knee flexion at EOM x 30   Heel slides 3x10     manual therapy techniques: Joint mobilizations and Soft tissue Mobilization were applied to the: L knee for 10 minutes, including:  Extension hinge   Patellar mobs all directions   Fat pat mobilization  PROM flex EOM    neuromuscular re-education activities to improve: Kinesthetic and Proprioception for 30 minutes. The following activities were included:  BFR applied for the following, 80% OKC reps/sets 30/15/15/15  -Quad sets with strap hinge  -SAQ over 1/2 bolster   -supine SLR    - LAQ     Weight shifting to scale ay 75% body weighty - 5 mins       Not today:  -S/L hip abd  Gait training with B axillary crutches     therapeutic activities to improve functional performance for 00 minutes, including:    Not today:  Alter-G anti-gravity treadmill training @ 40% body weight speed 1.5-2.0    Patient Education and Home Exercises       Education provided:   - reviewed 5 post op goals, educated to do more quads sets for HEP    Written Home Exercises Provided: yes. Exercises were reviewed and Karla was able to demonstrate them prior to the end of the session.  Karla demonstrated good  understanding of the education provided. See EMR under Patient Instructions for exercises provided during therapy sessions    Assessment   Karla presented with continued good ROM and improvement in volitional quad control. Still required cuing to perform full SLR. Flexion and extension ROM remain adequate per protocol. Today we allowed Karla to put 75% WB with brace donned and used scale for accuracy.  She had no pain or difficulty.     Karla Is progressing well towards her goals.   Pt prognosis is Excellent.     Pt will continue to benefit from skilled outpatient physical therapy to address the deficits listed in the problem list box on initial evaluation, provide pt/family education and to maximize pt's level of independence in the home and community environment.     Pt's spiritual, cultural and educational needs considered and pt agreeable to plan of care and goals.     Anticipated barriers to physical therapy: none    Post-op Knee   Short Term Goals ( 4 Weeks ):   1. Pt will report reduced pain by 20 to 40% or greater for improved mobility, sleep  and ambulation.   2. Patients knee edema reduced by 1/4 to 1/2 inch or greater to enhance flexion ROM and knee comfort.   3. Pt to report minimal to no pain to palpation for improved level of comfort.   4. Pt to demonstrate symmetrical weight bearing w/o increased pain for stability and functional ambulation .  5. Pts neuromuscular response will be enhanced for unilateral standing stability and balance   6. Pt to achieve 90 degrees of passive knee flexion for restoring functional knee mobility, ambulating with proper gait pattern and sit to stand ability.   7 Pt to report understanding of all instruction pertinent to patient safety , gait instruction and HEP.    8. Pt to exhibit correct return demonstration of exercises for self-management and independence with HEP     Long Term Goals (32 Weeks):  1. Pt will demonstrate increased knee flexion AROM to 120 degrees or greater for return to functional activity  2. Pt will demonstrate increased knee extension AROM to 0 degrees for standing stability   3. Pt will demonstrate increased RLE strength by 1/2 to 1 grade for improved functional stability  4. Pt to demonstrate standing stability unsupported on dynamic surfaces for functional return to ADL and recreational activities.   5.The patient will be independent amb with  no assistive device on all surfaces for community distances.  6. Pt to demonstrate independence with HEP for self management  7. Patient will return to exercising in gym for fitness and recreation 3x/wk within 6 months.  8. Patient will improve FOTO score to </= 15% limited to decrease perceived limitation with mobility.    Plan     Cont POC    Vernon Hart, PT, DPT

## 2023-07-20 ENCOUNTER — CLINICAL SUPPORT (OUTPATIENT)
Dept: REHABILITATION | Facility: HOSPITAL | Age: 23
End: 2023-07-20
Payer: COMMERCIAL

## 2023-07-20 DIAGNOSIS — M25.662 KNEE STIFFNESS, LEFT: ICD-10-CM

## 2023-07-20 DIAGNOSIS — M25.562 ACUTE PAIN OF LEFT KNEE: Primary | ICD-10-CM

## 2023-07-20 PROCEDURE — 97112 NEUROMUSCULAR REEDUCATION: CPT | Mod: PN

## 2023-07-20 PROCEDURE — 97140 MANUAL THERAPY 1/> REGIONS: CPT | Mod: PN

## 2023-07-20 PROCEDURE — 97110 THERAPEUTIC EXERCISES: CPT | Mod: PN

## 2023-07-20 NOTE — PROGRESS NOTES
[facility]   Physical Therapy Treatment Note      Name: Karla Price  [facility] Number: [id number]    Therapy Diagnosis:   Encounter Diagnoses   Name Primary?    Acute pain of left knee Yes    Knee stiffness, left          Physician: Camila Holcomb MD    Visit Date: 7/20/2023    Physician Orders: PT Eval and Treat  Medical Diagnosis from Referral:   S83.512A (ICD-10-CM) - Rupture of anterior cruciate ligament of left knee, initial encounter   S83.242A (ICD-10-CM) - Tear of medial meniscus of left knee, current, unspecified tear type, initial encounter   S83.282A (ICD-10-CM) - Tear of lateral meniscus of left knee, current, unspecified tear type, initial encounter      Evaluation Date: 6/15/2023  Authorization Period Expiration: 12/31/23  Plan of Care Expiration: 3/29/24  Progress Note Due: 7/15/23  Visit # / Visits authorized: 24/40 (4 new auth)  FOTO: 1/3     Precautions: DOS 6/13/23  Procedure:  1. Left Knee Arthroscopy, anterior cruciate ligament reconstruction 04466  2.  Left Knee open lateral extra-articular tenodesis (modified Irma procedure)  3.  Left Knee Arthroscopy, with meniscus repair (medial AND lateral) 58713  4.  Left Knee open lateral collateral ligament repair  5.  Left Knee  arthroscopic loose body removal      vertical tear in the medial meniscus with 1 suture, vertical tear of lateral meniscus with 2 sutures     Post-Op Plan:  ACL reconstruction with quad tendon autograft and medial and lateral meniscal repairs.  Toe-touch weight-bearing for 2-4 weeks followed by partial and full weight-bearing by 6 weeks.  She will require a hinged brace for 6 weeks.  We will start a CPM with passive motion immediately.      PTA Visit #: 0/5     Time In: 5:05 pm  Time Out: 6:15 pm  Total Billable Time: 70 minutes    Subjective     Pt reports: good no complaints at all  She was compliant with home exercise program.  Response to previous treatment: improved ROM  Functional change:  ongoing    Pain: 2/10  Location: left knee      Objective    Week 5 as of 7/18    Knee Passive Range of Motion:    Right  Left    Flexion WNL 90   Extension 10 hyper 10 hyper     Treatment     Karla received the treatments listed below:    therapeutic exercises to develop strength and ROM for 15 minutes including:  LLLD knee extension with heel prop 8 mins   Self assisted knee flexion at EOM x 30   Heel slides 3x10     manual therapy techniques: Joint mobilizations and Soft tissue Mobilization were applied to the: L knee for 10 minutes, including:  Extension hinge   Patellar mobs all directions   Fat pat mobilization  PROM flex EOM    neuromuscular re-education activities to improve: Kinesthetic and Proprioception for 30 minutes. The following activities were included:  BFR applied for the following, 80% OKC reps/sets 30/15/15/15  -Quad sets with strap hinge  -SAQ over 1/2 bolster   -supine SLR    - LAQ     Gait training: step to gait pattern, reciprocal gait pattern x 4 laps each  Retro gait x 4 laps    Not today:  -S/L hip abd  Gait training with B axillary crutches     therapeutic activities to improve functional performance for 00 minutes, including:    Not today:  Alter-G anti-gravity treadmill training @ 40% body weight speed 1.5-2.0    Patient Education and Home Exercises       Education provided:   - reviewed 5 post op goals, educated to do more quads sets for HEP    Written Home Exercises Provided: yes. Exercises were reviewed and Karla was able to demonstrate them prior to the end of the session.  Karla demonstrated good  understanding of the education provided. See EMR under Patient Instructions for exercises provided during therapy sessions    Assessment   Karla presented with continued good ROM and improvement in volitional quad control. Able to perform 30 SLR with no lag demonstrating adequate quad control to begin ambulating with extension brace unlocked prior to upcoming 6 week MD appointment per  protocol. Educatd on if she develops increased pain or swelling to revert back to extension brace locked. Will continue to progress as tolerated    Karla Is progressing well towards her goals.   Pt prognosis is Excellent.     Pt will continue to benefit from skilled outpatient physical therapy to address the deficits listed in the problem list box on initial evaluation, provide pt/family education and to maximize pt's level of independence in the home and community environment.     Pt's spiritual, cultural and educational needs considered and pt agreeable to plan of care and goals.     Anticipated barriers to physical therapy: none    Post-op Knee   Short Term Goals ( 4 Weeks ):   1. Pt will report reduced pain by 20 to 40% or greater for improved mobility, sleep  and ambulation.   2. Patients knee edema reduced by 1/4 to 1/2 inch or greater to enhance flexion ROM and knee comfort.   3. Pt to report minimal to no pain to palpation for improved level of comfort.   4. Pt to demonstrate symmetrical weight bearing w/o increased pain for stability and functional ambulation .  5. Pts neuromuscular response will be enhanced for unilateral standing stability and balance   6. Pt to achieve 90 degrees of passive knee flexion for restoring functional knee mobility, ambulating with proper gait pattern and sit to stand ability.   7 Pt to report understanding of all instruction pertinent to patient safety , gait instruction and HEP.    8. Pt to exhibit correct return demonstration of exercises for self-management and independence with HEP     Long Term Goals (32 Weeks):  1. Pt will demonstrate increased knee flexion AROM to 120 degrees or greater for return to functional activity  2. Pt will demonstrate increased knee extension AROM to 0 degrees for standing stability   3. Pt will demonstrate increased RLE strength by 1/2 to 1 grade for improved functional stability  4. Pt to demonstrate standing stability unsupported on dynamic  surfaces for functional return to ADL and recreational activities.   5.The patient will be independent amb with no assistive device on all surfaces for community distances.  6. Pt to demonstrate independence with HEP for self management  7. Patient will return to exercising in gym for fitness and recreation 3x/wk within 6 months.  8. Patient will improve FOTO score to </= 15% limited to decrease perceived limitation with mobility.    Plan     Cont POC    ERIKA TOLLIVER, PT, DPT, SCS

## 2023-07-25 ENCOUNTER — PATIENT MESSAGE (OUTPATIENT)
Dept: SPORTS MEDICINE | Facility: CLINIC | Age: 23
End: 2023-07-25
Payer: COMMERCIAL

## 2023-07-25 ENCOUNTER — CLINICAL SUPPORT (OUTPATIENT)
Dept: REHABILITATION | Facility: HOSPITAL | Age: 23
End: 2023-07-25
Payer: COMMERCIAL

## 2023-07-25 DIAGNOSIS — M25.662 KNEE STIFFNESS, LEFT: ICD-10-CM

## 2023-07-25 DIAGNOSIS — M25.562 ACUTE PAIN OF LEFT KNEE: Primary | ICD-10-CM

## 2023-07-25 PROCEDURE — 97112 NEUROMUSCULAR REEDUCATION: CPT | Mod: PN

## 2023-07-25 PROCEDURE — 97110 THERAPEUTIC EXERCISES: CPT | Mod: PN

## 2023-07-25 PROCEDURE — 97140 MANUAL THERAPY 1/> REGIONS: CPT | Mod: PN

## 2023-07-25 NOTE — PROGRESS NOTES
[facility]   Physical Therapy Treatment Note      Name: Karla Price  [facility] Number: [id number]    Therapy Diagnosis:   Encounter Diagnoses   Name Primary?    Acute pain of left knee Yes    Knee stiffness, left            Physician: Camila Holcomb MD    Visit Date: 7/25/2023    Physician Orders: PT Eval and Treat  Medical Diagnosis from Referral:   S83.512A (ICD-10-CM) - Rupture of anterior cruciate ligament of left knee, initial encounter   S83.242A (ICD-10-CM) - Tear of medial meniscus of left knee, current, unspecified tear type, initial encounter   S83.282A (ICD-10-CM) - Tear of lateral meniscus of left knee, current, unspecified tear type, initial encounter      Evaluation Date: 6/15/2023  Authorization Period Expiration: 12/31/23  Plan of Care Expiration: 3/29/24  Progress Note Due: 7/15/23  Visit # / Visits authorized: 25/40 (4 new auth)  FOTO: 1/3     Precautions: DOS 6/13/23  Procedure:  1. Left Knee Arthroscopy, anterior cruciate ligament reconstruction 57875  2.  Left Knee open lateral extra-articular tenodesis (modified Irma procedure)  3.  Left Knee Arthroscopy, with meniscus repair (medial AND lateral) 40476  4.  Left Knee open lateral collateral ligament repair  5.  Left Knee  arthroscopic loose body removal      vertical tear in the medial meniscus with 1 suture, vertical tear of lateral meniscus with 2 sutures     Post-Op Plan:  ACL reconstruction with quad tendon autograft and medial and lateral meniscal repairs.  Toe-touch weight-bearing for 2-4 weeks followed by partial and full weight-bearing by 6 weeks.  She will require a hinged brace for 6 weeks.  We will start a CPM with passive motion immediately.      PTA Visit #: 0/5     Time In: 5:25 pm  Time Out: 6:20 pm  Total Billable Time: 55 minutes    Subjective     Pt reports: good no complaints at all  She was compliant with home exercise program.  Response to previous treatment: improved ROM  Functional change:  ongoing    Pain: 2/10  Location: left knee      Objective    Week 5 as of 7/18    Knee Passive Range of Motion:    Right  Left    Flexion WNL 90   Extension 10 hyper 10 hyper     Treatment     Karla received the treatments listed below:    therapeutic exercises to develop strength and ROM for 15 minutes including:  LLLD knee extension with heel prop 8 mins   Self assisted knee flexion at EOM x 30   Heel slides 3x10     manual therapy techniques: Joint mobilizations and Soft tissue Mobilization were applied to the: L knee for 10 minutes, including:  Extension hinge   Patellar mobs all directions   Fat pat mobilization  PROM flex EOM    neuromuscular re-education activities to improve: Kinesthetic and Proprioception for 30 minutes. The following activities were included:  BFR applied for the following, 80% OKC reps/sets 30/15/15/15  -Quad sets with strap hinge  -SAQ over 1/2 bolster   -supine SLR    - LAQ     Standing TKE orange monster band  Retro gait x 4 laps  Clamshells 3x10 GTB     Not today:  -S/L hip abd  Gait training with B axillary crutches     therapeutic activities to improve functional performance for 00 minutes, including:    Not today:  Alter-G anti-gravity treadmill training @ 40% body weight speed 1.5-2.0    Patient Education and Home Exercises       Education provided:   - reviewed 5 post op goals, educated to do more quads sets for HEP    Written Home Exercises Provided: yes. Exercises were reviewed and Karla was able to demonstrate them prior to the end of the session.  Karla demonstrated good  understanding of the education provided. See EMR under Patient Instructions for exercises provided during therapy sessions    Assessment   Karla is doing well with brace unlocked and no AD and arrives with no increased pain or stiffness. She continues to be challenged by BFR. Quad strength is still reduced but is steadily improving as she is able to perform an upright SLR with no lag. Sees MD tomorrow and we  will progress accordingly.     Karla Is progressing well towards her goals.   Pt prognosis is Excellent.     Pt will continue to benefit from skilled outpatient physical therapy to address the deficits listed in the problem list box on initial evaluation, provide pt/family education and to maximize pt's level of independence in the home and community environment.     Pt's spiritual, cultural and educational needs considered and pt agreeable to plan of care and goals.     Anticipated barriers to physical therapy: none    Post-op Knee   Short Term Goals ( 4 Weeks ):   1. Pt will report reduced pain by 20 to 40% or greater for improved mobility, sleep  and ambulation.   2. Patients knee edema reduced by 1/4 to 1/2 inch or greater to enhance flexion ROM and knee comfort.   3. Pt to report minimal to no pain to palpation for improved level of comfort.   4. Pt to demonstrate symmetrical weight bearing w/o increased pain for stability and functional ambulation .  5. Pts neuromuscular response will be enhanced for unilateral standing stability and balance   6. Pt to achieve 90 degrees of passive knee flexion for restoring functional knee mobility, ambulating with proper gait pattern and sit to stand ability.   7 Pt to report understanding of all instruction pertinent to patient safety , gait instruction and HEP.    8. Pt to exhibit correct return demonstration of exercises for self-management and independence with HEP     Long Term Goals (32 Weeks):  1. Pt will demonstrate increased knee flexion AROM to 120 degrees or greater for return to functional activity  2. Pt will demonstrate increased knee extension AROM to 0 degrees for standing stability   3. Pt will demonstrate increased RLE strength by 1/2 to 1 grade for improved functional stability  4. Pt to demonstrate standing stability unsupported on dynamic surfaces for functional return to ADL and recreational activities.   5.The patient will be independent amb with no  assistive device on all surfaces for community distances.  6. Pt to demonstrate independence with HEP for self management  7. Patient will return to exercising in gym for fitness and recreation 3x/wk within 6 months.  8. Patient will improve FOTO score to </= 15% limited to decrease perceived limitation with mobility.    Plan     Cont POC    Vernon Hart, PT, DPT

## 2023-07-26 ENCOUNTER — OFFICE VISIT (OUTPATIENT)
Dept: SPORTS MEDICINE | Facility: CLINIC | Age: 23
End: 2023-07-26
Payer: COMMERCIAL

## 2023-07-26 VITALS
WEIGHT: 210 LBS | DIASTOLIC BLOOD PRESSURE: 87 MMHG | HEART RATE: 86 BPM | SYSTOLIC BLOOD PRESSURE: 128 MMHG | BODY MASS INDEX: 39.65 KG/M2 | HEIGHT: 61 IN

## 2023-07-26 DIAGNOSIS — Z98.890 S/P ACL RECONSTRUCTION: Primary | ICD-10-CM

## 2023-07-26 PROCEDURE — 3008F BODY MASS INDEX DOCD: CPT | Mod: CPTII,S$GLB,, | Performed by: ORTHOPAEDIC SURGERY

## 2023-07-26 PROCEDURE — 99999 PR PBB SHADOW E&M-EST. PATIENT-LVL III: ICD-10-PCS | Mod: PBBFAC,,, | Performed by: ORTHOPAEDIC SURGERY

## 2023-07-26 PROCEDURE — 1159F PR MEDICATION LIST DOCUMENTED IN MEDICAL RECORD: ICD-10-PCS | Mod: CPTII,S$GLB,, | Performed by: ORTHOPAEDIC SURGERY

## 2023-07-26 PROCEDURE — 3074F SYST BP LT 130 MM HG: CPT | Mod: CPTII,S$GLB,, | Performed by: ORTHOPAEDIC SURGERY

## 2023-07-26 PROCEDURE — 3074F PR MOST RECENT SYSTOLIC BLOOD PRESSURE < 130 MM HG: ICD-10-PCS | Mod: CPTII,S$GLB,, | Performed by: ORTHOPAEDIC SURGERY

## 2023-07-26 PROCEDURE — 3008F PR BODY MASS INDEX (BMI) DOCUMENTED: ICD-10-PCS | Mod: CPTII,S$GLB,, | Performed by: ORTHOPAEDIC SURGERY

## 2023-07-26 PROCEDURE — 99024 PR POST-OP FOLLOW-UP VISIT: ICD-10-PCS | Mod: S$GLB,,, | Performed by: ORTHOPAEDIC SURGERY

## 2023-07-26 PROCEDURE — 3044F PR MOST RECENT HEMOGLOBIN A1C LEVEL <7.0%: ICD-10-PCS | Mod: CPTII,S$GLB,, | Performed by: ORTHOPAEDIC SURGERY

## 2023-07-26 PROCEDURE — 99999 PR PBB SHADOW E&M-EST. PATIENT-LVL III: CPT | Mod: PBBFAC,,, | Performed by: ORTHOPAEDIC SURGERY

## 2023-07-26 PROCEDURE — 3044F HG A1C LEVEL LT 7.0%: CPT | Mod: CPTII,S$GLB,, | Performed by: ORTHOPAEDIC SURGERY

## 2023-07-26 PROCEDURE — 3079F PR MOST RECENT DIASTOLIC BLOOD PRESSURE 80-89 MM HG: ICD-10-PCS | Mod: CPTII,S$GLB,, | Performed by: ORTHOPAEDIC SURGERY

## 2023-07-26 PROCEDURE — 99024 POSTOP FOLLOW-UP VISIT: CPT | Mod: S$GLB,,, | Performed by: ORTHOPAEDIC SURGERY

## 2023-07-26 PROCEDURE — 3079F DIAST BP 80-89 MM HG: CPT | Mod: CPTII,S$GLB,, | Performed by: ORTHOPAEDIC SURGERY

## 2023-07-26 PROCEDURE — 1159F MED LIST DOCD IN RCRD: CPT | Mod: CPTII,S$GLB,, | Performed by: ORTHOPAEDIC SURGERY

## 2023-07-26 NOTE — PROGRESS NOTES
"POST-OPERATIVE EXAMINATION    22 y.o. Female who returns for follow after surgery. She is 6 weeks s/p:    Procedure:  1.  Left Knee Arthroscopy, anterior cruciate ligament reconstruction 84183  2.  Left Knee open lateral extra-articular tenodesis (modified Irma procedure)  3.  Left Knee Arthroscopy, with meniscus repair (medial AND lateral) 55851  4.  Left Knee open lateral collateral ligament repair  5.  Left Knee  arthroscopic loose body removal      She is doing well without any issues.       PHYSICAL EXAMINATION:  /87   Pulse 86   Ht 5' 1" (1.549 m)   Wt 95.3 kg (210 lb)   BMI 39.68 kg/m²   General: Well-developed well-nourished 22 y.o. female in no acute distress   Cardiovascular: Regular rhythm   Lungs: No labored breathing or wheezing appreciated   Neuro: Alert and oriented ×3   Psychiatric: well oriented to person, place and time, demonstrates normal mood and affect   Skin: No rashes, lesions or ulcers, normal temperature, turgor, and texture on involved extremity    ORTHOPEDIC EXAM:  Normal post-operative swelling  Normal post-operative scarring  Strength: WNL  ROM: 0-115  Tests: None    ASSESSMENT:      ICD-10-CM ICD-9-CM   1. S/P ACL reconstruction  Z98.890 V45.89         PLAN:       Continue physical therapy  RTC in 2 months                  "

## 2023-07-27 ENCOUNTER — CLINICAL SUPPORT (OUTPATIENT)
Dept: REHABILITATION | Facility: HOSPITAL | Age: 23
End: 2023-07-27
Payer: COMMERCIAL

## 2023-07-27 DIAGNOSIS — M25.562 ACUTE PAIN OF LEFT KNEE: Primary | ICD-10-CM

## 2023-07-27 DIAGNOSIS — M25.662 KNEE STIFFNESS, LEFT: ICD-10-CM

## 2023-07-27 PROCEDURE — 97112 NEUROMUSCULAR REEDUCATION: CPT | Mod: PN

## 2023-07-27 PROCEDURE — 97110 THERAPEUTIC EXERCISES: CPT | Mod: PN

## 2023-07-27 PROCEDURE — 97140 MANUAL THERAPY 1/> REGIONS: CPT | Mod: PN

## 2023-07-27 PROCEDURE — 97530 THERAPEUTIC ACTIVITIES: CPT | Mod: PN

## 2023-07-27 NOTE — PROGRESS NOTES
[facility]   Physical Therapy Treatment Note      Name: Karla Price  [facility] Number: [id number]    Therapy Diagnosis:   Encounter Diagnoses   Name Primary?    Acute pain of left knee Yes    Knee stiffness, left            Physician: Camila Holcomb MD    Visit Date: 7/27/2023    Physician Orders: PT Eval and Treat  Medical Diagnosis from Referral:   S83.512A (ICD-10-CM) - Rupture of anterior cruciate ligament of left knee, initial encounter   S83.242A (ICD-10-CM) - Tear of medial meniscus of left knee, current, unspecified tear type, initial encounter   S83.282A (ICD-10-CM) - Tear of lateral meniscus of left knee, current, unspecified tear type, initial encounter      Evaluation Date: 6/15/2023  Authorization Period Expiration: 12/31/23  Plan of Care Expiration: 3/29/24  Progress Note Due: 7/15/23  Visit # / Visits authorized: 26/40 (4 new auth)  FOTO: 1/3     Precautions: DOS 6/13/23  Procedure:  1. Left Knee Arthroscopy, anterior cruciate ligament reconstruction 14370  2.  Left Knee open lateral extra-articular tenodesis (modified Irma procedure)  3.  Left Knee Arthroscopy, with meniscus repair (medial AND lateral) 10542  4.  Left Knee open lateral collateral ligament repair  5.  Left Knee  arthroscopic loose body removal      vertical tear in the medial meniscus with 1 suture, vertical tear of lateral meniscus with 2 sutures     Post-Op Plan:  ACL reconstruction with quad tendon autograft and medial and lateral meniscal repairs.  Toe-touch weight-bearing for 2-4 weeks followed by partial and full weight-bearing by 6 weeks.  She will require a hinged brace for 6 weeks.  We will start a CPM with passive motion immediately.      PTA Visit #: 0/5     Time In: 5:10 pm  Time Out: 6:12 pm  Total Billable Time: 62 minutes    Subjective     Pt reports: Saw MD and received a good report. MD removed brace and told her to continue what she's doing  She was compliant with home exercise  program.  Response to previous treatment: improved ROM  Functional change: ongoing    Pain: 0/10  Location: left knee      Objective    Week 6 as of 7/27    Knee Passive Range of Motion:    Right  Left    Flexion    Extension 10 hyper 10 hyper     Treatment     Karla received the treatments listed below:    therapeutic exercises to develop strength and ROM for 08 minutes including:  Heel slides 3x10   Sidestepping with GTB knees - 2 laps     Not performed today  LLLD knee extension with heel prop 8 mins     manual therapy techniques: Joint mobilizations and Soft tissue Mobilization were applied to the: L knee for 08 minutes, including:  Extension hinge   Patellar mobs all directions   Fat pat mobilization  PROM flex EOM    neuromuscular re-education activities to improve: Kinesthetic and Proprioception for 23 minutes. The following activities were included:  BFR applied for the following, 80% OKC reps/sets 30/15/15/15  - LAQs 5#   - supine SLR        Not today:  Standing TKE orange monster band  Retro gait x 4 laps  Clamshells 3x10 GTB   -S/L hip abd     therapeutic activities to improve functional performance for 23 minutes, including:  Bike half revolutions 8 mins (able to achieve full revolutions in the last 2 mins)  Squats to chair and foam pad - 3x8 (cuing given to shift weight to affected LE)   DL shuttle 87.5# 3x12  SL shuttle 37.5# 3x12    Patient Education and Home Exercises       Education provided:   - HEP, POC, answered patient questions      Written Home Exercises Provided: yes. Exercises were reviewed and Karla was able to demonstrate them prior to the end of the session.  Karla demonstrated good  understanding of the education provided. See EMR under Patient Instructions for exercises provided during therapy sessions    Assessment   Karla arrives today with no brace after her MD appointment. Initiated bike today. After 5-6 minutes of half revolutions she was able to perform full revolutions  forward with no pain. Also initiated CKC today. Pt attempts to offload quad during squats today which improved with cuing. Ended session with BFR focused on quad strengthening which was juan well but she was very fatigued after.     Karla Is progressing well towards her goals.   Pt prognosis is Excellent.     Pt will continue to benefit from skilled outpatient physical therapy to address the deficits listed in the problem list box on initial evaluation, provide pt/family education and to maximize pt's level of independence in the home and community environment.     Pt's spiritual, cultural and educational needs considered and pt agreeable to plan of care and goals.     Anticipated barriers to physical therapy: none    Post-op Knee   Short Term Goals ( 4 Weeks ):   1. Pt will report reduced pain by 20 to 40% or greater for improved mobility, sleep  and ambulation.   2. Patients knee edema reduced by 1/4 to 1/2 inch or greater to enhance flexion ROM and knee comfort.   3. Pt to report minimal to no pain to palpation for improved level of comfort.   4. Pt to demonstrate symmetrical weight bearing w/o increased pain for stability and functional ambulation .  5. Pts neuromuscular response will be enhanced for unilateral standing stability and balance   6. Pt to achieve 90 degrees of passive knee flexion for restoring functional knee mobility, ambulating with proper gait pattern and sit to stand ability.   7 Pt to report understanding of all instruction pertinent to patient safety , gait instruction and HEP.    8. Pt to exhibit correct return demonstration of exercises for self-management and independence with HEP     Long Term Goals (32 Weeks):  1. Pt will demonstrate increased knee flexion AROM to 120 degrees or greater for return to functional activity  2. Pt will demonstrate increased knee extension AROM to 0 degrees for standing stability   3. Pt will demonstrate increased RLE strength by 1/2 to 1 grade for improved  functional stability  4. Pt to demonstrate standing stability unsupported on dynamic surfaces for functional return to ADL and recreational activities.   5.The patient will be independent amb with no assistive device on all surfaces for community distances.  6. Pt to demonstrate independence with HEP for self management  7. Patient will return to exercising in gym for fitness and recreation 3x/wk within 6 months.  8. Patient will improve FOTO score to </= 15% limited to decrease perceived limitation with mobility.    Plan     Cont POC    Vernon Hart, PT, DPT

## 2023-08-01 ENCOUNTER — CLINICAL SUPPORT (OUTPATIENT)
Dept: REHABILITATION | Facility: HOSPITAL | Age: 23
End: 2023-08-01
Payer: COMMERCIAL

## 2023-08-01 DIAGNOSIS — M25.662 KNEE STIFFNESS, LEFT: ICD-10-CM

## 2023-08-01 DIAGNOSIS — M25.562 ACUTE PAIN OF LEFT KNEE: Primary | ICD-10-CM

## 2023-08-01 PROCEDURE — 97110 THERAPEUTIC EXERCISES: CPT | Mod: PN

## 2023-08-01 PROCEDURE — 97112 NEUROMUSCULAR REEDUCATION: CPT | Mod: PN

## 2023-08-01 PROCEDURE — 97530 THERAPEUTIC ACTIVITIES: CPT | Mod: PN

## 2023-08-01 NOTE — PROGRESS NOTES
[facility]   Physical Therapy Treatment Note      Name: Karla Price  [facility] Number: [id number]    Therapy Diagnosis:   Encounter Diagnoses   Name Primary?    Acute pain of left knee Yes    Knee stiffness, left        Physician: Camila Holcomb MD    Visit Date: 8/1/2023    Physician Orders: PT Eval and Treat  Medical Diagnosis from Referral:   S83.512A (ICD-10-CM) - Rupture of anterior cruciate ligament of left knee, initial encounter   S83.242A (ICD-10-CM) - Tear of medial meniscus of left knee, current, unspecified tear type, initial encounter   S83.282A (ICD-10-CM) - Tear of lateral meniscus of left knee, current, unspecified tear type, initial encounter      Evaluation Date: 6/15/2023  Authorization Period Expiration: 12/31/23  Plan of Care Expiration: 3/29/24  Progress Note Due: 7/15/23  Visit # / Visits authorized: 27/40 (4 new auth)  FOTO: 1/3     Precautions: DOS 6/13/23  Procedure:  1. Left Knee Arthroscopy, anterior cruciate ligament reconstruction 19094  2.  Left Knee open lateral extra-articular tenodesis (modified Irma procedure)  3.  Left Knee Arthroscopy, with meniscus repair (medial AND lateral) 05153  4.  Left Knee open lateral collateral ligament repair  5.  Left Knee  arthroscopic loose body removal      vertical tear in the medial meniscus with 1 suture, vertical tear of lateral meniscus with 2 sutures     Post-Op Plan:  ACL reconstruction with quad tendon autograft and medial and lateral meniscal repairs.  Toe-touch weight-bearing for 2-4 weeks followed by partial and full weight-bearing by 6 weeks.  She will require a hinged brace for 6 weeks.  We will start a CPM with passive motion immediately.      PTA Visit #: 0/5     Time In: 5:05 pm  Time Out: 6:15 pm  Total Billable Time: 70 minutes    Subjective     Pt reports: was sore for 1-2 days following prior session but otherwise no complaints  She was compliant with home exercise program.  Response to previous  "treatment: improved ROM  Functional change: ongoing    Pain: 0/10  Location: left knee      Objective    DOS 6/13: 7 weeks 0 days POD as of 8/1/23    Knee Passive Range of Motion:    Right  Left    Flexion    Extension 10 hyper 10 hyper     Treatment     Karla received the treatments listed below:    therapeutic exercises to develop strength and ROM for 08 minutes including:  Heel slides 3x10     Not performed today  LLLD knee extension with heel prop 8 mins     manual therapy techniques: Joint mobilizations and Soft tissue Mobilization were applied to the: L knee for 08 minutes, including:  Extension hinge   Patellar mobs all directions   Fat pat mobilization  PROM flex EOM    neuromuscular re-education activities to improve: Kinesthetic and Proprioception for 29 minutes. The following activities were included:  SLR 3x15  Resisted lateral steps GTB x 3 laps   DL shuttle 87.5# 3x10  SL shuttle 50# 3x10B  24" LLE bias box squat 3x10  6" forward step up 3x10          Not today:  BFR applied for the following, 80% OKC reps/sets 30/15/15/15  - LAQs 5#   - supine SLR    Standing TKE orange monster band  Retro gait x 4 laps  Clamshells 3x10 GTB   -S/L hip abd     therapeutic activities to improve functional performance for 25 minutes, including:  Stationary bike x 10' to improve CV endurance and tissue extensibility    45# sled push/pull x laps    Patient Education and Home Exercises       Education provided:   - HEP, POC, answered patient questions      Written Home Exercises Provided: yes. Exercises were reviewed and Karla was able to demonstrate them prior to the end of the session.  Karla demonstrated good  understanding of the education provided. See EMR under Patient Instructions for exercises provided during therapy sessions    Assessment   Karla completed session as noted above with continued progressions made to [facility] functional movement patterns. Initially demo'd compensatory weight shift to RLE during box " squats that improved with modification to LLE bias squat to increase loading to LLE. Sig muscle fasciculations noted with fwd step up 2/2 overall fatigue. Good response to treatment session but fatigues very easily. Will continue to progress as tolerated per protocol        Karla Is progressing well towards her goals.   Pt prognosis is Excellent.     Pt will continue to benefit from skilled outpatient physical therapy to address the deficits listed in the problem list box on initial evaluation, provide pt/family education and to maximize pt's level of independence in the home and community environment.     Pt's spiritual, cultural and educational needs considered and pt agreeable to plan of care and goals.     Anticipated barriers to physical therapy: none    Post-op Knee   Short Term Goals ( 4 Weeks ):   1. Pt will report reduced pain by 20 to 40% or greater for improved mobility, sleep  and ambulation.   2. Patients knee edema reduced by 1/4 to 1/2 inch or greater to enhance flexion ROM and knee comfort.   3. Pt to report minimal to no pain to palpation for improved level of comfort.   4. Pt to demonstrate symmetrical weight bearing w/o increased pain for stability and functional ambulation .  5. Pts neuromuscular response will be enhanced for unilateral standing stability and balance   6. Pt to achieve 90 degrees of passive knee flexion for restoring functional knee mobility, ambulating with proper gait pattern and sit to stand ability.   7 Pt to report understanding of all instruction pertinent to patient safety , gait instruction and HEP.    8. Pt to exhibit correct return demonstration of exercises for self-management and independence with HEP     Long Term Goals (32 Weeks):  1. Pt will demonstrate increased knee flexion AROM to 120 degrees or greater for return to functional activity  2. Pt will demonstrate increased knee extension AROM to 0 degrees for standing stability   3. Pt will demonstrate increased  RLE strength by 1/2 to 1 grade for improved functional stability  4. Pt to demonstrate standing stability unsupported on dynamic surfaces for functional return to ADL and recreational activities.   5.The patient will be independent amb with no assistive device on all surfaces for community distances.  6. Pt to demonstrate independence with HEP for self management  7. Patient will return to exercising in gym for fitness and recreation 3x/wk within 6 months.  8. Patient will improve FOTO score to </= 15% limited to decrease perceived limitation with mobility.    Plan     Cont POC    ERIKA TOLLIVER, PT, DPT

## 2023-08-03 ENCOUNTER — CLINICAL SUPPORT (OUTPATIENT)
Dept: REHABILITATION | Facility: HOSPITAL | Age: 23
End: 2023-08-03
Payer: COMMERCIAL

## 2023-08-03 DIAGNOSIS — M25.562 ACUTE PAIN OF LEFT KNEE: Primary | ICD-10-CM

## 2023-08-03 DIAGNOSIS — M25.662 KNEE STIFFNESS, LEFT: ICD-10-CM

## 2023-08-03 PROCEDURE — 97530 THERAPEUTIC ACTIVITIES: CPT | Mod: PN

## 2023-08-03 PROCEDURE — 97112 NEUROMUSCULAR REEDUCATION: CPT | Mod: PN

## 2023-08-03 PROCEDURE — 97110 THERAPEUTIC EXERCISES: CPT | Mod: PN

## 2023-08-03 NOTE — PROGRESS NOTES
[facility]   Physical Therapy Treatment Note      Name: Karla Price  [facility] Number: [id number]    Therapy Diagnosis:   Encounter Diagnoses   Name Primary?    Acute pain of left knee Yes    Knee stiffness, left          Physician: Camila Holcomb MD    Visit Date: 8/3/2023    Physician Orders: PT Eval and Treat  Medical Diagnosis from Referral:   S83.512A (ICD-10-CM) - Rupture of anterior cruciate ligament of left knee, initial encounter   S83.242A (ICD-10-CM) - Tear of medial meniscus of left knee, current, unspecified tear type, initial encounter   S83.282A (ICD-10-CM) - Tear of lateral meniscus of left knee, current, unspecified tear type, initial encounter      Evaluation Date: 6/15/2023  Authorization Period Expiration: 12/31/23  Plan of Care Expiration: 3/29/24  Progress Note Due: 7/15/23  Visit # / Visits authorized: 28/40 (4 new auth)  FOTO: 1/3     Precautions: DOS 6/13/23  Procedure:  1. Left Knee Arthroscopy, anterior cruciate ligament reconstruction 85047  2.  Left Knee open lateral extra-articular tenodesis (modified Irma procedure)  3.  Left Knee Arthroscopy, with meniscus repair (medial AND lateral) 27835  4.  Left Knee open lateral collateral ligament repair  5.  Left Knee  arthroscopic loose body removal      vertical tear in the medial meniscus with 1 suture, vertical tear of lateral meniscus with 2 sutures     Post-Op Plan:  ACL reconstruction with quad tendon autograft and medial and lateral meniscal repairs.  Toe-touch weight-bearing for 2-4 weeks followed by partial and full weight-bearing by 6 weeks.  She will require a hinged brace for 6 weeks.  We will start a CPM with passive motion immediately.      PTA Visit #: 0/5     Time In: 5:05 pm  Time Out: 6:15 pm  Total Billable Time: 70 minutes    Subjective     Pt reports: was sore for 1-2 days following prior session but otherwise no complaints  She was compliant with home exercise program.  Response to previous  "treatment: improved ROM  Functional change: ongoing    Pain: 0/10  Location: left knee      Objective    DOS 6/13: 7 weeks 2 days POD as of 8/3/23    Knee Passive Range of Motion:    Right  Left    Flexion    Extension 10 hyper 10 hyper     Treatment     Karla received the treatments listed below:    therapeutic exercises to develop strength and ROM for 10 minutes including:  Prone quad stretch 30s x5  Heel slides 3x10     Not performed today  LLLD knee extension with heel prop 8 mins     manual therapy techniques: Joint mobilizations and Soft tissue Mobilization were applied to the: L knee for 5 minutes, including:  Extension hinge   Patellar mobs all directions   Fat pat mobilization  PROM flex EOM    neuromuscular re-education activities to improve: Kinesthetic and Proprioception for 40 minutes. The following activities were included:  BFR applied for the following, 80% OKC reps/sets 30/15/15/15  -quad sets  -SLR  -LAQ  -DL shuttle 87.5# 3x10  -SL shuttle 50# 3x10B    24" LLE bias box squat 3x10  6" forward step up 3x10    Resisted lateral steps GTB x 3 laps       Not today:    - LAQs 5#   - supine SLR    Standing TKE orange monster band  Retro gait x 4 laps  Clamshells 3x10 GTB   -S/L hip abd     therapeutic activities to improve functional performance for 15 minutes, including:  Stationary bike x 10' to improve CV endurance and tissue extensibility  45# sled push/pull x laps    Patient Education and Home Exercises       Education provided:   - HEP, POC, answered patient questions      Written Home Exercises Provided: yes. Exercises were reviewed and Karla was able to demonstrate them prior to the end of the session.  Karla demonstrated good  understanding of the education provided. See EMR under Patient Instructions for exercises provided during therapy sessions    Assessment   Karla completed session as noted above with continued progressions made to [facility] functional movement patterns. Initially demo'd " compensatory weight shift to RLE during box squats that improved with modification to LLE bias squat to increase loading to LLE. Sig muscle fasciculations noted with fwd step up 2/2 overall fatigue. Good response to treatment session but fatigues very easily. Will continue to progress as tolerated per protocol        Karla Is progressing well towards her goals.   Pt prognosis is Excellent.     Pt will continue to benefit from skilled outpatient physical therapy to address the deficits listed in the problem list box on initial evaluation, provide pt/family education and to maximize pt's level of independence in the home and community environment.     Pt's spiritual, cultural and educational needs considered and pt agreeable to plan of care and goals.     Anticipated barriers to physical therapy: none    Post-op Knee   Short Term Goals ( 4 Weeks ):   1. Pt will report reduced pain by 20 to 40% or greater for improved mobility, sleep  and ambulation.   2. Patients knee edema reduced by 1/4 to 1/2 inch or greater to enhance flexion ROM and knee comfort.   3. Pt to report minimal to no pain to palpation for improved level of comfort.   4. Pt to demonstrate symmetrical weight bearing w/o increased pain for stability and functional ambulation .  5. Pts neuromuscular response will be enhanced for unilateral standing stability and balance   6. Pt to achieve 90 degrees of passive knee flexion for restoring functional knee mobility, ambulating with proper gait pattern and sit to stand ability.   7 Pt to report understanding of all instruction pertinent to patient safety , gait instruction and HEP.    8. Pt to exhibit correct return demonstration of exercises for self-management and independence with HEP     Long Term Goals (32 Weeks):  1. Pt will demonstrate increased knee flexion AROM to 120 degrees or greater for return to functional activity  2. Pt will demonstrate increased knee extension AROM to 0 degrees for standing  stability   3. Pt will demonstrate increased RLE strength by 1/2 to 1 grade for improved functional stability  4. Pt to demonstrate standing stability unsupported on dynamic surfaces for functional return to ADL and recreational activities.   5.The patient will be independent amb with no assistive device on all surfaces for community distances.  6. Pt to demonstrate independence with HEP for self management  7. Patient will return to exercising in gym for fitness and recreation 3x/wk within 6 months.  8. Patient will improve FOTO score to </= 15% limited to decrease perceived limitation with mobility.    Plan     Cont POC    ERIKA TOLLIVER, PT, DPT, SCS

## 2023-08-08 ENCOUNTER — OFFICE VISIT (OUTPATIENT)
Dept: ALLERGY | Facility: CLINIC | Age: 23
End: 2023-08-08
Payer: COMMERCIAL

## 2023-08-08 VITALS — HEIGHT: 61 IN | WEIGHT: 208.13 LBS | BODY MASS INDEX: 39.3 KG/M2

## 2023-08-08 DIAGNOSIS — Z91.018 HISTORY OF FOOD ALLERGY: ICD-10-CM

## 2023-08-08 DIAGNOSIS — T78.3XXD ANGIOEDEMA, SUBSEQUENT ENCOUNTER: ICD-10-CM

## 2023-08-08 DIAGNOSIS — L20.9 ATOPIC DERMATITIS, UNSPECIFIED TYPE: Primary | ICD-10-CM

## 2023-08-08 PROCEDURE — 1160F RVW MEDS BY RX/DR IN RCRD: CPT | Mod: CPTII,S$GLB,, | Performed by: STUDENT IN AN ORGANIZED HEALTH CARE EDUCATION/TRAINING PROGRAM

## 2023-08-08 PROCEDURE — 1159F MED LIST DOCD IN RCRD: CPT | Mod: CPTII,S$GLB,, | Performed by: STUDENT IN AN ORGANIZED HEALTH CARE EDUCATION/TRAINING PROGRAM

## 2023-08-08 PROCEDURE — 99213 PR OFFICE/OUTPT VISIT, EST, LEVL III, 20-29 MIN: ICD-10-PCS | Mod: S$GLB,,, | Performed by: STUDENT IN AN ORGANIZED HEALTH CARE EDUCATION/TRAINING PROGRAM

## 2023-08-08 PROCEDURE — 3044F HG A1C LEVEL LT 7.0%: CPT | Mod: CPTII,S$GLB,, | Performed by: STUDENT IN AN ORGANIZED HEALTH CARE EDUCATION/TRAINING PROGRAM

## 2023-08-08 PROCEDURE — 1159F PR MEDICATION LIST DOCUMENTED IN MEDICAL RECORD: ICD-10-PCS | Mod: CPTII,S$GLB,, | Performed by: STUDENT IN AN ORGANIZED HEALTH CARE EDUCATION/TRAINING PROGRAM

## 2023-08-08 PROCEDURE — 1160F PR REVIEW ALL MEDS BY PRESCRIBER/CLIN PHARMACIST DOCUMENTED: ICD-10-PCS | Mod: CPTII,S$GLB,, | Performed by: STUDENT IN AN ORGANIZED HEALTH CARE EDUCATION/TRAINING PROGRAM

## 2023-08-08 PROCEDURE — 99213 OFFICE O/P EST LOW 20 MIN: CPT | Mod: S$GLB,,, | Performed by: STUDENT IN AN ORGANIZED HEALTH CARE EDUCATION/TRAINING PROGRAM

## 2023-08-08 PROCEDURE — 99999 PR PBB SHADOW E&M-EST. PATIENT-LVL III: CPT | Mod: PBBFAC,,, | Performed by: STUDENT IN AN ORGANIZED HEALTH CARE EDUCATION/TRAINING PROGRAM

## 2023-08-08 PROCEDURE — 3008F PR BODY MASS INDEX (BMI) DOCUMENTED: ICD-10-PCS | Mod: CPTII,S$GLB,, | Performed by: STUDENT IN AN ORGANIZED HEALTH CARE EDUCATION/TRAINING PROGRAM

## 2023-08-08 PROCEDURE — 3044F PR MOST RECENT HEMOGLOBIN A1C LEVEL <7.0%: ICD-10-PCS | Mod: CPTII,S$GLB,, | Performed by: STUDENT IN AN ORGANIZED HEALTH CARE EDUCATION/TRAINING PROGRAM

## 2023-08-08 PROCEDURE — 3008F BODY MASS INDEX DOCD: CPT | Mod: CPTII,S$GLB,, | Performed by: STUDENT IN AN ORGANIZED HEALTH CARE EDUCATION/TRAINING PROGRAM

## 2023-08-08 PROCEDURE — 99999 PR PBB SHADOW E&M-EST. PATIENT-LVL III: ICD-10-PCS | Mod: PBBFAC,,, | Performed by: STUDENT IN AN ORGANIZED HEALTH CARE EDUCATION/TRAINING PROGRAM

## 2023-08-08 NOTE — PROGRESS NOTES
ALLERGY & IMMUNOLOGY CLINIC - FOLLOW UP     HISTORY OF PRESENT ILLNESS     Patient ID: Karla Price is a 22 y.o. female    CC: atopic dermatitis, history of angioedema, history of food allergy.    HPI: Karla Price is a 22 y.o. female following up for atopic dermatitis, angioedema, and history of food allergy.    Patient is here with her mother. History is from both the patient and her mother.     Eczema doig much better. Triamcinolone prn helps. Mom also thinks that her going in the sun less has helped. She used the triamcinolone for the first time in a while last week. No flares right now.     No swelling since last visit.     She tried peanut butter at home, and tolerated it.    No complaints today.      MEDICAL HISTORY     Vaccines:   Immunization History   Administered Date(s) Administered    COVID-19, MRNA, LN-S, PF (MODERNA FULL 0.5 ML DOSE) 04/09/2021, 05/07/2021, 01/04/2022    DTaP 02/12/2000, 04/03/2000, 06/12/2000, 06/18/2001, 12/23/2003    HPV 9-Valent 04/18/2022, 05/16/2022    Hepatitis A, Pediatric/Adolescent, 2 Dose 10/11/2010, 07/30/2015    Hepatitis B 06/12/2000, 09/29/2000, 03/20/2001    Hib-HbOC 02/12/2000, 04/03/2000, 06/12/2000, 03/20/2001    IPV 02/12/2000, 04/03/2000, 06/18/2001, 12/23/2003    Influenza - Quadrivalent - PF *Preferred* (6 months and older) 11/09/2002, 10/11/2010, 11/01/2018    Influenza - Trivalent (ADULT) 11/09/2002    Influenza - Trivalent - PF (ADULT) 10/11/2010    MMR 12/18/2000, 12/23/2003    Meningococcal B, OMV 10/13/2016, 04/19/2018    Meningococcal B, recombinant 07/12/2016, 10/13/2016    Meningococcal Conjugate (MCV4P) 12/28/2010, 07/12/2016    PPD Test 06/18/2001, 07/02/2002, 09/03/2004    Pneumococcal Conjugate - 7 Valent 09/29/2000, 12/18/2000, 03/20/2001    Tdap 12/28/2010    Varicella 12/18/2000, 10/11/2010     Medical Hx:   Patient Active Problem List   Diagnosis    Knee stiffness, left    Muscle weakness    Left knee pain    Left anterior cruciate ligament tear     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    Acute pain of left knee     Surgical Hx:   Past Surgical History:   Procedure Laterality Date    ARTHROSCOPY,KNEE,WITH MENISCUS REPAIR Left 6/13/2023    Procedure: ARTHROSCOPY,KNEE,WITH MENISCUS REPAIR;  Surgeon: Cecilio Payton MD;  Location: [facility] OR;  Service: Orthopedics;  Laterality: Left;    FIXATION OF TENDON Left 6/13/2023    Procedure: FIXATION, TENDON-LATERAL EXTRA ARTICULAR TENODESIS;  Surgeon: Cecilio Payton MD;  Location: [facility] OR;  Service: Orthopedics;  Laterality: Left;    RECONSTRUCTION OF LIGAMENT Left 6/13/2023    Procedure: RECONSTRUCTION, ACL-QUAD AUTO;  Surgeon: Cecilio Payton MD;  Location: [facility] OR;  Service: Orthopedics;  Laterality: Left;  REGIONAL BLOCK       Medications:   Current Outpatient Medications on File Prior to Visit   Medication Sig Dispense Refill    EPINEPHrine (EPIPEN) 0.3 mg/0.3 mL AtIn Inject 0.3 mLs (0.3 mg total) into the muscle as needed (for anaphylaxis). (Patient not taking: Reported on 6/9/2023) 1 each 0    [DISCONTINUED] aspirin (ECOTRIN) 81 MG EC tablet Take 1 tablet (81 mg total) by mouth once daily. 28 tablet 0    [DISCONTINUED] benzoyl peroxide (BP WASH) 10 % external wash Use daily as wash to affected areas. For boils. Rinse completely.  May bleach clothing (Patient not taking: Reported on 6/9/2023) 227 g 12    [DISCONTINUED] HYDROcodone-acetaminophen (NORCO) 7.5-325 mg per tablet Take 1 tablet by mouth every 6 (six) hours as needed for Pain. (Patient not taking: Reported on 7/26/2023) 20 tablet 0    [DISCONTINUED] naproxen (NAPROSYN) 500 MG tablet Take 500 mg by mouth 2 (two) times daily.       No current facility-administered medications on file prior to visit.     Drug Allergies:   Review of patient's allergies indicates:   Allergen Reactions    Nuts [tree nut] Hives     Social Hx:   Social History     Tobacco Use    Smoking status: Never    Smokeless tobacco: Never   Substance Use Topics  "   Alcohol use: No    Drug use: No     Additional History Obtained at Initial Visit:  H/o Eczema: endorses  H/o Rhinitis: denies     PHYSICAL EXAM     VS: Ht 5' 0.98" (1.549 m)   Wt 94.4 kg (208 lb 1.8 oz)   BMI 39.34 kg/m²   GENERAL: Alert, NAD, well-appearing  EYES: EOMI, no conjunctival injection, no discharge, no infraorbital shiners  LUNGS: normal effort, no increased WOB  DERM: no rashes, no skin breaks  NEURO: normal speech, normal gait, no facial asymmetry     LABORATORY STUDIES     Component      Latest Ref Rng & Units 5/19/2023 4/14/2023 3/14/2023   WBC      3.90 - 12.70 K/uL 15.18 (H) 9.17 13.97 (H)   RBC      4.00 - 5.40 M/uL 4.48 5.19 4.67   Hemoglobin      12.0 - 16.0 g/dL 13.8 15.7 14.0   Hematocrit      37.0 - 48.5 % 40.9 49.2 (H) 44.5   MCV      82 - 98 fL 91 95 95   MCH      27.0 - 31.0 pg 30.8 30.3 30.0   MCHC      32.0 - 36.0 g/dL 33.7 31.9 (L) 31.5 (L)   RDW      11.5 - 14.5 % 13.2 13.4 13.5   Platelets      150 - 450 K/uL 309 256 294   MPV      9.2 - 12.9 fL 11.3 11.9 12.5   Immature Granulocytes      0.0 - 0.5 % 0.9 (H) 0.5 0.9 (H)   Gran # (ANC)      1.8 - 7.7 K/uL 9.9 (H) 5.5 9.8 (H)   Immature Grans (Abs)      0.00 - 0.04 K/uL 0.14 (H) 0.05 (H) 0.12 (H)   Lymph #      1.0 - 4.8 K/uL 3.9 2.8 2.8   Mono #      0.3 - 1.0 K/uL 1.0 0.6 1.1 (H)   Eos #      0.0 - 0.5 K/uL 0.3 0.1 0.2   Baso #      0.00 - 0.20 K/uL 0.06 0.06 0.05   Differential Method       Automated Automated Automated   Sodium      136 - 145 mmol/L 139  141   Potassium      3.5 - 5.1 mmol/L 4.2  4.0   Chloride      95 - 110 mmol/L 109  109   CO2      23 - 29 mmol/L 18 (L)  23   Glucose      70 - 110 mg/dL 77  77   BUN      6 - 20 mg/dL 14  9   Creatinine      0.5 - 1.4 mg/dL 0.7  0.9   Calcium      8.7 - 10.5 mg/dL 9.3  9.5   PROTEIN TOTAL      6.0 - 8.4 g/dL 8.3  7.8   Albumin      3.5 - 5.2 g/dL 3.7  3.4 (L)   BILIRUBIN TOTAL      0.1 - 1.0 mg/dL 0.1  0.5   Alkaline Phosphatase      55 - 135 U/L 85  85   AST      10 - 40 U/L " 36  29   ALT      10 - 44 U/L 42  29   Hemoglobin A1C External      4.0 - 5.6 %   5.2   TSH      0.400 - 4.000 uIU/mL   1.641   Free T4      0.71 - 1.51 ng/dL   0.74     Pathologist interpretation peripheral blood smear 4/14/23:      Mild leukocytosis shows absolute and relative neutrophilia, favor   reactive. Red cells and platelets show no diagnostic morphologic abnormalities   on scanning.     Component      Latest Ref Rng & Units 5/19/2023 3/14/2023   Anti Sm Antibody      0.00 - 0.99 Ratio  0.09   Anti-Sm Interpretation      Negative  Negative   Anti-SSA Antibody      0.00 - 0.99 Ratio  0.06   Anti-SSA Interpretation      Negative  Negative   Anti-SSB Antibody      0.00 - 0.99 Ratio  0.06   Anti-SSB Interpretation      Negative  Negative   ds DNA Ab      Negative 1:10  Negative 1:10   Anti Sm/RNP Antibody      0.00 - 0.99 Ratio  0.10   Anti-Sm/RNP Interpretation      Negative  Negative   EDWINA Screen      Negative <1:80  Positive (A)   Sed Rate      0 - 36 mm/Hr  30   CRP      0.0 - 8.2 mg/L  31.8 (H)   EDWINA PATTERN 1        Speckled   EDWINA Titer 1        1:160   Complement (C-3)      50 - 180 mg/dL 137    Complement (C-4)      11 - 44 mg/dL 38       ALLERGEN TESTING     Immunocaps:   Component      Latest Ref Rng & Units 5/23/2023 5/23/2023 5/23/2023           2:30 PM  2:30 PM  2:30 PM   D. farinae      <0.10 kU/L   30.20 (H)   D. farinae Class         CLASS 4   Mite Dust Pteronyssinus IgE      <0.10 kU/L   21.50 (H)   D. pteronyssinus Class         CLASS 4   BERMUDA GRASS      <0.10 kU/L   <0.10   [place] Grass Class         CLASS 0   Kale Grass      <0.10 kU/L   <0.10   Kale Grass Class         CLASS 0   [place] IgE      <0.10 kU/L   <0.10   [place] Class         CLASS 0   Plantain      <0.10 kU/L   <0.10   English Plantain Class         CLASS 0   White Oak(Quercus alba) IgE      <0.10 kU/L   <0.10   [place], Class         CLASS 0   Pecan Woodland Tree      <0.10 kU/L   <0.10   Pecan, Class         CLASS 0    Marshelder IgE      <0.10 kU/L   <0.10   Marshelder Class         CLASS 0   Ragweed, Western IgE      <0.10 kU/L   <0.10   Ragweed, Western, Class         CLASS 0   Alternaria alternata      <0.10 kU/L   <0.10   Altern. alternata Class         CLASS 0   Aspergillus Fumigatus IgE      <0.10 kU/L   <0.10   A. fumigatus Class         CLASS 0   Cat Dander      <0.10 kU/L   <0.10   Cat Epithelium Class         CLASS 0   Cockroach, IgE      <0.10 kU/L CLASS 0/1 0.16 (H)    Dog Dander, IgE      <0.10 kU/L   0.53 (H)   Dog Dander Class         CLASS 1   WHITE AUDI TREE      <0.10 kU/L   <0.10   White Audi Class         CLASS 0   Silver Birch IgE      <0.10 kU/L   <0.10   Silver Birch Class         CLASS 0   Allergen Maple ([place]) IgE      <0.10 kU/L   <0.10   Allergen Maple ([place]) Class         CLASS 0   [place] IgE      <0.10 kU/L   <0.10   [place] Class         CLASS 0   Common Pigweed IgE      <0.10 kU/L   <0.10   Common Pigweed Class         CLASS 0   Russian Thistle IgE      <0.10 kU/L   <0.10   Russian Thistle Class         CLASS 0   Allergen Sheep [place] ([place]) IgE      <0.10 kU/L   <0.10   Allergen Sheep [place] ([place]) Class         CLASS 0   SHRUTHI GRASS      <0.10 kU/L   <0.10   Shruthi Grass Class         CLASS 0   BAHIA GRASS      <0.10 kU/L   <0.10   [place] Class         CLASS 0     Component      Latest Ref Rng & Units 5/23/2023   Allergen Peanut IgE      <0.10 kU/L <0.10   Peanut Class       CLASS 0   Almonds      <0.10 kU/L <0.10   [place] Class       CLASS 0   Cashew IgE      <0.10 kU/L <0.10   Cashew Class       CLASS 0   Hazelnut, IgE      <0.10 kU/L <0.10   Hazelnut-Food Class       CLASS 0   WALNUT      <0.10 kU/L <0.10   [place] Class       CLASS 0   Macadamia Nut, IgE      <0.10 kU/L <0.10   Macadamia Nut Class       CLASS 0      ASSESSMENT & PLAN     Karla Price is a 22 y.o. female with     # Atopic dermatitis: Worse in spring, summer, and winter (better in fall). Immunocaps  positive to dust mites and dog. She has noticed significant improvement with triamcinolone, no longer requiring frequent use. Currently without flares.  -continue triamcinolone 0.1% cream BID prn.  -continue frequent moisturization with thick creams and ointments.    # Angioedema: Occurred on 5/19/23 (at the end of a 10-day augmentin course for OM). She woke up with pruritic wrist and hand swelling which resolved after a few hours, and then she got lip angioedema that evening. Was likely spontaneous angioedema. Symptoms haven't returned since.    # History of tree nut allergy: She reports she was tested for food allergies around 14 yo after she had an episode of urticaria, and was told she has a mild allergy to tree nuts, which she had always previously tolerated. She continued to tolerate pistachios, but avoided other tree nuts and peanuts. Immunocaps were negative for evidence of allergy to tree nuts and peanuts. She has since tried peanut butter, which she tolerated.  -okay to eat peanuts and tree nuts going forward.  -nuts removed from allergy list.      Follow up: as needed.    Magalys Steen MD  Allergy/Immunology

## 2023-08-10 ENCOUNTER — CLINICAL SUPPORT (OUTPATIENT)
Dept: REHABILITATION | Facility: HOSPITAL | Age: 23
End: 2023-08-10
Payer: COMMERCIAL

## 2023-08-10 DIAGNOSIS — M25.662 KNEE STIFFNESS, LEFT: ICD-10-CM

## 2023-08-10 DIAGNOSIS — M25.562 ACUTE PAIN OF LEFT KNEE: Primary | ICD-10-CM

## 2023-08-10 PROCEDURE — 97530 THERAPEUTIC ACTIVITIES: CPT | Mod: PN

## 2023-08-10 PROCEDURE — 97110 THERAPEUTIC EXERCISES: CPT | Mod: PN

## 2023-08-10 PROCEDURE — 97140 MANUAL THERAPY 1/> REGIONS: CPT | Mod: PN

## 2023-08-10 PROCEDURE — 97112 NEUROMUSCULAR REEDUCATION: CPT | Mod: PN

## 2023-08-15 ENCOUNTER — CLINICAL SUPPORT (OUTPATIENT)
Dept: REHABILITATION | Facility: HOSPITAL | Age: 23
End: 2023-08-15
Payer: COMMERCIAL

## 2023-08-15 DIAGNOSIS — M25.562 ACUTE PAIN OF LEFT KNEE: Primary | ICD-10-CM

## 2023-08-15 DIAGNOSIS — M25.662 KNEE STIFFNESS, LEFT: ICD-10-CM

## 2023-08-15 PROCEDURE — 97530 THERAPEUTIC ACTIVITIES: CPT | Mod: PN

## 2023-08-15 PROCEDURE — 97112 NEUROMUSCULAR REEDUCATION: CPT | Mod: PN

## 2023-08-15 PROCEDURE — 97110 THERAPEUTIC EXERCISES: CPT | Mod: PN

## 2023-08-15 NOTE — PROGRESS NOTES
[facility]   Physical Therapy Treatment Note      Name: Karla Price  [facility] Number: [id number]    Therapy Diagnosis:   Encounter Diagnoses   Name Primary?    Acute pain of left knee Yes    Knee stiffness, left          Physician: Cmaila Holcomb MD    Visit Date: 8/15/2023    Physician Orders: PT Eval and Treat  Medical Diagnosis from Referral:   S83.512A (ICD-10-CM) - Rupture of anterior cruciate ligament of left knee, initial encounter   S83.242A (ICD-10-CM) - Tear of medial meniscus of left knee, current, unspecified tear type, initial encounter   S83.282A (ICD-10-CM) - Tear of lateral meniscus of left knee, current, unspecified tear type, initial encounter      Evaluation Date: 6/15/2023  Authorization Period Expiration: 12/31/23  Plan of Care Expiration: 3/29/24  Progress Note Due: 7/15/23  Visit # / Visits authorized: 30/40 (14 new auth)  FOTO: 2/2 administered 8/10/23: 64/100     Precautions: DOS 6/13/23  Procedure:  1. Left Knee Arthroscopy, anterior cruciate ligament reconstruction 19248  2.  Left Knee open lateral extra-articular tenodesis (modified Irma procedure)  3.  Left Knee Arthroscopy, with meniscus repair (medial AND lateral) 89551  4.  Left Knee open lateral collateral ligament repair  5.  Left Knee  arthroscopic loose body removal      vertical tear in the medial meniscus with 1 suture, vertical tear of lateral meniscus with 2 sutures     Post-Op Plan:  ACL reconstruction with quad tendon autograft and medial and lateral meniscal repairs.  Toe-touch weight-bearing for 2-4 weeks followed by partial and full weight-bearing by 6 weeks.  She will require a hinged brace for 6 weeks.  We will start a CPM with passive motion immediately.      PTA Visit #: 0/5     Time In: 5:30 pm  Time Out: 6:30 pm  Total Billable Time: 60 minutes    Subjective     Pt reports: I'm good today  She was compliant with home exercise program.  Response to previous treatment: improved  "ROM  Functional change: ongoing    Pain: 0/10  Location: left knee      Objective    DOS 6/13: 9 weeks 0 days POD as of 8/15/23    Knee Passive Range of Motion:    Right  Left    Flexion    Extension 10 hyper 10 hyper     Treatment     Karla received the treatments listed below:    therapeutic exercises to develop strength and ROM for 10 minutes including:  Prone quad stretch 30s x5      Nottoday  Heel slides 3x10   Knee extension 2 up 1 down 3x10 10#    Not performed today  LLLD knee extension with heel prop 8 mins     manual therapy techniques: Joint mobilizations and Soft tissue Mobilization were applied to the: L knee for 5 minutes, including:  Extension hinge   Patellar mobs all directions   Fat pat mobilization  PROM flex EOM    neuromuscular re-education activities to improve: Kinesthetic and Proprioception for 35 minutes. The following activities were included:  Resisted lateral steps GTB at ankles x 3 laps   TRX DL squat 3x10  TRX split squat 3x10  LLE bias wall sit 30s x4  SLS on airex 30s x3  6" lateral step down 3x10    Not today    6" forward step up 3x10  20" LLE bias box squat 3x10      Not today:  BFR applied for the following, 80% OKC reps/sets 30/15/15/15  -quad sets  -SLR  -LAQ  -DL shuttle 87.5# 3x10  -SL shuttle 50# 3x10B   Standing TKE orange monster band  Retro gait x 4 laps  Clamshells 3x10 GTB   -S/L hip abd     therapeutic activities to improve functional performance for 10 minutes, including:  Stationary bike x 10' to improve CV endurance and tissue extensibility    Not today  45# sled push/pull x laps    Patient Education and Home Exercises       Education provided:   - HEP, POC, answered patient questions      Written Home Exercises Provided: yes. Exercises were reviewed and Karla was able to demonstrate them prior to the end of the session.  Karla demonstrated good  understanding of the education provided. See EMR under Patient Instructions for exercises provided during therapy " sessions    Assessment   Karla completed session as noted above with continued progressions made to [facility] functional movement patterns. Initially demo'd compensatory weight shift to RLE duringTRX squats that improved with modification to LLE bias squat to increase loading to LLE. Sig muscle fasciculations noted with SLS 2/2 overall fatigue. Good response to treatment session but fatigues very easily. Will continue to progress as tolerated per protocol        Karla Is progressing well towards her goals.   Pt prognosis is Excellent.     Pt will continue to benefit from skilled outpatient physical therapy to address the deficits listed in the problem list box on initial evaluation, provide pt/family education and to maximize pt's level of independence in the home and community environment.     Pt's spiritual, cultural and educational needs considered and pt agreeable to plan of care and goals.     Anticipated barriers to physical therapy: none    Post-op Knee   Short Term Goals ( 4 Weeks ):   1. Pt will report reduced pain by 20 to 40% or greater for improved mobility, sleep  and ambulation.   2. Patients knee edema reduced by 1/4 to 1/2 inch or greater to enhance flexion ROM and knee comfort.   3. Pt to report minimal to no pain to palpation for improved level of comfort.   4. Pt to demonstrate symmetrical weight bearing w/o increased pain for stability and functional ambulation .  5. Pts neuromuscular response will be enhanced for unilateral standing stability and balance   6. Pt to achieve 90 degrees of passive knee flexion for restoring functional knee mobility, ambulating with proper gait pattern and sit to stand ability.   7 Pt to report understanding of all instruction pertinent to patient safety , gait instruction and HEP.    8. Pt to exhibit correct return demonstration of exercises for self-management and independence with HEP     Long Term Goals (32 Weeks):  1. Pt will demonstrate increased knee flexion AROM  to 120 degrees or greater for return to functional activity  2. Pt will demonstrate increased knee extension AROM to 0 degrees for standing stability   3. Pt will demonstrate increased RLE strength by 1/2 to 1 grade for improved functional stability  4. Pt to demonstrate standing stability unsupported on dynamic surfaces for functional return to ADL and recreational activities.   5.The patient will be independent amb with no assistive device on all surfaces for community distances.  6. Pt to demonstrate independence with HEP for self management  7. Patient will return to exercising in gym for fitness and recreation 3x/wk within 6 months.  8. Patient will improve FOTO score to </= 15% limited to decrease perceived limitation with mobility.    Plan     Cont POC    ERIKA TOLLIVER, PT, DPT, SCS

## 2023-08-17 ENCOUNTER — CLINICAL SUPPORT (OUTPATIENT)
Dept: REHABILITATION | Facility: HOSPITAL | Age: 23
End: 2023-08-17
Payer: COMMERCIAL

## 2023-08-17 DIAGNOSIS — M25.662 KNEE STIFFNESS, LEFT: ICD-10-CM

## 2023-08-17 DIAGNOSIS — M25.562 ACUTE PAIN OF LEFT KNEE: Primary | ICD-10-CM

## 2023-08-17 PROCEDURE — 97110 THERAPEUTIC EXERCISES: CPT | Mod: PN

## 2023-08-17 PROCEDURE — 97530 THERAPEUTIC ACTIVITIES: CPT | Mod: PN

## 2023-08-17 PROCEDURE — 97112 NEUROMUSCULAR REEDUCATION: CPT | Mod: PN

## 2023-08-17 PROCEDURE — 97140 MANUAL THERAPY 1/> REGIONS: CPT | Mod: PN

## 2023-08-17 NOTE — PROGRESS NOTES
[facility]   Physical Therapy Treatment Note      Name: Karla Price  [facility] Number: [id number]    Therapy Diagnosis:   Encounter Diagnoses   Name Primary?    Acute pain of left knee Yes    Knee stiffness, left            Physician: Camila Holcomb MD    Visit Date: 8/17/2023    Physician Orders: PT Eval and Treat  Medical Diagnosis from Referral:   S83.512A (ICD-10-CM) - Rupture of anterior cruciate ligament of left knee, initial encounter   S83.242A (ICD-10-CM) - Tear of medial meniscus of left knee, current, unspecified tear type, initial encounter   S83.282A (ICD-10-CM) - Tear of lateral meniscus of left knee, current, unspecified tear type, initial encounter      Evaluation Date: 6/15/2023  Authorization Period Expiration: 12/31/23  Plan of Care Expiration: 3/29/24  Progress Note Due: 7/15/23  Visit # / Visits authorized: 31/40 (14 new auth)  FOTO: 2/2 administered 8/10/23: 64/100     Precautions: DOS 6/13/23  Procedure:  1. Left Knee Arthroscopy, anterior cruciate ligament reconstruction 32221  2.  Left Knee open lateral extra-articular tenodesis (modified Irma procedure)  3.  Left Knee Arthroscopy, with meniscus repair (medial AND lateral) 75078  4.  Left Knee open lateral collateral ligament repair  5.  Left Knee  arthroscopic loose body removal      vertical tear in the medial meniscus with 1 suture, vertical tear of lateral meniscus with 2 sutures     Post-Op Plan:  ACL reconstruction with quad tendon autograft and medial and lateral meniscal repairs.  Toe-touch weight-bearing for 2-4 weeks followed by partial and full weight-bearing by 6 weeks.  She will require a hinged brace for 6 weeks.  We will start a CPM with passive motion immediately.      PTA Visit #: 0/5     Time In: 5:30 pm  Time Out: 6:30 pm  Total Billable Time: 60 minutes    Subjective     Pt reports: I'm good today. Still sore from tuesday  She was compliant with home exercise program.  Response to previous  "treatment: improved ROM  Functional change: ongoing    Pain: 0/10  Location: left knee      Objective    DOS 6/13: 9 weeks 2 days POD as of 8/17/23    Knee Passive Range of Motion:    Right  Left    Flexion 140 130   Extension 10 hyper 10 hyper     Treatment     Karla received the treatments listed below:    therapeutic exercises to develop strength and ROM for 10 minutes including:  Prone quad stretch 30s x5  Heel slides 30x 5s holds      Nottoday  Knee extension 2 up 1 down 3x10 10#    Not performed today  LLLD knee extension with heel prop 8 mins     manual therapy techniques: Joint mobilizations and Soft tissue Mobilization were applied to the: L knee for 10 minutes, including:  Extension hinge   Patellar mobs all directions   Fat pat mobilization  PROM flex EOM    neuromuscular re-education activities to improve: Kinesthetic and Proprioception for 30 minutes. The following activities were included:  SLR 3x15  Modified SL bridge 3x10B  Resisted lateral steps GTB at ankles x 3 laps   20" LLE bias box squat 3x10 with 10# KB press   Welsh SS 3x8  LLE bias wall sit 30s x4 with 10# KB hold on RLE      Not today  TRX DL squat 3x10  TRX split squat 3x10  6" forward step up 3x10  20" LLE bias box squat 3x10  SLS on airex 30s x3  6" lateral step down 3x10      Not today:  BFR applied for the following, 80% OKC reps/sets 30/15/15/15  -quad sets  -SLR  -LAQ  -DL shuttle 87.5# 3x10  -SL shuttle 50# 3x10B   Standing TKE orange monster band  Retro gait x 4 laps  Clamshells 3x10 GTB   -S/L hip abd     therapeutic activities to improve functional performance for 10 minutes, including:  Stationary bike x 10' to improve CV endurance and tissue extensibility    Not today  45# sled push/pull x laps    Patient Education and Home Exercises       Education provided:   - HEP, POC, answered patient questions      Written Home Exercises Provided: yes. Exercises were reviewed and Karla was able to demonstrate them prior to the end of " the session.  Karla demonstrated good  understanding of the education provided. See EMR under Patient Instructions for exercises provided during therapy sessions    Assessment   Karla completed session as noted above with continued progressions made to [facility] functional movement patterns. Session today focused towards improving quad loading during functional strengthening progressions. Sig fatigue reported but overall good tolerance to treatment session.  Good response to treatment session but fatigues very easily. Will continue to progress as tolerated per protocol        Karla Is progressing well towards her goals.   Pt prognosis is Excellent.     Pt will continue to benefit from skilled outpatient physical therapy to address the deficits listed in the problem list box on initial evaluation, provide pt/family education and to maximize pt's level of independence in the home and community environment.     Pt's spiritual, cultural and educational needs considered and pt agreeable to plan of care and goals.     Anticipated barriers to physical therapy: none    Post-op Knee   Short Term Goals ( 4 Weeks ):   1. Pt will report reduced pain by 20 to 40% or greater for improved mobility, sleep  and ambulation.   2. Patients knee edema reduced by 1/4 to 1/2 inch or greater to enhance flexion ROM and knee comfort.   3. Pt to report minimal to no pain to palpation for improved level of comfort.   4. Pt to demonstrate symmetrical weight bearing w/o increased pain for stability and functional ambulation .  5. Pts neuromuscular response will be enhanced for unilateral standing stability and balance   6. Pt to achieve 90 degrees of passive knee flexion for restoring functional knee mobility, ambulating with proper gait pattern and sit to stand ability.   7 Pt to report understanding of all instruction pertinent to patient safety , gait instruction and HEP.    8. Pt to exhibit correct return demonstration of exercises for  self-management and independence with HEP     Long Term Goals (32 Weeks):  1. Pt will demonstrate increased knee flexion AROM to 120 degrees or greater for return to functional activity  2. Pt will demonstrate increased knee extension AROM to 0 degrees for standing stability   3. Pt will demonstrate increased RLE strength by 1/2 to 1 grade for improved functional stability  4. Pt to demonstrate standing stability unsupported on dynamic surfaces for functional return to ADL and recreational activities.   5.The patient will be independent amb with no assistive device on all surfaces for community distances.  6. Pt to demonstrate independence with HEP for self management  7. Patient will return to exercising in gym for fitness and recreation 3x/wk within 6 months.  8. Patient will improve FOTO score to </= 15% limited to decrease perceived limitation with mobility.    Plan     Cont POC    ERIKA TOLLIVER, PT, DPT, SCS

## 2023-08-22 ENCOUNTER — CLINICAL SUPPORT (OUTPATIENT)
Dept: REHABILITATION | Facility: HOSPITAL | Age: 23
End: 2023-08-22
Payer: COMMERCIAL

## 2023-08-22 DIAGNOSIS — M25.562 ACUTE PAIN OF LEFT KNEE: Primary | ICD-10-CM

## 2023-08-22 DIAGNOSIS — M25.662 KNEE STIFFNESS, LEFT: ICD-10-CM

## 2023-08-22 PROCEDURE — 97140 MANUAL THERAPY 1/> REGIONS: CPT | Mod: PN

## 2023-08-22 PROCEDURE — 97112 NEUROMUSCULAR REEDUCATION: CPT | Mod: PN

## 2023-08-22 PROCEDURE — 97110 THERAPEUTIC EXERCISES: CPT | Mod: PN

## 2023-08-22 PROCEDURE — 97530 THERAPEUTIC ACTIVITIES: CPT | Mod: PN

## 2023-08-22 NOTE — PROGRESS NOTES
[facility]   Physical Therapy Treatment Note      Name: Karla Price  [facility] Number: [id number]    Therapy Diagnosis:   Encounter Diagnoses   Name Primary?    Acute pain of left knee Yes    Knee stiffness, left      Physician: Camila Holcomb MD    Visit Date: 8/22/2023    Physician Orders: PT Eval and Treat  Medical Diagnosis from Referral:   S83.512A (ICD-10-CM) - Rupture of anterior cruciate ligament of left knee, initial encounter   S83.242A (ICD-10-CM) - Tear of medial meniscus of left knee, current, unspecified tear type, initial encounter   S83.282A (ICD-10-CM) - Tear of lateral meniscus of left knee, current, unspecified tear type, initial encounter      Evaluation Date: 6/15/2023  Authorization Period Expiration: 12/31/23  Plan of Care Expiration: 3/29/24  Progress Note Due: 7/15/23  Visit # / Visits authorized: 32/40 (14 new auth)  FOTO: 2/2 administered 8/10/23: 64/100     Precautions: DOS 6/13/23  Procedure:  1. Left Knee Arthroscopy, anterior cruciate ligament reconstruction 39504  2.  Left Knee open lateral extra-articular tenodesis (modified Irma procedure)  3.  Left Knee Arthroscopy, with meniscus repair (medial AND lateral) 33901  4.  Left Knee open lateral collateral ligament repair  5.  Left Knee  arthroscopic loose body removal      vertical tear in the medial meniscus with 1 suture, vertical tear of lateral meniscus with 2 sutures     Post-Op Plan:  ACL reconstruction with quad tendon autograft and medial and lateral meniscal repairs.  Toe-touch weight-bearing for 2-4 weeks followed by partial and full weight-bearing by 6 weeks.  She will require a hinged brace for 6 weeks.  We will start a CPM with passive motion immediately.      PTA Visit #: 0/5     Time In: 5:04 pm  Time Out: 6:15 pm  Total Billable Time: 71 minutes    Subjective     Pt reports: feeling good not really that sore following last session  She was compliant with home exercise program.  Response to  "previous treatment: improved ROM  Functional change: ongoing    Pain: 0/10  Location: left knee      Objective    DOS 6/13: 10 weeks 0 days POD as of 8/22/23    Knee Passive Range of Motion:    Right  Left    Flexion 140 130   Extension 10 hyper 10 hyper     Treatment     Karla received the treatments listed below:    therapeutic exercises to develop strength and ROM for 15 minutes including:  Knee extension 2 up 1 down 3x10 10#  Prone quad stretch 30s x5  Heel slides 30x 5s holds      Not today    Not performed today  LLLD knee extension with heel prop 8 mins     manual therapy techniques: Joint mobilizations and Soft tissue Mobilization were applied to the: L knee for 10 minutes, including:  Extension hinge   Patellar mobs all directions   Fat pat mobilization  PROM flex EOM    neuromuscular re-education activities to improve: Kinesthetic and Proprioception for 36 minutes. The following activities were included:  SLR 3x15  SL bridge 3x10B  Resisted lateral steps GTB at ankles x 3 laps   20" LLE bias box squat 3x10 with 10# KB press   Turkish SS 3x8  LLE bias wall sit 30s x4 with 10# KB hold on RLE  12" forward step up 3x10 with contralat HF    Not today  TRX DL squat 3x10  TRX split squat 3x10  6" forward step up 3x10  20" LLE bias box squat 3x10  SLS on airex 30s x3  6" lateral step down 3x10      Not today:  BFR applied for the following, 80% OKC reps/sets 30/15/15/15  -quad sets  -SLR  -LAQ  -DL shuttle 87.5# 3x10  -SL shuttle 50# 3x10B   Standing TKE orange monster band  Retro gait x 4 laps  Clamshells 3x10 GTB   -S/L hip abd     therapeutic activities to improve functional performance for 10 minutes, including:  Stationary bike x 10' to improve CV endurance and tissue extensibility    Not today  45# sled push/pull x laps    Patient Education and Home Exercises       Education provided:   - HEP, POC, answered patient questions      Written Home Exercises Provided: yes. Exercises were reviewed and Karla was " able to demonstrate them prior to the end of the session.  Karla demonstrated good  understanding of the education provided. See EMR under Patient Instructions for exercises provided during therapy sessions    Assessment   Karla completed session as noted above with continued progressions made to [facility] functional movement patterns. Session today focused towards improving quad loading during functional strengthening progressions. Sig fatigue reported but overall good tolerance to treatment session.  Good response to treatment session but fatigues very easily. Will continue to progress as tolerated per protocol    Karla Is progressing well towards her goals.   Pt prognosis is Excellent.     Pt will continue to benefit from skilled outpatient physical therapy to address the deficits listed in the problem list box on initial evaluation, provide pt/family education and to maximize pt's level of independence in the home and community environment.     Pt's spiritual, cultural and educational needs considered and pt agreeable to plan of care and goals.     Anticipated barriers to physical therapy: none    Post-op Knee   Short Term Goals ( 4 Weeks ):   1. Pt will report reduced pain by 20 to 40% or greater for improved mobility, sleep  and ambulation.  MET  2. Patients knee edema reduced by 1/4 to 1/2 inch or greater to enhance flexion ROM and knee comfort.  MET  3. Pt to report minimal to no pain to palpation for improved level of comfort.  MET  4. Pt to demonstrate symmetrical weight bearing w/o increased pain for stability and functional ambulation . MET  5. Pts neuromuscular response will be enhanced for unilateral standing stability and balance  MET  6. Pt to achieve 90 degrees of passive knee flexion for restoring functional knee mobility, ambulating with proper gait pattern and sit to stand ability. MET   7 Pt to report understanding of all instruction pertinent to patient safety , gait instruction and HEP.  MET  8. Pt  to exhibit correct return demonstration of exercises for self-management and independence with HEP MET     Long Term Goals (32 Weeks):  1. Pt will demonstrate increased knee flexion AROM to 120 degrees or greater for return to functional activity  2. Pt will demonstrate increased knee extension AROM to 0 degrees for standing stability   3. Pt will demonstrate increased RLE strength by 1/2 to 1 grade for improved functional stability  4. Pt to demonstrate standing stability unsupported on dynamic surfaces for functional return to ADL and recreational activities.   5.The patient will be independent amb with no assistive device on all surfaces for community distances.  6. Pt to demonstrate independence with HEP for self management  7. Patient will return to exercising in gym for fitness and recreation 3x/wk within 6 months.  8. Patient will improve FOTO score to </= 15% limited to decrease perceived limitation with mobility.    Plan     Cont POC    ERIKA TOLLIVER, PT, DPT, SCS

## 2023-08-24 ENCOUNTER — CLINICAL SUPPORT (OUTPATIENT)
Dept: REHABILITATION | Facility: HOSPITAL | Age: 23
End: 2023-08-24
Payer: COMMERCIAL

## 2023-08-24 DIAGNOSIS — M25.562 ACUTE PAIN OF LEFT KNEE: Primary | ICD-10-CM

## 2023-08-24 DIAGNOSIS — M25.662 KNEE STIFFNESS, LEFT: ICD-10-CM

## 2023-08-24 PROCEDURE — 97112 NEUROMUSCULAR REEDUCATION: CPT | Mod: PN

## 2023-08-24 PROCEDURE — 97110 THERAPEUTIC EXERCISES: CPT | Mod: PN

## 2023-08-24 PROCEDURE — 97530 THERAPEUTIC ACTIVITIES: CPT | Mod: PN

## 2023-08-24 NOTE — PROGRESS NOTES
[facility]   Physical Therapy Treatment Note/Re-assessment/Updated POC     Name: Karla Price  Clinic Number: [id number]    Therapy Diagnosis:   Encounter Diagnoses   Name Primary?    Acute pain of left knee Yes    Knee stiffness, left        Physician: Camila Holcomb MD    Visit Date: 8/24/2023    Physician Orders: PT Eval and Treat  Medical Diagnosis from Referral:   S83.512A (ICD-10-CM) - Rupture of anterior cruciate ligament of left knee, initial encounter   S83.242A (ICD-10-CM) - Tear of medial meniscus of left knee, current, unspecified tear type, initial encounter   S83.282A (ICD-10-CM) - Tear of lateral meniscus of left knee, current, unspecified tear type, initial encounter      Evaluation Date: 6/15/2023  Authorization Period Expiration: 12/31/23  Plan of Care Expiration: 3/29/24  Progress Note Due: 7/15/23  Visit # / Visits authorized: 33/40 (18 new auth)  FOTO: 2/2 administered 8/10/23: 64/100     Precautions: DOS 6/13/23  Procedure:  1. Left Knee Arthroscopy, anterior cruciate ligament reconstruction 32817  2.  Left Knee open lateral extra-articular tenodesis (modified Irma procedure)  3.  Left Knee Arthroscopy, with meniscus repair (medial AND lateral) 63347  4.  Left Knee open lateral collateral ligament repair  5.  Left Knee  arthroscopic loose body removal      vertical tear in the medial meniscus with 1 suture, vertical tear of lateral meniscus with 2 sutures     Post-Op Plan:  ACL reconstruction with quad tendon autograft and medial and lateral meniscal repairs.  Toe-touch weight-bearing for 2-4 weeks followed by partial and full weight-bearing by 6 weeks.  She will require a hinged brace for 6 weeks.  We will start a CPM with passive motion immediately.      PTA Visit #: 0/5     Time In: 5:26 pm  Time Out: 6:30 pm  Total Billable Time: 64 minutes    Subjective     Pt reports: good no complaints following last session  She was compliant with home exercise  "program.  Response to previous treatment: improved ROM  Functional change: ongoing    Pain: 0/10  Location: left knee      Objective    DOS 6/13: 10 weeks 2 days POD as of 8/24/23    Knee Passive Range of Motion:    Right  Left    Flexion 140 135   Extension 10 hyper 10 hyper     Treatment     Karla received the treatments listed below:    therapeutic exercises to develop strength and ROM for 15 minutes including:  Knee extension 2 up 1 down 3x10 10#  Prone quad stretch 30s x5  Heel slides 30x 5s holds      Not today    Not performed today  LLLD knee extension with heel prop 8 mins     manual therapy techniques: Joint mobilizations and Soft tissue Mobilization were applied to the: L knee for 2 minutes, including:  Extension hinge   Patellar mobs all directions   Fat pat mobilization  PROM flex EOM    neuromuscular re-education activities to improve: Kinesthetic and Proprioception for 37 minutes. The following activities were included:  BFR applied for the following, 80% OKC reps/sets 30/15/15/15  -quad sets into hyper  -LAQ 3#  -LLE bias box squat with 10# KB front press (reps 15/15/15)    Heel elevated DL squat to facilitate positive shin angles 3x10 3s hold    Not today  SLR 3x15  SL bridge 3x10B  Resisted lateral steps GTB at ankles x 3 laps   20" LLE bias box squat 3x10 with 10# KB press   Lao SS 3x8  LLE bias wall sit 30s x4 with 10# KB hold on RLE  12" forward step up 3x10 with contralat HF  TRX DL squat 3x10  TRX split squat 3x10  6" forward step up 3x10  20" LLE bias box squat 3x10  SLS on airex 30s x3  6" lateral step down 3x10  -DL shuttle 87.5# 3x10  -SL shuttle 50# 3x10B   Standing TKE orange monster band  Retro gait x 4 laps  Clamshells 3x10 GTB   -S/L hip abd     therapeutic activities to improve functional performance for 10 minutes, including:  Stationary bike x 10' to improve CV endurance and tissue extensibility    Not today  45# sled push/pull x laps    Patient Education and Home Exercises   "     Education provided:   - HEP, POC, answered patient questions      Written Home Exercises Provided: yes. Exercises were reviewed and Karla was able to demonstrate them prior to the end of the session.  Karla demonstrated good  understanding of the education provided. See EMR under Patient Instructions for exercises provided during therapy sessions    Assessment   Karla completed session as noted above with re-assessment performed. At this time ROM is getting close to symmetrical continues to lack terminal knee flexion by about 5 deg. Remainder of session focused towards facilitating quadriceps loading. Challenged appropriately with heel elevated DL squat with notable muscle fasciculations noted. Overall doing well at this stage but continues to be limited by end range flexion deficits and significant quad strength deficits. Karla would continue to benefit from skilled physical therapy to address the beforementioned deficits in order to return to PLOF      Karla Is progressing well towards her goals.   Pt prognosis is Excellent.     Pt will continue to benefit from skilled outpatient physical therapy to address the deficits listed in the problem list box on initial evaluation, provide pt/family education and to maximize pt's level of independence in the home and community environment.     Pt's spiritual, cultural and educational needs considered and pt agreeable to plan of care and goals.     Anticipated barriers to physical therapy: none    Post-op Knee   Short Term Goals ( 4 Weeks ):   1. Pt will report reduced pain by 20 to 40% or greater for improved mobility, sleep  and ambulation.  MET  2. Patients knee edema reduced by 1/4 to 1/2 inch or greater to enhance flexion ROM and knee comfort.  MET  3. Pt to report minimal to no pain to palpation for improved level of comfort.  MET  4. Pt to demonstrate symmetrical weight bearing w/o increased pain for stability and functional ambulation . MET  5. Pts neuromuscular  response will be enhanced for unilateral standing stability and balance  MET  6. Pt to achieve 90 degrees of passive knee flexion for restoring functional knee mobility, ambulating with proper gait pattern and sit to stand ability. MET   7 Pt to report understanding of all instruction pertinent to patient safety , gait instruction and HEP.  MET  8. Pt to exhibit correct return demonstration of exercises for self-management and independence with HEP MET     Long Term Goals (32 Weeks):  1. Pt will demonstrate increased knee flexion AROM to 120 degrees or greater for return to functional activity  2. Pt will demonstrate increased knee extension AROM to 0 degrees for standing stability   3. Pt will demonstrate increased RLE strength by 1/2 to 1 grade for improved functional stability  4. Pt to demonstrate standing stability unsupported on dynamic surfaces for functional return to ADL and recreational activities.   5.The patient will be independent amb with no assistive device on all surfaces for community distances.  6. Pt to demonstrate independence with HEP for self management  7. Patient will return to exercising in gym for fitness and recreation 3x/wk within 6 months.  8. Patient will improve FOTO score to </= 15% limited to decrease perceived limitation with mobility.    Plan     Cont POC    ERIKA TOLLIVER, PT, DPT, SCS

## 2023-08-29 ENCOUNTER — CLINICAL SUPPORT (OUTPATIENT)
Dept: REHABILITATION | Facility: HOSPITAL | Age: 23
End: 2023-08-29
Payer: COMMERCIAL

## 2023-08-29 DIAGNOSIS — M25.662 KNEE STIFFNESS, LEFT: ICD-10-CM

## 2023-08-29 DIAGNOSIS — M25.562 ACUTE PAIN OF LEFT KNEE: Primary | ICD-10-CM

## 2023-08-29 PROCEDURE — 97110 THERAPEUTIC EXERCISES: CPT | Mod: PN

## 2023-08-29 PROCEDURE — 97112 NEUROMUSCULAR REEDUCATION: CPT | Mod: PN

## 2023-08-29 PROCEDURE — 97530 THERAPEUTIC ACTIVITIES: CPT | Mod: PN

## 2023-08-29 NOTE — PLAN OF CARE
[facility]   Physical Therapy Treatment Note/Re-assessment/Updated POC     Name: Karla Price  Clinic Number: [id number]    Therapy Diagnosis:   Encounter Diagnoses   Name Primary?    Acute pain of left knee Yes    Knee stiffness, left          Physician: Camila Holcomb MD    Visit Date: 8/29/2023    Physician Orders: PT Eval and Treat  Medical Diagnosis from Referral:   S83.512A (ICD-10-CM) - Rupture of anterior cruciate ligament of left knee, initial encounter   S83.242A (ICD-10-CM) - Tear of medial meniscus of left knee, current, unspecified tear type, initial encounter   S83.282A (ICD-10-CM) - Tear of lateral meniscus of left knee, current, unspecified tear type, initial encounter      Evaluation Date: 6/15/2023  Authorization Period Expiration: 12/31/23  Plan of Care Expiration: 3/29/24  Progress Note Due: 7/15/23  Visit # / Visits authorized: 34/40 (18 new auth)  FOTO: 2/2 administered 8/10/23: 64/100     Precautions: DOS 6/13/23  Procedure:  1. Left Knee Arthroscopy, anterior cruciate ligament reconstruction 32742  2.  Left Knee open lateral extra-articular tenodesis (modified Irma procedure)  3.  Left Knee Arthroscopy, with meniscus repair (medial AND lateral) 67413  4.  Left Knee open lateral collateral ligament repair  5.  Left Knee  arthroscopic loose body removal      vertical tear in the medial meniscus with 1 suture, vertical tear of lateral meniscus with 2 sutures     Post-Op Plan:  ACL reconstruction with quad tendon autograft and medial and lateral meniscal repairs.  Toe-touch weight-bearing for 2-4 weeks followed by partial and full weight-bearing by 6 weeks.  She will require a hinged brace for 6 weeks.  We will start a CPM with passive motion immediately.      PTA Visit #: 0/5     Time In: 5:26 pm  Time Out: 6:30 pm  Total Billable Time: 64 minutes    Subjective     Pt reports: did a lot of walking over the weekend so my knee was a little irritated and my calf felt like  "it had a big cramp in it  She was compliant with home exercise program.  Response to previous treatment: improved ROM  Functional change: ongoing    Pain: 0/10  Location: left knee      Objective    DOS 6/13: 10 weeks 2 days POD as of 8/24/23    Knee Passive Range of Motion:    Right  Left    Flexion 140 135   Extension 10 hyper 10 hyper     Treatment     Karla received the treatments listed below:    therapeutic exercises to develop strength and ROM for 15 minutes including:  Single leg matrix knee extension 3x10 10#  Prone quad stretch 30s x5  Heel slides 30x 5s holds      Not today    Not performed today  LLLD knee extension with heel prop 8 mins     manual therapy techniques: Joint mobilizations and Soft tissue Mobilization were applied to the: L knee for 2 minutes, including:  Extension hinge   Patellar mobs all directions   Fat pat mobilization  PROM flex EOM    neuromuscular re-education activities to improve: Kinesthetic and Proprioception for 37 minutes. The following activities were included:  BFR applied for the following, 80% OKC reps/sets 30/15/15/15  -quad sets into hyper  -LAQ 3#  -LLE bias box squat with 10# KB front press (reps 15/15/15)    Heel elevated DL squat to facilitate positive shin angles 3x10 3s hold    Not today  SLR 3x15  SL bridge 3x10B  Resisted lateral steps GTB at ankles x 3 laps   20" LLE bias box squat 3x10 with 10# KB press   [place] SS 3x8  LLE bias wall sit 30s x4 with 10# KB hold on RLE  12" forward step up 3x10 with contralat HF  TRX DL squat 3x10  TRX split squat 3x10  6" forward step up 3x10  6" lateral step down 3x10  -DL shuttle 87.5# 3x10  -SL shuttle 50# 3x10B     therapeutic activities to improve functional performance for 10 minutes, including:  Stationary bike x 10' to improve CV endurance and tissue extensibility    Not today  45# sled push/pull x laps    Patient Education and Home Exercises       Education provided:   - HEP, POC, answered patient " questions      Written Home Exercises Provided: yes. Exercises were reviewed and Karla was able to demonstrate them prior to the end of the session.  Karla demonstrated good  understanding of the education provided. See EMR under Patient Instructions for exercises provided during therapy sessions    Assessment   Karla completed session as noted above with re-assessment performed. At this time ROM is getting close to symmetrical continues to lack terminal knee flexion by about 5 deg. Remainder of session focused towards facilitating quadriceps loading. Challenged appropriately with heel elevated DL squat with notable muscle fasciculations noted. Overall doing well at this stage but continues to be limited by end range flexion deficits and significant quad strength deficits. Karla would continue to benefit from skilled physical therapy to address the beforementioned deficits in order to return to PLOF      Karla Is progressing well towards her goals.   Pt prognosis is Excellent.     Pt will continue to benefit from skilled outpatient physical therapy to address the deficits listed in the problem list box on initial evaluation, provide pt/family education and to maximize pt's level of independence in the home and community environment.     Pt's spiritual, cultural and educational needs considered and pt agreeable to plan of care and goals.     Anticipated barriers to physical therapy: none    Post-op Knee   Short Term Goals ( 4 Weeks ):   1. Pt will report reduced pain by 20 to 40% or greater for improved mobility, sleep  and ambulation.  MET  2. Patients knee edema reduced by 1/4 to 1/2 inch or greater to enhance flexion ROM and knee comfort.  MET  3. Pt to report minimal to no pain to palpation for improved level of comfort.  MET  4. Pt to demonstrate symmetrical weight bearing w/o increased pain for stability and functional ambulation . MET  5. Pts neuromuscular response will be enhanced for unilateral standing  stability and balance  MET  6. Pt to achieve 90 degrees of passive knee flexion for restoring functional knee mobility, ambulating with proper gait pattern and sit to stand ability. MET   7 Pt to report understanding of all instruction pertinent to patient safety , gait instruction and HEP.  MET  8. Pt to exhibit correct return demonstration of exercises for self-management and independence with HEP MET     Long Term Goals (32 Weeks):  1. Pt will demonstrate increased knee flexion AROM to 120 degrees or greater for return to functional activity  2. Pt will demonstrate increased knee extension AROM to 0 degrees for standing stability   3. Pt will demonstrate increased RLE strength by 1/2 to 1 grade for improved functional stability  4. Pt to demonstrate standing stability unsupported on dynamic surfaces for functional return to ADL and recreational activities.   5.The patient will be independent amb with no assistive device on all surfaces for community distances.  6. Pt to demonstrate independence with HEP for self management  7. Patient will return to exercising in gym for fitness and recreation 3x/wk within 6 months.  8. Patient will improve FOTO score to </= 15% limited to decrease perceived limitation with mobility.    Plan     Cont POC    ERIKA TOLLIVER, PT, DPT, SCS

## 2023-08-29 NOTE — PROGRESS NOTES
[facility]   Physical Therapy Treatment Note/Re-assessment/Updated POC     Name: Karla Price  Clinic Number: [id number]    Therapy Diagnosis:   Encounter Diagnoses   Name Primary?    Acute pain of left knee Yes    Knee stiffness, left          Physician: Camila Holcomb MD    Visit Date: 8/29/2023    Physician Orders: PT Eval and Treat  Medical Diagnosis from Referral:   S83.512A (ICD-10-CM) - Rupture of anterior cruciate ligament of left knee, initial encounter   S83.242A (ICD-10-CM) - Tear of medial meniscus of left knee, current, unspecified tear type, initial encounter   S83.282A (ICD-10-CM) - Tear of lateral meniscus of left knee, current, unspecified tear type, initial encounter      Evaluation Date: 6/15/2023  Authorization Period Expiration: 12/31/23  Plan of Care Expiration: 3/29/24  Progress Note Due: 7/15/23  Visit # / Visits authorized: 34/40 (18 new auth)  FOTO: 2/2 administered 8/10/23: 64/100     Precautions: DOS 6/13/23  Procedure:  1. Left Knee Arthroscopy, anterior cruciate ligament reconstruction 45441  2.  Left Knee open lateral extra-articular tenodesis (modified Irma procedure)  3.  Left Knee Arthroscopy, with meniscus repair (medial AND lateral) 54459  4.  Left Knee open lateral collateral ligament repair  5.  Left Knee  arthroscopic loose body removal      vertical tear in the medial meniscus with 1 suture, vertical tear of lateral meniscus with 2 sutures     Post-Op Plan:  ACL reconstruction with quad tendon autograft and medial and lateral meniscal repairs.  Toe-touch weight-bearing for 2-4 weeks followed by partial and full weight-bearing by 6 weeks.  She will require a hinged brace for 6 weeks.  We will start a CPM with passive motion immediately.      PTA Visit #: 0/5     Time In: 5:26 pm  Time Out: 6:30 pm  Total Billable Time: 64 minutes    Subjective     Pt reports: did a lot of walking over the weekend so my knee was a little irritated and my calf felt like  "it had a big cramp in it  She was compliant with home exercise program.  Response to previous treatment: improved ROM  Functional change: ongoing    Pain: 0/10  Location: left knee      Objective    DOS 6/13: 10 weeks 2 days POD as of 8/24/23    Knee Passive Range of Motion:    Right  Left    Flexion 140 135   Extension 10 hyper 10 hyper     Treatment     Karla received the treatments listed below:    therapeutic exercises to develop strength and ROM for 17 minutes including:  Single leg matrix knee extension 3x10 10#  Prone quad stretch 30s x5  Heel slides 30x 5s holds      Not today    Not performed today  LLLD knee extension with heel prop 8 mins     manual therapy techniques: Joint mobilizations and Soft tissue Mobilization were applied to the: L knee for 2 minutes, including:  Extension hinge   Patellar mobs all directions   Fat pat mobilization  PROM flex EOM    neuromuscular re-education activities to improve: Kinesthetic and Proprioception for 25 minutes. The following activities were included:  SLR 3x15  SL bridge 3x15  4" lateral step down 3x10    Heel elevated DL squat to facilitate positive shin angles 3x10 3s hold    Not today  BFR applied for the following, 80% OKC reps/sets 30/15/15/15  -quad sets into hyper  -LAQ 3#  -LLE bias box squat with 10# KB front press (reps 15/15/15)  SLR 3x15  SL bridge 3x10B  Resisted lateral steps GTB at ankles x 3 laps   20" LLE bias box squat 3x10 with 10# KB press   [place] SS 3x8  LLE bias wall sit 30s x4 with 10# KB hold on RLE  12" forward step up 3x10 with contralat HF  TRX DL squat 3x10  TRX split squat 3x10  6" forward step up 3x10  6" lateral step down 3x10  -DL shuttle 87.5# 3x10  -SL shuttle 50# 3x10B     therapeutic activities to improve functional performance for 20 minutes, including:  Stationary bike x 10' to improve CV endurance and tissue extensibility  Wall sit 20s x3  Superset single leg calf raise 3x fatigue    Not today  45# sled push/pull x " [facility]    Patient Education and Home Exercises       Education provided:   - HEP, POC, answered patient questions      Written Home Exercises Provided: yes. Exercises were reviewed and Karla was able to demonstrate them prior to the end of the session.  Karla demonstrated good  understanding of the education provided. See EMR under Patient Instructions for exercises provided during therapy sessions    Assessment   Karla completed session as noted above with re-assessment performed. At this time ROM is getting close to symmetrical continues to lack terminal knee flexion by about 5 deg. Remainder of session focused towards facilitating quadriceps loading. Challenged appropriately with heel elevated DL squat with notable muscle fasciculations noted. Overall doing well at this stage but continues to be limited by end range flexion deficits and significant quad strength deficits. Karla would continue to benefit from skilled physical therapy to address the beforementioned deficits in order to return to PLOF      Karla Is progressing well towards her goals.   Pt prognosis is Excellent.     Pt will continue to benefit from skilled outpatient physical therapy to address the deficits listed in the problem list box on initial evaluation, provide pt/family education and to maximize pt's level of independence in the home and community environment.     Pt's spiritual, cultural and educational needs considered and pt agreeable to plan of care and goals.     Anticipated barriers to physical therapy: none    Post-op Knee   Short Term Goals ( 4 Weeks ):   1. Pt will report reduced pain by 20 to 40% or greater for improved mobility, sleep  and ambulation.  MET  2. Patients knee edema reduced by 1/4 to 1/2 inch or greater to enhance flexion ROM and knee comfort.  MET  3. Pt to report minimal to no pain to palpation for improved level of comfort.  MET  4. Pt to demonstrate symmetrical weight bearing w/o increased pain for stability and  functional ambulation . MET  5. Pts neuromuscular response will be enhanced for unilateral standing stability and balance  MET  6. Pt to achieve 90 degrees of passive knee flexion for restoring functional knee mobility, ambulating with proper gait pattern and sit to stand ability. MET   7 Pt to report understanding of all instruction pertinent to patient safety , gait instruction and HEP.  MET  8. Pt to exhibit correct return demonstration of exercises for self-management and independence with HEP MET     Long Term Goals (32 Weeks):  1. Pt will demonstrate increased knee flexion AROM to 120 degrees or greater for return to functional activity  2. Pt will demonstrate increased knee extension AROM to 0 degrees for standing stability   3. Pt will demonstrate increased RLE strength by 1/2 to 1 grade for improved functional stability  4. Pt to demonstrate standing stability unsupported on dynamic surfaces for functional return to ADL and recreational activities.   5.The patient will be independent amb with no assistive device on all surfaces for community distances.  6. Pt to demonstrate independence with HEP for self management  7. Patient will return to exercising in gym for fitness and recreation 3x/wk within 6 months.  8. Patient will improve FOTO score to </= 15% limited to decrease perceived limitation with mobility.    Plan     Cont POC    ERIKA TOLLIVER, PT, DPT, SCS

## 2023-08-31 ENCOUNTER — CLINICAL SUPPORT (OUTPATIENT)
Dept: REHABILITATION | Facility: HOSPITAL | Age: 23
End: 2023-08-31
Payer: COMMERCIAL

## 2023-08-31 DIAGNOSIS — M25.662 KNEE STIFFNESS, LEFT: ICD-10-CM

## 2023-08-31 DIAGNOSIS — M25.562 ACUTE PAIN OF LEFT KNEE: Primary | ICD-10-CM

## 2023-08-31 PROCEDURE — 97112 NEUROMUSCULAR REEDUCATION: CPT | Mod: PN

## 2023-08-31 PROCEDURE — 97110 THERAPEUTIC EXERCISES: CPT | Mod: PN

## 2023-08-31 PROCEDURE — 97530 THERAPEUTIC ACTIVITIES: CPT | Mod: PN

## 2023-08-31 NOTE — PROGRESS NOTES
[facility]   Physical Therapy Treatment Note     Name: Karla Price  [facility] Number: [id number]    Therapy Diagnosis:   Encounter Diagnoses   Name Primary?    Acute pain of left knee Yes    Knee stiffness, left            Physician: Camila Holcomb MD    Visit Date: 8/31/2023    Physician Orders: PT Eval and Treat  Medical Diagnosis from Referral:   S83.512A (ICD-10-CM) - Rupture of anterior cruciate ligament of left knee, initial encounter   S83.242A (ICD-10-CM) - Tear of medial meniscus of left knee, current, unspecified tear type, initial encounter   S83.282A (ICD-10-CM) - Tear of lateral meniscus of left knee, current, unspecified tear type, initial encounter      Evaluation Date: 6/15/2023  Authorization Period Expiration: 12/31/23  Plan of Care Expiration: 3/29/24  Progress Note Due: 7/15/23  Visit # / Visits authorized: 35/40 (18 new auth)  FOTO: 2/2 administered 8/10/23: 64/100     Precautions: DOS 6/13/23  Procedure:  1. Left Knee Arthroscopy, anterior cruciate ligament reconstruction 76311  2.  Left Knee open lateral extra-articular tenodesis (modified Irma procedure)  3.  Left Knee Arthroscopy, with meniscus repair (medial AND lateral) 24546  4.  Left Knee open lateral collateral ligament repair  5.  Left Knee  arthroscopic loose body removal      vertical tear in the medial meniscus with 1 suture, vertical tear of lateral meniscus with 2 sutures     Post-Op Plan:  ACL reconstruction with quad tendon autograft and medial and lateral meniscal repairs.  Toe-touch weight-bearing for 2-4 weeks followed by partial and full weight-bearing by 6 weeks.  She will require a hinged brace for 6 weeks.  We will start a CPM with passive motion immediately.      PTA Visit #: 0/5     Time In: 5:20 pm  Time Out: 6:30 pm  Total Billable Time: 70 minutes    Subjective     Pt reports: continues to feel a little irritation and calf soreness. But overall doing well.   She was compliant with home  "exercise program.  Response to previous treatment: improved ROM  Functional change: ongoing    Pain: 0/10  Location: left knee      Objective    DOS 6/13: 10 weeks 2 days POD as of 8/24/23    Knee Passive Range of Motion:    Right  Left    Flexion 140 135   Extension 10 hyper 10 hyper     Treatment     Karla received the treatments listed below:    therapeutic exercises to develop strength and ROM for 20 minutes including:  Single leg matrix knee extension 3x10 10#  Prone quad stretch 30s x5  Heel slides 30x 5s holds      Not today    Not performed today  LLLD knee extension with heel prop 8 mins     manual therapy techniques: Joint mobilizations and Soft tissue Mobilization were applied to the: L knee for 2 minutes, including:  Extension hinge   Patellar mobs all directions   Fat pat mobilization  PROM flex EOM    neuromuscular re-education activities to improve: Kinesthetic and Proprioception for 38 minutes. The following activities were included:      SLR 3x15  SL bridge 3x15    BFR applied for the following, 80% OKC reps/sets 30/15/15/15  -quad sets into hyper  -LAQ 3#  -LLE bias box squat with 10# KB front press (reps 30/15/15/15)      4" lateral step down 3x10      Not today:  SLR 3x15  SL bridge 3x10B  Resisted lateral steps GTB at ankles x 3 laps   20" LLE bias box squat 3x10 with 10# KB press   [place] SS 3x8  LLE bias wall sit 30s x4 with 10# KB hold on RLE  12" forward step up 3x10 with contralat HF  TRX DL squat 3x10  TRX split squat 3x10  6" forward step up 3x10  6" lateral step down 3x10  -DL shuttle 87.5# 3x10  -SL shuttle 50# 3x10B     therapeutic activities to improve functional performance for 10 minutes, including:  Stationary bike x 10' to improve CV endurance and tissue extensibility    Not today  Wall sit 20s x3  Superset single leg calf raise 3x fatigue  45# sled push/pull x laps    Patient Education and Home Exercises       Education provided:   - HEP, POC, answered patient " questions      Written Home Exercises Provided: yes. Exercises were reviewed and Karla was able to demonstrate them prior to the end of the session.  Karla demonstrated good  understanding of the education provided. See EMR under Patient Instructions for exercises provided during therapy sessions    Assessment   Karla completed session as noted above and is doing well overall. Flexion is still limited by about 5 degrees but this improved with heel slides and prone quad stretch. Lateral step down is very challenging for her at this time. VC/TC required to avoid forward trunk lean and reduced shin angle, but this should improve as her quad strength improved. She was challenged by BFR today , but did well with moderate fatigue after.       Karla Is progressing well towards her goals.   Pt prognosis is Excellent.     Pt will continue to benefit from skilled outpatient physical therapy to address the deficits listed in the problem list box on initial evaluation, provide pt/family education and to maximize pt's level of independence in the home and community environment.     Pt's spiritual, cultural and educational needs considered and pt agreeable to plan of care and goals.     Anticipated barriers to physical therapy: none    Post-op Knee   Short Term Goals ( 4 Weeks ):   1. Pt will report reduced pain by 20 to 40% or greater for improved mobility, sleep  and ambulation.  MET  2. Patients knee edema reduced by 1/4 to 1/2 inch or greater to enhance flexion ROM and knee comfort.  MET  3. Pt to report minimal to no pain to palpation for improved level of comfort.  MET  4. Pt to demonstrate symmetrical weight bearing w/o increased pain for stability and functional ambulation . MET  5. Pts neuromuscular response will be enhanced for unilateral standing stability and balance  MET  6. Pt to achieve 90 degrees of passive knee flexion for restoring functional knee mobility, ambulating with proper gait pattern and sit to stand  ability. MET   7 Pt to report understanding of all instruction pertinent to patient safety , gait instruction and HEP.  MET  8. Pt to exhibit correct return demonstration of exercises for self-management and independence with HEP MET     Long Term Goals (32 Weeks):  1. Pt will demonstrate increased knee flexion AROM to 120 degrees or greater for return to functional activity  2. Pt will demonstrate increased knee extension AROM to 0 degrees for standing stability   3. Pt will demonstrate increased RLE strength by 1/2 to 1 grade for improved functional stability  4. Pt to demonstrate standing stability unsupported on dynamic surfaces for functional return to ADL and recreational activities.   5.The patient will be independent amb with no assistive device on all surfaces for community distances.  6. Pt to demonstrate independence with HEP for self management  7. Patient will return to exercising in gym for fitness and recreation 3x/wk within 6 months.  8. Patient will improve FOTO score to </= 15% limited to decrease perceived limitation with mobility.    Plan     Cont POC    Vernon Hart, PT, DPT

## 2023-09-05 ENCOUNTER — CLINICAL SUPPORT (OUTPATIENT)
Dept: REHABILITATION | Facility: HOSPITAL | Age: 23
End: 2023-09-05
Payer: COMMERCIAL

## 2023-09-05 DIAGNOSIS — M25.562 ACUTE PAIN OF LEFT KNEE: Primary | ICD-10-CM

## 2023-09-05 DIAGNOSIS — M25.662 KNEE STIFFNESS, LEFT: ICD-10-CM

## 2023-09-05 PROCEDURE — 97110 THERAPEUTIC EXERCISES: CPT | Mod: PN

## 2023-09-05 PROCEDURE — 97530 THERAPEUTIC ACTIVITIES: CPT | Mod: PN

## 2023-09-05 PROCEDURE — 97112 NEUROMUSCULAR REEDUCATION: CPT | Mod: PN

## 2023-09-05 NOTE — PROGRESS NOTES
[facility]   Physical Therapy Treatment Note     Name: Karla Price  [facility] Number: [id number]    Therapy Diagnosis:   Encounter Diagnoses   Name Primary?    Acute pain of left knee Yes    Knee stiffness, left      Physician: Camila Holcomb MD    Visit Date: 9/5/2023    Physician Orders: PT Eval and Treat  Medical Diagnosis from Referral:   S83.512A (ICD-10-CM) - Rupture of anterior cruciate ligament of left knee, initial encounter   S83.242A (ICD-10-CM) - Tear of medial meniscus of left knee, current, unspecified tear type, initial encounter   S83.282A (ICD-10-CM) - Tear of lateral meniscus of left knee, current, unspecified tear type, initial encounter      Evaluation Date: 6/15/2023  Authorization Period Expiration: 12/31/23  Plan of Care Expiration: 3/29/24  Progress Note Due: 7/15/23  Visit # / Visits authorized: 36/40 (18 new auth)  FOTO: 2/2 administered 8/10/23: 64/100     Precautions: DOS 6/13/23  Procedure:  1. Left Knee Arthroscopy, anterior cruciate ligament reconstruction 90047  2.  Left Knee open lateral extra-articular tenodesis (modified Irma procedure)  3.  Left Knee Arthroscopy, with meniscus repair (medial AND lateral) 41344  4.  Left Knee open lateral collateral ligament repair  5.  Left Knee  arthroscopic loose body removal      vertical tear in the medial meniscus with 1 suture, vertical tear of lateral meniscus with 2 sutures     Post-Op Plan:  ACL reconstruction with quad tendon autograft and medial and lateral meniscal repairs.  Toe-touch weight-bearing for 2-4 weeks followed by partial and full weight-bearing by 6 weeks.  She will require a hinged brace for 6 weeks.  We will start a CPM with passive motion immediately.      PTA Visit #: 0/5     Time In: 5:28 pm  Time Out: 6:30 pm  Total Billable Time: 62 minutes    Subjective     Pt reports: doing good. Soreness has improved  She was compliant with home exercise program.  Response to previous treatment: improved  "ROM  Functional change: ongoing    Pain: 0/10  Location: left knee      Objective    DOS 6/13: 10 weeks 2 days POD as of 8/24/23    Knee Passive Range of Motion:    Right  Left    Flexion 140 135   Extension 10 hyper 10 hyper     Treatment     Karla received the treatments listed below:    therapeutic exercises to develop strength and ROM for 20 minutes including:  Single leg matrix knee extension 3x10 10#  Prone quad stretch 30s x5  Heel slides 30x 5s holds      Not today    Not performed today  LLLD knee extension with heel prop 8 mins     manual therapy techniques: Joint mobilizations and Soft tissue Mobilization were applied to the: L knee for 2 minutes, including:  Extension hinge   Patellar mobs all directions   Fat pat mobilization  PROM flex EOM    neuromuscular re-education activities to improve: Kinesthetic and Proprioception for 33 minutes. The following activities were included:      SLR 3x15  SL bridge 3x15    BFR applied for the following, 80% OKC reps/sets 30/15/15/15  -quad sets into hyper  -LAQ 3#  -LLE bias box squat with 10# KB front press (reps 30/15/15/15)      4" lateral step down 3x10      Not today:  SLR 3x15  SL bridge 3x10B  Resisted lateral steps GTB at ankles x 3 laps   20" LLE bias box squat 3x10 with 10# KB press   [place] SS 3x8  LLE bias wall sit 30s x4 with 10# KB hold on RLE  12" forward step up 3x10 with contralat HF  TRX DL squat 3x10  TRX split squat 3x10  6" forward step up 3x10  6" lateral step down 3x10  -DL shuttle 87.5# 3x10  -SL shuttle 50# 3x10B     therapeutic activities to improve functional performance for 10 minutes, including:  Stationary bike x 10' to improve CV endurance and tissue extensibility    Not today  Wall sit 20s x3  Superset single leg calf raise 3x fatigue  45# sled push/pull x laps    Patient Education and Home Exercises       Education provided:   - HEP, POC, answered patient questions      Written Home Exercises Provided: yes. Exercises were reviewed " and Karla was able to demonstrate them prior to the end of the session.  Karla demonstrated good  understanding of the education provided. See EMR under Patient Instructions for exercises provided during therapy sessions    Assessment   Karla completed session as noted above and is doing well overall. Flexion is still limited by about 5 degrees but this improved with heel slides and prone quad stretch. Lateral step down is very challenging for her at this time. VC/TC required to avoid forward trunk lean and reduced shin angle, but this should improve as her quad strength improved. She was challenged by BFR today , but did well with moderate fatigue after.       Karla Is progressing well towards her goals.   Pt prognosis is Excellent.     Pt will continue to benefit from skilled outpatient physical therapy to address the deficits listed in the problem list box on initial evaluation, provide pt/family education and to maximize pt's level of independence in the home and community environment.     Pt's spiritual, cultural and educational needs considered and pt agreeable to plan of care and goals.     Anticipated barriers to physical therapy: none    Post-op Knee   Short Term Goals ( 4 Weeks ):   1. Pt will report reduced pain by 20 to 40% or greater for improved mobility, sleep  and ambulation.  MET  2. Patients knee edema reduced by 1/4 to 1/2 inch or greater to enhance flexion ROM and knee comfort.  MET  3. Pt to report minimal to no pain to palpation for improved level of comfort.  MET  4. Pt to demonstrate symmetrical weight bearing w/o increased pain for stability and functional ambulation . MET  5. Pts neuromuscular response will be enhanced for unilateral standing stability and balance  MET  6. Pt to achieve 90 degrees of passive knee flexion for restoring functional knee mobility, ambulating with proper gait pattern and sit to stand ability. MET   7 Pt to report understanding of all instruction pertinent to  patient safety , gait instruction and HEP.  MET  8. Pt to exhibit correct return demonstration of exercises for self-management and independence with HEP MET     Long Term Goals (32 Weeks):  1. Pt will demonstrate increased knee flexion AROM to 120 degrees or greater for return to functional activity  2. Pt will demonstrate increased knee extension AROM to 0 degrees for standing stability   3. Pt will demonstrate increased RLE strength by 1/2 to 1 grade for improved functional stability  4. Pt to demonstrate standing stability unsupported on dynamic surfaces for functional return to ADL and recreational activities.   5.The patient will be independent amb with no assistive device on all surfaces for community distances.  6. Pt to demonstrate independence with HEP for self management  7. Patient will return to exercising in gym for fitness and recreation 3x/wk within 6 months.  8. Patient will improve FOTO score to </= 15% limited to decrease perceived limitation with mobility.    Plan     Cont POC    ERIKA TOLLIVER, PT, DPT, SCS

## 2023-09-12 ENCOUNTER — CLINICAL SUPPORT (OUTPATIENT)
Dept: REHABILITATION | Facility: HOSPITAL | Age: 23
End: 2023-09-12
Payer: COMMERCIAL

## 2023-09-12 DIAGNOSIS — M25.562 ACUTE PAIN OF LEFT KNEE: Primary | ICD-10-CM

## 2023-09-12 DIAGNOSIS — M25.662 KNEE STIFFNESS, LEFT: ICD-10-CM

## 2023-09-12 PROCEDURE — 97530 THERAPEUTIC ACTIVITIES: CPT | Mod: PN

## 2023-09-12 PROCEDURE — 97112 NEUROMUSCULAR REEDUCATION: CPT | Mod: PN

## 2023-09-12 NOTE — PROGRESS NOTES
[facility]   Physical Therapy Treatment Note     Name: Karla Price  [facility] Number: [id number]    Therapy Diagnosis:   Encounter Diagnoses   Name Primary?    Acute pain of left knee Yes    Knee stiffness, left        Physician: Camila Holcomb MD    Visit Date: 9/12/2023    Physician Orders: PT Eval and Treat  Medical Diagnosis from Referral:   S83.512A (ICD-10-CM) - Rupture of anterior cruciate ligament of left knee, initial encounter   S83.242A (ICD-10-CM) - Tear of medial meniscus of left knee, current, unspecified tear type, initial encounter   S83.282A (ICD-10-CM) - Tear of lateral meniscus of left knee, current, unspecified tear type, initial encounter      Evaluation Date: 6/15/2023  Authorization Period Expiration: 12/31/23  Plan of Care Expiration: 3/29/24  Progress Note Due: 7/15/23  Visit # / Visits authorized: 37/40 (22 new auth)  FOTO: 2/2 administered 8/10/23: 64/100     Precautions: DOS 6/13/23  Procedure:  1. Left Knee Arthroscopy, anterior cruciate ligament reconstruction 16994  2.  Left Knee open lateral extra-articular tenodesis (modified Irma procedure)  3.  Left Knee Arthroscopy, with meniscus repair (medial AND lateral) 38052  4.  Left Knee open lateral collateral ligament repair  5.  Left Knee  arthroscopic loose body removal      vertical tear in the medial meniscus with 1 suture, vertical tear of lateral meniscus with 2 sutures     Post-Op Plan:  ACL reconstruction with quad tendon autograft and medial and lateral meniscal repairs.  Toe-touch weight-bearing for 2-4 weeks followed by partial and full weight-bearing by 6 weeks.  She will require a hinged brace for 6 weeks.  We will start a CPM with passive motion immediately.      PTA Visit #: 0/5     Time In: 5:22 pm  Time Out: 6:30 pm  Total Billable Time: 68 minutes    Subjective     Pt reports: been walking more with less soreness. Still occasionally feel timid when taking a big step  She was compliant with home  "exercise program.  Response to previous treatment: improved ROM  Functional change: ongoing    Pain: 0/10  Location: left knee      Objective    DOS 6/13: 13 weeks 0 days POD as of 9/12/23    Knee Passive Range of Motion:    Right  Left    Flexion 140 140   Extension 10 hyper 10 hyper     Treatment     Karla received the treatments listed below:    therapeutic exercises to develop strength and ROM for 0 minutes including:  Single leg matrix knee extension 3x15 10#        Not today  Prone quad stretch 30s x5  Heel slides 30x 5s holds      manual therapy techniques: Joint mobilizations and Soft tissue Mobilization were applied to the: L knee for 2 minutes, including:  Extension hinge   Patellar mobs all directions   Fat pat mobilization  PROM flex EOM    neuromuscular re-education activities to improve: Kinesthetic and Proprioception for 35 minutes. The following activities were included:  SLR 3x15  SL bridge 3x15  Resisted lateral steps GTB at ankles x 3 laps   Single leg shuttle squat 87.5# 3x10B  4" lateral step down 3x10  12" forward step up 3x10 with contralat HF      Not today:  BFR applied for the following, 80% OKC reps/sets 30/15/15/15  -quad sets into hyper  -LAQ 3#  -LLE bias box squat with 10# KB front press (reps 30/15/15/15)  20" LLE bias box squat 3x10 with 10# KB press   [place] SS 3x8  LLE bias wall sit 30s x4 with 10# KB hold on RLE    TRX DL squat 3x10  TRX split squat 3x10  -DL shuttle 87.5# 3x10    therapeutic activities to improve functional performance for 31 minutes, including:  Stationary bike x 10' to improve CV endurance and tissue extensibility  Circuit: x 3 rounds each  - 75# sled push  - Heel elevated DL squat x15   - Single leg calf raise       Not today  Wall sit 20s x3    Patient Education and Home Exercises       Education provided:   - HEP, POC, answered patient questions      Written Home Exercises Provided: yes. Exercises were reviewed and Karla was able to demonstrate them prior " to the end of the session.  Karla demonstrated good  understanding of the education provided. See EMR under Patient Instructions for exercises provided during therapy sessions    Assessment   Karla completed session as noted above and is doing well overall. Flexion is still limited by about 5 degrees but this improved with heel slides and prone quad stretch. Lateral step down is very challenging for her at this time although improved from previous treatment session. Intro'd circuit based resistance training to challenge CV system and increase HR. Increased fatigue with circuit based strength training but no complaints of pain. Should continue to improve as quad strength improves      Karla Is progressing well towards her goals.   Pt prognosis is Excellent.     Pt will continue to benefit from skilled outpatient physical therapy to address the deficits listed in the problem list box on initial evaluation, provide pt/family education and to maximize pt's level of independence in the home and community environment.     Pt's spiritual, cultural and educational needs considered and pt agreeable to plan of care and goals.     Anticipated barriers to physical therapy: none    Post-op Knee   Short Term Goals ( 4 Weeks ):   1. Pt will report reduced pain by 20 to 40% or greater for improved mobility, sleep  and ambulation.  MET  2. Patients knee edema reduced by 1/4 to 1/2 inch or greater to enhance flexion ROM and knee comfort.  MET  3. Pt to report minimal to no pain to palpation for improved level of comfort.  MET  4. Pt to demonstrate symmetrical weight bearing w/o increased pain for stability and functional ambulation . MET  5. Pts neuromuscular response will be enhanced for unilateral standing stability and balance  MET  6. Pt to achieve 90 degrees of passive knee flexion for restoring functional knee mobility, ambulating with proper gait pattern and sit to stand ability. MET   7 Pt to report understanding of all  instruction pertinent to patient safety , gait instruction and HEP.  MET  8. Pt to exhibit correct return demonstration of exercises for self-management and independence with HEP MET     Long Term Goals (32 Weeks):  1. Pt will demonstrate increased knee flexion AROM to 120 degrees or greater for return to functional activity  2. Pt will demonstrate increased knee extension AROM to 0 degrees for standing stability   3. Pt will demonstrate increased RLE strength by 1/2 to 1 grade for improved functional stability  4. Pt to demonstrate standing stability unsupported on dynamic surfaces for functional return to ADL and recreational activities.   5.The patient will be independent amb with no assistive device on all surfaces for community distances.  6. Pt to demonstrate independence with HEP for self management  7. Patient will return to exercising in gym for fitness and recreation 3x/wk within 6 months.  8. Patient will improve FOTO score to </= 15% limited to decrease perceived limitation with mobility.    Plan     Cont POC    ERIKA TOLLIVER, PT, DPT, SCS

## 2023-09-14 ENCOUNTER — CLINICAL SUPPORT (OUTPATIENT)
Dept: REHABILITATION | Facility: HOSPITAL | Age: 23
End: 2023-09-14
Payer: COMMERCIAL

## 2023-09-14 DIAGNOSIS — M25.562 ACUTE PAIN OF LEFT KNEE: Primary | ICD-10-CM

## 2023-09-14 DIAGNOSIS — M25.662 KNEE STIFFNESS, LEFT: ICD-10-CM

## 2023-09-14 PROCEDURE — 97112 NEUROMUSCULAR REEDUCATION: CPT | Mod: PN

## 2023-09-14 PROCEDURE — 97110 THERAPEUTIC EXERCISES: CPT | Mod: PN

## 2023-09-14 PROCEDURE — 97530 THERAPEUTIC ACTIVITIES: CPT | Mod: PN

## 2023-09-14 NOTE — PROGRESS NOTES
[facility]   Physical Therapy Treatment Note     Name: Karla Price  [facility] Number: [id number]    Therapy Diagnosis:   Encounter Diagnoses   Name Primary?    Acute pain of left knee Yes    Knee stiffness, left          Physician: Camila Holcomb MD    Visit Date: 9/14/2023    Physician Orders: PT Eval and Treat  Medical Diagnosis from Referral:   S83.512A (ICD-10-CM) - Rupture of anterior cruciate ligament of left knee, initial encounter   S83.242A (ICD-10-CM) - Tear of medial meniscus of left knee, current, unspecified tear type, initial encounter   S83.282A (ICD-10-CM) - Tear of lateral meniscus of left knee, current, unspecified tear type, initial encounter      Evaluation Date: 6/15/2023  Authorization Period Expiration: 12/31/23  Plan of Care Expiration: 3/29/24  Progress Note Due: 7/15/23  Visit # / Visits authorized: 38/40 (22 new auth)  FOTO: 3/3 administered 9/14/23: 64/100     Precautions: DOS 6/13/23  Procedure:  1. Left Knee Arthroscopy, anterior cruciate ligament reconstruction 54107  2.  Left Knee open lateral extra-articular tenodesis (modified Irma procedure)  3.  Left Knee Arthroscopy, with meniscus repair (medial AND lateral) 60667  4.  Left Knee open lateral collateral ligament repair  5.  Left Knee  arthroscopic loose body removal      vertical tear in the medial meniscus with 1 suture, vertical tear of lateral meniscus with 2 sutures     Post-Op Plan:  ACL reconstruction with quad tendon autograft and medial and lateral meniscal repairs.  Toe-touch weight-bearing for 2-4 weeks followed by partial and full weight-bearing by 6 weeks.  She will require a hinged brace for 6 weeks.  We will start a CPM with passive motion immediately.      PTA Visit #: 0/5     Time In: 11:36 pm  Time Out: 12:35 pm  Total Billable Time: 59 minutes    Subjective     Pt reports: was a little sore following last session but not as sore as I thought I would be  She was compliant with home  "exercise program.  Response to previous treatment: improved ROM  Functional change: improved gait mechanics    Pain: 0/10  Location: left knee      Objective    DOS 6/13: 13 weeks 2 days POD as of 9/14/23    Knee Passive Range of Motion:    Right  Left    Flexion 140 140   Extension 10 hyper 10 hyper     Treatment     Karla received the treatments listed below:    therapeutic exercises to develop strength and ROM for 10 minutes including:  Single leg matrix knee extension 3x15 10#        Not today  Prone quad stretch 30s x5  Heel slides 30x 5s holds      manual therapy techniques: Joint mobilizations and Soft tissue Mobilization were applied to the: L knee for 4 minutes, including:  Extension hinge   Patellar mobs all directions   Fat pat mobilization  Flexion MWM with tibiofemoral gapping    neuromuscular re-education activities to improve: Kinesthetic and Proprioception for 20 minutes. The following activities were included:  Long sitting SLR 3x15  SL bridge 3x15  Resisted lateral steps GTB at ankles x 3 laps   Single leg shuttle squat 87.5# 3x10B  4" forward step down 3x10        Not today:  BFR applied for the following, 80% OKC reps/sets 30/15/15/15  -quad sets into hyper  -LAQ 3#  -LLE bias box squat with 10# KB front press (reps 30/15/15/15)  20" LLE bias box squat 3x10 with 10# KB press   Monegasque SS 3x8  LLE bias wall sit 30s x4 with 10# KB hold on RLE  TRX DL squat 3x10  TRX split squat 3x10  -DL shuttle 87.5# 3x10    therapeutic activities to improve functional performance for 25 minutes, including:  Stationary bike x 10' to improve CV endurance and tissue extensibility  Circuit: x 3 rounds each  - 75# sled push  - 15# front rack KB squat x12  - 8" forward step up to contralat HF x12      Not today  Wall sit 20s x3    Patient Education and Home Exercises       Education provided:   - HEP, POC, answered patient questions, obtaining gym membership      Written Home Exercises Provided: yes. Exercises were " reviewed and Karla was able to demonstrate them prior to the end of the session.  Karla demonstrated good  understanding of the education provided. See EMR under Patient Instructions for exercises provided during therapy sessions    Assessment   Karla completed session as noted above. At this time A/PROM ROM is near symmetrical at this time into both terminal extension and flexion. Continues to be challenged by eccentric quad control in CKC which was focus of todays session. Continued with circuit based resistance training to challenge CV and anaerobic energy system. Discussed transitioning to 1x/week which I think is appropriate at this time. Encouraged to get a gym membership to facilitate strength gains outside of PT. Overall Karla is doing very well however continues to have significant quad strength deficit limited functional capabilities at this time. Will plan to assess LE LSI using in-line HH dynamometer at future sesson        Karla Is progressing well towards her goals.   Pt prognosis is Excellent.     Pt will continue to benefit from skilled outpatient physical therapy to address the deficits listed in the problem list box on initial evaluation, provide pt/family education and to maximize pt's level of independence in the home and community environment.     Pt's spiritual, cultural and educational needs considered and pt agreeable to plan of care and goals.     Anticipated barriers to physical therapy: none    Post-op Knee   Short Term Goals ( 4 Weeks ):   1. Pt will report reduced pain by 20 to 40% or greater for improved mobility, sleep  and ambulation.  MET  2. Patients knee edema reduced by 1/4 to 1/2 inch or greater to enhance flexion ROM and knee comfort.  MET  3. Pt to report minimal to no pain to palpation for improved level of comfort.  MET  4. Pt to demonstrate symmetrical weight bearing w/o increased pain for stability and functional ambulation . MET  5. Pts neuromuscular response will be  enhanced for unilateral standing stability and balance  MET  6. Pt to achieve 90 degrees of passive knee flexion for restoring functional knee mobility, ambulating with proper gait pattern and sit to stand ability. MET   7 Pt to report understanding of all instruction pertinent to patient safety , gait instruction and HEP.  MET  8. Pt to exhibit correct return demonstration of exercises for self-management and independence with HEP MET     Long Term Goals (32 Weeks):  1. Pt will demonstrate increased knee flexion AROM to 120 degrees or greater for return to functional activity  2. Pt will demonstrate increased knee extension AROM to 0 degrees for standing stability   3. Pt will demonstrate increased RLE strength by 1/2 to 1 grade for improved functional stability  4. Pt to demonstrate standing stability unsupported on dynamic surfaces for functional return to ADL and recreational activities.   5.The patient will be independent amb with no assistive device on all surfaces for community distances.  6. Pt to demonstrate independence with HEP for self management  7. Patient will return to exercising in gym for fitness and recreation 3x/wk within 6 months.  8. Patient will improve FOTO score to </= 15% limited to decrease perceived limitation with mobility.    Plan     Cont POC    ERIKA TOLLIVER, PT, DPT, SCS

## 2023-09-19 ENCOUNTER — CLINICAL SUPPORT (OUTPATIENT)
Dept: REHABILITATION | Facility: HOSPITAL | Age: 23
End: 2023-09-19
Payer: COMMERCIAL

## 2023-09-19 DIAGNOSIS — M25.562 ACUTE PAIN OF LEFT KNEE: Primary | ICD-10-CM

## 2023-09-19 DIAGNOSIS — M25.662 KNEE STIFFNESS, LEFT: ICD-10-CM

## 2023-09-19 PROCEDURE — 97110 THERAPEUTIC EXERCISES: CPT | Mod: PN

## 2023-09-19 PROCEDURE — 97112 NEUROMUSCULAR REEDUCATION: CPT | Mod: PN

## 2023-09-19 PROCEDURE — 97530 THERAPEUTIC ACTIVITIES: CPT | Mod: PN

## 2023-09-19 NOTE — PROGRESS NOTES
[facility]   Physical Therapy Treatment Note     Name: Karla Price  [facility] Number: [id number]    Therapy Diagnosis:   Encounter Diagnoses   Name Primary?    Acute pain of left knee Yes    Knee stiffness, left        Physician: Camila Holcomb MD    Visit Date: 9/19/2023    Physician Orders: PT Eval and Treat  Medical Diagnosis from Referral:   S83.512A (ICD-10-CM) - Rupture of anterior cruciate ligament of left knee, initial encounter   S83.242A (ICD-10-CM) - Tear of medial meniscus of left knee, current, unspecified tear type, initial encounter   S83.282A (ICD-10-CM) - Tear of lateral meniscus of left knee, current, unspecified tear type, initial encounter      Evaluation Date: 6/15/2023  Authorization Period Expiration: 12/31/23  Plan of Care Expiration: 3/29/24  Progress Note Due: 7/15/23  Visit # / Visits authorized: 38/40 (22 new auth)  FOTO: 3/3 administered 9/14/23: 64/100     Precautions: DOS 6/13/23  Procedure:  1. Left Knee Arthroscopy, anterior cruciate ligament reconstruction 20159  2.  Left Knee open lateral extra-articular tenodesis (modified Irma procedure)  3.  Left Knee Arthroscopy, with meniscus repair (medial AND lateral) 68853  4.  Left Knee open lateral collateral ligament repair  5.  Left Knee  arthroscopic loose body removal      vertical tear in the medial meniscus with 1 suture, vertical tear of lateral meniscus with 2 sutures     Post-Op Plan:  ACL reconstruction with quad tendon autograft and medial and lateral meniscal repairs.  Toe-touch weight-bearing for 2-4 weeks followed by partial and full weight-bearing by 6 weeks.  She will require a hinged brace for 6 weeks.  We will start a CPM with passive motion immediately.      PTA Visit #: 0/5     Time In: 5:30 pm  Time Out: 6:29 pm  Total Billable Time: 59 minutes    Subjective     Pt reports: doing well today. No pain or probs after last visit  She was compliant with home exercise program.  Response to previous  "treatment: improved ROM  Functional change: improved gait mechanics    Pain: 0/10  Location: left knee      Objective    DOS 6/13: 14 weeks 0 days POD as of 9/19/23    Knee Passive Range of Motion:    Right  Left    Flexion 140 140   Extension 10 hyper 10 hyper     Treatment     Karla received the treatments listed below:    therapeutic exercises to develop strength and ROM for 10 minutes including:  Single leg matrix knee extension 3x15 10#    Not today  Prone quad stretch 30s x5  Heel slides 30x 5s holds      manual therapy techniques: Joint mobilizations and Soft tissue Mobilization were applied to the: L knee for 4 minutes, including:  Extension hinge   Patellar mobs all directions   Fat pat mobilization  Flexion MWM with tibiofemoral gapping    neuromuscular re-education activities to improve: Kinesthetic and Proprioception for 20 minutes. The following activities were included:  Long sitting SLR 3x15  SL bridge 3x15  Resisted lateral steps GTB at ankles x 3 laps   Single leg shuttle squat 87.5# 3x10B  4" Lateral step down 4x5 UE assistance        Not today:  BFR applied for the following, 80% OKC reps/sets 30/15/15/15  -quad sets into hyper  -LAQ 3#  -LLE bias box squat with 10# KB front press (reps 30/15/15/15)  20" LLE bias box squat 3x10 with 10# KB press   Turkish SS 3x8  LLE bias wall sit 30s x4 with 10# KB hold on RLE  TRX DL squat 3x10  TRX split squat 3x10  -DL shuttle 87.5# 3x10    therapeutic activities to improve functional performance for 25 minutes, including:  Stationary bike x 10' to improve CV endurance and tissue extensibility  Circuit: x 3 rounds each  - 45# sled pull  - Turkish SS - x8 each   - Side plank clams       Not today  Wall sit 20s x3    Patient Education and Home Exercises       Education provided:   - HEP, POC, answered patient questions, obtaining gym membership      Written Home Exercises Provided: yes. Exercises were reviewed and Karla was able to demonstrate them prior to " the end of the session.  Karla demonstrated good  understanding of the education provided. See EMR under Patient Instructions for exercises provided during therapy sessions    Assessment   Karla completed session as noted above. Still has difficulty with lateral step downs with noted fasciculations and UE assistance required. Jared the session well today.         Karla Is progressing well towards her goals.   Pt prognosis is Excellent.     Pt will continue to benefit from skilled outpatient physical therapy to address the deficits listed in the problem list box on initial evaluation, provide pt/family education and to maximize pt's level of independence in the home and community environment.     Pt's spiritual, cultural and educational needs considered and pt agreeable to plan of care and goals.     Anticipated barriers to physical therapy: none    Post-op Knee   Short Term Goals ( 4 Weeks ):   1. Pt will report reduced pain by 20 to 40% or greater for improved mobility, sleep  and ambulation.  MET  2. Patients knee edema reduced by 1/4 to 1/2 inch or greater to enhance flexion ROM and knee comfort.  MET  3. Pt to report minimal to no pain to palpation for improved level of comfort.  MET  4. Pt to demonstrate symmetrical weight bearing w/o increased pain for stability and functional ambulation . MET  5. Pts neuromuscular response will be enhanced for unilateral standing stability and balance  MET  6. Pt to achieve 90 degrees of passive knee flexion for restoring functional knee mobility, ambulating with proper gait pattern and sit to stand ability. MET   7 Pt to report understanding of all instruction pertinent to patient safety , gait instruction and HEP.  MET  8. Pt to exhibit correct return demonstration of exercises for self-management and independence with HEP MET     Long Term Goals (32 Weeks):  1. Pt will demonstrate increased knee flexion AROM to 120 degrees or greater for return to functional activity  2. Pt  will demonstrate increased knee extension AROM to 0 degrees for standing stability   3. Pt will demonstrate increased RLE strength by 1/2 to 1 grade for improved functional stability  4. Pt to demonstrate standing stability unsupported on dynamic surfaces for functional return to ADL and recreational activities.   5.The patient will be independent amb with no assistive device on all surfaces for community distances.  6. Pt to demonstrate independence with HEP for self management  7. Patient will return to exercising in gym for fitness and recreation 3x/wk within 6 months.  8. Patient will improve FOTO score to </= 15% limited to decrease perceived limitation with mobility.    Plan     Cont POC    Vernon Hart, PT, DPT

## 2023-09-26 ENCOUNTER — CLINICAL SUPPORT (OUTPATIENT)
Dept: REHABILITATION | Facility: HOSPITAL | Age: 23
End: 2023-09-26
Payer: COMMERCIAL

## 2023-09-26 DIAGNOSIS — M25.662 KNEE STIFFNESS, LEFT: ICD-10-CM

## 2023-09-26 DIAGNOSIS — M25.562 ACUTE PAIN OF LEFT KNEE: Primary | ICD-10-CM

## 2023-09-26 PROCEDURE — 97530 THERAPEUTIC ACTIVITIES: CPT | Mod: PN

## 2023-09-26 PROCEDURE — 97110 THERAPEUTIC EXERCISES: CPT | Mod: PN

## 2023-09-26 PROCEDURE — 97112 NEUROMUSCULAR REEDUCATION: CPT | Mod: PN

## 2023-09-26 NOTE — PROGRESS NOTES
[facility]   Physical Therapy Treatment Note     Name: Karla Price  [facility] Number: [id number]    Therapy Diagnosis:   Encounter Diagnoses   Name Primary?    Acute pain of left knee Yes    Knee stiffness, left          Physician: Camila Holcomb MD    Visit Date: 9/26/2023    Physician Orders: PT Eval and Treat  Medical Diagnosis from Referral:   S83.512A (ICD-10-CM) - Rupture of anterior cruciate ligament of left knee, initial encounter   S83.242A (ICD-10-CM) - Tear of medial meniscus of left knee, current, unspecified tear type, initial encounter   S83.282A (ICD-10-CM) - Tear of lateral meniscus of left knee, current, unspecified tear type, initial encounter      Evaluation Date: 6/15/2023  Authorization Period Expiration: 12/31/23  Plan of Care Expiration: 3/29/24  Progress Note Due: 7/15/23  Visit # / Visits authorized: 40/60 (22 new auth)  FOTO: 3/3 administered 9/14/23: 64/100     Precautions: DOS 6/13/23  Procedure:  1. Left Knee Arthroscopy, anterior cruciate ligament reconstruction 79923  2.  Left Knee open lateral extra-articular tenodesis (modified Irma procedure)  3.  Left Knee Arthroscopy, with meniscus repair (medial AND lateral) 01983  4.  Left Knee open lateral collateral ligament repair  5.  Left Knee  arthroscopic loose body removal      vertical tear in the medial meniscus with 1 suture, vertical tear of lateral meniscus with 2 sutures     Post-Op Plan:  ACL reconstruction with quad tendon autograft and medial and lateral meniscal repairs.  Toe-touch weight-bearing for 2-4 weeks followed by partial and full weight-bearing by 6 weeks.  She will require a hinged brace for 6 weeks.  We will start a CPM with passive motion immediately.      PTA Visit #: 0/5     Time In: 5:15 pm  Time Out: 6:15 pm  Total Billable Time: 60 minutes    Subjective     Pt reports: doing well today. No pain or probs after last visit  She was compliant with home exercise program.  Response to  "previous treatment: improved ROM  Functional change: improved gait mechanics    Pain: 0/10  Location: left knee      Objective    DOS 6/13: 14 weeks 0 days POD as of 9/19/23    Knee Passive Range of Motion:    Right  Left    Flexion 140 140   Extension 10 hyper 10 hyper     Treatment     Karla received the treatments listed below:    therapeutic exercises to develop strength and ROM for 10 minutes including:  Dynamic 6   Single leg matrix knee extension 3x15 10#    Not today  Prone quad stretch 30s x5  Heel slides 30x 5s holds      manual therapy techniques: Joint mobilizations and Soft tissue Mobilization were applied to the: L knee for 2 minutes, including:  Extension hinge   Patellar mobs all directions   Fat pat mobilization  Flexion MWM with tibiofemoral gapping    neuromuscular re-education activities to improve: Kinesthetic and Proprioception for 23 minutes. The following activities were included:  Long sitting SLR 3x15  SL hip thruster 2x10  Resisted lateral steps GTB at ankles x 4laps   Chinese split squats 4x6        Not today:  4" Lateral step down  UE assistance  Single leg shuttle squat 87.5# 3x10B  BFR applied for the following, 80% OKC reps/sets 30/15/15/15  -quad sets into hyper  -LAQ 3#  -LLE bias box squat with 10# KB front press (reps 30/15/15/15)  20" LLE bias box squat 3x10 with 10# KB press   Chinese SS 3x8  LLE bias wall sit 30s x4 with 10# KB hold on RLE  TRX DL squat 3x10  TRX split squat 3x10  -DL shuttle 87.5# 3x10    therapeutic activities to improve functional performance for 25 minutes, including:  Stationary bike x 10' to improve CV endurance and tissue extensibility  Circuit: x 3 rounds each  - 25# goblet squat x15  - Side plank clams x10      Not today  Wall sit 20s x3    Patient Education and Home Exercises       Education provided:   - HEP, POC, answered patient questions, obtaining gym membership      Written Home Exercises Provided: yes. Exercises were reviewed and Karla was " able to demonstrate them prior to the end of the session.  Karla demonstrated good  understanding of the education provided. See EMR under Patient Instructions for exercises provided during therapy sessions    Assessment   Karla completed session as noted above. ROM remains full. Lateral step down is still shaky but able to complete from 4'' with good form. Will look to formally assess strength next week and likely add jogging in the near future.         Karla Is progressing well towards her goals.   Pt prognosis is Excellent.     Pt will continue to benefit from skilled outpatient physical therapy to address the deficits listed in the problem list box on initial evaluation, provide pt/family education and to maximize pt's level of independence in the home and community environment.     Pt's spiritual, cultural and educational needs considered and pt agreeable to plan of care and goals.     Anticipated barriers to physical therapy: none    Post-op Knee   Short Term Goals ( 4 Weeks ):   1. Pt will report reduced pain by 20 to 40% or greater for improved mobility, sleep  and ambulation.  MET  2. Patients knee edema reduced by 1/4 to 1/2 inch or greater to enhance flexion ROM and knee comfort.  MET  3. Pt to report minimal to no pain to palpation for improved level of comfort.  MET  4. Pt to demonstrate symmetrical weight bearing w/o increased pain for stability and functional ambulation . MET  5. Pts neuromuscular response will be enhanced for unilateral standing stability and balance  MET  6. Pt to achieve 90 degrees of passive knee flexion for restoring functional knee mobility, ambulating with proper gait pattern and sit to stand ability. MET   7 Pt to report understanding of all instruction pertinent to patient safety , gait instruction and HEP.  MET  8. Pt to exhibit correct return demonstration of exercises for self-management and independence with HEP MET     Long Term Goals (32 Weeks):  1. Pt will demonstrate  increased knee flexion AROM to 120 degrees or greater for return to functional activity  2. Pt will demonstrate increased knee extension AROM to 0 degrees for standing stability   3. Pt will demonstrate increased RLE strength by 1/2 to 1 grade for improved functional stability  4. Pt to demonstrate standing stability unsupported on dynamic surfaces for functional return to ADL and recreational activities.   5.The patient will be independent amb with no assistive device on all surfaces for community distances.  6. Pt to demonstrate independence with HEP for self management  7. Patient will return to exercising in gym for fitness and recreation 3x/wk within 6 months.  8. Patient will improve FOTO score to </= 15% limited to decrease perceived limitation with mobility.    Plan     Cont POC    Vernon Hart, PT, DPT

## 2023-09-27 ENCOUNTER — OFFICE VISIT (OUTPATIENT)
Dept: SPORTS MEDICINE | Facility: CLINIC | Age: 23
End: 2023-09-27
Payer: COMMERCIAL

## 2023-09-27 VITALS — WEIGHT: 208 LBS | HEIGHT: 61 IN | BODY MASS INDEX: 39.27 KG/M2

## 2023-09-27 DIAGNOSIS — Z98.890 S/P ACL RECONSTRUCTION: Primary | ICD-10-CM

## 2023-09-27 PROCEDURE — 99214 PR OFFICE/OUTPT VISIT, EST, LEVL IV, 30-39 MIN: ICD-10-PCS | Mod: S$GLB,,, | Performed by: ORTHOPAEDIC SURGERY

## 2023-09-27 PROCEDURE — 99214 OFFICE O/P EST MOD 30 MIN: CPT | Mod: S$GLB,,, | Performed by: ORTHOPAEDIC SURGERY

## 2023-09-27 PROCEDURE — 99999 PR PBB SHADOW E&M-EST. PATIENT-LVL III: ICD-10-PCS | Mod: PBBFAC,,, | Performed by: ORTHOPAEDIC SURGERY

## 2023-09-27 PROCEDURE — 3044F HG A1C LEVEL LT 7.0%: CPT | Mod: CPTII,S$GLB,, | Performed by: ORTHOPAEDIC SURGERY

## 2023-09-27 PROCEDURE — 1159F PR MEDICATION LIST DOCUMENTED IN MEDICAL RECORD: ICD-10-PCS | Mod: CPTII,S$GLB,, | Performed by: ORTHOPAEDIC SURGERY

## 2023-09-27 PROCEDURE — 3008F BODY MASS INDEX DOCD: CPT | Mod: CPTII,S$GLB,, | Performed by: ORTHOPAEDIC SURGERY

## 2023-09-27 PROCEDURE — 3008F PR BODY MASS INDEX (BMI) DOCUMENTED: ICD-10-PCS | Mod: CPTII,S$GLB,, | Performed by: ORTHOPAEDIC SURGERY

## 2023-09-27 PROCEDURE — 3044F PR MOST RECENT HEMOGLOBIN A1C LEVEL <7.0%: ICD-10-PCS | Mod: CPTII,S$GLB,, | Performed by: ORTHOPAEDIC SURGERY

## 2023-09-27 PROCEDURE — 1159F MED LIST DOCD IN RCRD: CPT | Mod: CPTII,S$GLB,, | Performed by: ORTHOPAEDIC SURGERY

## 2023-09-27 PROCEDURE — 99999 PR PBB SHADOW E&M-EST. PATIENT-LVL III: CPT | Mod: PBBFAC,,, | Performed by: ORTHOPAEDIC SURGERY

## 2023-09-27 NOTE — PROGRESS NOTES
"POST-OPERATIVE EXAMINATION    22 y.o. Female who returns for follow after surgery. She is 3.5 months s/p:    Procedure:  1.  Left Knee Arthroscopy, anterior cruciate ligament reconstruction 83618  2.  Left Knee open lateral extra-articular tenodesis (modified Irma procedure)  3.  Left Knee Arthroscopy, with meniscus repair (medial AND lateral) 95158  4.  Left Knee open lateral collateral ligament repair  5.  Left Knee  arthroscopic loose body removal      She is doing well without any issues.       PHYSICAL EXAMINATION:  Ht 5' 1" (1.549 m)   Wt 94.3 kg (208 lb)   BMI 39.30 kg/m²   General: Well-developed well-nourished 22 y.o. female in no acute distress   Cardiovascular: Regular rhythm   Lungs: No labored breathing or wheezing appreciated   Neuro: Alert and oriented ×3   Psychiatric: well oriented to person, place and time, demonstrates normal mood and affect   Skin: No rashes, lesions or ulcers, normal temperature, turgor, and texture on involved extremity    ORTHOPEDIC EXAM:  Normal post-operative swelling  Normal post-operative scarring  Strength: WNL  ROM: 0-135  Tests: Negative Lachman's, Negative pivot shift    KNEE EXAM - right    Inspection:   Normal skin color and appearance with no scars, no ecchymosis and no effusion.      ROM:   0° - 145°.      Palpation:   There is no tenderness along medial joint line, medial plica, medial collateral ligament, pes bursa, lateral joint line, iliotibial band, lateral collateral ligament, popliteal fossa, patellar tendon, or quadriceps tendon.    Stability:  Positive anterior drawer, positive Lachman's, positive 3+ pivot shift, negative posterior drawer    No instability with varus or valgus stress at 0° or 30°. Negative dial  test at 30° and 90°.    Tests:   - Andreinas test.  - patellar compression - grind test, - crepitus.  There is no patellar apprehension.     - J Sign. - Victor Manuel's. - patellar tilt. - Ana. Lateral patella translation  2 quadrants. Medial " patella translation 2 quadrants    Motor:   Quadriceps strength is 5/5 and hamstrings strength is 5/5. Hamstrings show no tightness.      Neuro:   Distal neurovascular status is normal    Vascular: Negative Homans and no palpable popliteal cords. 2+ pedal pulse with brisk cap refill    Gait Normal      ASSESSMENT:      ICD-10-CM ICD-9-CM   1. S/P ACL reconstruction  Z98.890 V45.89         PLAN:       Continue physical therapy  RTC in 6 weeks  We also discussed at length her right knee.  It is evident that she does have an ACL tear on the right knee now that she is had the left knee fix she feels significant instability related to the right knee.  I advised her that we can continue to discuss this in the future and that she may need further imaging to evaluate the right knee but I am concerned that she has an ACL tear on her right knee.

## 2023-10-03 ENCOUNTER — CLINICAL SUPPORT (OUTPATIENT)
Dept: REHABILITATION | Facility: HOSPITAL | Age: 23
End: 2023-10-03
Payer: COMMERCIAL

## 2023-10-03 DIAGNOSIS — M25.662 KNEE STIFFNESS, LEFT: ICD-10-CM

## 2023-10-03 DIAGNOSIS — M25.562 ACUTE PAIN OF LEFT KNEE: Primary | ICD-10-CM

## 2023-10-03 PROCEDURE — 97530 THERAPEUTIC ACTIVITIES: CPT | Mod: PN

## 2023-10-03 PROCEDURE — 97110 THERAPEUTIC EXERCISES: CPT | Mod: PN

## 2023-10-03 PROCEDURE — 97112 NEUROMUSCULAR REEDUCATION: CPT | Mod: PN

## 2023-10-03 NOTE — PROGRESS NOTES
[facility]   Physical Therapy Treatment Note     Name: Karla Price  [facility] Number: [id number]    Therapy Diagnosis:   Encounter Diagnoses   Name Primary?    Acute pain of left knee Yes    Knee stiffness, left        Physician: Camila Holcomb MD    Visit Date: 10/3/2023    Physician Orders: PT Eval and Treat  Medical Diagnosis from Referral:   S83.512A (ICD-10-CM) - Rupture of anterior cruciate ligament of left knee, initial encounter   S83.242A (ICD-10-CM) - Tear of medial meniscus of left knee, current, unspecified tear type, initial encounter   S83.282A (ICD-10-CM) - Tear of lateral meniscus of left knee, current, unspecified tear type, initial encounter      Evaluation Date: 6/15/2023  Authorization Period Expiration: 12/31/23  Plan of Care Expiration: 3/29/24  Progress Note Due: 7/15/23  Visit # / Visits authorized: 41/60 (22 new auth)  FOTO: 3/3 administered 9/14/23: 64/100     Precautions: DOS 6/13/23  Procedure:  1. Left Knee Arthroscopy, anterior cruciate ligament reconstruction 21781  2.  Left Knee open lateral extra-articular tenodesis (modified Irma procedure)  3.  Left Knee Arthroscopy, with meniscus repair (medial AND lateral) 73533  4.  Left Knee open lateral collateral ligament repair  5.  Left Knee  arthroscopic loose body removal      vertical tear in the medial meniscus with 1 suture, vertical tear of lateral meniscus with 2 sutures     Post-Op Plan:  ACL reconstruction with quad tendon autograft and medial and lateral meniscal repairs.  Toe-touch weight-bearing for 2-4 weeks followed by partial and full weight-bearing by 6 weeks.  She will require a hinged brace for 6 weeks.  We will start a CPM with passive motion immediately.      PTA Visit #: 0/5     Time In: 5:25 pm  Time Out: 6:25 pm  Total Billable Time: 60 minutes    Subjective     Pt reports: Saw MD and received a good report.   She was compliant with home exercise program.  Response to previous treatment:  "improved ROM  Functional change: improved gait mechanics    Pain: 0/10  Location: left knee      Objective    DOS 6/13: 16 weeks 0 days POD as of 10/3/23    Knee Passive Range of Motion:    Right  Left    Flexion 140 140   Extension 10 hyper 10 hyper         Treatment     Karla received the treatments listed below:    therapeutic exercises to develop strength and ROM for 10 minutes including:  Single leg matrix knee extension 10# 4x8    Not performed    Prone quad stretch 30s x5  Heel slides 30x 5s holds      manual therapy techniques: Joint mobilizations and Soft tissue Mobilization were applied to the: L knee for 00 minutes, including:  Extension hinge   Patellar mobs all directions   Fat pat mobilization  Flexion MWM with tibiofemoral gapping    neuromuscular re-education activities to improve: Kinesthetic and Proprioception for 25 minutes. The following activities were included:  Long sitting SLR 3x15  Resisted lateral steps GTB at ankles x 4laps   Wall Clams 3x10  SL bridge - 3x10      Not today:  4" Lateral step down  UE assistance  Turkmen split squats 4x6  Single leg shuttle squat 87.5# 3x10B  BFR applied for the following, 80% OKC reps/sets 30/15/15/15  -quad sets into hyper  -LAQ 3#  -LLE bias box squat with 10# KB front press (reps 30/15/15/15)  20" LLE bias box squat 3x10 with 10# KB press   Turkmen SS 3x8  LLE bias wall sit 30s x4 with 10# KB hold on RLE  TRX DL squat 3x10  TRX split squat 3x10  -DL shuttle 87.5# 3x10    therapeutic activities to improve functional performance for 25 minutes, including:  Stationary bike x 10' to improve CV endurance and tissue extensibility  Hex bar DL - 4x8 with 65#    Not today  Circuit: x 3 rounds each  - 25# goblet squat x15  - Side plank clams x10  Wall sit 20s x3    Patient Education and Home Exercises       Education provided:   - HEP, POC, answered patient questions, obtaining gym membership      Written Home Exercises Provided: yes. Exercises were reviewed " and Karla was able to demonstrate them prior to the end of the session.  Karla demonstrated good  understanding of the education provided. See EMR under Patient Instructions for exercises provided during therapy sessions    Assessment   Karla completed session as noted above. ROM remains full. Did well with hex bar today with no compensations and likely will be ready to load heavy on here in the upcoming visits. Will look to formally assess strength next week and likely add jogging in the near future.         Karla Is progressing well towards her goals.   Pt prognosis is Excellent.     Pt will continue to benefit from skilled outpatient physical therapy to address the deficits listed in the problem list box on initial evaluation, provide pt/family education and to maximize pt's level of independence in the home and community environment.     Pt's spiritual, cultural and educational needs considered and pt agreeable to plan of care and goals.     Anticipated barriers to physical therapy: none    Post-op Knee   Short Term Goals ( 4 Weeks ):   1. Pt will report reduced pain by 20 to 40% or greater for improved mobility, sleep  and ambulation.  MET  2. Patients knee edema reduced by 1/4 to 1/2 inch or greater to enhance flexion ROM and knee comfort.  MET  3. Pt to report minimal to no pain to palpation for improved level of comfort.  MET  4. Pt to demonstrate symmetrical weight bearing w/o increased pain for stability and functional ambulation . MET  5. Pts neuromuscular response will be enhanced for unilateral standing stability and balance  MET  6. Pt to achieve 90 degrees of passive knee flexion for restoring functional knee mobility, ambulating with proper gait pattern and sit to stand ability. MET   7 Pt to report understanding of all instruction pertinent to patient safety , gait instruction and HEP.  MET  8. Pt to exhibit correct return demonstration of exercises for self-management and independence with HEP  MET     Long Term Goals (32 Weeks):  1. Pt will demonstrate increased knee flexion AROM to 120 degrees or greater for return to functional activity  2. Pt will demonstrate increased knee extension AROM to 0 degrees for standing stability   3. Pt will demonstrate increased RLE strength by 1/2 to 1 grade for improved functional stability  4. Pt to demonstrate standing stability unsupported on dynamic surfaces for functional return to ADL and recreational activities.   5.The patient will be independent amb with no assistive device on all surfaces for community distances.  6. Pt to demonstrate independence with HEP for self management  7. Patient will return to exercising in gym for fitness and recreation 3x/wk within 6 months.  8. Patient will improve FOTO score to </= 15% limited to decrease perceived limitation with mobility.    Plan     Cont POC    Vernon Hart, PT, DPT

## 2023-10-10 ENCOUNTER — CLINICAL SUPPORT (OUTPATIENT)
Dept: REHABILITATION | Facility: HOSPITAL | Age: 23
End: 2023-10-10
Payer: COMMERCIAL

## 2023-10-10 DIAGNOSIS — M25.662 KNEE STIFFNESS, LEFT: ICD-10-CM

## 2023-10-10 DIAGNOSIS — M25.562 ACUTE PAIN OF LEFT KNEE: Primary | ICD-10-CM

## 2023-10-10 PROCEDURE — 97530 THERAPEUTIC ACTIVITIES: CPT | Mod: PN

## 2023-10-10 PROCEDURE — 97110 THERAPEUTIC EXERCISES: CPT | Mod: PN

## 2023-10-10 NOTE — PROGRESS NOTES
[facility]   Physical Therapy Treatment Note     Name: Karla Price  [facility] Number: [id number]    Therapy Diagnosis:   Encounter Diagnoses   Name Primary?    Acute pain of left knee Yes    Knee stiffness, left          Physician: Camila Holcomb MD    Visit Date: 10/10/2023    Physician Orders: PT Eval and Treat  Medical Diagnosis from Referral:   S83.512A (ICD-10-CM) - Rupture of anterior cruciate ligament of left knee, initial encounter   S83.242A (ICD-10-CM) - Tear of medial meniscus of left knee, current, unspecified tear type, initial encounter   S83.282A (ICD-10-CM) - Tear of lateral meniscus of left knee, current, unspecified tear type, initial encounter      Evaluation Date: 6/15/2023  Authorization Period Expiration: 12/31/23  Plan of Care Expiration: 3/29/24  Progress Note Due: 7/15/23  Visit # / Visits authorized: 42/60 (22 new auth)  FOTO: 3/3 administered 9/14/23: 64/100     Precautions: DOS 6/13/23  Procedure:  1. Left Knee Arthroscopy, anterior cruciate ligament reconstruction 34380  2.  Left Knee open lateral extra-articular tenodesis (modified Irma procedure)  3.  Left Knee Arthroscopy, with meniscus repair (medial AND lateral) 19720  4.  Left Knee open lateral collateral ligament repair  5.  Left Knee  arthroscopic loose body removal      vertical tear in the medial meniscus with 1 suture, vertical tear of lateral meniscus with 2 sutures     Post-Op Plan:  ACL reconstruction with quad tendon autograft and medial and lateral meniscal repairs.  Toe-touch weight-bearing for 2-4 weeks followed by partial and full weight-bearing by 6 weeks.  She will require a hinged brace for 6 weeks.  We will start a CPM with passive motion immediately.      PTA Visit #: 0/5     Time In: 5:30 pm  Time Out: 6:20 pm  Total Billable Time: 50 minutes    Subjective     Pt reports: Doing well overall with no problems to report   She was compliant with home exercise program.  Response to previous  "treatment: improved ROM  Functional change: improved gait mechanics    Pain: 0/10  Location: left knee      Objective    DOS 6/13: 16 weeks 0 days POD as of 10/3/23    Knee Passive Range of Motion:    Right  Left    Flexion 140 140   Extension 10 hyper 10 hyper     Hand-held dynamometry:   Right  Left  % deficit   Quadriceps at 90 degrees: 23.33# 45# 49%     Y Balance:   Right  Left  Cm difference   Anterior reach 56cm 43cm 13 cm         Treatment     Karla received the treatments listed below:    therapeutic exercises to develop strength and ROM for 25 minutes including:  Objective testing as above    Not performed    Prone quad stretch 30s x5  Heel slides 30x 5s holds      manual therapy techniques: Joint mobilizations and Soft tissue Mobilization were applied to the: L knee for 00 minutes, including:  Extension hinge   Patellar mobs all directions   Fat pat mobilization  Flexion MWM with tibiofemoral gapping    neuromuscular re-education activities to improve: Kinesthetic and Proprioception for 00 minutes. The following activities were included:  Long sitting SLR 3x15  Resisted lateral steps GTB at ankles x 4laps   Wall Clams 3x10  SL bridge - 3x10      Not today:  4" Lateral step down  UE assistance  Bahamian split squats 4x6  Single leg shuttle squat 87.5# 3x10B  BFR applied for the following, 80% OKC reps/sets 30/15/15/15  -quad sets into hyper  -LAQ 3#  -LLE bias box squat with 10# KB front press (reps 30/15/15/15)  20" LLE bias box squat 3x10 with 10# KB press   Bahamian SS 3x8  LLE bias wall sit 30s x4 with 10# KB hold on RLE  TRX DL squat 3x10  TRX split squat 3x10  -DL shuttle 87.5# 3x10    therapeutic activities to improve functional performance for 25 minutes, including:  Elliptical 10 mins for cardiovascular endurance  Alter G - 60% body weight: 10 mins total, 1 min to 30s     Not today  Hex bar DL - 4x8 with 65#  Circuit: x 3 rounds each  - 25# goblet squat x15  - Side plank clams x10  Wall sit 20s " x3    Patient Education and Home Exercises       Education provided:   - HEP, POC, answered patient questions, obtaining gym membership      Written Home Exercises Provided: yes. Exercises were reviewed and Karla was able to demonstrate them prior to the end of the session.  Karla demonstrated good  understanding of the education provided. See EMR under Patient Instructions for exercises provided during therapy sessions    Assessment   Karla's strength testing revealed a 49% quad deficit of the L compared to the R. Her Y-balance assessment showed a 13 cm difference. Therefore we initiated jogging on [address] today in order to ease back into jogging. No pain or probs reported with alter G. Overall she is doing well but needs to focus on quad strengthening going forward.         Karla Is progressing well towards her goals.   Pt prognosis is Excellent.     Pt will continue to benefit from skilled outpatient physical therapy to address the deficits listed in the problem list box on initial evaluation, provide pt/family education and to maximize pt's level of independence in the home and community environment.     Pt's spiritual, cultural and educational needs considered and pt agreeable to plan of care and goals.     Anticipated barriers to physical therapy: none    Post-op Knee   Short Term Goals ( 4 Weeks ):   1. Pt will report reduced pain by 20 to 40% or greater for improved mobility, sleep  and ambulation.  MET  2. Patients knee edema reduced by 1/4 to 1/2 inch or greater to enhance flexion ROM and knee comfort.  MET  3. Pt to report minimal to no pain to palpation for improved level of comfort.  MET  4. Pt to demonstrate symmetrical weight bearing w/o increased pain for stability and functional ambulation . MET  5. Pts neuromuscular response will be enhanced for unilateral standing stability and balance  MET  6. Pt to achieve 90 degrees of passive knee flexion for restoring functional knee mobility, ambulating with  proper gait pattern and sit to stand ability. MET   7 Pt to report understanding of all instruction pertinent to patient safety , gait instruction and HEP.  MET  8. Pt to exhibit correct return demonstration of exercises for self-management and independence with HEP MET     Long Term Goals (32 Weeks):  1. Pt will demonstrate increased knee flexion AROM to 120 degrees or greater for return to functional activity  2. Pt will demonstrate increased knee extension AROM to 0 degrees for standing stability   3. Pt will demonstrate increased RLE strength by 1/2 to 1 grade for improved functional stability  4. Pt to demonstrate standing stability unsupported on dynamic surfaces for functional return to ADL and recreational activities.   5.The patient will be independent amb with no assistive device on all surfaces for community distances.  6. Pt to demonstrate independence with HEP for self management  7. Patient will return to exercising in gym for fitness and recreation 3x/wk within 6 months.  8. Patient will improve FOTO score to </= 15% limited to decrease perceived limitation with mobility.    Plan     Cont POC    Vernon Hart, PT, DPT

## 2023-10-13 ENCOUNTER — PATIENT MESSAGE (OUTPATIENT)
Dept: OBSTETRICS AND GYNECOLOGY | Facility: CLINIC | Age: 23
End: 2023-10-13
Payer: COMMERCIAL

## 2023-10-22 NOTE — PROGRESS NOTES
"Chief Complaint   Patient presents with    Follow-up       HPI:  Karla Price is a 22 y.o. female patient  who presents today for evaluation of oligomenorrhea / irregular bleeding.  She had previously had a virtual visit 23 to discuss her bleeding pattern.  She reports taking Provera 2023 resulting in a withdrawal bleed  2023 but has no subsequent bleeding.  She describes noting some increased facial hair growth as well as weight gain.  She is in a same-sex relationship and states that pregnancy is not possible.  She would like to discuss management options for menstrual regulation.  S/P left knee surgery 2023.    23 Note:  She has a long history of irregular periods, sometimes occurring every 4-6 months, for which, in the past, she has used Provera for menstrual regulation.  She reports having a period 2022 and then experiencing some light bleeding since 23.  Denies pelvic pain.  She is in a same-sex relationship and is sure that pregnancy is impossible.  Reports some recent weight gain.  Plans to have ACL repair 23.    Patient was counseled today on her irregular periods and the various etiologies.  She has recently had hormonal evaluation.  We reviewed management options for menstrual regulation and she would like to return to Provera.  We discussed Provera: r / b / correct usage.  She will take Provera 10 mg daily for 7 days each month and let us know her progress in several months.    Blood pressure 100/84, height 5' 1" (1.549 m), weight 97.7 kg (215 lb 6.2 oz), last menstrual period 2023.    11/15/22 Pap: Negative,  FSH 4.45,  LH 5.3,  E2 38,  DHEAS 176    23 H/H 15.7/49.2     3/14/23 BHCG: Negative     11/15/22   E2 38,  FSH 4.45,  LH 5.3,  E2 38,  free testosterone 1.1,  Pap: Negative       Past Medical History:   Diagnosis Date    Eczema        Past Surgical History:   Procedure Laterality Date    ARTHROSCOPY,KNEE,WITH MENISCUS REPAIR Left 2023    " Procedure: ARTHROSCOPY,KNEE,WITH MENISCUS REPAIR;  Surgeon: Cecilio Payton MD;  Location: [facility] OR;  Service: Orthopedics;  Laterality: Left;    FIXATION OF TENDON Left 6/13/2023    Procedure: FIXATION, TENDON-LATERAL EXTRA ARTICULAR TENODESIS;  Surgeon: Cecilio Payton MD;  Location: [facility] OR;  Service: Orthopedics;  Laterality: Left;    RECONSTRUCTION OF LIGAMENT Left 6/13/2023    Procedure: RECONSTRUCTION, ACL-QUAD AUTO;  Surgeon: Cecilio Payton MD;  Location: [facility] OR;  Service: Orthopedics;  Laterality: Left;  REGIONAL BLOCK       Diagnosis:  1. Secondary oligomenorrhea    2. Irregular periods          PLAN:    Orders Placed This Encounter    medroxyPROGESTERone (PROVERA) 10 MG tablet       Patient was counseled today on her oligomenorrhea and managements for menstrual regulation.  We discussed cyclic Provera vs OCPs.  She would prefer to use Provera and will take 10 mg for 7 days each month to created cyclic monthly withdrawal bleeding.  We reviewed Provera: r / b / correct usage.  She will let us know if a withdrawal bleed does not occur after finishing Provera.    Follow-up with progress in several months.  Return for annual exam    I spent a total of 24 minutes on the day of the visit.This includes face to face time and non-face to face time preparing to see the patient (eg, review of tests), Obtaining and/or reviewing separately obtained history, Documenting clinical information in the electronic or other health record, Independently interpreting resultsand communicating results to the patient/family/caregiver, or Care coordination.    This note was created with voice recognition software.  Grammatical, syntax and spelling errors may be inevitable.

## 2023-10-23 ENCOUNTER — OFFICE VISIT (OUTPATIENT)
Dept: OBSTETRICS AND GYNECOLOGY | Facility: CLINIC | Age: 23
End: 2023-10-23
Payer: COMMERCIAL

## 2023-10-23 VITALS
WEIGHT: 215.38 LBS | HEIGHT: 61 IN | SYSTOLIC BLOOD PRESSURE: 100 MMHG | BODY MASS INDEX: 40.66 KG/M2 | DIASTOLIC BLOOD PRESSURE: 84 MMHG

## 2023-10-23 DIAGNOSIS — N91.4 SECONDARY OLIGOMENORRHEA: Primary | ICD-10-CM

## 2023-10-23 DIAGNOSIS — N92.6 IRREGULAR PERIODS: ICD-10-CM

## 2023-10-23 PROCEDURE — 99999 PR PBB SHADOW E&M-EST. PATIENT-LVL III: ICD-10-PCS | Mod: PBBFAC,,, | Performed by: OBSTETRICS & GYNECOLOGY

## 2023-10-23 PROCEDURE — 3008F PR BODY MASS INDEX (BMI) DOCUMENTED: ICD-10-PCS | Mod: CPTII,S$GLB,, | Performed by: OBSTETRICS & GYNECOLOGY

## 2023-10-23 PROCEDURE — 3074F SYST BP LT 130 MM HG: CPT | Mod: CPTII,S$GLB,, | Performed by: OBSTETRICS & GYNECOLOGY

## 2023-10-23 PROCEDURE — 1160F RVW MEDS BY RX/DR IN RCRD: CPT | Mod: CPTII,S$GLB,, | Performed by: OBSTETRICS & GYNECOLOGY

## 2023-10-23 PROCEDURE — 3079F PR MOST RECENT DIASTOLIC BLOOD PRESSURE 80-89 MM HG: ICD-10-PCS | Mod: CPTII,S$GLB,, | Performed by: OBSTETRICS & GYNECOLOGY

## 2023-10-23 PROCEDURE — 1159F PR MEDICATION LIST DOCUMENTED IN MEDICAL RECORD: ICD-10-PCS | Mod: CPTII,S$GLB,, | Performed by: OBSTETRICS & GYNECOLOGY

## 2023-10-23 PROCEDURE — 3074F PR MOST RECENT SYSTOLIC BLOOD PRESSURE < 130 MM HG: ICD-10-PCS | Mod: CPTII,S$GLB,, | Performed by: OBSTETRICS & GYNECOLOGY

## 2023-10-23 PROCEDURE — 3044F HG A1C LEVEL LT 7.0%: CPT | Mod: CPTII,S$GLB,, | Performed by: OBSTETRICS & GYNECOLOGY

## 2023-10-23 PROCEDURE — 1159F MED LIST DOCD IN RCRD: CPT | Mod: CPTII,S$GLB,, | Performed by: OBSTETRICS & GYNECOLOGY

## 2023-10-23 PROCEDURE — 3079F DIAST BP 80-89 MM HG: CPT | Mod: CPTII,S$GLB,, | Performed by: OBSTETRICS & GYNECOLOGY

## 2023-10-23 PROCEDURE — 3044F PR MOST RECENT HEMOGLOBIN A1C LEVEL <7.0%: ICD-10-PCS | Mod: CPTII,S$GLB,, | Performed by: OBSTETRICS & GYNECOLOGY

## 2023-10-23 PROCEDURE — 99999 PR PBB SHADOW E&M-EST. PATIENT-LVL III: CPT | Mod: PBBFAC,,, | Performed by: OBSTETRICS & GYNECOLOGY

## 2023-10-23 PROCEDURE — 3008F BODY MASS INDEX DOCD: CPT | Mod: CPTII,S$GLB,, | Performed by: OBSTETRICS & GYNECOLOGY

## 2023-10-23 PROCEDURE — 99213 OFFICE O/P EST LOW 20 MIN: CPT | Mod: S$GLB,,, | Performed by: OBSTETRICS & GYNECOLOGY

## 2023-10-23 PROCEDURE — 99213 PR OFFICE/OUTPT VISIT, EST, LEVL III, 20-29 MIN: ICD-10-PCS | Mod: S$GLB,,, | Performed by: OBSTETRICS & GYNECOLOGY

## 2023-10-23 PROCEDURE — 1160F PR REVIEW ALL MEDS BY PRESCRIBER/CLIN PHARMACIST DOCUMENTED: ICD-10-PCS | Mod: CPTII,S$GLB,, | Performed by: OBSTETRICS & GYNECOLOGY

## 2023-10-23 RX ORDER — AMOXICILLIN 875 MG/1
875 TABLET, FILM COATED ORAL 2 TIMES DAILY
COMMUNITY
Start: 2023-10-21

## 2023-10-23 RX ORDER — MEDROXYPROGESTERONE ACETATE 10 MG/1
10 TABLET ORAL DAILY
Qty: 7 TABLET | Refills: 6 | Status: SHIPPED | OUTPATIENT
Start: 2023-10-23 | End: 2023-11-08

## 2023-10-24 ENCOUNTER — CLINICAL SUPPORT (OUTPATIENT)
Dept: REHABILITATION | Facility: HOSPITAL | Age: 23
End: 2023-10-24
Payer: COMMERCIAL

## 2023-10-24 DIAGNOSIS — M25.562 ACUTE PAIN OF LEFT KNEE: Primary | ICD-10-CM

## 2023-10-24 DIAGNOSIS — M25.662 KNEE STIFFNESS, LEFT: ICD-10-CM

## 2023-10-24 PROCEDURE — 97112 NEUROMUSCULAR REEDUCATION: CPT | Mod: PN

## 2023-10-24 PROCEDURE — 97530 THERAPEUTIC ACTIVITIES: CPT | Mod: PN

## 2023-10-24 PROCEDURE — 97110 THERAPEUTIC EXERCISES: CPT | Mod: PN

## 2023-10-24 NOTE — PROGRESS NOTES
[facility]   Physical Therapy Treatment Note     Name: Karla Price  [facility] Number: [id number]    Therapy Diagnosis:   Encounter Diagnoses   Name Primary?    Acute pain of left knee Yes    Knee stiffness, left            Physician: Camila Holcomb MD    Visit Date: 10/24/2023    Physician Orders: PT Eval and Treat  Medical Diagnosis from Referral:   S83.512A (ICD-10-CM) - Rupture of anterior cruciate ligament of left knee, initial encounter   S83.242A (ICD-10-CM) - Tear of medial meniscus of left knee, current, unspecified tear type, initial encounter   S83.282A (ICD-10-CM) - Tear of lateral meniscus of left knee, current, unspecified tear type, initial encounter      Evaluation Date: 6/15/2023  Authorization Period Expiration: 12/31/23  Plan of Care Expiration: 3/29/24  Progress Note Due: 7/15/23  Visit # / Visits authorized: 1/20 (20 new auth)  FOTO: 3/3 administered 9/14/23: 64/100     Precautions: DOS 6/13/23  Procedure:  1. Left Knee Arthroscopy, anterior cruciate ligament reconstruction 05742  2.  Left Knee open lateral extra-articular tenodesis (modified Irma procedure)  3.  Left Knee Arthroscopy, with meniscus repair (medial AND lateral) 31470  4.  Left Knee open lateral collateral ligament repair  5.  Left Knee  arthroscopic loose body removal      vertical tear in the medial meniscus with 1 suture, vertical tear of lateral meniscus with 2 sutures     Post-Op Plan:  ACL reconstruction with quad tendon autograft and medial and lateral meniscal repairs.  Toe-touch weight-bearing for 2-4 weeks followed by partial and full weight-bearing by 6 weeks.  She will require a hinged brace for 6 weeks.  We will start a CPM with passive motion immediately.      PTA Visit #: 0/5     Time In: 5:15 pm  Time Out: 6:15 pm  Total Billable Time: 60 minutes    Subjective     Pt reports: Doing well overall with no problems to report   She was compliant with home exercise program.  Response to previous  "treatment: improved ROM  Functional change: improved gait mechanics    Pain: 0/10  Location: left knee      Objective    DOS 6/13: 19 weeks 0 days POD as of 10/3/23    Knee Passive Range of Motion:    Right  Left    Flexion 140 140   Extension 10 hyper 10 hyper     Hand-held dynamometry:   Right  Left  % deficit   Quadriceps at 90 degrees: 23.33# 45# 49%     Y Balance:   Right  Left  Cm difference   Anterior reach 56cm 43cm 13 cm         Treatment     Karla received the treatments listed below:    therapeutic exercises to develop strength and ROM for 08 minutes including:  Knee extension machine - 15# x8,8,8,6    Not performed    Prone quad stretch 30s x5  Heel slides 30x 5s holds      manual therapy techniques: Joint mobilizations and Soft tissue Mobilization were applied to the: L knee for 00 minutes, including:  Extension hinge   Patellar mobs all directions   Fat pat mobilization  Flexion MWM with tibiofemoral gapping    neuromuscular re-education activities to improve: Kinesthetic and Proprioception for 12 minutes. The following activities were included:  1A: Single leg squat to box 3x6  1B: Sidestepping with band x3 laps       Long sitting SLR 3x15  Resisted lateral steps GTB at ankles x 4laps   Wall Clams 3x10  SL bridge - 3x10      Not today:  4" Lateral step down  UE assistance  Iranian split squats 4x6  Single leg shuttle squat 87.5# 3x10B  BFR applied for the following, 80% OKC reps/sets 30/15/15/15  -quad sets into hyper  -LAQ 3#  -LLE bias box squat with 10# KB front press (reps 30/15/15/15)  20" LLE bias box squat 3x10 with 10# KB press   Iranian SS 3x8  LLE bias wall sit 30s x4 with 10# KB hold on RLE  TRX DL squat 3x10  TRX split squat 3x10  -DL shuttle 87.5# 3x10    therapeutic activities to improve functional performance for 38 minutes, including:  Elliptical 10 mins for cardiovascular endurance    Alter G - 60% body weight: 10 mins total, 1 min to 30s   Hex bar DL - 4x6 with 7590#  Forward " step up weighted 7'' + 10# 3x8  SL shuttle press 87.5# 4x6    Not today  Circuit: x 3 rounds each  - 25# goblet squat x15  - Side plank clams x10  Wall sit 20s x3    Patient Education and Home Exercises       Education provided:   - HEP, POC, answered patient questions, obtaining gym membership      Written Home Exercises Provided: yes. Exercises were reviewed and Karla was able to demonstrate them prior to the end of the session.  Karla demonstrated good  understanding of the education provided. See EMR under Patient Instructions for exercises provided during therapy sessions    Assessment   Karla did well today. Focused on heavy quad strengthening today. Still with noted fasciculations and weakness with single leg activities. Will re-test quad strength next visit and progress. Overall she is doing well but still needs to focus on re-gaining quad strength.         Karla Is progressing well towards her goals.   Pt prognosis is Excellent.     Pt will continue to benefit from skilled outpatient physical therapy to address the deficits listed in the problem list box on initial evaluation, provide pt/family education and to maximize pt's level of independence in the home and community environment.     Pt's spiritual, cultural and educational needs considered and pt agreeable to plan of care and goals.     Anticipated barriers to physical therapy: none    Post-op Knee   Short Term Goals ( 4 Weeks ):   1. Pt will report reduced pain by 20 to 40% or greater for improved mobility, sleep  and ambulation.  MET  2. Patients knee edema reduced by 1/4 to 1/2 inch or greater to enhance flexion ROM and knee comfort.  MET  3. Pt to report minimal to no pain to palpation for improved level of comfort.  MET  4. Pt to demonstrate symmetrical weight bearing w/o increased pain for stability and functional ambulation . MET  5. Pts neuromuscular response will be enhanced for unilateral standing stability and balance  MET  6. Pt to achieve  90 degrees of passive knee flexion for restoring functional knee mobility, ambulating with proper gait pattern and sit to stand ability. MET   7 Pt to report understanding of all instruction pertinent to patient safety , gait instruction and HEP.  MET  8. Pt to exhibit correct return demonstration of exercises for self-management and independence with HEP MET     Long Term Goals (32 Weeks):  1. Pt will demonstrate increased knee flexion AROM to 120 degrees or greater for return to functional activity  2. Pt will demonstrate increased knee extension AROM to 0 degrees for standing stability   3. Pt will demonstrate increased RLE strength by 1/2 to 1 grade for improved functional stability  4. Pt to demonstrate standing stability unsupported on dynamic surfaces for functional return to ADL and recreational activities.   5.The patient will be independent amb with no assistive device on all surfaces for community distances.  6. Pt to demonstrate independence with HEP for self management  7. Patient will return to exercising in gym for fitness and recreation 3x/wk within 6 months.  8. Patient will improve FOTO score to </= 15% limited to decrease perceived limitation with mobility.    Plan     Cont POC    Vernon Hart, PT, DPT

## 2023-10-26 ENCOUNTER — HOSPITAL ENCOUNTER (EMERGENCY)
Facility: HOSPITAL | Age: 23
Discharge: HOME | End: 2023-10-26
Attending: EMERGENCY MEDICINE
Payer: COMMERCIAL

## 2023-10-26 VITALS
WEIGHT: 215 LBS | HEIGHT: 61 IN | DIASTOLIC BLOOD PRESSURE: 88 MMHG | SYSTOLIC BLOOD PRESSURE: 123 MMHG | BODY MASS INDEX: 40.59 KG/M2 | TEMPERATURE: 98 F | OXYGEN SATURATION: 98 % | RESPIRATION RATE: 18 BRPM | HEART RATE: 71 BPM

## 2023-10-26 DIAGNOSIS — H60.502 ACUTE OTITIS EXTERNA OF LEFT EAR, UNSPECIFIED TYPE: Primary | ICD-10-CM

## 2023-10-26 LAB
B-HCG UR QL: NEGATIVE
CTP QC/QA: YES

## 2023-10-26 PROCEDURE — 99284 EMERGENCY DEPT VISIT MOD MDM: CPT

## 2023-10-26 PROCEDURE — 81025 URINE PREGNANCY TEST: CPT | Performed by: NURSE PRACTITIONER

## 2023-10-26 PROCEDURE — 25000003 PHARM REV CODE 250: Performed by: EMERGENCY MEDICINE

## 2023-10-26 RX ORDER — IBUPROFEN 400 MG/1
800 TABLET ORAL
Status: COMPLETED | OUTPATIENT
Start: 2023-10-26 | End: 2023-10-26

## 2023-10-26 RX ORDER — CIPROFLOXACIN AND DEXAMETHASONE 3; 1 MG/ML; MG/ML
4 SUSPENSION/ DROPS AURICULAR (OTIC) 2 TIMES DAILY
Qty: 7.5 ML | Refills: 0 | Status: SHIPPED | OUTPATIENT
Start: 2023-10-26

## 2023-10-26 RX ORDER — IBUPROFEN 800 MG/1
800 TABLET ORAL EVERY 6 HOURS PRN
Qty: 40 TABLET | Refills: 0 | Status: SHIPPED | OUTPATIENT
Start: 2023-10-26

## 2023-10-26 RX ADMIN — IBUPROFEN 800 MG: 400 TABLET ORAL at 06:10

## 2023-10-26 NOTE — ED PROVIDER NOTES
Encounter Date: 10/26/2023       History     Chief Complaint   Patient presents with    Otalgia     Pt c/o L ear pain and L lymph node swelling. Pt started taking ABX on Saturday, the 21st for infection and states she has not had any relief. No fever, chills, body aches or other complaints. Pt A/O x 4 w/ ABCs intact, NAD. VSS.      22-year-old female presenting with left ear pain.  Patient reports symptom onset on 10/21, 5 days prior.  Patient was prescribed amoxicillin for suspected acute otitis media.  Patient reports pain has persisted.  The patient reports taking the amoxicillin.  Patient reports Tylenol and NyQuil for pain control.      Review of patient's allergies indicates:  No Known Allergies  Past Medical History:   Diagnosis Date    Eczema      Past Surgical History:   Procedure Laterality Date    ARTHROSCOPY,KNEE,WITH MENISCUS REPAIR Left 6/13/2023    Procedure: ARTHROSCOPY,KNEE,WITH MENISCUS REPAIR;  Surgeon: Cecilio Payton MD;  Location: [facility] OR;  Service: Orthopedics;  Laterality: Left;    FIXATION OF TENDON Left 6/13/2023    Procedure: FIXATION, TENDON-LATERAL EXTRA ARTICULAR TENODESIS;  Surgeon: Cecilio Payton MD;  Location: [facility] OR;  Service: Orthopedics;  Laterality: Left;    RECONSTRUCTION OF LIGAMENT Left 6/13/2023    Procedure: RECONSTRUCTION, ACL-QUAD AUTO;  Surgeon: Cecilio Payton MD;  Location: [facility] OR;  Service: Orthopedics;  Laterality: Left;  REGIONAL BLOCK     Family History   Problem Relation Age of Onset    Breast cancer Maternal Aunt 30        maternal great aunt with breast cancer    Fibrocystic breast disease Mother     No Known Problems Sister     Liver cancer Maternal Grandmother     No Known Problems Father     No Known Problems Brother     No Known Problems Maternal Uncle     No Known Problems Paternal Aunt     No Known Problems Paternal Uncle     No Known Problems Maternal Grandfather     No Known Problems Paternal Grandmother     No Known Problems Paternal Grandfather      Colon cancer Neg Hx     Ovarian cancer Neg Hx     Amblyopia Neg Hx     Blindness Neg Hx     Cancer Neg Hx     Cataracts Neg Hx     Diabetes Neg Hx     Glaucoma Neg Hx     Hypertension Neg Hx     Macular degeneration Neg Hx     Retinal detachment Neg Hx     Strabismus Neg Hx     Stroke Neg Hx     Thyroid disease Neg Hx      Social History     Tobacco Use    Smoking status: Never    Smokeless tobacco: Never   Substance Use Topics    Alcohol use: No    Drug use: No     Review of Systems   Constitutional:  Negative for chills and fever.   HENT:  Positive for ear pain. Negative for sore throat.    Respiratory:  Negative for cough and shortness of breath.    Cardiovascular:  Negative for chest pain.       Physical Exam     Initial Vitals [10/26/23 0509]   BP Pulse Resp Temp SpO2   123/88 71 18 98.3 °F (36.8 °C) 98 %      MAP       --         Physical Exam    Nursing note and vitals reviewed.  Constitutional: She appears well-developed and well-nourished. She does not appear ill. No distress.   HENT:   Head: Normocephalic and atraumatic.   Right Ear: Tympanic membrane, external ear and ear canal normal.   Left Ear: Tympanic membrane and external ear normal.   Nose: Nose normal.   Mouth/Throat: Oropharynx is clear and moist and mucous membranes are normal. No oropharyngeal exudate, posterior oropharyngeal edema or posterior oropharyngeal erythema.   No left mastoid tenderness.  Erythema and irritation noted to the left external ear canal.   Eyes: Conjunctivae and EOM are normal. Right conjunctiva is not injected. Left conjunctiva is not injected.   Neck:   No submandibular lymphadenopathy.  No cervical lymphadenopathy.   Normal range of motion.  Cardiovascular:  Regular rhythm and normal heart sounds.   Tachycardia present.         No murmur heard.  Pulmonary/Chest: Breath sounds normal. No respiratory distress. She has no wheezes.   Abdominal: Abdomen is soft. There is no abdominal tenderness.   Musculoskeletal:          General: No edema.      Cervical back: Normal range of motion.     Neurological: She is alert. GCS score is 15.   Skin: Skin is warm.   Psychiatric: She has a normal mood and affect.         ED Course   Procedures  Labs Reviewed   POCT URINE PREGNANCY - Normal          Imaging Results    None          Medications   ibuprofen tablet 800 mg (has no administration in time range)     Medical Decision Making    22-year-old female presenting with left ear pain.  Signs of left otitis externa present.  Recommended ibuprofen as needed for pain control.  Starting on Ciprodex.  Recommended holding off on further amoxicillin as patient does not appear to have otitis media at this time however otitis externa.  Patient feels comfortable with the plan.  Patient will follow up with the primary care provider.      Differential acute otitis externa, acute otitis media, URI    Risk  Prescription drug management.                               Clinical Impression:   Final diagnoses:  [H60.502] Acute otitis externa of left ear, unspecified type (Primary)        ED Disposition Condition    Discharge Stable          ED Prescriptions       Medication Sig Dispense Start Date End Date Auth. Provider    ciprofloxacin-dexAMETHasone 0.3-0.1% (CIPRODEX) 0.3-0.1 % DrpS Place 4 drops into both ears 2 (two) times daily. 7.5 mL 10/26/2023 -- Homer Mccauley MD    ibuprofen (ADVIL,MOTRIN) 800 MG tablet Take 1 tablet (800 mg total) by mouth every 6 (six) hours as needed for Pain. 40 tablet 10/26/2023 -- Homer Mccauley MD          Follow-up Information       Follow up With Specialties Details Why Contact Info    Hans Francisco MD Family Medicine Schedule an appointment as soon as possible for a visit in 3 days Primary care [address] [place] [id number] [phone]      [facility] - Emergency Dept Emergency Medicine  If symptoms worsen [address] [place] [address] [phone]             Homer Mccauley,  MD  10/26/23 0600

## 2023-10-26 NOTE — DISCHARGE INSTRUCTIONS
Recommend using ibuprofen as needed for pain control.    Please follow up with the primary care provider for further evaluation within the next 2-3 days.  Avoid placing any objects in the ear including Q-tips.  Keep the ear canal dry after showering or after other water activities.

## 2023-10-26 NOTE — ED TRIAGE NOTES
23 yo female presents to the ED with c/o left ear pain/discomfort. Pt is AAO x 3 upon assessment; reports that she started taking ABX 5 days ago for ear infection; states that she hasn't had any relief of symptoms. Pt denies any other symptoms. NAD noted at this time. Plan of care is ongoing.

## 2023-10-31 ENCOUNTER — CLINICAL SUPPORT (OUTPATIENT)
Dept: REHABILITATION | Facility: HOSPITAL | Age: 23
End: 2023-10-31
Payer: COMMERCIAL

## 2023-10-31 DIAGNOSIS — M25.662 KNEE STIFFNESS, LEFT: ICD-10-CM

## 2023-10-31 DIAGNOSIS — M25.562 ACUTE PAIN OF LEFT KNEE: Primary | ICD-10-CM

## 2023-10-31 PROCEDURE — 97110 THERAPEUTIC EXERCISES: CPT | Mod: PN

## 2023-10-31 PROCEDURE — 97112 NEUROMUSCULAR REEDUCATION: CPT | Mod: PN

## 2023-10-31 PROCEDURE — 97530 THERAPEUTIC ACTIVITIES: CPT | Mod: PN

## 2023-10-31 NOTE — PROGRESS NOTES
[facility]   Physical Therapy Treatment Note     Name: Karla Price  [facility] Number: [id number]    Therapy Diagnosis:   Encounter Diagnoses   Name Primary?    Acute pain of left knee Yes    Knee stiffness, left            Physician: Camila Holcomb MD    Visit Date: 10/31/2023    Physician Orders: PT Eval and Treat  Medical Diagnosis from Referral:   S83.512A (ICD-10-CM) - Rupture of anterior cruciate ligament of left knee, initial encounter   S83.242A (ICD-10-CM) - Tear of medial meniscus of left knee, current, unspecified tear type, initial encounter   S83.282A (ICD-10-CM) - Tear of lateral meniscus of left knee, current, unspecified tear type, initial encounter      Evaluation Date: 6/15/2023  Authorization Period Expiration: 12/31/23  Plan of Care Expiration: 3/29/24  Progress Note Due: 7/15/23  Visit # / Visits authorized: 2/20 (20 new auth)  FOTO: 3/3 administered 9/14/23: 64/100     Precautions: DOS 6/13/23  Procedure:  1. Left Knee Arthroscopy, anterior cruciate ligament reconstruction 47641  2.  Left Knee open lateral extra-articular tenodesis (modified Irma procedure)  3.  Left Knee Arthroscopy, with meniscus repair (medial AND lateral) 79943  4.  Left Knee open lateral collateral ligament repair  5.  Left Knee  arthroscopic loose body removal      vertical tear in the medial meniscus with 1 suture, vertical tear of lateral meniscus with 2 sutures     Post-Op Plan:  ACL reconstruction with quad tendon autograft and medial and lateral meniscal repairs.  Toe-touch weight-bearing for 2-4 weeks followed by partial and full weight-bearing by 6 weeks.  She will require a hinged brace for 6 weeks.  We will start a CPM with passive motion immediately.      PTA Visit #: 0/5     Time In: 5:30 pm  Time Out: 6:30 pm  Total Billable Time: 60 minutes    Subjective     Pt reports: Doing well overall with no problems to report    She was compliant with home exercise program.  Response to previous  "treatment: improved ROM  Functional change: improved gait mechanics    Pain: 0/10  Location: left knee      Objective    DOS 6/13: 19 weeks 0 days POD as of 10/3/23    Knee Passive Range of Motion:    Right  Left    Flexion 140 140   Extension 10 hyper 10 hyper     Hand-held dynamometry:   Right  Left  % deficit   Quadriceps at 90 degrees: 41.4# 22.4# 46%     Y Balance:   Right  Left  Cm difference   Anterior reach 52cm 47cm 5 cm         Treatment     Karla received the treatments listed below:    therapeutic exercises to develop strength and ROM for 25 minutes including:  Objective testing as above  Knee extension machine - 20# 4x6    Not performed    Prone quad stretch 30s x5  Heel slides 30x 5s holds      manual therapy techniques: Joint mobilizations and Soft tissue Mobilization were applied to the: L knee for 00 minutes, including:  Extension hinge   Patellar mobs all directions   Fat pat mobilization  Flexion MWM with tibiofemoral gapping    neuromuscular re-education activities to improve: Kinesthetic and Proprioception for 25 minutes. The following activities were included:  BFR applied for the following, 60% CKC reps/sets 30/15/15/15  - Shuttle SL 62.5#  -LLE bias box squat with 10# KB front press (reps 30/15/15/15)      Long sitting SLR 3x15  Resisted lateral steps GTB at ankles x 4laps   Wall Clams 3x10  SL bridge - 3x10      Not today:  4" Lateral step down  UE assistance  Faroese split squats 4x6  Single leg shuttle squat 87.5# 3x10B  20" LLE bias box squat 3x10 with 10# KB press   Faroese SS 3x8  LLE bias wall sit 30s x4 with 10# KB hold on RLE  TRX DL squat 3x10  TRX split squat 3x10  -DL shuttle 87.5# 3x10    therapeutic activities to improve functional performance for 38 minutes, including:  Bike 10 mins for cardiovascular endurance    Alter G - 60% body weight: 10 mins total, 1 min to 30s   Hex bar DL - 4x6 with 7590#  Forward step up weighted 7'' + 10# 3x8  SL shuttle press 87.5# 4x6    Not " today  Circuit: x 3 rounds each  - 25# goblet squat x15  - Side plank clams x10  Wall sit 20s x3    Patient Education and Home Exercises       Education provided:   - HEP, POC, answered patient questions, obtaining gym membership      Written Home Exercises Provided: yes. Exercises were reviewed and Karla was able to demonstrate them prior to the end of the session.  Karla demonstrated good  understanding of the education provided. See EMR under Patient Instructions for exercises provided during therapy sessions    Assessment   Patient arrives today with low levels of motivation. Re-tested strength today with L quad strength improving 3% and Y balance improving by about 5 cm. Her lateral step down has also improved as she is able to complete with improved eccentric control, but does still have forward trunk lean and fasciculations. Educated on increasing challenge on quads for her gym routine and we reviewed doing a heavy day and a volume day in the gym. Returned to [facility] to focus on quad strength. Overall she is doing well but still needs to improve quad strength.          Karla Is progressing well towards her goals.   Pt prognosis is Excellent.     Pt will continue to benefit from skilled outpatient physical therapy to address the deficits listed in the problem list box on initial evaluation, provide pt/family education and to maximize pt's level of independence in the home and community environment.     Pt's spiritual, cultural and educational needs considered and pt agreeable to plan of care and goals.     Anticipated barriers to physical therapy: none    Post-op Knee   Short Term Goals ( 4 Weeks ):   1. Pt will report reduced pain by 20 to 40% or greater for improved mobility, sleep  and ambulation.  MET  2. Patients knee edema reduced by 1/4 to 1/2 inch or greater to enhance flexion ROM and knee comfort.  MET  3. Pt to report minimal to no pain to palpation for improved level of comfort.  MET  4. Pt to  demonstrate symmetrical weight bearing w/o increased pain for stability and functional ambulation . MET  5. Pts neuromuscular response will be enhanced for unilateral standing stability and balance  MET  6. Pt to achieve 90 degrees of passive knee flexion for restoring functional knee mobility, ambulating with proper gait pattern and sit to stand ability. MET   7 Pt to report understanding of all instruction pertinent to patient safety , gait instruction and HEP.  MET  8. Pt to exhibit correct return demonstration of exercises for self-management and independence with HEP MET     Long Term Goals (32 Weeks):  1. Pt will demonstrate increased knee flexion AROM to 120 degrees or greater for return to functional activity  2. Pt will demonstrate increased knee extension AROM to 0 degrees for standing stability   3. Pt will demonstrate increased RLE strength by 1/2 to 1 grade for improved functional stability  4. Pt to demonstrate standing stability unsupported on dynamic surfaces for functional return to ADL and recreational activities.   5.The patient will be independent amb with no assistive device on all surfaces for community distances.  6. Pt to demonstrate independence with HEP for self management  7. Patient will return to exercising in gym for fitness and recreation 3x/wk within 6 months.  8. Patient will improve FOTO score to </= 15% limited to decrease perceived limitation with mobility.    Plan     Cont POC    Vernon Hart, PT, DPT

## 2023-11-07 ENCOUNTER — CLINICAL SUPPORT (OUTPATIENT)
Dept: REHABILITATION | Facility: HOSPITAL | Age: 23
End: 2023-11-07
Payer: COMMERCIAL

## 2023-11-07 DIAGNOSIS — M25.562 ACUTE PAIN OF LEFT KNEE: Primary | ICD-10-CM

## 2023-11-07 DIAGNOSIS — M25.662 KNEE STIFFNESS, LEFT: ICD-10-CM

## 2023-11-07 PROCEDURE — 97110 THERAPEUTIC EXERCISES: CPT | Mod: PN

## 2023-11-07 PROCEDURE — 97530 THERAPEUTIC ACTIVITIES: CPT | Mod: PN

## 2023-11-07 PROCEDURE — 97112 NEUROMUSCULAR REEDUCATION: CPT | Mod: PN

## 2023-11-07 NOTE — PROGRESS NOTES
[facility]   Physical Therapy Treatment Note     Name: Karla Price  [facility] Number: [id number]    Therapy Diagnosis:   Encounter Diagnoses   Name Primary?    Acute pain of left knee Yes    Knee stiffness, left        Physician: Camila Holcomb MD    Visit Date: 11/7/2023    Physician Orders: PT Eval and Treat  Medical Diagnosis from Referral:   S83.512A (ICD-10-CM) - Rupture of anterior cruciate ligament of left knee, initial encounter   S83.242A (ICD-10-CM) - Tear of medial meniscus of left knee, current, unspecified tear type, initial encounter   S83.282A (ICD-10-CM) - Tear of lateral meniscus of left knee, current, unspecified tear type, initial encounter      Evaluation Date: 6/15/2023  Authorization Period Expiration: 12/31/23  Plan of Care Expiration: 3/29/24  Progress Note Due: 7/15/23  Visit # / Visits authorized: 2/20 (20 new auth)  FOTO: 3/3 administered 9/14/23: 64/100     Precautions: DOS 6/13/23  Procedure:  1. Left Knee Arthroscopy, anterior cruciate ligament reconstruction 80733  2.  Left Knee open lateral extra-articular tenodesis (modified Irma procedure)  3.  Left Knee Arthroscopy, with meniscus repair (medial AND lateral) 82200  4.  Left Knee open lateral collateral ligament repair  5.  Left Knee  arthroscopic loose body removal      vertical tear in the medial meniscus with 1 suture, vertical tear of lateral meniscus with 2 sutures     Post-Op Plan:  ACL reconstruction with quad tendon autograft and medial and lateral meniscal repairs.  Toe-touch weight-bearing for 2-4 weeks followed by partial and full weight-bearing by 6 weeks.  She will require a hinged brace for 6 weeks.  We will start a CPM with passive motion immediately.      PTA Visit #: 0/5     Time In: 5:30 pm  Time Out: 6:30 pm  Total Billable Time: 60 minutes    Subjective     Pt reports: Doing well overall with no problems to report    She was compliant with home exercise program.  Response to previous  "treatment: improved ROM  Functional change: improved gait mechanics    Pain: 0/10  Location: left knee      Objective    DOS 6/13: 19 weeks 0 days POD as of 10/3/23    Knee Passive Range of Motion:    Right  Left    Flexion 140 140   Extension 10 hyper 10 hyper     Hand-held dynamometry:   Right  Left  % deficit   Quadriceps at 90 degrees: 41.4# 22.4# 46%     Y Balance:   Right  Left  Cm difference   Anterior reach 52cm 47cm 5 cm         Treatment     Karla received the treatments listed below:    therapeutic exercises to develop strength and ROM for 23 minutes including:  ROM check    Circuit: 3 rounds:  Knee extension machine - 15# x10-15  Knee flexion machine - 30# x15    Not performed    Prone quad stretch 30s x5  Heel slides 30x 5s holds      manual therapy techniques: Joint mobilizations and Soft tissue Mobilization were applied to the: L knee for 00 minutes, including:  Extension hinge   Patellar mobs all directions   Fat pat mobilization  Flexion MWM with tibiofemoral gapping    neuromuscular re-education activities to improve: Kinesthetic and Proprioception for 10 minutes. The following activities were included:  BFR applied for the following, 60% [facility] reps/sets 30/15/15/15  - SL shuttle 50#     Not today:  Long sitting SLR 3x15  Resisted lateral steps GTB at ankles x 4laps   Wall Clams 3x10  SL bridge - 3x10  4" Lateral step down  UE assistance  [place] split squats 4x6  Single leg shuttle squat 87.5# 3x10B  20" LLE bias box squat 3x10 with 10# KB press   [place] SS 3x8  LLE bias wall sit 30s x4 with 10# KB hold on RLE  TRX DL squat 3x10  TRX split squat 3x10  -DL shuttle 87.5# 3x10    therapeutic activities to improve functional performance for 25 minutes, including:  Alter G - 60% body weight: 10 mins total, 1 min to 1min     Circuit: 3 rounds   DL squat on wedge at wall   Sidestepping with band GTB        Hex bar DL - 4x6 with 7590#  Forward step up weighted 7'' + 10# 3x8  SL shuttle press 87.5# " 4x6    Not today  Bike 10 mins for cardiovascular endurance  Circuit: x 3 rounds each  - 25# goblet squat x15  - Side plank clams x10  Wall sit 20s x3    Patient Education and Home Exercises       Education provided:   - HEP, POC, answered patient questions, obtaining gym membership      Written Home Exercises Provided: yes. Exercises were reviewed and Karla was able to demonstrate them prior to the end of the session.  Karla demonstrated good  understanding of the education provided. See EMR under Patient Instructions for exercises provided during therapy sessions    Assessment   Patient arrives today with better levels of motivation. Continued with alter G today at 50% bw and her jog gait looks much better. Continued with quad focused exercises which she did well with today. Sees Dr. Meijer tomorrow and will try to round with him beforehand         Karla Is progressing well towards her goals.   Pt prognosis is Excellent.     Pt will continue to benefit from skilled outpatient physical therapy to address the deficits listed in the problem list box on initial evaluation, provide pt/family education and to maximize pt's level of independence in the home and community environment.     Pt's spiritual, cultural and educational needs considered and pt agreeable to plan of care and goals.     Anticipated barriers to physical therapy: none    Post-op Knee   Short Term Goals ( 4 Weeks ):   1. Pt will report reduced pain by 20 to 40% or greater for improved mobility, sleep  and ambulation.  MET  2. Patients knee edema reduced by 1/4 to 1/2 inch or greater to enhance flexion ROM and knee comfort.  MET  3. Pt to report minimal to no pain to palpation for improved level of comfort.  MET  4. Pt to demonstrate symmetrical weight bearing w/o increased pain for stability and functional ambulation . MET  5. Pts neuromuscular response will be enhanced for unilateral standing stability and balance  MET  6. Pt to achieve 90 degrees of  passive knee flexion for restoring functional knee mobility, ambulating with proper gait pattern and sit to stand ability. MET   7 Pt to report understanding of all instruction pertinent to patient safety , gait instruction and HEP.  MET  8. Pt to exhibit correct return demonstration of exercises for self-management and independence with HEP MET     Long Term Goals (32 Weeks):  1. Pt will demonstrate increased knee flexion AROM to 120 degrees or greater for return to functional activity  2. Pt will demonstrate increased knee extension AROM to 0 degrees for standing stability   3. Pt will demonstrate increased RLE strength by 1/2 to 1 grade for improved functional stability  4. Pt to demonstrate standing stability unsupported on dynamic surfaces for functional return to ADL and recreational activities.   5.The patient will be independent amb with no assistive device on all surfaces for community distances.  6. Pt to demonstrate independence with HEP for self management  7. Patient will return to exercising in gym for fitness and recreation 3x/wk within 6 months.  8. Patient will improve FOTO score to </= 15% limited to decrease perceived limitation with mobility.    Plan     Cont POC    Vernon Hart, PT, DPT

## 2023-11-08 ENCOUNTER — OFFICE VISIT (OUTPATIENT)
Dept: SPORTS MEDICINE | Facility: CLINIC | Age: 23
End: 2023-11-08
Payer: COMMERCIAL

## 2023-11-08 VITALS
SYSTOLIC BLOOD PRESSURE: 126 MMHG | HEIGHT: 61 IN | HEART RATE: 80 BPM | DIASTOLIC BLOOD PRESSURE: 81 MMHG | BODY MASS INDEX: 39.95 KG/M2 | WEIGHT: 211.63 LBS

## 2023-11-08 DIAGNOSIS — Z98.890 S/P ACL RECONSTRUCTION: Primary | ICD-10-CM

## 2023-11-08 PROCEDURE — 1159F PR MEDICATION LIST DOCUMENTED IN MEDICAL RECORD: ICD-10-PCS | Mod: CPTII,S$GLB,, | Performed by: ORTHOPAEDIC SURGERY

## 2023-11-08 PROCEDURE — 99213 OFFICE O/P EST LOW 20 MIN: CPT | Mod: S$GLB,,, | Performed by: ORTHOPAEDIC SURGERY

## 2023-11-08 PROCEDURE — 3044F PR MOST RECENT HEMOGLOBIN A1C LEVEL <7.0%: ICD-10-PCS | Mod: CPTII,S$GLB,, | Performed by: ORTHOPAEDIC SURGERY

## 2023-11-08 PROCEDURE — 3079F DIAST BP 80-89 MM HG: CPT | Mod: CPTII,S$GLB,, | Performed by: ORTHOPAEDIC SURGERY

## 2023-11-08 PROCEDURE — 3044F HG A1C LEVEL LT 7.0%: CPT | Mod: CPTII,S$GLB,, | Performed by: ORTHOPAEDIC SURGERY

## 2023-11-08 PROCEDURE — 99213 PR OFFICE/OUTPT VISIT, EST, LEVL III, 20-29 MIN: ICD-10-PCS | Mod: S$GLB,,, | Performed by: ORTHOPAEDIC SURGERY

## 2023-11-08 PROCEDURE — 99999 PR PBB SHADOW E&M-EST. PATIENT-LVL III: CPT | Mod: PBBFAC,,, | Performed by: ORTHOPAEDIC SURGERY

## 2023-11-08 PROCEDURE — 1159F MED LIST DOCD IN RCRD: CPT | Mod: CPTII,S$GLB,, | Performed by: ORTHOPAEDIC SURGERY

## 2023-11-08 PROCEDURE — 3008F PR BODY MASS INDEX (BMI) DOCUMENTED: ICD-10-PCS | Mod: CPTII,S$GLB,, | Performed by: ORTHOPAEDIC SURGERY

## 2023-11-08 PROCEDURE — 99999 PR PBB SHADOW E&M-EST. PATIENT-LVL III: ICD-10-PCS | Mod: PBBFAC,,, | Performed by: ORTHOPAEDIC SURGERY

## 2023-11-08 PROCEDURE — 3008F BODY MASS INDEX DOCD: CPT | Mod: CPTII,S$GLB,, | Performed by: ORTHOPAEDIC SURGERY

## 2023-11-08 PROCEDURE — 3074F PR MOST RECENT SYSTOLIC BLOOD PRESSURE < 130 MM HG: ICD-10-PCS | Mod: CPTII,S$GLB,, | Performed by: ORTHOPAEDIC SURGERY

## 2023-11-08 PROCEDURE — 3074F SYST BP LT 130 MM HG: CPT | Mod: CPTII,S$GLB,, | Performed by: ORTHOPAEDIC SURGERY

## 2023-11-08 PROCEDURE — 3079F PR MOST RECENT DIASTOLIC BLOOD PRESSURE 80-89 MM HG: ICD-10-PCS | Mod: CPTII,S$GLB,, | Performed by: ORTHOPAEDIC SURGERY

## 2023-11-08 NOTE — PROGRESS NOTES
POST-OPERATIVE EXAMINATION    22 y.o. Female who returns for follow after surgery. She is 5 months s/p:    Procedure:  1.  Left Knee Arthroscopy, anterior cruciate ligament reconstruction 64728  2.  Left Knee open lateral extra-articular tenodesis (modified Irma procedure)  3.  Left Knee Arthroscopy, with meniscus repair (medial AND lateral) 55931  4.  Left Knee open lateral collateral ligament repair  5.  Left Knee  arthroscopic loose body removal      She is doing well without any issues.       PHYSICAL EXAMINATION:  [facility] 06/16/2023 (Exact Date)   General: Well-developed well-nourished 22 y.o. female in no acute distress   Cardiovascular: Regular rhythm   Lungs: No labored breathing or wheezing appreciated   Neuro: Alert and oriented ×3   Psychiatric: well oriented to person, place and time, demonstrates normal mood and affect   Skin: No rashes, lesions or ulcers, normal temperature, turgor, and texture on involved extremity    ORTHOPEDIC EXAM:  Normal post-operative swelling  Normal post-operative scarring  Strength: WNL  ROM: 0-135  Tests: Negative Lachman's, Negative pivot shift    KNEE EXAM - right    Inspection:   Normal skin color and appearance with no scars, no ecchymosis and no effusion.      ROM:   0° - 145°.      Palpation:   There is no tenderness along medial joint line, medial plica, medial collateral ligament, pes bursa, lateral joint line, iliotibial band, lateral collateral ligament, popliteal fossa, patellar tendon, or quadriceps tendon.    Stability:  -anterior drawer, - Lachman's, 1+ pivot shift, negative posterior drawer    No instability with varus or valgus stress at 0° or 30°. Negative dial  test at 30° and 90°.    Tests:   - Andreinas test.  - patellar compression - grind test, - crepitus.  There is no patellar apprehension.     - J Sign. - Victor Manuel's. - patellar tilt. - Ana. Lateral patella translation  2 quadrants. Medial patella translation 2 quadrants    Motor:   Quadriceps strength  is 5/5 and hamstrings strength is 5/5. Hamstrings show no tightness.      Neuro:   Distal neurovascular status is normal    Vascular: Negative Homans and no palpable popliteal cords. 2+ pedal pulse with brisk cap refill    Gait Normal      ASSESSMENT:      ICD-10-CM ICD-9-CM   1. S/P ACL reconstruction  Z98.890 V45.89         PLAN:       Continue physical therapy  RTC in 6 weeks

## 2023-11-14 ENCOUNTER — CLINICAL SUPPORT (OUTPATIENT)
Dept: REHABILITATION | Facility: HOSPITAL | Age: 23
End: 2023-11-14
Payer: COMMERCIAL

## 2023-11-14 DIAGNOSIS — M25.662 KNEE STIFFNESS, LEFT: ICD-10-CM

## 2023-11-14 DIAGNOSIS — M25.562 ACUTE PAIN OF LEFT KNEE: Primary | ICD-10-CM

## 2023-11-14 PROCEDURE — 97110 THERAPEUTIC EXERCISES: CPT | Mod: PN

## 2023-11-14 PROCEDURE — 97530 THERAPEUTIC ACTIVITIES: CPT | Mod: PN

## 2023-11-14 PROCEDURE — 97112 NEUROMUSCULAR REEDUCATION: CPT | Mod: PN

## 2023-11-14 NOTE — PROGRESS NOTES
[facility]   Physical Therapy Treatment Note     Name: Karla Price  [facility] Number: [id number]    Therapy Diagnosis:   Encounter Diagnoses   Name Primary?    Acute pain of left knee Yes    Knee stiffness, left        Physician: Camila Holcomb MD    Visit Date: 11/14/2023    Physician Orders: PT Eval and Treat  Medical Diagnosis from Referral:   S83.512A (ICD-10-CM) - Rupture of anterior cruciate ligament of left knee, initial encounter   S83.242A (ICD-10-CM) - Tear of medial meniscus of left knee, current, unspecified tear type, initial encounter   S83.282A (ICD-10-CM) - Tear of lateral meniscus of left knee, current, unspecified tear type, initial encounter      Evaluation Date: 6/15/2023  Authorization Period Expiration: 12/31/23  Plan of Care Expiration: 3/29/24  Progress Note Due: 7/15/23  Visit # / Visits authorized: 4/20 (20 new auth)  FOTO: 3/3 administered 9/14/23: 64/100     Precautions: DOS 6/13/23  Procedure:  1. Left Knee Arthroscopy, anterior cruciate ligament reconstruction 51999  2.  Left Knee open lateral extra-articular tenodesis (modified Irma procedure)  3.  Left Knee Arthroscopy, with meniscus repair (medial AND lateral) 30559  4.  Left Knee open lateral collateral ligament repair  5.  Left Knee  arthroscopic loose body removal      vertical tear in the medial meniscus with 1 suture, vertical tear of lateral meniscus with 2 sutures     Post-Op Plan:  ACL reconstruction with quad tendon autograft and medial and lateral meniscal repairs.  Toe-touch weight-bearing for 2-4 weeks followed by partial and full weight-bearing by 6 weeks.  She will require a hinged brace for 6 weeks.  We will start a CPM with passive motion immediately.      PTA Visit #: 0/5     Time In: 5:30 pm  Time Out: 6:30 pm  Total Billable Time: 60 minutes    Subjective     Pt reports: Visit with Dr. Bee went well. Overall she is doing well. Has been dealing with anterior knee pain     She was  "compliant with home exercise program.  Response to previous treatment: improved ROM  Functional change: improved gait mechanics    Pain: 0/10  Location: left knee      Objective    DOS 6/13: 19 weeks 0 days POD as of 10/3/23    Knee Passive Range of Motion:    Right  Left    Flexion 140 140   Extension 10 hyper 10 hyper     Hand-held dynamometry:   Right  Left  % deficit   Quadriceps at 90 degrees: 41.4# 22.4# 46%     Y Balance:   Right  Left  Cm difference   Anterior reach 52cm 47cm 5 cm         Treatment     Karla received the treatments listed below:    therapeutic exercises to develop strength and ROM for 08 minutes including:  ROM check and objective assessment of anterior knee pain    Not performed  Circuit: 3 rounds:  Knee extension machine - 15# x10-15  Knee flexion machine - 30# x15  Prone quad stretch 30s x5  Heel slides 30x 5s holds      manual therapy techniques: Joint mobilizations and Soft tissue Mobilization were applied to the: L knee for 00 minutes, including:  Extension hinge   Patellar mobs all directions   Fat pat mobilization  Flexion MWM with tibiofemoral gapping    neuromuscular re-education activities to improve: Kinesthetic and Proprioception for 25 minutes. The following activities were included:  Knee extension iso 5x45s wth 2 min breaks   Side plank clams and SL bridge x10ea (alternating during break)      BFR applied for the following, 60% [facility] reps/sets 30/15/15/15  - SL shuttle 50#     Not today:  Long sitting SLR 3x15  Resisted lateral steps GTB at ankles x 4laps   Wall Clams 3x10  SL bridge - 3x10  4" Lateral step down  UE assistance  [place] split squats 4x6  Single leg shuttle squat 87.5# 3x10B  20" LLE bias box squat 3x10 with 10# KB press   [place] SS 3x8  LLE bias wall sit 30s x4 with 10# KB hold on RLE  TRX DL squat 3x10  TRX split squat 3x10  -DL shuttle 87.5# 3x10    therapeutic activities to improve functional performance for 25 minutes, including:  Elliptical 10 mins "   Lateral step down with orange band assistance - 3x5    Circuit: 3 rounds   DL squat on wedge at wall   Sidestepping with band GTB        Hex bar DL - 4x6 with 7590#  Forward step up weighted 7'' + 10# 3x8  SL shuttle press 87.5# 4x6    Not today  Bike 10 mins for cardiovascular endurance  Circuit: x 3 rounds each  - 25# goblet squat x15  - Side plank clams x10  Wall sit 20s x3    Patient Education and Home Exercises       Education provided:   - HEP, POC, answered patient questions, obtaining gym membership      Written Home Exercises Provided: yes. Exercises were reviewed and Karla was able to demonstrate them prior to the end of the session.  Karla demonstrated good  understanding of the education provided. See EMR under Patient Instructions for exercises provided during therapy sessions    Assessment   Patient does seem to present with patellar tendinopathy at this time per location of pain. Min TTP over knee cap or other locations. Issued isometric protocol. Pre and post squat test improved after this protocol.         Karla Is progressing well towards her goals.   Pt prognosis is Excellent.     Pt will continue to benefit from skilled outpatient physical therapy to address the deficits listed in the problem list box on initial evaluation, provide pt/family education and to maximize pt's level of independence in the home and community environment.     Pt's spiritual, cultural and educational needs considered and pt agreeable to plan of care and goals.     Anticipated barriers to physical therapy: none    Post-op Knee   Short Term Goals ( 4 Weeks ):   1. Pt will report reduced pain by 20 to 40% or greater for improved mobility, sleep  and ambulation.  MET  2. Patients knee edema reduced by 1/4 to 1/2 inch or greater to enhance flexion ROM and knee comfort.  MET  3. Pt to report minimal to no pain to palpation for improved level of comfort.  MET  4. Pt to demonstrate symmetrical weight bearing w/o increased pain  for stability and functional ambulation . MET  5. Pts neuromuscular response will be enhanced for unilateral standing stability and balance  MET  6. Pt to achieve 90 degrees of passive knee flexion for restoring functional knee mobility, ambulating with proper gait pattern and sit to stand ability. MET   7 Pt to report understanding of all instruction pertinent to patient safety , gait instruction and HEP.  MET  8. Pt to exhibit correct return demonstration of exercises for self-management and independence with HEP MET     Long Term Goals (32 Weeks):  1. Pt will demonstrate increased knee flexion AROM to 120 degrees or greater for return to functional activity  2. Pt will demonstrate increased knee extension AROM to 0 degrees for standing stability   3. Pt will demonstrate increased RLE strength by 1/2 to 1 grade for improved functional stability  4. Pt to demonstrate standing stability unsupported on dynamic surfaces for functional return to ADL and recreational activities.   5.The patient will be independent amb with no assistive device on all surfaces for community distances.  6. Pt to demonstrate independence with HEP for self management  7. Patient will return to exercising in gym for fitness and recreation 3x/wk within 6 months.  8. Patient will improve FOTO score to </= 15% limited to decrease perceived limitation with mobility.    Plan     Cont POC    Vernon Hart, PT, DPT

## 2023-11-21 ENCOUNTER — CLINICAL SUPPORT (OUTPATIENT)
Dept: REHABILITATION | Facility: HOSPITAL | Age: 23
End: 2023-11-21
Payer: COMMERCIAL

## 2023-11-21 DIAGNOSIS — M25.562 ACUTE PAIN OF LEFT KNEE: Primary | ICD-10-CM

## 2023-11-21 DIAGNOSIS — M25.662 KNEE STIFFNESS, LEFT: ICD-10-CM

## 2023-11-21 PROCEDURE — 97110 THERAPEUTIC EXERCISES: CPT | Mod: PN

## 2023-11-21 PROCEDURE — 97112 NEUROMUSCULAR REEDUCATION: CPT | Mod: PN

## 2023-11-21 PROCEDURE — 97530 THERAPEUTIC ACTIVITIES: CPT | Mod: PN

## 2023-11-21 NOTE — PROGRESS NOTES
[facility]   Physical Therapy Treatment Note     Name: Karla Price  [facility] Number: [id number]    Therapy Diagnosis:   Encounter Diagnoses   Name Primary?    Acute pain of left knee Yes    Knee stiffness, left          Physician: Camila Holcomb MD    Visit Date: 11/21/2023    Physician Orders: PT Eval and Treat  Medical Diagnosis from Referral:   S83.512A (ICD-10-CM) - Rupture of anterior cruciate ligament of left knee, initial encounter   S83.242A (ICD-10-CM) - Tear of medial meniscus of left knee, current, unspecified tear type, initial encounter   S83.282A (ICD-10-CM) - Tear of lateral meniscus of left knee, current, unspecified tear type, initial encounter      Evaluation Date: 6/15/2023  Authorization Period Expiration: 12/31/23  Plan of Care Expiration: 3/29/24  Progress Note Due: 7/15/23  Visit # / Visits authorized: 4/20 (20 new auth)  FOTO: 3/3 administered 9/14/23: 64/100     Precautions: DOS 6/13/23  Procedure:  1. Left Knee Arthroscopy, anterior cruciate ligament reconstruction 63554  2.  Left Knee open lateral extra-articular tenodesis (modified Irma procedure)  3.  Left Knee Arthroscopy, with meniscus repair (medial AND lateral) 47633  4.  Left Knee open lateral collateral ligament repair  5.  Left Knee  arthroscopic loose body removal      vertical tear in the medial meniscus with 1 suture, vertical tear of lateral meniscus with 2 sutures     Post-Op Plan:  ACL reconstruction with quad tendon autograft and medial and lateral meniscal repairs.  Toe-touch weight-bearing for 2-4 weeks followed by partial and full weight-bearing by 6 weeks.  She will require a hinged brace for 6 weeks.  We will start a CPM with passive motion immediately.      PTA Visit #: 0/5     Time In: 5:00 pm  Time Out: 6:00 pm  Total Billable Time: 60 minutes    Subjective     Pt reports: doing well     She was compliant with home exercise program.  Response to previous treatment: improved ROM  Functional  "change: improved gait mechanics    Pain: 0/10  Location: left knee      Objective    DOS 6/13: 19 weeks 0 days POD as of 10/3/23    Knee Passive Range of Motion:    Right  Left    Flexion 140 140   Extension 10 hyper 10 hyper     Hand-held dynamometry:   Right  Left  % deficit   Quadriceps at 90 degrees: 41.4# 22.4# 46%     Y Balance:   Right  Left  Cm difference   Anterior reach 52cm 47cm 5 cm         Treatment     Karla received the treatments listed below:    therapeutic exercises to develop strength and ROM for 08 minutes including:  Lateral step down assessment   Standing Hamstring assessment    Not performed  Knee flexion machine - 30# x15  Prone quad stretch 30s x5  Heel slides 30x 5s holds      manual therapy techniques: Joint mobilizations and Soft tissue Mobilization were applied to the: L knee for 00 minutes, including:    neuromuscular re-education activities to improve: Kinesthetic and Proprioception for 25 minutes. The following activities were included:  Knee extension iso 5x45s wth 2 min breaks   Single leg bridge x10 ea      Not today:  BFR applied for the following, 60% CKC reps/sets 30/15/15/15  - LAQ with band   Long sitting SLR 3x15  Resisted lateral steps GTB at ankles x 4laps   Wall Clams 3x10  SL bridge - 3x10  4" Lateral step down  UE assistance  [place]auritanian split squats 4x6  Single leg shuttle squat 87.5# 3x10B  20" LLE bias box squat 3x10 with 10# KB press   [place]auritanian SS 3x8  LLE bias wall sit 30s x4 with 10# KB hold on RLE  TRX DL squat 3x10  TRX split squat 3x10  -DL shuttle 87.5# 3x10    therapeutic activities to improve functional performance for 25 minutes, including:  Hex bar DL - 95# x10 , 115# x5, 125# 2x5      Circuit: 3 rounds   Lateral step down 6'' x6   Single leg heel raise       Forward step up weighted 7'' + 10# 3x8  SL shuttle press 87.5# 4x6    Not today  Bike 10 mins for cardiovascular endurance  Circuit: x 3 rounds each  - 25# goblet squat x15  - Side plank clams x10  Wall " sit 20s x3    Patient Education and Home Exercises       Education provided:   - HEP, POC, answered patient questions, obtaining gym membership      Written Home Exercises Provided: yes. Exercises were reviewed and Karla was able to demonstrate them prior to the end of the session.  Karla demonstrated good  understanding of the education provided. See EMR under Patient Instructions for exercises provided during therapy sessions    Assessment   Patients patellar tendon pain seems to be improving despite her not performing isometrics as instructed. She stated that she did not have the correct belt, so we issued a thick TB to assist with this. Quad control improving seen via lat step down, but is still with fasciculations and fatigue. Able to complete full hamstring standing curl indicating good control in this muscle.         Krala Is progressing well towards her goals.   Pt prognosis is Excellent.     Pt will continue to benefit from skilled outpatient physical therapy to address the deficits listed in the problem list box on initial evaluation, provide pt/family education and to maximize pt's level of independence in the home and community environment.     Pt's spiritual, cultural and educational needs considered and pt agreeable to plan of care and goals.     Anticipated barriers to physical therapy: none    Post-op Knee   Short Term Goals ( 4 Weeks ):   1. Pt will report reduced pain by 20 to 40% or greater for improved mobility, sleep  and ambulation.  MET  2. Patients knee edema reduced by 1/4 to 1/2 inch or greater to enhance flexion ROM and knee comfort.  MET  3. Pt to report minimal to no pain to palpation for improved level of comfort.  MET  4. Pt to demonstrate symmetrical weight bearing w/o increased pain for stability and functional ambulation . MET  5. Pts neuromuscular response will be enhanced for unilateral standing stability and balance  MET  6. Pt to achieve 90 degrees of passive knee flexion for  restoring functional knee mobility, ambulating with proper gait pattern and sit to stand ability. MET   7 Pt to report understanding of all instruction pertinent to patient safety , gait instruction and HEP.  MET  8. Pt to exhibit correct return demonstration of exercises for self-management and independence with HEP MET     Long Term Goals (32 Weeks):  1. Pt will demonstrate increased knee flexion AROM to 120 degrees or greater for return to functional activity  2. Pt will demonstrate increased knee extension AROM to 0 degrees for standing stability   3. Pt will demonstrate increased RLE strength by 1/2 to 1 grade for improved functional stability  4. Pt to demonstrate standing stability unsupported on dynamic surfaces for functional return to ADL and recreational activities.   5.The patient will be independent amb with no assistive device on all surfaces for community distances.  6. Pt to demonstrate independence with HEP for self management  7. Patient will return to exercising in gym for fitness and recreation 3x/wk within 6 months.  8. Patient will improve FOTO score to </= 15% limited to decrease perceived limitation with mobility.    Plan     Cont POC    Vernon Hart, PT, DPT

## 2023-11-28 ENCOUNTER — CLINICAL SUPPORT (OUTPATIENT)
Dept: REHABILITATION | Facility: HOSPITAL | Age: 23
End: 2023-11-28
Payer: COMMERCIAL

## 2023-11-28 DIAGNOSIS — M25.562 ACUTE PAIN OF LEFT KNEE: Primary | ICD-10-CM

## 2023-11-28 DIAGNOSIS — M25.662 KNEE STIFFNESS, LEFT: ICD-10-CM

## 2023-11-28 PROCEDURE — 97110 THERAPEUTIC EXERCISES: CPT | Mod: PN

## 2023-11-28 PROCEDURE — 97112 NEUROMUSCULAR REEDUCATION: CPT | Mod: PN

## 2023-11-28 PROCEDURE — 97530 THERAPEUTIC ACTIVITIES: CPT | Mod: PN

## 2023-11-28 NOTE — PROGRESS NOTES
[facility]   Physical Therapy Treatment Note     Name: Karla Price  [facility] Number: [id number]    Therapy Diagnosis:   Encounter Diagnoses   Name Primary?    Acute pain of left knee Yes    Knee stiffness, left          Physician: Camila Holcomb MD    Visit Date: 11/28/2023    Physician Orders: PT Eval and Treat  Medical Diagnosis from Referral:   S83.512A (ICD-10-CM) - Rupture of anterior cruciate ligament of left knee, initial encounter   S83.242A (ICD-10-CM) - Tear of medial meniscus of left knee, current, unspecified tear type, initial encounter   S83.282A (ICD-10-CM) - Tear of lateral meniscus of left knee, current, unspecified tear type, initial encounter      Evaluation Date: 6/15/2023  Authorization Period Expiration: 12/31/23  Plan of Care Expiration: 3/29/24  Progress Note Due: 7/15/23  Visit # / Visits authorized: 48/60  FOTO: 3/3 administered 9/14/23: 64/100     Precautions: DOS 6/13/23  Procedure:  1. Left Knee Arthroscopy, anterior cruciate ligament reconstruction 76411  2.  Left Knee open lateral extra-articular tenodesis (modified Irma procedure)  3.  Left Knee Arthroscopy, with meniscus repair (medial AND lateral) 34876  4.  Left Knee open lateral collateral ligament repair  5.  Left Knee  arthroscopic loose body removal      vertical tear in the medial meniscus with 1 suture, vertical tear of lateral meniscus with 2 sutures     Post-Op Plan:  ACL reconstruction with quad tendon autograft and medial and lateral meniscal repairs.  Toe-touch weight-bearing for 2-4 weeks followed by partial and full weight-bearing by 6 weeks.  She will require a hinged brace for 6 weeks.  We will start a CPM with passive motion immediately.      PTA Visit #: 0/5     Time In: 5:00 pm  Time Out: 6:01 pm  Total Billable Time: 61 minutes    Subjective     Pt reports: doing well. Anterior knee pain is still lingering but not bad    She was compliant with home exercise program.  Response to previous  "treatment: improved ROM  Functional change: improved gait mechanics    Pain: 0/10  Location: left knee      Objective    DOS 6/13: 19 weeks 0 days POD as of 10/3/23    Knee Passive Range of Motion:    Right  Left    Flexion 140 140   Extension 10 hyper 10 hyper     Hand-held dynamometry:   Right  Left  % deficit   Quadriceps at 90 degrees: 41.4# 22.4# 46%     Y Balance:   Right  Left  Cm difference   Anterior reach 52cm 47cm 5 cm         Treatment     Karla received the treatments listed below:    therapeutic exercises to develop strength and ROM for 23 minutes including:  Knee extension machine 10# 4x5   SL shuttle 62.5# 3x15     Not performed  Knee flexion machine - 30# x15  Prone quad stretch 30s x5  Heel slides 30x 5s holds      manual therapy techniques: Joint mobilizations and Soft tissue Mobilization were applied to the: L knee for 00 minutes, including:    neuromuscular re-education activities to improve: Kinesthetic and Proprioception for 15 minutes. The following activities were included:  Knee extension iso 5x45s wth 2 min breaks   Single leg bridge x10 ea      Not today:  BFR applied for the following, 60% CKC reps/sets 30/15/15/15  - LAQ with band   Long sitting SLR 3x15  Resisted lateral steps GTB at ankles x 4laps   Wall Clams 3x10  SL bridge - 3x10  4" Lateral step down  UE assistance  Micronesian split squats 4x6  Single leg shuttle squat 87.5# 3x10B  20" LLE bias box squat 3x10 with 10# KB press   [place]ronesian SS 3x8  LLE bias wall sit 30s x4 with 10# KB hold on RLE  TRX DL squat 3x10  TRX split squat 3x10  -DL shuttle 87.5# 3x10    therapeutic activities to improve functional performance for 23 minutes, including:  Elliptical - 10 mins   Hex bar DL - 95# x10 , 115# x5, 135# 2x5  Barbell squats 45# x10, 55# 2x10       Circuit: 3 rounds   Lateral step down 6'' x6   Single leg heel raise       Forward step up weighted 7'' + 10# 3x8  SL shuttle press 87.5# 4x6    Not today  Bike 10 mins for cardiovascular " endurance  Circuit: x 3 rounds each  - 25# goblet squat x15  - Side plank clams x10  Wall sit 20s x3    Patient Education and Home Exercises       Education provided:   - HEP, POC, answered patient questions, obtaining gym membership      Written Home Exercises Provided: yes. Exercises were reviewed and Karla was able to demonstrate them prior to the end of the session.  Karla demonstrated good  understanding of the education provided. See EMR under Patient Instructions for exercises provided during therapy sessions    Assessment   Overall doing well. Able to increased load on hex bar DL today. Also initiated barbell squat training which went well. Finished the session with isometrics to work on anterior knee pain. Overall doing well.       Karla Is progressing well towards her goals.   Pt prognosis is Excellent.     Pt will continue to benefit from skilled outpatient physical therapy to address the deficits listed in the problem list box on initial evaluation, provide pt/family education and to maximize pt's level of independence in the home and community environment.     Pt's spiritual, cultural and educational needs considered and pt agreeable to plan of care and goals.     Anticipated barriers to physical therapy: none    Post-op Knee   Short Term Goals ( 4 Weeks ):   1. Pt will report reduced pain by 20 to 40% or greater for improved mobility, sleep  and ambulation.  MET  2. Patients knee edema reduced by 1/4 to 1/2 inch or greater to enhance flexion ROM and knee comfort.  MET  3. Pt to report minimal to no pain to palpation for improved level of comfort.  MET  4. Pt to demonstrate symmetrical weight bearing w/o increased pain for stability and functional ambulation . MET  5. Pts neuromuscular response will be enhanced for unilateral standing stability and balance  MET  6. Pt to achieve 90 degrees of passive knee flexion for restoring functional knee mobility, ambulating with proper gait pattern and sit to  stand ability. MET   7 Pt to report understanding of all instruction pertinent to patient safety , gait instruction and HEP.  MET  8. Pt to exhibit correct return demonstration of exercises for self-management and independence with HEP MET     Long Term Goals (32 Weeks):  1. Pt will demonstrate increased knee flexion AROM to 120 degrees or greater for return to functional activity  2. Pt will demonstrate increased knee extension AROM to 0 degrees for standing stability   3. Pt will demonstrate increased RLE strength by 1/2 to 1 grade for improved functional stability  4. Pt to demonstrate standing stability unsupported on dynamic surfaces for functional return to ADL and recreational activities.   5.The patient will be independent amb with no assistive device on all surfaces for community distances.  6. Pt to demonstrate independence with HEP for self management  7. Patient will return to exercising in gym for fitness and recreation 3x/wk within 6 months.  8. Patient will improve FOTO score to </= 15% limited to decrease perceived limitation with mobility.    Plan     Cont POC    Vernon Hart, PT, DPT

## 2023-12-05 ENCOUNTER — CLINICAL SUPPORT (OUTPATIENT)
Dept: REHABILITATION | Facility: HOSPITAL | Age: 23
End: 2023-12-05
Payer: COMMERCIAL

## 2023-12-05 DIAGNOSIS — M25.662 KNEE STIFFNESS, LEFT: ICD-10-CM

## 2023-12-05 DIAGNOSIS — M25.562 ACUTE PAIN OF LEFT KNEE: Primary | ICD-10-CM

## 2023-12-05 PROCEDURE — 97112 NEUROMUSCULAR REEDUCATION: CPT | Mod: PN

## 2023-12-05 PROCEDURE — 97530 THERAPEUTIC ACTIVITIES: CPT | Mod: PN

## 2023-12-05 PROCEDURE — 97110 THERAPEUTIC EXERCISES: CPT | Mod: PN

## 2023-12-05 NOTE — PROGRESS NOTES
[facility]   Physical Therapy Treatment Note     Name: Karla Price  [facility] Number: [id number]    Therapy Diagnosis:   Encounter Diagnoses   Name Primary?    Acute pain of left knee Yes    Knee stiffness, left          Physician: Camila Holcomb MD    Visit Date: 12/5/2023    Physician Orders: PT Eval and Treat  Medical Diagnosis from Referral:   S83.512A (ICD-10-CM) - Rupture of anterior cruciate ligament of left knee, initial encounter   S83.242A (ICD-10-CM) - Tear of medial meniscus of left knee, current, unspecified tear type, initial encounter   S83.282A (ICD-10-CM) - Tear of lateral meniscus of left knee, current, unspecified tear type, initial encounter      Evaluation Date: 6/15/2023  Authorization Period Expiration: 12/31/23  Plan of Care Expiration: 3/29/24  Progress Note Due: 7/15/23  Visit # / Visits authorized: 49/60  FOTO: 3/3 administered 9/14/23: 64/100     Precautions: DOS 6/13/23  Procedure:  1. Left Knee Arthroscopy, anterior cruciate ligament reconstruction 82082  2.  Left Knee open lateral extra-articular tenodesis (modified Irma procedure)  3.  Left Knee Arthroscopy, with meniscus repair (medial AND lateral) 64203  4.  Left Knee open lateral collateral ligament repair  5.  Left Knee  arthroscopic loose body removal      vertical tear in the medial meniscus with 1 suture, vertical tear of lateral meniscus with 2 sutures     Post-Op Plan:  ACL reconstruction with quad tendon autograft and medial and lateral meniscal repairs.  Toe-touch weight-bearing for 2-4 weeks followed by partial and full weight-bearing by 6 weeks.  She will require a hinged brace for 6 weeks.  We will start a CPM with passive motion immediately.      PTA Visit #: 0/5     Time In: 5:00 pm  Time Out: 6:01 pm  Total Billable Time: 61 minutes    Subjective     Pt reports: doing well. Anterior knee pain has not been present as of recently. She has been working really hard in the gym     She was compliant  "with home exercise program.  Response to previous treatment: improved ROM  Functional change: improved gait mechanics    Pain: 0/10  Location: left knee      Objective    DOS 6/13: 19 weeks 0 days POD as of 10/3/23    Knee Passive Range of Motion:    Right  Left    Flexion 140 140   Extension 10 hyper 10 hyper     Hand-held dynamometry:   Right  Left  % deficit   Quadriceps at 90 degrees: 41.4# 22.4# 46%     Y Balance:   Right  Left  Cm difference   Anterior reach 52cm 47cm 5 cm         Treatment     Karla received the treatments listed below:    therapeutic exercises to develop strength and ROM for 10 minutes including:  Knee extension machine 25#  isotonic 3x15    Not performed  Knee flexion machine - 30# x15  Prone quad stretch 30s x5  Heel slides 30x 5s holds      manual therapy techniques: Joint mobilizations and Soft tissue Mobilization were applied to the: L knee for 00 minutes, including:    neuromuscular re-education activities to improve: Kinesthetic and Proprioception for 25 minutes. The following activities were included:  Dynamic stretching: quad pulls, knees to chest, lunges forward, lunges lateral   Hamstring eccentric bridge 3x10      Not today:  BFR applied for the following, 60% CKC reps/sets 30/15/15/15  - LAQ with band   Long sitting SLR 3x15  Resisted lateral steps GTB at ankles x 4laps   Wall Clams 3x10  SL bridge - 3x10  4" Lateral step down  UE assistance  Sudanese split squats 4x6  Single leg shuttle squat 87.5# 3x10B  20" LLE bias box squat 3x10 with 10# KB press   Sudanese SS 3x8  LLE bias wall sit 30s x4 with 10# KB hold on RLE  TRX DL squat 3x10  TRX split squat 3x10  -DL shuttle 87.5# 3x10    therapeutic activities to improve functional performance for 26 minutes, including:  Elliptical - 10 mins   Barbell squats 65# 3x10   Sudanese SS 3x10   Lateral step down 3x10      Circuit: 3 rounds   Single leg heel raise       Forward step up weighted 7'' + 10# 3x8  SL shuttle press 87.5# " 4x6    Not today  Bike 10 mins for cardiovascular endurance  Circuit: x 3 rounds each  - 25# goblet squat x15  - Side plank clams x10  Wall sit 20s x3    Patient Education and Home Exercises       Education provided:   - HEP, POC, answered patient questions, obtaining gym membership      Written Home Exercises Provided: yes. Exercises were reviewed and Karla was able to demonstrate them prior to the end of the session.  Karla demonstrated good  understanding of the education provided. See EMR under Patient Instructions for exercises provided during therapy sessions    Assessment   Overall doing well. It is evident that she has been working really hard on her home program as her quad strength is much improved as evidenced by doubling her weight on the knee extension machine. Describes to me that she is having arch pain as of recently. Will look into this next visit.       Karla Is progressing well towards her goals.   Pt prognosis is Excellent.     Pt will continue to benefit from skilled outpatient physical therapy to address the deficits listed in the problem list box on initial evaluation, provide pt/family education and to maximize pt's level of independence in the home and community environment.     Pt's spiritual, cultural and educational needs considered and pt agreeable to plan of care and goals.     Anticipated barriers to physical therapy: none    Post-op Knee   Short Term Goals ( 4 Weeks ):   1. Pt will report reduced pain by 20 to 40% or greater for improved mobility, sleep  and ambulation.  MET  2. Patients knee edema reduced by 1/4 to 1/2 inch or greater to enhance flexion ROM and knee comfort.  MET  3. Pt to report minimal to no pain to palpation for improved level of comfort.  MET  4. Pt to demonstrate symmetrical weight bearing w/o increased pain for stability and functional ambulation . MET  5. Pts neuromuscular response will be enhanced for unilateral standing stability and balance  MET  6. Pt to  achieve 90 degrees of passive knee flexion for restoring functional knee mobility, ambulating with proper gait pattern and sit to stand ability. MET   7 Pt to report understanding of all instruction pertinent to patient safety , gait instruction and HEP.  MET  8. Pt to exhibit correct return demonstration of exercises for self-management and independence with HEP MET     Long Term Goals (32 Weeks):  1. Pt will demonstrate increased knee flexion AROM to 120 degrees or greater for return to functional activity  2. Pt will demonstrate increased knee extension AROM to 0 degrees for standing stability   3. Pt will demonstrate increased RLE strength by 1/2 to 1 grade for improved functional stability  4. Pt to demonstrate standing stability unsupported on dynamic surfaces for functional return to ADL and recreational activities.   5.The patient will be independent amb with no assistive device on all surfaces for community distances.  6. Pt to demonstrate independence with HEP for self management  7. Patient will return to exercising in gym for fitness and recreation 3x/wk within 6 months.  8. Patient will improve FOTO score to </= 15% limited to decrease perceived limitation with mobility.    Plan     Cont POC    Vernon Hart, PT, DPT

## 2023-12-14 ENCOUNTER — CLINICAL SUPPORT (OUTPATIENT)
Dept: REHABILITATION | Facility: HOSPITAL | Age: 23
End: 2023-12-14
Payer: COMMERCIAL

## 2023-12-14 DIAGNOSIS — M25.562 ACUTE PAIN OF LEFT KNEE: Primary | ICD-10-CM

## 2023-12-14 DIAGNOSIS — M25.662 KNEE STIFFNESS, LEFT: ICD-10-CM

## 2023-12-14 PROCEDURE — 97530 THERAPEUTIC ACTIVITIES: CPT | Mod: PN

## 2023-12-14 PROCEDURE — 97110 THERAPEUTIC EXERCISES: CPT | Mod: PN

## 2023-12-14 PROCEDURE — 97112 NEUROMUSCULAR REEDUCATION: CPT | Mod: PN

## 2023-12-14 NOTE — PROGRESS NOTES
[facility]   Physical Therapy Treatment Note     Name: Karla Price  [facility] Number: [id number]    Therapy Diagnosis:   Encounter Diagnoses   Name Primary?    Acute pain of left knee Yes    Knee stiffness, left            Physician: Camila Holcomb MD    Visit Date: 12/14/2023    Physician Orders: PT Eval and Treat  Medical Diagnosis from Referral:   S83.512A (ICD-10-CM) - Rupture of anterior cruciate ligament of left knee, initial encounter   S83.242A (ICD-10-CM) - Tear of medial meniscus of left knee, current, unspecified tear type, initial encounter   S83.282A (ICD-10-CM) - Tear of lateral meniscus of left knee, current, unspecified tear type, initial encounter      Evaluation Date: 6/15/2023  Authorization Period Expiration: 12/31/23  Plan of Care Expiration: 3/29/24  Progress Note Due: 7/15/23  Visit # / Visits authorized: 49/60  FOTO: 3/3 administered 9/14/23: 64/100     Precautions: DOS 6/13/23  Procedure:  1. Left Knee Arthroscopy, anterior cruciate ligament reconstruction 58877  2.  Left Knee open lateral extra-articular tenodesis (modified Irma procedure)  3.  Left Knee Arthroscopy, with meniscus repair (medial AND lateral) 07582  4.  Left Knee open lateral collateral ligament repair  5.  Left Knee  arthroscopic loose body removal      vertical tear in the medial meniscus with 1 suture, vertical tear of lateral meniscus with 2 sutures     Post-Op Plan:  ACL reconstruction with quad tendon autograft and medial and lateral meniscal repairs.  Toe-touch weight-bearing for 2-4 weeks followed by partial and full weight-bearing by 6 weeks.  She will require a hinged brace for 6 weeks.  We will start a CPM with passive motion immediately.      PTA Visit #: 0/5     Time In: 5:00 pm  Time Out: 6:01 pm  Total Billable Time: 61 minutes    Subjective     Pt reports: doing well. Anterior knee pain has not been present as of recently. She has been working really hard in the gym     She was  compliant with home exercise program.  Response to previous treatment: improved ROM  Functional change: improved gait mechanics    Pain: 0/10  Location: left knee      Objective    DOS 6/13: 26 weeks 2 days POD as of 10/3/23    Knee Passive Range of Motion:    Right  Left    Flexion 140 140   Extension 10 hyper 10 hyper     Hand-held dynamometry: 12/14/23   Right  Left  % deficit   Quadriceps at 90 degrees: 105  106  108  Avg= 106 55  48  45.8  Avg=49 53%   Hamstrings at 90  64  64  64 57  56  54  Avg =  14%     Y Balance:   Right  Left  Cm difference   Anterior reach 49cm  48  47  Avg 48 47cm  48  44  Avg 46.333 4%      Lateral step down: 1     Calf flexibility:   Right: knee straight  7 knee bent 7   Left: knee straight 3 Knee bent 7     Treatment     Karla received the treatments listed below:    therapeutic exercises to develop strength and ROM for 30 minutes including:  Reassessment as above   Review of gym program  Knee extension machine 25#  isotonic 5x5  Calf stretch 3x30s     Not performed  Knee flexion machine - 30# x15  Prone quad stretch 30s x5  Heel slides 30x 5s holds      manual therapy techniques: Joint mobilizations and Soft tissue Mobilization were applied to the: L knee for 00 minutes, including:    neuromuscular re-education activities to improve: Kinesthetic and Proprioception for 08 minutes. The following activities were included:  Dynamic stretching: quad pulls, knees to chest, lunges forward, lunges lateral       therapeutic activities to improve functional performance for 30 minutes, including:  Elliptical - 10 mins   Barbell squats 65# 4x5  Persian SS +10# x10  Lateral step down x5    Patient Education and Home Exercises       Education provided:   - HEP, POC, answered patient questions, obtaining gym membership      Written Home Exercises Provided: yes. Exercises were reviewed and Karla was able to demonstrate them prior to the end of the session.  Karla demonstrated good  understanding  of the education provided. See EMR under Patient Instructions for exercises provided during therapy sessions    Assessment   Reassessment shows good improvement in functional measures of lateral step down. Hamstring strength is adequate at only a 14% deficit. Quad LSI is still 50% and is her greatest impairment. Updated HEP to reflect more advanced and heavy loading techniques that she will need to continue to strengthen her quad. Also assessed the foot pain she has been having. May be demonstrating s/s of plantar fasciopathy. Evidence of a tight gastroc on the R, so she was issued calf stretching to treat this.       Karla Is progressing well towards her goals.   Pt prognosis is Excellent.     Pt will continue to benefit from skilled outpatient physical therapy to address the deficits listed in the problem list box on initial evaluation, provide pt/family education and to maximize pt's level of independence in the home and community environment.     Pt's spiritual, cultural and educational needs considered and pt agreeable to plan of care and goals.     Anticipated barriers to physical therapy: none    Post-op Knee   Short Term Goals ( 4 Weeks ):   1. Pt will report reduced pain by 20 to 40% or greater for improved mobility, sleep  and ambulation.  MET  2. Patients knee edema reduced by 1/4 to 1/2 inch or greater to enhance flexion ROM and knee comfort.  MET  3. Pt to report minimal to no pain to palpation for improved level of comfort.  MET  4. Pt to demonstrate symmetrical weight bearing w/o increased pain for stability and functional ambulation . MET  5. Pts neuromuscular response will be enhanced for unilateral standing stability and balance  MET  6. Pt to achieve 90 degrees of passive knee flexion for restoring functional knee mobility, ambulating with proper gait pattern and sit to stand ability. MET   7 Pt to report understanding of all instruction pertinent to patient safety , gait instruction and HEP.   MET  8. Pt to exhibit correct return demonstration of exercises for self-management and independence with HEP MET     Long Term Goals (32 Weeks):  1. Pt will demonstrate increased knee flexion AROM to 120 degrees or greater for return to functional activity MET  2. Pt will demonstrate increased knee extension AROM to 0 degrees for standing stability  MET  3. Pt will demonstrate increased RLE strength by 1/2 to 1 grade for improved functional stability Progressing  4. Pt to demonstrate standing stability unsupported on dynamic surfaces for functional return to ADL and recreational activities.   MET  5.The patient will be independent amb with no assistive device on all surfaces for community distances. MET  6. Pt to demonstrate independence with HEP for self management JAMIN  7. Patient will return to exercising in gym for fitness and recreation 3x/wk within 6 months. Progressing   8. Patient will improve FOTO score to </= 15% limited to decrease perceived limitation with mobility. Progressing    Plan     Cont POC    Vernon Hart, PT, DPT

## 2023-12-18 ENCOUNTER — OFFICE VISIT (OUTPATIENT)
Dept: SPORTS MEDICINE | Facility: CLINIC | Age: 23
End: 2023-12-18
Payer: COMMERCIAL

## 2023-12-18 VITALS — BODY MASS INDEX: 40.41 KG/M2 | WEIGHT: 213.88 LBS

## 2023-12-18 DIAGNOSIS — Z98.890 S/P ACL RECONSTRUCTION: Primary | ICD-10-CM

## 2023-12-18 PROCEDURE — 1159F PR MEDICATION LIST DOCUMENTED IN MEDICAL RECORD: ICD-10-PCS | Mod: CPTII,S$GLB,, | Performed by: ORTHOPAEDIC SURGERY

## 2023-12-18 PROCEDURE — 99212 OFFICE O/P EST SF 10 MIN: CPT | Mod: PBBFAC | Performed by: ORTHOPAEDIC SURGERY

## 2023-12-18 PROCEDURE — 1159F MED LIST DOCD IN RCRD: CPT | Mod: CPTII,S$GLB,, | Performed by: ORTHOPAEDIC SURGERY

## 2023-12-18 PROCEDURE — 3044F PR MOST RECENT HEMOGLOBIN A1C LEVEL <7.0%: ICD-10-PCS | Mod: CPTII,S$GLB,, | Performed by: ORTHOPAEDIC SURGERY

## 2023-12-18 PROCEDURE — 99214 OFFICE O/P EST MOD 30 MIN: CPT | Mod: S$GLB,,, | Performed by: ORTHOPAEDIC SURGERY

## 2023-12-18 PROCEDURE — 99214 PR OFFICE/OUTPT VISIT, EST, LEVL IV, 30-39 MIN: ICD-10-PCS | Mod: S$GLB,,, | Performed by: ORTHOPAEDIC SURGERY

## 2023-12-18 PROCEDURE — 3044F HG A1C LEVEL LT 7.0%: CPT | Mod: CPTII,S$GLB,, | Performed by: ORTHOPAEDIC SURGERY

## 2023-12-18 PROCEDURE — 3008F BODY MASS INDEX DOCD: CPT | Mod: CPTII,S$GLB,, | Performed by: ORTHOPAEDIC SURGERY

## 2023-12-18 PROCEDURE — 99999 PR PBB SHADOW E&M-EST. PATIENT-LVL II: CPT | Mod: PBBFAC,,, | Performed by: ORTHOPAEDIC SURGERY

## 2023-12-18 PROCEDURE — 99999 PR PBB SHADOW E&M-EST. PATIENT-LVL II: ICD-10-PCS | Mod: PBBFAC,,, | Performed by: ORTHOPAEDIC SURGERY

## 2023-12-18 PROCEDURE — 3008F PR BODY MASS INDEX (BMI) DOCUMENTED: ICD-10-PCS | Mod: CPTII,S$GLB,, | Performed by: ORTHOPAEDIC SURGERY

## 2023-12-18 RX ORDER — MEDROXYPROGESTERONE ACETATE 10 MG/1
10 TABLET ORAL DAILY
COMMUNITY

## 2023-12-18 NOTE — PROGRESS NOTES
POST-OPERATIVE EXAMINATION    23 y.o. Female who returns for follow after surgery. She is 6 months s/p:    Procedure:  1.  Left Knee Arthroscopy, anterior cruciate ligament reconstruction 16188  2.  Left Knee open lateral extra-articular tenodesis (modified Irma procedure)  3.  Left Knee Arthroscopy, with meniscus repair (medial AND lateral) 09396  4.  Left Knee open lateral collateral ligament repair  5.  Left Knee  arthroscopic loose body removal      She is doing well without any issues.       PHYSICAL EXAMINATION:  Wt 97 kg (213 lb 13.5 oz)   BMI 40.41 kg/m²   General: Well-developed well-nourished 23 y.o. female in no acute distress   Cardiovascular: Regular rhythm   Lungs: No labored breathing or wheezing appreciated   Neuro: Alert and oriented ×3   Psychiatric: well oriented to person, place and time, demonstrates normal mood and affect   Skin: No rashes, lesions or ulcers, normal temperature, turgor, and texture on involved extremity    ORTHOPEDIC EXAM:  Normal post-operative swelling  Normal post-operative scarring  Strength: WNL  ROM: 0-145  Tests: Negative Lachman's, 1+ pivot shift    KNEE EXAM - right    Inspection:   Normal skin color and appearance with no scars, no ecchymosis and no effusion.      ROM:   0° - 145°.      Palpation:   There is no tenderness along medial joint line, medial plica, medial collateral ligament, pes bursa, lateral joint line, iliotibial band, lateral collateral ligament, popliteal fossa, patellar tendon, or quadriceps tendon.    Stability:  -anterior drawer, - Lachman's, 1+ pivot shift, negative posterior drawer    No instability with varus or valgus stress at 0° or 30°. Negative dial  test at 30° and 90°.    Tests:   - Andreinas test.  - patellar compression - grind test, - crepitus.  There is no patellar apprehension.     - J Sign. - Victor Manuel's. - patellar tilt. - Ana. Lateral patella translation  2 quadrants. Medial patella translation 2 quadrants    Motor:   Quadriceps  strength is 5/5 and hamstrings strength is 5/5. Hamstrings show no tightness.      Neuro:   Distal neurovascular status is normal    Vascular: Negative Homans and no palpable popliteal cords. 2+ pedal pulse with brisk cap refill    Gait Normal      ASSESSMENT:      ICD-10-CM ICD-9-CM   1. S/P ACL reconstruction  Z98.890 V45.89     PLAN:       Continue physical therapy  RTC in 3 months with Cybex testing

## 2023-12-19 ENCOUNTER — CLINICAL SUPPORT (OUTPATIENT)
Dept: REHABILITATION | Facility: HOSPITAL | Age: 23
End: 2023-12-19
Payer: COMMERCIAL

## 2023-12-19 DIAGNOSIS — M25.662 KNEE STIFFNESS, LEFT: ICD-10-CM

## 2023-12-19 DIAGNOSIS — M25.562 ACUTE PAIN OF LEFT KNEE: Primary | ICD-10-CM

## 2023-12-19 PROCEDURE — 97530 THERAPEUTIC ACTIVITIES: CPT | Mod: PN

## 2023-12-19 PROCEDURE — 97112 NEUROMUSCULAR REEDUCATION: CPT | Mod: PN

## 2023-12-19 PROCEDURE — 97110 THERAPEUTIC EXERCISES: CPT | Mod: PN

## 2023-12-19 NOTE — PROGRESS NOTES
[facility]   Physical Therapy Treatment Note     Name: Karla Price  [facility] Number: [id number]    Therapy Diagnosis:   Encounter Diagnoses   Name Primary?    Acute pain of left knee Yes    Knee stiffness, left            Physician: Camila Holcomb MD    Visit Date: 12/19/2023    Physician Orders: PT Eval and Treat  Medical Diagnosis from Referral:   S83.512A (ICD-10-CM) - Rupture of anterior cruciate ligament of left knee, initial encounter   S83.242A (ICD-10-CM) - Tear of medial meniscus of left knee, current, unspecified tear type, initial encounter   S83.282A (ICD-10-CM) - Tear of lateral meniscus of left knee, current, unspecified tear type, initial encounter      Evaluation Date: 6/15/2023  Authorization Period Expiration: 12/31/23  Plan of Care Expiration: 3/29/24  Progress Note Due: 7/15/23  Visit # / Visits authorized: 51/60  FOTO: 3/3 administered 9/14/23: 64/100     Precautions: DOS 6/13/23  Procedure:  1. Left Knee Arthroscopy, anterior cruciate ligament reconstruction 87203  2.  Left Knee open lateral extra-articular tenodesis (modified Irma procedure)  3.  Left Knee Arthroscopy, with meniscus repair (medial AND lateral) 99686  4.  Left Knee open lateral collateral ligament repair  5.  Left Knee  arthroscopic loose body removal      vertical tear in the medial meniscus with 1 suture, vertical tear of lateral meniscus with 2 sutures     Post-Op Plan:  ACL reconstruction with quad tendon autograft and medial and lateral meniscal repairs.  Toe-touch weight-bearing for 2-4 weeks followed by partial and full weight-bearing by 6 weeks.  She will require a hinged brace for 6 weeks.  We will start a CPM with passive motion immediately.      PTA Visit #: 0/5     Time In: 5:00 pm  Time Out: 6:01 pm  Total Billable Time: 61 minutes    Subjective     Pt reports: doing well. Received a good report from MD. He wants her to join [employer] in the near future.     She was compliant with home exercise  program.  Response to previous treatment: improved ROM  Functional change: improved gait mechanics    Pain: 0/10  Location: left knee      Objective    DOS 6/13: 26 weeks 2 days POD as of 10/3/23    Knee Passive Range of Motion:    Right  Left    Flexion 140 140   Extension 10 hyper 10 hyper     Hand-held dynamometry: 12/14/23   Right  Left  % deficit   Quadriceps at 90 degrees: 105  106  108  Avg= 106 55  48  45.8  Avg=49 53%   Hamstrings at 90  64  64  64 57  56  54  Avg =  14%     Y Balance:   Right  Left  Cm difference   Anterior reach 49cm  48  47  Avg 48 47cm  48  44  Avg 46.333 4%      Lateral step down: 1     Calf flexibility:   Right: knee straight  7 knee bent 7   Left: knee straight 3 Knee bent 7     Treatment     Karla received the treatments listed below:    therapeutic exercises to develop strength and ROM for 14 minutes including:  Knee extension machine 15#  isotonic 3x15    Not performed  Knee flexion machine - 30# x15  Prone quad stretch 30s x5  Heel slides 30x 5s holds      manual therapy techniques: Joint mobilizations and Soft tissue Mobilization were applied to the: L knee for 00 minutes, including:    neuromuscular re-education activities to improve: Kinesthetic and Proprioception for 08 minutes. The following activities were included:  Dynamic stretching: quad pulls, knees to chest, lunges forward, lunges lateral     Split squat holds - 10x10s no weight       therapeutic activities to improve functional performance for 38 minutes, including:  Elliptical - 10 mins   Barbell squats 65# 4x5  Skater squats with TRX and foam 3x10    Sled pulls +45# - 3 laps   Sneaky lunges - x15      Patient Education and Home Exercises       Education provided:   - HEP, POC, answered patient questions, obtaining gym membership      Written Home Exercises Provided: yes. Exercises were reviewed and Karla was able to demonstrate them prior to the end of the session.  Karla demonstrated good  understanding of the  education provided. See EMR under Patient Instructions for exercises provided during therapy sessions    Assessment   Karla is interested in [employer] and I believe that this will be very beneficial for her. We continue with strengthening today which was juan well. Overall she is doing well per POC.       Karla Is progressing well towards her goals.   Pt prognosis is Excellent.     Pt will continue to benefit from skilled outpatient physical therapy to address the deficits listed in the problem list box on initial evaluation, provide pt/family education and to maximize pt's level of independence in the home and community environment.     Pt's spiritual, cultural and educational needs considered and pt agreeable to plan of care and goals.     Anticipated barriers to physical therapy: none    Post-op Knee   Short Term Goals ( 4 Weeks ):   1. Pt will report reduced pain by 20 to 40% or greater for improved mobility, sleep  and ambulation.  MET  2. Patients knee edema reduced by 1/4 to 1/2 inch or greater to enhance flexion ROM and knee comfort.  MET  3. Pt to report minimal to no pain to palpation for improved level of comfort.  MET  4. Pt to demonstrate symmetrical weight bearing w/o increased pain for stability and functional ambulation . MET  5. Pts neuromuscular response will be enhanced for unilateral standing stability and balance  MET  6. Pt to achieve 90 degrees of passive knee flexion for restoring functional knee mobility, ambulating with proper gait pattern and sit to stand ability. MET   7 Pt to report understanding of all instruction pertinent to patient safety , gait instruction and HEP.  MET  8. Pt to exhibit correct return demonstration of exercises for self-management and independence with HEP MET     Long Term Goals (32 Weeks):  1. Pt will demonstrate increased knee flexion AROM to 120 degrees or greater for return to functional activity MET  2. Pt will demonstrate increased knee extension AROM to 0 degrees  for standing stability  MET  3. Pt will demonstrate increased RLE strength by 1/2 to 1 grade for improved functional stability Progressing  4. Pt to demonstrate standing stability unsupported on dynamic surfaces for functional return to ADL and recreational activities.   MET  5.The patient will be independent amb with no assistive device on all surfaces for community distances. MET  6. Pt to demonstrate independence with HEP for self management JAMIN  7. Patient will return to exercising in gym for fitness and recreation 3x/wk within 6 months. Progressing   8. Patient will improve FOTO score to </= 15% limited to decrease perceived limitation with mobility. Progressing    Plan     Cont POC    Vernon Hart, PT, DPT

## 2024-01-02 ENCOUNTER — CLINICAL SUPPORT (OUTPATIENT)
Dept: REHABILITATION | Facility: HOSPITAL | Age: 24
End: 2024-01-02
Payer: COMMERCIAL

## 2024-01-02 DIAGNOSIS — M25.662 KNEE STIFFNESS, LEFT: ICD-10-CM

## 2024-01-02 DIAGNOSIS — M25.562 ACUTE PAIN OF LEFT KNEE: Primary | ICD-10-CM

## 2024-01-02 PROCEDURE — 97112 NEUROMUSCULAR REEDUCATION: CPT | Mod: PN

## 2024-01-02 PROCEDURE — 97530 THERAPEUTIC ACTIVITIES: CPT | Mod: PN

## 2024-01-02 NOTE — PROGRESS NOTES
[facility]   Physical Therapy Treatment Note     Name: Karla Price  [facility] Number: [id number]    Therapy Diagnosis:   Encounter Diagnoses   Name Primary?    Acute pain of left knee Yes    Knee stiffness, left            Physician: Camila Holcomb MD    Visit Date: 1/2/2024    Physician Orders: PT Eval and Treat  Medical Diagnosis from Referral:   S83.512A (ICD-10-CM) - Rupture of anterior cruciate ligament of left knee, initial encounter   S83.242A (ICD-10-CM) - Tear of medial meniscus of left knee, current, unspecified tear type, initial encounter   S83.282A (ICD-10-CM) - Tear of lateral meniscus of left knee, current, unspecified tear type, initial encounter      Evaluation Date: 6/15/2023  Authorization Period Expiration: 12/31/23  Plan of Care Expiration: 3/29/24  Progress Note Due: 7/15/23  Visit # / Visits authorized: 51/60  FOTO: 3/3 administered 9/14/23: 64/100     Precautions: DOS 6/13/23  Procedure:  1. Left Knee Arthroscopy, anterior cruciate ligament reconstruction 87885  2.  Left Knee open lateral extra-articular tenodesis (modified Irma procedure)  3.  Left Knee Arthroscopy, with meniscus repair (medial AND lateral) 16304  4.  Left Knee open lateral collateral ligament repair  5.  Left Knee  arthroscopic loose body removal      vertical tear in the medial meniscus with 1 suture, vertical tear of lateral meniscus with 2 sutures     Post-Op Plan:  ACL reconstruction with quad tendon autograft and medial and lateral meniscal repairs.  Toe-touch weight-bearing for 2-4 weeks followed by partial and full weight-bearing by 6 weeks.  She will require a hinged brace for 6 weeks.  We will start a CPM with passive motion immediately.      PTA Visit #: 0/5     Time In: 5:00 pm  Time Out: 6:01 pm  Total Billable Time: 61 minutes    Subjective     Pt reports: doing well. Received a good report from MD. He wants her to join [employer] in the near future.     She was compliant with home exercise  program.  Response to previous treatment: improved ROM  Functional change: improved gait mechanics    Pain: 0/10  Location: left knee      Objective    DOS 6/13: 26 weeks 2 days POD as of 10/3/23    Knee Passive Range of Motion:    Right  Left    Flexion 140 140   Extension 10 hyper 10 hyper     Hand-held dynamometry: 12/14/23   Right  Left  % deficit   Quadriceps at 90 degrees: 105  106  108  Avg= 106 55  48  45.8  Avg=49 53%   Hamstrings at 90  64  64  64 57  56  54  Avg =  14%     Y Balance:   Right  Left  Cm difference   Anterior reach 49cm  48  47  Avg 48 47cm  48  44  Avg 46.333 4%      Lateral step down: 1     Calf flexibility:   Right: knee straight  7 knee bent 7   Left: knee straight 3 Knee bent 7     Treatment     Karla received the treatments listed below:    therapeutic exercises to develop strength and ROM for 00 minutes including:  Knee extension machine 15#  isotonic 3x15    Not performed  Knee flexion machine - 30# x15  Prone quad stretch 30s x5  Heel slides 30x 5s holds      manual therapy techniques: Joint mobilizations and Soft tissue Mobilization were applied to the: L knee for 00 minutes, including:    neuromuscular re-education activities to improve: Kinesthetic and Proprioception for 20 minutes. The following activities were included:  Dynamic stretching: quad pulls, knees to chest, lunges forward, lunges lateral   Captain morgans 3x8   Prone hamstring curls 2 BTB 3x10       therapeutic activities to improve functional performance for 25 minutes, including:  Elliptical - 5 mins   Barbell squats 65# x6, 75# 2x5  Lateral step downs 6'' x10   Forward step downs 4'' 2x10       Patient Education and Home Exercises       Education provided:   - HEP, POC, answered patient questions, obtaining gym membership      Written Home Exercises Provided: yes. Exercises were reviewed and Karla was able to demonstrate them prior to the end of the session.  Karla demonstrated good  understanding of the education  provided. See EMR under Patient Instructions for exercises provided during therapy sessions    Assessment   Able to complete full forward step down indicating good progress with eccentric quad strength. Overall doing well. Should be ready for light plyo in the near future.       Karla Is progressing well towards her goals.   Pt prognosis is Excellent.     Pt will continue to benefit from skilled outpatient physical therapy to address the deficits listed in the problem list box on initial evaluation, provide pt/family education and to maximize pt's level of independence in the home and community environment.     Pt's spiritual, cultural and educational needs considered and pt agreeable to plan of care and goals.     Anticipated barriers to physical therapy: none    Post-op Knee   Short Term Goals ( 4 Weeks ):   1. Pt will report reduced pain by 20 to 40% or greater for improved mobility, sleep  and ambulation.  MET  2. Patients knee edema reduced by 1/4 to 1/2 inch or greater to enhance flexion ROM and knee comfort.  MET  3. Pt to report minimal to no pain to palpation for improved level of comfort.  MET  4. Pt to demonstrate symmetrical weight bearing w/o increased pain for stability and functional ambulation . MET  5. Pts neuromuscular response will be enhanced for unilateral standing stability and balance  MET  6. Pt to achieve 90 degrees of passive knee flexion for restoring functional knee mobility, ambulating with proper gait pattern and sit to stand ability. MET   7 Pt to report understanding of all instruction pertinent to patient safety , gait instruction and HEP.  MET  8. Pt to exhibit correct return demonstration of exercises for self-management and independence with HEP MET     Long Term Goals (32 Weeks):  1. Pt will demonstrate increased knee flexion AROM to 120 degrees or greater for return to functional activity MET  2. Pt will demonstrate increased knee extension AROM to 0 degrees for standing  stability  MET  3. Pt will demonstrate increased RLE strength by 1/2 to 1 grade for improved functional stability Progressing  4. Pt to demonstrate standing stability unsupported on dynamic surfaces for functional return to ADL and recreational activities.   MET  5.The patient will be independent amb with no assistive device on all surfaces for community distances. MET  6. Pt to demonstrate independence with HEP for self management JAMIN  7. Patient will return to exercising in gym for fitness and recreation 3x/wk within 6 months. Progressing   8. Patient will improve FOTO score to </= 15% limited to decrease perceived limitation with mobility. Progressing    Plan     Cont POC    Vernon Hart, PT, DPT

## 2024-01-09 ENCOUNTER — CLINICAL SUPPORT (OUTPATIENT)
Dept: REHABILITATION | Facility: HOSPITAL | Age: 24
End: 2024-01-09
Payer: COMMERCIAL

## 2024-01-09 DIAGNOSIS — M25.562 ACUTE PAIN OF LEFT KNEE: Primary | ICD-10-CM

## 2024-01-09 DIAGNOSIS — M25.662 KNEE STIFFNESS, LEFT: ICD-10-CM

## 2024-01-09 PROCEDURE — 97110 THERAPEUTIC EXERCISES: CPT | Mod: PN

## 2024-01-09 PROCEDURE — 97530 THERAPEUTIC ACTIVITIES: CPT | Mod: PN

## 2024-01-09 NOTE — PROGRESS NOTES
[facility]   Physical Therapy Treatment Note     Name: Karla Price  [facility] Number: [id number]    Therapy Diagnosis:   Encounter Diagnoses   Name Primary?    Acute pain of left knee Yes    Knee stiffness, left        Physician: Camila Holcomb MD    Visit Date: 1/9/2024    Physician Orders: PT Eval and Treat  Medical Diagnosis from Referral:   S83.512A (ICD-10-CM) - Rupture of anterior cruciate ligament of left knee, initial encounter   S83.242A (ICD-10-CM) - Tear of medial meniscus of left knee, current, unspecified tear type, initial encounter   S83.282A (ICD-10-CM) - Tear of lateral meniscus of left knee, current, unspecified tear type, initial encounter      Evaluation Date: 6/15/2023  Authorization Period Expiration: 12/31/23  Plan of Care Expiration: 3/29/24  Progress Note Due: 7/15/23  Visit # / Visits authorized: 51/60  FOTO: 3/3 administered 9/14/23: 64/100     Precautions: DOS 6/13/23  Procedure:  1. Left Knee Arthroscopy, anterior cruciate ligament reconstruction 34134  2.  Left Knee open lateral extra-articular tenodesis (modified Irma procedure)  3.  Left Knee Arthroscopy, with meniscus repair (medial AND lateral) 40891  4.  Left Knee open lateral collateral ligament repair  5.  Left Knee  arthroscopic loose body removal      vertical tear in the medial meniscus with 1 suture, vertical tear of lateral meniscus with 2 sutures     Post-Op Plan:  ACL reconstruction with quad tendon autograft and medial and lateral meniscal repairs.  Toe-touch weight-bearing for 2-4 weeks followed by partial and full weight-bearing by 6 weeks.  She will require a hinged brace for 6 weeks.  We will start a CPM with passive motion immediately.      PTA Visit #: 0/5     Time In: 4:00 pm  Time Out: 5:00 pm  Total Billable Time: 60 minutes    Subjective     Pt reports: patient is doing well overall     She was compliant with home exercise program.  Response to previous treatment: improved  ROM  Functional change: improved functional abilities     Pain: 0/10  Location: left knee      Objective    DOS 6/13: 7 months    Knee Passive Range of Motion:    Right  Left    Flexion 140 140   Extension 10 hyper 10 hyper     Hand-held dynamometry: 12/14/23   Right  Left  % deficit   Quadriceps at 90 degrees: 105  106  108  Avg= 106 55  48  45.8  Avg=49 53%   Hamstrings at 90  64  64  64 57  56  54  Avg =  14%     Y Balance:   Right  Left  Cm difference   Anterior reach 49cm  48  47  Avg 48 47cm  48  44  Avg 46.333 4%      1/9/24  Single leg press assessment: Left: 90#  Right 110#      Treatment     Karla received the treatments listed below:    therapeutic exercises to develop strength and ROM for 25 minutes including:  Strength assessment as above     Knee extension machine 15#  isotonic 3x15  Captain morgans with band 3x10     Not performed  Knee flexion machine - 30# x15  Prone quad stretch 30s x5  Heel slides 30x 5s holds      manual therapy techniques: Joint mobilizations and Soft tissue Mobilization were applied to the: L knee for 00 minutes, including:    neuromuscular re-education activities to improve: Kinesthetic and Proprioception for 00 minutes. The following activities were included      therapeutic activities to improve functional performance for 35 minutes, including:  Bike 10 mins   Barbell squats 70# x10  85# 2x6    Plyo:   Double leg jump downs 6'' x15   Bounding 3x10 ea       Patient Education and Home Exercises       Education provided:   - HEP, POC, answered patient questions, obtaining gym membership      Written Home Exercises Provided: yes. Exercises were reviewed and Karla was able to demonstrate them prior to the end of the session.  Karla demonstrated good  understanding of the education provided. See EMR under Patient Instructions for exercises provided during therapy sessions    Assessment   Added light plyometrics today which were juan well. She is a little hesitant but did better  with cuing. Strength is much imporoved as seen on the single leg press assessment overall doing well. Will look to reassess quad strength next visit.     Karla Is progressing well towards her goals.   Pt prognosis is Excellent.     Pt will continue to benefit from skilled outpatient physical therapy to address the deficits listed in the problem list box on initial evaluation, provide pt/family education and to maximize pt's level of independence in the home and community environment.     Pt's spiritual, cultural and educational needs considered and pt agreeable to plan of care and goals.     Anticipated barriers to physical therapy: none    Post-op Knee   Short Term Goals ( 4 Weeks ):   1. Pt will report reduced pain by 20 to 40% or greater for improved mobility, sleep  and ambulation.  MET  2. Patients knee edema reduced by 1/4 to 1/2 inch or greater to enhance flexion ROM and knee comfort.  MET  3. Pt to report minimal to no pain to palpation for improved level of comfort.  MET  4. Pt to demonstrate symmetrical weight bearing w/o increased pain for stability and functional ambulation . MET  5. Pts neuromuscular response will be enhanced for unilateral standing stability and balance  MET  6. Pt to achieve 90 degrees of passive knee flexion for restoring functional knee mobility, ambulating with proper gait pattern and sit to stand ability. MET   7 Pt to report understanding of all instruction pertinent to patient safety , gait instruction and HEP.  MET  8. Pt to exhibit correct return demonstration of exercises for self-management and independence with HEP MET     Long Term Goals (32 Weeks):  1. Pt will demonstrate increased knee flexion AROM to 120 degrees or greater for return to functional activity MET  2. Pt will demonstrate increased knee extension AROM to 0 degrees for standing stability  MET  3. Pt will demonstrate increased RLE strength by 1/2 to 1 grade for improved functional stability Progressing  4. Pt  to demonstrate standing stability unsupported on dynamic surfaces for functional return to ADL and recreational activities.   MET  5.The patient will be independent amb with no assistive device on all surfaces for community distances. MET  6. Pt to demonstrate independence with HEP for self management JAMIN  7. Patient will return to exercising in gym for fitness and recreation 3x/wk within 6 months. Progressing   8. Patient will improve FOTO score to </= 15% limited to decrease perceived limitation with mobility. Progressing    Plan     Cont POC    Vernon Hart, PT, DPT

## 2024-01-16 ENCOUNTER — CLINICAL SUPPORT (OUTPATIENT)
Dept: REHABILITATION | Facility: HOSPITAL | Age: 24
End: 2024-01-16
Payer: COMMERCIAL

## 2024-01-16 DIAGNOSIS — M25.662 KNEE STIFFNESS, LEFT: ICD-10-CM

## 2024-01-16 DIAGNOSIS — M25.562 ACUTE PAIN OF LEFT KNEE: Primary | ICD-10-CM

## 2024-01-16 PROCEDURE — 97110 THERAPEUTIC EXERCISES: CPT | Mod: PN

## 2024-01-16 NOTE — PROGRESS NOTES
[facility]   Physical Therapy Treatment Note     Name: Karla Price  [facility] Number: [id number]    Therapy Diagnosis:   Encounter Diagnoses   Name Primary?    Acute pain of left knee Yes    Knee stiffness, left        Physician: Camila Holcomb MD    Visit Date: 1/16/2024    Physician Orders: PT Eval and Treat  Medical Diagnosis from Referral:   S83.512A (ICD-10-CM) - Rupture of anterior cruciate ligament of left knee, initial encounter   S83.242A (ICD-10-CM) - Tear of medial meniscus of left knee, current, unspecified tear type, initial encounter   S83.282A (ICD-10-CM) - Tear of lateral meniscus of left knee, current, unspecified tear type, initial encounter      Evaluation Date: 6/15/2023  Authorization Period Expiration: 12/31/23  Plan of Care Expiration: 3/29/24  Progress Note Due: performed 1/16/24  Visit # / Visits authorized: 3/30  FOTO: 3/3 administered 9/14/23: 64/100     Precautions: DOS 6/13/23  Procedure:  1. Left Knee Arthroscopy, anterior cruciate ligament reconstruction 19724  2.  Left Knee open lateral extra-articular tenodesis (modified Irma procedure)  3.  Left Knee Arthroscopy, with meniscus repair (medial AND lateral) 94103  4.  Left Knee open lateral collateral ligament repair  5.  Left Knee  arthroscopic loose body removal      vertical tear in the medial meniscus with 1 suture, vertical tear of lateral meniscus with 2 sutures     Post-Op Plan:  ACL reconstruction with quad tendon autograft and medial and lateral meniscal repairs.  Toe-touch weight-bearing for 2-4 weeks followed by partial and full weight-bearing by 6 weeks.  She will require a hinged brace for 6 weeks.  We will start a CPM with passive motion immediately.      PTA Visit #: 0/5     Time In: 4:00 pm  Time Out: 5:00 pm  Total Billable Time: 60 minutes    Subjective     Pt reports: patient is doing well overall     She was compliant with home exercise program.  Response to previous treatment: improved  ROM  Functional change: improved functional abilities     Pain: 0/10  Location: left knee      Objective    DOS 6/13: 7 months    Knee Passive Range of Motion:    Right  Left    Flexion 140 140   Extension 10 hyper 10 hyper     Hand-held dynamometry: 1/16/23   Right  Left  % deficit   Quadriceps at 90 degrees: 105  106  108  Avg= 106    100  98  102    Avg 100 55  48  45.8  Avg=49    64  66  66    65% 53%                  35%   Hamstrings at 90  64  64  64 57  56  54  Avg =  14%     Y Balance:   Right  Left  Cm difference   Anterior reach 49cm  48  47  Avg 48 47cm  48  44  Avg 46.333 4%      1/9/24  Single leg press assessment: Left: 90#  Right 110#      Treatment     Karla received the treatments listed below:    therapeutic exercises to develop strength and ROM for 60 minutes including:  Bike 10 mins   Strength assessment as above     Circuit: 2 rounds:   Wall clam shells - x10 GTB   Sidestepping with band x10   Single leg hip thrusters    Single leg squats 3x6    Knee extension machine 15#  isotonic 2x10    Not performed  Knee flexion machine - 30# x15  Prone quad stretch 30s x5  Heel slides 30x 5s holds      Patient Education and Home Exercises       Education provided:   - HEP, POC, answered patient questions, obtaining gym membership      Written Home Exercises Provided: yes. Exercises were reviewed and Karla was able to demonstrate them prior to the end of the session.  Karla demonstrated good  understanding of the education provided. See EMR under Patient Instructions for exercises provided during therapy sessions    Assessment   Iso quad testing performed today and patient improved 12% from last visit. Overall doing very well. Able to perform full SL squat to 90 degrees with light UE assistance.     Karla Is progressing well towards her goals.   Pt prognosis is Excellent.     Pt will continue to benefit from skilled outpatient physical therapy to address the deficits listed in the problem list box on  initial evaluation, provide pt/family education and to maximize pt's level of independence in the home and community environment.     Pt's spiritual, cultural and educational needs considered and pt agreeable to plan of care and goals.     Anticipated barriers to physical therapy: none    Post-op Knee   Short Term Goals ( 4 Weeks ):   1. Pt will report reduced pain by 20 to 40% or greater for improved mobility, sleep  and ambulation.  MET  2. Patients knee edema reduced by 1/4 to 1/2 inch or greater to enhance flexion ROM and knee comfort.  MET  3. Pt to report minimal to no pain to palpation for improved level of comfort.  MET  4. Pt to demonstrate symmetrical weight bearing w/o increased pain for stability and functional ambulation . MET  5. Pts neuromuscular response will be enhanced for unilateral standing stability and balance  MET  6. Pt to achieve 90 degrees of passive knee flexion for restoring functional knee mobility, ambulating with proper gait pattern and sit to stand ability. MET   7 Pt to report understanding of all instruction pertinent to patient safety , gait instruction and HEP.  MET  8. Pt to exhibit correct return demonstration of exercises for self-management and independence with HEP MET     Long Term Goals (32 Weeks):  1. Pt will demonstrate increased knee flexion AROM to 120 degrees or greater for return to functional activity MET  2. Pt will demonstrate increased knee extension AROM to 0 degrees for standing stability  MET  3. Pt will demonstrate increased RLE strength by 1/2 to 1 grade for improved functional stability Progressing  4. Pt to demonstrate standing stability unsupported on dynamic surfaces for functional return to ADL and recreational activities.   MET  5.The patient will be independent amb with no assistive device on all surfaces for community distances. MET  6. Pt to demonstrate independence with HEP for self management JAMIN  7. Patient will return to exercising in gym for  fitness and recreation 3x/wk within 6 months. Progressing   8. Patient will improve FOTO score to </= 15% limited to decrease perceived limitation with mobility. Progressing    Plan     Cont POC    Vernon Hart, PT, DPT

## 2024-01-23 ENCOUNTER — CLINICAL SUPPORT (OUTPATIENT)
Dept: REHABILITATION | Facility: HOSPITAL | Age: 24
End: 2024-01-23
Payer: COMMERCIAL

## 2024-01-23 DIAGNOSIS — M25.562 ACUTE PAIN OF LEFT KNEE: Primary | ICD-10-CM

## 2024-01-23 DIAGNOSIS — M25.662 KNEE STIFFNESS, LEFT: ICD-10-CM

## 2024-01-23 PROCEDURE — 97110 THERAPEUTIC EXERCISES: CPT | Mod: PN

## 2024-01-23 NOTE — PROGRESS NOTES
[facility]   Physical Therapy Treatment Note     Name: Karla Price  [facility] Number: [id number]    Therapy Diagnosis:   Encounter Diagnoses   Name Primary?    Acute pain of left knee Yes    Knee stiffness, left        Physician: Camila Holcomb MD    Visit Date: 1/23/2024    Physician Orders: PT Eval and Treat  Medical Diagnosis from Referral:   S83.512A (ICD-10-CM) - Rupture of anterior cruciate ligament of left knee, initial encounter   S83.242A (ICD-10-CM) - Tear of medial meniscus of left knee, current, unspecified tear type, initial encounter   S83.282A (ICD-10-CM) - Tear of lateral meniscus of left knee, current, unspecified tear type, initial encounter      Evaluation Date: 6/15/2023  Authorization Period Expiration: 12/31/23  Plan of Care Expiration: 3/29/24  Progress Note Due: performed 1/16/24  Visit # / Visits authorized: 4/30  FOTO: 3/3 administered 9/14/23: 64/100     Precautions: DOS 6/13/23  Procedure:  1. Left Knee Arthroscopy, anterior cruciate ligament reconstruction 99131  2.  Left Knee open lateral extra-articular tenodesis (modified Irma procedure)  3.  Left Knee Arthroscopy, with meniscus repair (medial AND lateral) 70492  4.  Left Knee open lateral collateral ligament repair  5.  Left Knee  arthroscopic loose body removal      vertical tear in the medial meniscus with 1 suture, vertical tear of lateral meniscus with 2 sutures     Post-Op Plan:  ACL reconstruction with quad tendon autograft and medial and lateral meniscal repairs.  Toe-touch weight-bearing for 2-4 weeks followed by partial and full weight-bearing by 6 weeks.  She will require a hinged brace for 6 weeks.  We will start a CPM with passive motion immediately.      PTA Visit #: 0/5     Time In: 5:00 pm  Time Out: 6:00 pm  Total Billable Time: 60 minutes    Subjective     Pt reports: patient is doing well overall     She was compliant with home exercise program.  Response to previous treatment: improved  ROM  Functional change: improved functional abilities     Pain: 0/10  Location: left knee      Objective    DOS 6/13: 7 months    Knee Passive Range of Motion:    Right  Left    Flexion 140 140   Extension 10 hyper 10 hyper     Hand-held dynamometry: 1/16/23   Right  Left  % deficit   Quadriceps at 90 degrees: 105  106  108  Avg= 106    100  98  102    Avg 100 55  48  45.8  Avg=49    64  66  66    65% 53%                  35%   Hamstrings at 90  64  64  64 57  56  54  Avg =  14%     Y Balance:   Right  Left  Cm difference   Anterior reach 49cm  48  47  Avg 48 47cm  48  44  Avg 46.333 4%      1/9/24  Single leg press assessment: Left: 90#  Right 110#      Treatment     Karla received the treatments listed below:    therapeutic exercises to develop strength and ROM for 60 minutes including:  Elliptical 10 mins   Dynamic stretching     Jump downs and Jump ups DL 3x10ea  Bounding f/b 3 laps 3x10 each   Split squat jumps 3x10   Jogging analysis on treadmill - 5 mins   Alter G 75% - (reports anterior knee pain so this was held off)     Not performed  Knee extension machine 15#  isotonic 2x10  Knee flexion machine - 30# x15  Prone quad stretch 30s x5  Heel slides 30x 5s holds      Patient Education and Home Exercises       Education provided:   - HEP, POC, answered patient questions, obtaining gym membership      Written Home Exercises Provided: yes. Exercises were reviewed and Karla was able to demonstrate them prior to the end of the session.  Karla demonstrated good  understanding of the education provided. See EMR under Patient Instructions for exercises provided during therapy sessions    Assessment   With her recent improvements in strength, we returned to plyo level 1 today. Pt does well but still has a difficult time with force absorption. Treadmill analysis showed a lack of knee flexion while jogging. Attempted to work on this via the alter G but the patient reported anterior knee pain. This was held off for the  time being. She likely needs to improve quad strength and force absorption before beginning to jog. Will revisit this next session.     Karla Is progressing well towards her goals.   Pt prognosis is Excellent.     Pt will continue to benefit from skilled outpatient physical therapy to address the deficits listed in the problem list box on initial evaluation, provide pt/family education and to maximize pt's level of independence in the home and community environment.     Pt's spiritual, cultural and educational needs considered and pt agreeable to plan of care and goals.     Anticipated barriers to physical therapy: none    Post-op Knee   Short Term Goals ( 4 Weeks ):   1. Pt will report reduced pain by 20 to 40% or greater for improved mobility, sleep  and ambulation.  MET  2. Patients knee edema reduced by 1/4 to 1/2 inch or greater to enhance flexion ROM and knee comfort.  MET  3. Pt to report minimal to no pain to palpation for improved level of comfort.  MET  4. Pt to demonstrate symmetrical weight bearing w/o increased pain for stability and functional ambulation . MET  5. Pts neuromuscular response will be enhanced for unilateral standing stability and balance  MET  6. Pt to achieve 90 degrees of passive knee flexion for restoring functional knee mobility, ambulating with proper gait pattern and sit to stand ability. MET   7 Pt to report understanding of all instruction pertinent to patient safety , gait instruction and HEP.  MET  8. Pt to exhibit correct return demonstration of exercises for self-management and independence with HEP MET     Long Term Goals (32 Weeks):  1. Pt will demonstrate increased knee flexion AROM to 120 degrees or greater for return to functional activity MET  2. Pt will demonstrate increased knee extension AROM to 0 degrees for standing stability  MET  3. Pt will demonstrate increased RLE strength by 1/2 to 1 grade for improved functional stability Progressing  4. Pt to demonstrate  standing stability unsupported on dynamic surfaces for functional return to ADL and recreational activities.   MET  5.The patient will be independent amb with no assistive device on all surfaces for community distances. MET  6. Pt to demonstrate independence with HEP for self management JAMIN  7. Patient will return to exercising in gym for fitness and recreation 3x/wk within 6 months. Progressing   8. Patient will improve FOTO score to </= 15% limited to decrease perceived limitation with mobility. Progressing    Plan     Cont POC    Vernon Hart, PT, DPT

## 2024-02-08 ENCOUNTER — CLINICAL SUPPORT (OUTPATIENT)
Dept: REHABILITATION | Facility: HOSPITAL | Age: 24
End: 2024-02-08
Payer: COMMERCIAL

## 2024-02-08 DIAGNOSIS — M25.662 KNEE STIFFNESS, LEFT: ICD-10-CM

## 2024-02-08 DIAGNOSIS — M25.562 ACUTE PAIN OF LEFT KNEE: Primary | ICD-10-CM

## 2024-02-08 PROCEDURE — 97530 THERAPEUTIC ACTIVITIES: CPT | Mod: PN

## 2024-02-08 PROCEDURE — 97110 THERAPEUTIC EXERCISES: CPT | Mod: PN

## 2024-02-08 NOTE — PROGRESS NOTES
[facility]   Physical Therapy Treatment Note     Name: Karla Price  [facility] Number: [id number]    Therapy Diagnosis:   Encounter Diagnoses   Name Primary?    Acute pain of left knee Yes    Knee stiffness, left        Physician: Camila Holcomb MD    Visit Date: 2/8/2024    Physician Orders: PT Eval and Treat  Medical Diagnosis from Referral:   S83.512A (ICD-10-CM) - Rupture of anterior cruciate ligament of left knee, initial encounter   S83.242A (ICD-10-CM) - Tear of medial meniscus of left knee, current, unspecified tear type, initial encounter   S83.282A (ICD-10-CM) - Tear of lateral meniscus of left knee, current, unspecified tear type, initial encounter      Evaluation Date: 6/15/2023  Authorization Period Expiration: 12/31/23  Plan of Care Expiration: 3/29/24  Progress Note Due: performed 1/16/24  Visit # / Visits authorized: 4/30  FOTO: 3/3 administered 9/14/23: 64/100     Precautions: DOS 6/13/23  Procedure:  1. Left Knee Arthroscopy, anterior cruciate ligament reconstruction 24987  2.  Left Knee open lateral extra-articular tenodesis (modified Irma procedure)  3.  Left Knee Arthroscopy, with meniscus repair (medial AND lateral) 59726  4.  Left Knee open lateral collateral ligament repair  5.  Left Knee  arthroscopic loose body removal      vertical tear in the medial meniscus with 1 suture, vertical tear of lateral meniscus with 2 sutures     Post-Op Plan:  ACL reconstruction with quad tendon autograft and medial and lateral meniscal repairs.  Toe-touch weight-bearing for 2-4 weeks followed by partial and full weight-bearing by 6 weeks.  She will require a hinged brace for 6 weeks.  We will start a CPM with passive motion immediately.      PTA Visit #: 0/5     Time In: 5:00 pm  Time Out: 6:00 pm  Total Billable Time: 60 minutes    Subjective     Pt reports: patient is doing well overall     She was compliant with home exercise program.  Response to previous treatment: improved  ROM  Functional change: improved functional abilities     Pain: 0/10  Location: left knee      Objective    DOS 6/13: 7 months    Knee Passive Range of Motion:    Right  Left    Flexion 140 140   Extension 10 hyper 10 hyper     Hand-held dynamometry: 1/16/23   Right  Left  % deficit   Quadriceps at 90 degrees: 105  106  108  Avg= 106    100  98  102    Avg 100 55  48  45.8  Avg=49    64  66  66    65% 53%                  35%   Hamstrings at 90  64  64  64 57  56  54  Avg =  14%     Y Balance:   Right  Left  Cm difference   Anterior reach 49cm  48  47  Avg 48 47cm  48  44  Avg 46.333 4%      1/9/24  Single leg press assessment: Left: 90#  Right 110#      Treatment     Karla received the treatments listed below:    therapeutic exercises to develop strength and ROM for 08 minutes including:  ROM check   Squat assessment   Patient education     manual therapy techniques: Joint mobilizations and Soft tissue Mobilization were applied to the: knee for 00 minutes, including:      neuromuscular re-education activities to improve: Kinesthetic and Proprioception for 14 minutes. The following activities were included:      therapeutic activities to improve functional performance for 53 minutes, including:  Bike 10 mins   Single leg squat to box 4x6   BSS 20# 3x5   Forward step up 20'' - 3x5   Forward step down 4'' 3x5        Patient Education and Home Exercises       Education provided:   - HEP, POC, answered patient questions, obtaining gym membership      Written Home Exercises Provided: yes. Exercises were reviewed and Karla was able to demonstrate them prior to the end of the session.  Karla demonstrated good  understanding of the education provided. See EMR under Patient Instructions for exercises provided during therapy sessions    Assessment   Educated to add back in SL exercises to gym routine and on appropriate level of challenge. Will return in 3 weeks for check in    Karla Is progressing well towards her goals.   Pt  prognosis is Excellent.     Pt will continue to benefit from skilled outpatient physical therapy to address the deficits listed in the problem list box on initial evaluation, provide pt/family education and to maximize pt's level of independence in the home and community environment.     Pt's spiritual, cultural and educational needs considered and pt agreeable to plan of care and goals.     Anticipated barriers to physical therapy: none    Post-op Knee   Short Term Goals ( 4 Weeks ):   1. Pt will report reduced pain by 20 to 40% or greater for improved mobility, sleep  and ambulation.  MET  2. Patients knee edema reduced by 1/4 to 1/2 inch or greater to enhance flexion ROM and knee comfort.  MET  3. Pt to report minimal to no pain to palpation for improved level of comfort.  MET  4. Pt to demonstrate symmetrical weight bearing w/o increased pain for stability and functional ambulation . MET  5. Pts neuromuscular response will be enhanced for unilateral standing stability and balance  MET  6. Pt to achieve 90 degrees of passive knee flexion for restoring functional knee mobility, ambulating with proper gait pattern and sit to stand ability. MET   7 Pt to report understanding of all instruction pertinent to patient safety , gait instruction and HEP.  MET  8. Pt to exhibit correct return demonstration of exercises for self-management and independence with HEP MET     Long Term Goals (32 Weeks):  1. Pt will demonstrate increased knee flexion AROM to 120 degrees or greater for return to functional activity MET  2. Pt will demonstrate increased knee extension AROM to 0 degrees for standing stability  MET  3. Pt will demonstrate increased RLE strength by 1/2 to 1 grade for improved functional stability Progressing  4. Pt to demonstrate standing stability unsupported on dynamic surfaces for functional return to ADL and recreational activities.   MET  5.The patient will be independent amb with no assistive device on all  surfaces for community distances. MET  6. Pt to demonstrate independence with HEP for self management JAMIN  7. Patient will return to exercising in gym for fitness and recreation 3x/wk within 6 months. Progressing   8. Patient will improve FOTO score to </= 15% limited to decrease perceived limitation with mobility. Progressing    Plan     Cont POC    Vernon Hart, PT, DPT

## 2024-03-12 ENCOUNTER — CLINICAL SUPPORT (OUTPATIENT)
Dept: REHABILITATION | Facility: HOSPITAL | Age: 24
End: 2024-03-12
Payer: COMMERCIAL

## 2024-03-12 DIAGNOSIS — M25.662 KNEE STIFFNESS, LEFT: ICD-10-CM

## 2024-03-12 DIAGNOSIS — M25.562 ACUTE PAIN OF LEFT KNEE: Primary | ICD-10-CM

## 2024-03-12 PROCEDURE — 97530 THERAPEUTIC ACTIVITIES: CPT | Mod: PN

## 2024-03-12 PROCEDURE — 97110 THERAPEUTIC EXERCISES: CPT | Mod: PN

## 2024-03-12 NOTE — PROGRESS NOTES
[facility]   Physical Therapy Treatment Note/ reassessment      Name: Karla Price  Clinic Number: [id number]    Therapy Diagnosis:   Encounter Diagnoses   Name Primary?    Acute pain of left knee Yes    Knee stiffness, left        Physician: Camila Holcomb MD    Visit Date: 3/12/2024    Physician Orders: PT Eval and Treat  Medical Diagnosis from Referral:   S83.512A (ICD-10-CM) - Rupture of anterior cruciate ligament of left knee, initial encounter   S83.242A (ICD-10-CM) - Tear of medial meniscus of left knee, current, unspecified tear type, initial encounter   S83.282A (ICD-10-CM) - Tear of lateral meniscus of left knee, current, unspecified tear type, initial encounter      Evaluation Date: 6/15/2023  Authorization Period Expiration: 12/31/23  Plan of Care Expiration: NEW 5/30/24  Progress Note Due: performed 3/12/24  Visit # / Visits authorized: 4/30  FOTO: 3/3 administered 9/14/23: 64/100     Precautions: DOS 6/13/23  Procedure:  1. Left Knee Arthroscopy, anterior cruciate ligament reconstruction 17350  2.  Left Knee open lateral extra-articular tenodesis (modified Irma procedure)  3.  Left Knee Arthroscopy, with meniscus repair (medial AND lateral) 14013  4.  Left Knee open lateral collateral ligament repair  5.  Left Knee  arthroscopic loose body removal      vertical tear in the medial meniscus with 1 suture, vertical tear of lateral meniscus with 2 sutures     Post-Op Plan:  ACL reconstruction with quad tendon autograft and medial and lateral meniscal repairs.  Toe-touch weight-bearing for 2-4 weeks followed by partial and full weight-bearing by 6 weeks.  She will require a hinged brace for 6 weeks.  We will start a CPM with passive motion immediately.      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 It was sore  "for the first week.     She was compliant with home exercise program.  Response to previous treatment: improved ROM  Functional change: improved functional abilities     Pain: 0/10  Location: left knee      Objective    DOS : 9 months 3 weeks     Knee Passive Range of Motion:    Right  Left    Flexion 140 140   Extension 10 hyper 10 hyper     Hand-held dynamometry: 23   Right  Left  % deficit   Quadriceps at 90 degrees:                    3/12/24 105  106  108  Avg= 106    100  98  102    Avg 100    106  106  110     Avg = 107.3    55  48  45.8  Avg=49    64  66  66    65%    70  63  63    Avg - 65.3  61%  53%                  35%              39%          Hamstrings at 90           3/13/24 64  64  64      62,64,67  Av.3 57  56  54      53,53,47  Av  79% 14%              21%      Y Balance:   Right  Left  Cm difference   Anterior reach 49cm  48  47  Avg 48    44  46  46  Av 47cm  48  44  Avg 46.333    41  46  47  Avg 44 4%                 1%       6" step down: 1 (still with compensations in the frontal plane)     Treatment     Karla received the treatments listed below:    therapeutic exercises to develop strength and ROM for 40 minutes including:  ROM check   Objective assessment as above    therapeutic activities to improve functional performance for 30 minutes, including:  Bike 10 mins   HEP education on the following:   Split squats +15# to foam 10x5s   Deep squats with heels elevated - 10x   LAQ with black TB x10         Patient Education and Home Exercises       Education provided:   - HEP, POC, answered patient questions, obtaining gym membership      Written Home Exercises Provided: yes. Exercises were reviewed and Karla was able to demonstrate them prior to the end of the session.  Karla demonstrated good  understanding of the education provided. See EMR under Patient Instructions for exercises provided during therapy sessions    Assessment   Reassessment as above shows similar " strength numbers, but improvement in Y-balance score. She still demos a significant quad deficit on the L side which is likely reducing her capacity to perform ADLs and work activities such as descending stairs and squatting down and picking up weighed objects. Will require continued skilled PT to address these aforementioned functional impairments     Karla Is progressing well towards her goals.   Pt prognosis is Excellent.     Pt will continue to benefit from skilled outpatient physical therapy to address the deficits listed in the problem list box on initial evaluation, provide pt/family education and to maximize pt's level of independence in the home and community environment.     Pt's spiritual, cultural and educational needs considered and pt agreeable to plan of care and goals.     Anticipated barriers to physical therapy: none    Post-op Knee   Short Term Goals ( 4 Weeks ):   1. Pt will report reduced pain by 20 to 40% or greater for improved mobility, sleep  and ambulation.  MET  2. Patients knee edema reduced by 1/4 to 1/2 inch or greater to enhance flexion ROM and knee comfort.  MET  3. Pt to report minimal to no pain to palpation for improved level of comfort.  MET  4. Pt to demonstrate symmetrical weight bearing w/o increased pain for stability and functional ambulation . MET  5. Pts neuromuscular response will be enhanced for unilateral standing stability and balance  MET  6. Pt to achieve 90 degrees of passive knee flexion for restoring functional knee mobility, ambulating with proper gait pattern and sit to stand ability. MET   7 Pt to report understanding of all instruction pertinent to patient safety , gait instruction and HEP.  MET  8. Pt to exhibit correct return demonstration of exercises for self-management and independence with HEP MET     Long Term Goals (32 Weeks):  1. Pt will demonstrate increased knee flexion AROM to 120 degrees or greater for return to functional activity MET  2. Pt will  demonstrate increased knee extension AROM to 0 degrees for standing stability  MET  3. Pt will demonstrate increased RLE strength by 1/2 to 1 grade for improved functional stability Progressing  4. Pt to demonstrate standing stability unsupported on dynamic surfaces for functional return to ADL and recreational activities.   MET  5.The patient will be independent amb with no assistive device on all surfaces for community distances. MET  6. Pt to demonstrate independence with HEP for self management JAMIN  7. Patient will return to exercising in gym for fitness and recreation 3x/wk within 6 months. Progressing   8. Patient will improve FOTO score to </= 15% limited to decrease perceived limitation with mobility. Progressing    NEW: Goals:    Long Term Goals: 52 weeks  Isokinetic quad/hamstring strength 80% or better on all test Progressing 2024   Pt completes return to jogging program  Progressing 2024     Plan     New POC set to  24     Vernon Hart, PT, DPT

## 2024-03-26 NOTE — PROGRESS NOTES
POST-OPERATIVE EXAMINATION    23 y.o. Female who returns for follow after surgery. She is 9 months s/p:    Procedure:  1.  Left Knee Arthroscopy, anterior cruciate ligament reconstruction 43495  2.  Left Knee open lateral extra-articular tenodesis (modified Irma procedure)  3.  Left Knee Arthroscopy, with meniscus repair (medial AND lateral) 84525  4.  Left Knee open lateral collateral ligament repair  5.  Left Knee  arthroscopic loose body removal      She is doing well without any issues.       PHYSICAL EXAMINATION:  There were no vitals taken for this visit.  General: Well-developed well-nourished 23 y.o. female in no acute distress   Cardiovascular: Regular rhythm   Lungs: No labored breathing or wheezing appreciated   Neuro: Alert and oriented ×3   Psychiatric: well oriented to person, place and time, demonstrates normal mood and affect   Skin: No rashes, lesions or ulcers, normal temperature, turgor, and texture on involved extremity    ORTHOPEDIC EXAM:  Normal post-operative swelling  Normal post-operative scarring  Strength: WNL  ROM: -  Tests:  Negative Lachman's.  Negative anterior drawer.  1+ pivot glide.    KNEE EXAM - right    Inspection:   Normal skin color and appearance with no scars, no ecchymosis and no effusion.      ROM:   0° - 145°.      Palpation:   There is no tenderness along medial joint line, medial plica, medial collateral ligament, pes bursa, lateral joint line, iliotibial band, lateral collateral ligament, popliteal fossa, patellar tendon, or quadriceps tendon.    Stability:  -anterior drawer, - Lachman's, 1+ pivot shift, negative posterior drawer    No instability with varus or valgus stress at 0° or 30°. Negative dial  test at 30° and 90°.    Tests:   - Andreinas test.  - patellar compression - grind test, - crepitus.  There is no patellar apprehension.     - J Sign. - Victor Manuel's. - patellar tilt. - Ana. Lateral patella translation  2 quadrants. Medial patella translation 2  quadrants    Motor:   Quadriceps strength is 5/5 and hamstrings strength is 5/5. Hamstrings show no tightness.      Neuro:   Distal neurovascular status is normal    Vascular: Negative Homans and no palpable popliteal cords. 2+ pedal pulse with brisk cap refill    Gait Normal      ASSESSMENT:      ICD-10-CM ICD-9-CM   1. S/P ACL reconstruction  Z98.890 V45.89       PLAN:       RTC in 3 months with Cybex testing  We discussed anti-inflammatories when she gets sore at the end of work.  I advised her this is likely due to weakness in her left lower extremity.  If her symptoms persist I recommended starting a prescription strength anti-inflammatories if needed.

## 2024-03-27 ENCOUNTER — OFFICE VISIT (OUTPATIENT)
Dept: SPORTS MEDICINE | Facility: CLINIC | Age: 24
End: 2024-03-27
Payer: COMMERCIAL

## 2024-03-27 VITALS
DIASTOLIC BLOOD PRESSURE: 91 MMHG | BODY MASS INDEX: 39.95 KG/M2 | HEIGHT: 61 IN | HEART RATE: 66 BPM | WEIGHT: 211.63 LBS | SYSTOLIC BLOOD PRESSURE: 148 MMHG

## 2024-03-27 DIAGNOSIS — Z98.890 S/P ACL RECONSTRUCTION: Primary | ICD-10-CM

## 2024-03-27 PROCEDURE — 3008F BODY MASS INDEX DOCD: CPT | Mod: CPTII,S$GLB,, | Performed by: ORTHOPAEDIC SURGERY

## 2024-03-27 PROCEDURE — 3077F SYST BP >= 140 MM HG: CPT | Mod: CPTII,S$GLB,, | Performed by: ORTHOPAEDIC SURGERY

## 2024-03-27 PROCEDURE — 1159F MED LIST DOCD IN RCRD: CPT | Mod: CPTII,S$GLB,, | Performed by: ORTHOPAEDIC SURGERY

## 2024-03-27 PROCEDURE — 99999 PR PBB SHADOW E&M-EST. PATIENT-LVL III: CPT | Mod: PBBFAC,,, | Performed by: ORTHOPAEDIC SURGERY

## 2024-03-27 PROCEDURE — 3080F DIAST BP >= 90 MM HG: CPT | Mod: CPTII,S$GLB,, | Performed by: ORTHOPAEDIC SURGERY

## 2024-03-27 PROCEDURE — 99214 OFFICE O/P EST MOD 30 MIN: CPT | Mod: S$GLB,,, | Performed by: ORTHOPAEDIC SURGERY

## 2024-04-02 ENCOUNTER — CLINICAL SUPPORT (OUTPATIENT)
Dept: REHABILITATION | Facility: HOSPITAL | Age: 24
End: 2024-04-02
Payer: COMMERCIAL

## 2024-04-02 DIAGNOSIS — M25.662 KNEE STIFFNESS, LEFT: ICD-10-CM

## 2024-04-02 DIAGNOSIS — M25.562 ACUTE PAIN OF LEFT KNEE: Primary | ICD-10-CM

## 2024-04-02 PROCEDURE — 97530 THERAPEUTIC ACTIVITIES: CPT | Mod: PN

## 2024-04-02 PROCEDURE — 97110 THERAPEUTIC EXERCISES: CPT | Mod: PN

## 2024-04-02 NOTE — PROGRESS NOTES
[facility]   Physical Therapy Treatment Note     Name: Karla Price  [facility] Number: [id number]    Therapy Diagnosis:   Encounter Diagnoses   Name Primary?    Acute pain of left knee Yes    Knee stiffness, left        Physician: Camila Holcomb MD    Visit Date: 4/2/2024    Physician Orders: PT Eval and Treat  Medical Diagnosis from Referral:   S83.512A (ICD-10-CM) - Rupture of anterior cruciate ligament of left knee, initial encounter   S83.242A (ICD-10-CM) - Tear of medial meniscus of left knee, current, unspecified tear type, initial encounter   S83.282A (ICD-10-CM) - Tear of lateral meniscus of left knee, current, unspecified tear type, initial encounter      Evaluation Date: 6/15/2023  Authorization Period Expiration: 12/31/23  Plan of Care Expiration: NEW 5/30/24  Progress Note Due: performed 3/12/24  Visit # / Visits authorized: 4/30  FOTO: 7/30     Precautions: DOS 6/13/23  Procedure:  1. Left Knee Arthroscopy, anterior cruciate ligament reconstruction 21195  2.  Left Knee open lateral extra-articular tenodesis (modified Irma procedure)  3.  Left Knee Arthroscopy, with meniscus repair (medial AND lateral) 58679  4.  Left Knee open lateral collateral ligament repair  5.  Left Knee  arthroscopic loose body removal      vertical tear in the medial meniscus with 1 suture, vertical tear of lateral meniscus with 2 sutures     Post-Op Plan:  ACL reconstruction with quad tendon autograft and medial and lateral meniscal repairs.  Toe-touch weight-bearing for 2-4 weeks followed by partial and full weight-bearing by 6 weeks.  She will require a hinged brace for 6 weeks.  We will start a CPM with passive motion immediately.      PTA Visit #: 0/5     Time In: 10:00 am  Time Out: 11:01 am  Total Billable Time: 61 minutes    Subjective     Pt reports: has had no issues with stairs or squatting at work lately. Has been semi compliant with HEP.     She was partially  compliant with home exercise  program.  Response to previous treatment: improved ROM  Functional change: improved functional abilities     Pain: 0/10  Location: left knee      Objective    DOS 6/13: 10 months    Treatment     Karla received the treatments listed below:    therapeutic exercises to develop strength and ROM for 8 minutes including:  ROM check   Patient eduction     therapeutic activities to improve functional performance for 53 minutes, including:  Bike 6 mins   Dynamic movements: knees to chest, quad pulls, lunges forward  Lateral step downs eccentric 3x12 8''  SL wall squat 2x6   Knee extension 30# - 4x6   BSS - +20# 3x5       Patient Education and Home Exercises       Education provided:   - HEP, POC, answered patient questions, obtaining gym membership      Written Home Exercises Provided: yes. Exercises were reviewed and Karla was able to demonstrate them prior to the end of the session.  Karla demonstrated good  understanding of the education provided. See EMR under Patient Instructions for exercises provided during therapy sessions    Assessment   Focused on CKC strength today and also did OKC on knee extension machine. All exercises were performed at 8-9/10 hard on the John scale. Emphasized importance on HEP compliance .     Karla Is progressing well towards her goals.   Pt prognosis is Excellent.     Pt will continue to benefit from skilled outpatient physical therapy to address the deficits listed in the problem list box on initial evaluation, provide pt/family education and to maximize pt's level of independence in the home and community environment.     Pt's spiritual, cultural and educational needs considered and pt agreeable to plan of care and goals.     Anticipated barriers to physical therapy: none    Post-op Knee   Short Term Goals ( 4 Weeks ):   1. Pt will report reduced pain by 20 to 40% or greater for improved mobility, sleep  and ambulation.  MET  2. Patients knee edema reduced by 1/4 to 1/2 inch or greater  to enhance flexion ROM and knee comfort.  MET  3. Pt to report minimal to no pain to palpation for improved level of comfort.  MET  4. Pt to demonstrate symmetrical weight bearing w/o increased pain for stability and functional ambulation . MET  5. Pts neuromuscular response will be enhanced for unilateral standing stability and balance  MET  6. Pt to achieve 90 degrees of passive knee flexion for restoring functional knee mobility, ambulating with proper gait pattern and sit to stand ability. MET   7 Pt to report understanding of all instruction pertinent to patient safety , gait instruction and HEP.  MET  8. Pt to exhibit correct return demonstration of exercises for self-management and independence with HEP MET     Long Term Goals (32 Weeks):  1. Pt will demonstrate increased knee flexion AROM to 120 degrees or greater for return to functional activity MET  2. Pt will demonstrate increased knee extension AROM to 0 degrees for standing stability  MET  3. Pt will demonstrate increased RLE strength by 1/2 to 1 grade for improved functional stability Progressing  4. Pt to demonstrate standing stability unsupported on dynamic surfaces for functional return to ADL and recreational activities.   MET  5.The patient will be independent amb with no assistive device on all surfaces for community distances. MET  6. Pt to demonstrate independence with HEP for self management JAMIN  7. Patient will return to exercising in gym for fitness and recreation 3x/wk within 6 months. Progressing   8. Patient will improve FOTO score to </= 15% limited to decrease perceived limitation with mobility. Progressing    NEW: Goals:    Long Term Goals: 52 weeks  Isokinetic quad/hamstring strength 80% or better on all test Progressing 2024   Pt completes return to jogging program  Progressing 2024     Plan     New POC set to  24     Vernon Hart, PT, DPT

## 2024-04-08 ENCOUNTER — PATIENT OUTREACH (OUTPATIENT)
Dept: ADMINISTRATIVE | Facility: HOSPITAL | Age: 24
End: 2024-04-08
Payer: COMMERCIAL

## 2024-04-12 DIAGNOSIS — Z11.8 SCREENING FOR CHLAMYDIAL DISEASE: Primary | ICD-10-CM

## 2024-04-16 ENCOUNTER — CLINICAL SUPPORT (OUTPATIENT)
Dept: REHABILITATION | Facility: HOSPITAL | Age: 24
End: 2024-04-16
Payer: COMMERCIAL

## 2024-04-16 DIAGNOSIS — M25.562 ACUTE PAIN OF LEFT KNEE: Primary | ICD-10-CM

## 2024-04-16 DIAGNOSIS — M25.662 KNEE STIFFNESS, LEFT: ICD-10-CM

## 2024-04-16 PROCEDURE — 97110 THERAPEUTIC EXERCISES: CPT | Mod: PN

## 2024-04-16 PROCEDURE — 97530 THERAPEUTIC ACTIVITIES: CPT | Mod: PN

## 2024-04-16 NOTE — PROGRESS NOTES
[facility]   Physical Therapy Treatment Note     Name: Karla Price  [facility] Number: [id number]    Therapy Diagnosis:   Encounter Diagnoses   Name Primary?    Acute pain of left knee Yes    Knee stiffness, left        Physician: Camila Holcomb MD    Visit Date: 4/16/2024    Physician Orders: PT Eval and Treat  Medical Diagnosis from Referral:   S83.512A (ICD-10-CM) - Rupture of anterior cruciate ligament of left knee, initial encounter   S83.242A (ICD-10-CM) - Tear of medial meniscus of left knee, current, unspecified tear type, initial encounter   S83.282A (ICD-10-CM) - Tear of lateral meniscus of left knee, current, unspecified tear type, initial encounter      Evaluation Date: 6/15/2023  Authorization Period Expiration: 12/31/23  Plan of Care Expiration: NEW 5/30/24  Progress Note Due: performed 3/12/24  Visit # / Visits authorized: 8/30  FOTO: 7/30     Precautions: DOS 6/13/23  Procedure:  1. Left Knee Arthroscopy, anterior cruciate ligament reconstruction 61812  2.  Left Knee open lateral extra-articular tenodesis (modified Irma procedure)  3.  Left Knee Arthroscopy, with meniscus repair (medial AND lateral) 99664  4.  Left Knee open lateral collateral ligament repair  5.  Left Knee  arthroscopic loose body removal      vertical tear in the medial meniscus with 1 suture, vertical tear of lateral meniscus with 2 sutures     Post-Op Plan:  ACL reconstruction with quad tendon autograft and medial and lateral meniscal repairs.  Toe-touch weight-bearing for 2-4 weeks followed by partial and full weight-bearing by 6 weeks.  She will require a hinged brace for 6 weeks.  We will start a CPM with passive motion immediately.      PTA Visit #: 0/5     Time In: 10:14 am  Time Out: 11:00 am  Total Billable Time: 46 minutes    Subjective     Pt reports: recently returned to the gym. States that she did several exercises from the list that I gave her and she made it difficult as instructed    She  was partially  compliant with home exercise program.  Response to previous treatment: improved ROM  Functional change: improved functional abilities     Pain: 0/10  Location: left knee      Objective    DOS 6/13: 10 months    Treatment     Karla received the treatments listed below:    therapeutic exercises to develop strength and ROM for 08 minutes including:  ROM check   Patient eduction     therapeutic activities to improve functional performance for 38 minutes, including:  Bike 6 mins   Dynamic movements: knees to chest, quad pulls, lunges forward  Lateral step downs 4x5-6 8''  Jogging on alter G 5.0 - 5 mins 75%   Jogging on treadmill, 3x1 min 3.0       Patient Education and Home Exercises       Education provided:   - HEP, POC, answered patient questions, obtaining gym membership      Written Home Exercises Provided: yes. Exercises were reviewed and Karla was able to demonstrate them prior to the end of the session.  Karla demonstrated good  understanding of the education provided. See EMR under Patient Instructions for exercises provided during therapy sessions    Assessment   Able to perform full 8'' lateral step downs with no A and min compensations. Also able to jog today for the first time with normalized gait. Overall doing well.     Karla Is progressing well towards her goals.   Pt prognosis is Excellent.     Pt will continue to benefit from skilled outpatient physical therapy to address the deficits listed in the problem list box on initial evaluation, provide pt/family education and to maximize pt's level of independence in the home and community environment.     Pt's spiritual, cultural and educational needs considered and pt agreeable to plan of care and goals.     Anticipated barriers to physical therapy: none    Post-op Knee   Short Term Goals ( 4 Weeks ):   1. Pt will report reduced pain by 20 to 40% or greater for improved mobility, sleep  and ambulation.  MET  2. Patients knee edema reduced by  1/4 to 1/2 inch or greater to enhance flexion ROM and knee comfort.  MET  3. Pt to report minimal to no pain to palpation for improved level of comfort.  MET  4. Pt to demonstrate symmetrical weight bearing w/o increased pain for stability and functional ambulation . MET  5. Pts neuromuscular response will be enhanced for unilateral standing stability and balance  MET  6. Pt to achieve 90 degrees of passive knee flexion for restoring functional knee mobility, ambulating with proper gait pattern and sit to stand ability. MET   7 Pt to report understanding of all instruction pertinent to patient safety , gait instruction and HEP.  MET  8. Pt to exhibit correct return demonstration of exercises for self-management and independence with HEP MET     Long Term Goals (32 Weeks):  1. Pt will demonstrate increased knee flexion AROM to 120 degrees or greater for return to functional activity MET  2. Pt will demonstrate increased knee extension AROM to 0 degrees for standing stability  MET  3. Pt will demonstrate increased RLE strength by 1/2 to 1 grade for improved functional stability Progressing  4. Pt to demonstrate standing stability unsupported on dynamic surfaces for functional return to ADL and recreational activities.   MET  5.The patient will be independent amb with no assistive device on all surfaces for community distances. MET  6. Pt to demonstrate independence with HEP for self management JAMIN  7. Patient will return to exercising in gym for fitness and recreation 3x/wk within 6 months. Progressing   8. Patient will improve FOTO score to </= 15% limited to decrease perceived limitation with mobility. Progressing    NEW: Goals:    Long Term Goals: 52 weeks  Isokinetic quad/hamstring strength 80% or better on all test Progressing 2024   Pt completes return to jogging program  Progressing 2024     Plan     New POC set to  24     Vernon Hart, PT, DPT

## 2024-04-22 NOTE — TELEPHONE ENCOUNTER
Refill Routing Note   Medication(s) are not appropriate for processing by [facility] for the following reason(s):        No active prescription written by provider    [facility] action(s):  Defer               Appointments  past 12m or future 3m with PCP    Date Provider   Last Visit   10/23/2023 Edgardo Dominguez MD   Next Visit   Visit date not found Edgardo Dominguez MD   ED visits in past 90 days: 0        Note composed:10:09 AM 04/22/2024

## 2024-04-25 RX ORDER — MEDROXYPROGESTERONE ACETATE 10 MG/1
TABLET ORAL
Qty: 7 TABLET | Refills: 3 | Status: SHIPPED | OUTPATIENT
Start: 2024-04-25

## 2024-04-26 ENCOUNTER — HOSPITAL ENCOUNTER (EMERGENCY)
Facility: HOSPITAL | Age: 24
Discharge: HOME | End: 2024-04-26
Attending: EMERGENCY MEDICINE
Payer: COMMERCIAL

## 2024-04-26 VITALS
WEIGHT: 210 LBS | SYSTOLIC BLOOD PRESSURE: 180 MMHG | HEIGHT: 61 IN | RESPIRATION RATE: 16 BRPM | BODY MASS INDEX: 39.65 KG/M2 | DIASTOLIC BLOOD PRESSURE: 88 MMHG | TEMPERATURE: 98 F | OXYGEN SATURATION: 98 % | HEART RATE: 97 BPM

## 2024-04-26 DIAGNOSIS — R21 RASH AND NONSPECIFIC SKIN ERUPTION: Primary | ICD-10-CM

## 2024-04-26 DIAGNOSIS — Z20.7 EXPOSURE TO SCABIES: ICD-10-CM

## 2024-04-26 PROCEDURE — 99283 EMERGENCY DEPT VISIT LOW MDM: CPT

## 2024-04-26 RX ORDER — HYDROXYZINE HYDROCHLORIDE 25 MG/1
25 TABLET, FILM COATED ORAL EVERY 6 HOURS
Qty: 12 TABLET | Refills: 0 | Status: SHIPPED | OUTPATIENT
Start: 2024-04-26

## 2024-04-26 RX ORDER — PERMETHRIN 50 MG/G
CREAM TOPICAL ONCE
Qty: 60 G | Refills: 0 | Status: SHIPPED | OUTPATIENT
Start: 2024-04-26 | End: 2024-04-26

## 2024-04-26 NOTE — DISCHARGE INSTRUCTIONS

## 2024-04-26 NOTE — Clinical Note
"Karla"Henrry Price was seen and treated in our emergency department on 4/26/2024.  She may return to work on 04/29/2024.  I expect that Ms. Karla Price was exposed to scabies based on the appearance of her rash.     If you have any questions or concerns, please don't hesitate to call.      Louis Vaughan, KRISTOPHER"

## 2024-04-27 NOTE — ED PROVIDER NOTES
Encounter Date: 4/26/2024       History     Chief Complaint   Patient presents with    Rash     Reports rash to R arm, chest, back, and face x Wednesday. Reports being exposed to scabies at work. Reports using cream with no relief.      23-year-old female presenting to the emergency department with a chief complaint of rash.  States that she was exposed to scabies at work.  She was previously diagnosed with scabies at urgent care and was given permethrin shampoo and Benadryl.  States that she was still having itching.  She also states that she was really here because she needs a note to return to work with that says that she was exposed to scabies and that the rash she has is due to scabies.  She wants definitive testing that shows scabies on her skin.  Denies fever, chills, nausea, vomiting, or systemic symptoms.  Denies history of hypertension.    The history is provided by the patient. No  was used.     Review of patient's allergies indicates:  No Known Allergies  Past Medical History:   Diagnosis Date    Eczema      Past Surgical History:   Procedure Laterality Date    ARTHROSCOPY,KNEE,WITH MENISCUS REPAIR Left 6/13/2023    Procedure: ARTHROSCOPY,KNEE,WITH MENISCUS REPAIR;  Surgeon: Cecilio Paytno MD;  Location: [facility] OR;  Service: Orthopedics;  Laterality: Left;    FIXATION OF TENDON Left 6/13/2023    Procedure: FIXATION, TENDON-LATERAL EXTRA ARTICULAR TENODESIS;  Surgeon: Cecilio Payton MD;  Location: [facility] OR;  Service: Orthopedics;  Laterality: Left;    RECONSTRUCTION OF LIGAMENT Left 6/13/2023    Procedure: RECONSTRUCTION, ACL-QUAD AUTO;  Surgeon: Cecilio Payton MD;  Location: [facility] OR;  Service: Orthopedics;  Laterality: Left;  REGIONAL BLOCK     Family History   Problem Relation Name Age of Onset    Breast cancer Maternal Aunt  30        maternal great aunt with breast cancer    Fibrocystic breast disease Mother      No Known Problems Sister      Liver cancer Maternal Grandmother       No Known Problems Father      No Known Problems Brother      No Known Problems Maternal Uncle      No Known Problems Paternal Aunt      No Known Problems Paternal Uncle      No Known Problems Maternal Grandfather      No Known Problems Paternal Grandmother      No Known Problems Paternal Grandfather      Colon cancer Neg Hx      Ovarian cancer Neg Hx      Amblyopia Neg Hx      Blindness Neg Hx      Cancer Neg Hx      Cataracts Neg Hx      Diabetes Neg Hx      Glaucoma Neg Hx      Hypertension Neg Hx      Macular degeneration Neg Hx      Retinal detachment Neg Hx      Strabismus Neg Hx      Stroke Neg Hx      Thyroid disease Neg Hx       Social History     Tobacco Use    Smoking status: Never    Smokeless tobacco: Never   Substance Use Topics    Alcohol use: No    Drug use: No     Review of Systems   Constitutional:  Negative for fever.   HENT:  Negative for sore throat.    Eyes:  Negative for photophobia and visual disturbance.   Respiratory:  Negative for shortness of breath.    Cardiovascular:  Negative for chest pain.   Gastrointestinal:  Negative for nausea.   Genitourinary:  Negative for dysuria.   Musculoskeletal:  Negative for back pain.   Skin:  Positive for rash.   Neurological:  Negative for syncope, facial asymmetry, weakness, numbness and headaches.   Hematological:  Does not bruise/bleed easily.       Physical Exam     Initial Vitals [04/26/24 1439]   BP Pulse Resp Temp SpO2   (!) 180/88 97 16 98 °F (36.7 °C) 98 %      MAP       --         Physical Exam    Nursing note and vitals reviewed.  Constitutional: Vital signs are normal. She appears well-developed and well-nourished. She is cooperative. She does not appear ill. No distress.   Well-appearing.  No acute distress.   HENT:   Head: Normocephalic and atraumatic.   Right Ear: Hearing and external ear normal.   Left Ear: Hearing and external ear normal.   Nose: Nose normal.   Eyes: Conjunctivae and EOM are normal.   Neck: Phonation normal.   Normal  range of motion.  Cardiovascular:  Normal rate and regular rhythm.           No murmur heard.  Pulmonary/Chest: Effort normal. No respiratory distress.   Abdominal: Abdomen is soft. She exhibits no distension. There is no abdominal tenderness.   Musculoskeletal:      Cervical back: Normal range of motion.     Neurological: She is alert and oriented to person, place, and time. GCS eye subscore is 4. GCS verbal subscore is 5. GCS motor subscore is 6.   Skin: Skin is warm. Capillary refill takes less than 2 seconds. Rash noted.   Scattered rash of erythematous papules and patches.  Present on the wrists, chest.  No pustules, vesicles, blisters, warmth.         ED Course   Procedures  Labs Reviewed - No data to display       Imaging Results    None          Medications - No data to display  Medical Decision Making  Differential diagnosis is broad and includes but is not limited to allergic, atopic, or contact dermatitis, cellulitis, erysipelas, monkey pox, small pox, chickenpox, tinea, psoriasis, burn, impetigo, drug rash, toxic epidermal necrolysis, Mccarty-Anshul syndrome, melanoma, actinic keratosis, seborrheic keratosis, allergic reaction, dermatitis herpetiformis    23-year-old female presenting to the emergency department for rash.  Previously seen at urgent care and believes she was appropriately treated, she was requesting a note for work saying that she has been diagnosed with scabies.  I discussed with the patient that we do not have definitive testing for scabies. Her rash is somewhat consistent with scabies although there are no lesions in the finger webs.  I did provide her with a work note saying that it was possible that she was exposed to scabies and this is the resulting rash.  I also sent permethrin cream and Atarax to her pharmacy for repeat treatment as she was still having symptoms.      Return precautions were discussed, all patient questions were answered, and the patient was agreeable to the  plan of care.  She was discharged home in stable condition and will follow up with her primary care provider or return to the emergency department if her symptoms worsen or do not improve.     Risk  Prescription drug management.  Diagnosis or treatment significantly limited by social determinants of health.                                      Clinical Impression:  Final diagnoses:  [R21] Rash and nonspecific skin eruption (Primary)  [Z20.7] Exposure to scabies          ED Disposition Condition    Discharge Stable          ED Prescriptions       Medication Sig Dispense Start Date End Date Auth. Provider    permethrin (ELIMITE) 5 % cream (Expires today) Apply topically once. for 1 dose 60 g 4/26/2024 4/26/2024 Louis Vaughan, PA-C    hydrOXYzine HCL (ATARAX) 25 MG tablet Take 1 tablet (25 mg total) by mouth every 6 (six) hours. 12 tablet 4/26/2024 -- Louis Vaughan PA-C          Follow-up Information       Follow up With Specialties Details Why Contact Info    Hans Francisco MD Family Medicine Schedule an appointment as soon as possible for a visit  As needed [address] [facility]  [place] [id number]  [phone]               Louis Vaughan PA-C  04/26/24 1956

## 2024-04-30 ENCOUNTER — TELEPHONE (OUTPATIENT)
Dept: DERMATOLOGY | Facility: CLINIC | Age: 24
End: 2024-04-30
Payer: COMMERCIAL

## 2024-05-28 ENCOUNTER — CLINICAL SUPPORT (OUTPATIENT)
Dept: REHABILITATION | Facility: HOSPITAL | Age: 24
End: 2024-05-28
Payer: COMMERCIAL

## 2024-05-28 DIAGNOSIS — M25.562 ACUTE PAIN OF LEFT KNEE: Primary | ICD-10-CM

## 2024-05-28 DIAGNOSIS — M25.662 KNEE STIFFNESS, LEFT: ICD-10-CM

## 2024-05-28 PROCEDURE — 97110 THERAPEUTIC EXERCISES: CPT | Mod: PN

## 2024-05-28 PROCEDURE — 97530 THERAPEUTIC ACTIVITIES: CPT | Mod: PN

## 2024-05-28 NOTE — PROGRESS NOTES
[facility]   Physical Therapy Treatment Note/ D/C note     Name: Karla Price  [facility] Number: [id number]    Therapy Diagnosis:   Encounter Diagnoses   Name Primary?    Acute pain of left knee Yes    Knee stiffness, left        Physician: Camila Holcomb MD    Visit Date: 5/28/2024    Physician Orders: PT Eval and Treat  Medical Diagnosis from Referral:   S83.512A (ICD-10-CM) - Rupture of anterior cruciate ligament of left knee, initial encounter   S83.242A (ICD-10-CM) - Tear of medial meniscus of left knee, current, unspecified tear type, initial encounter   S83.282A (ICD-10-CM) - Tear of lateral meniscus of left knee, current, unspecified tear type, initial encounter      Evaluation Date: 6/15/2023  Authorization Period Expiration: 12/31/23  Plan of Care Expiration: NEW 5/30/24  Progress Note Due: performed 3/12/24  Visit # / Visits authorized: 9/30  FOTO: 7/30     Precautions: DOS 6/13/23  Procedure:  1. Left Knee Arthroscopy, anterior cruciate ligament reconstruction 25265  2.  Left Knee open lateral extra-articular tenodesis (modified Irma procedure)  3.  Left Knee Arthroscopy, with meniscus repair (medial AND lateral) 12968  4.  Left Knee open lateral collateral ligament repair  5.  Left Knee  arthroscopic loose body removal      vertical tear in the medial meniscus with 1 suture, vertical tear of lateral meniscus with 2 sutures     Post-Op Plan:  ACL reconstruction with quad tendon autograft and medial and lateral meniscal repairs.  Toe-touch weight-bearing for 2-4 weeks followed by partial and full weight-bearing by 6 weeks.  She will require a hinged brace for 6 weeks.  We will start a CPM with passive motion immediately.      PTA Visit #: 0/5     Time In: 10 am  Time Out: 11 am  Total Billable Time: 60 minutes    Subjective     Pt reports: her knee occasionally gets sore after work but overall doing fine with no limitations     She was partially  compliant with home exercise  "program.  Response to previous treatment: improved ROM  Functional change: improved functional abilities     Pain: 0/10  Location: left knee      Objective    DOS : 50 weeks     Knee Passive Range of Motion:    Right  Left    Flexion 140 140   Extension 10 hyper 10 hyper      Hand-held dynamometry: 23    Right  Left  % deficit   Quadriceps at 90 degrees:                             3/12/24                  5/28/25 105  106  108  Avg= 106     100  98  102     Avg 100     106  106  110      Avg = 107.3           81, 75, 70  Avg 75.33    55  48  45.8  Avg=49     64  66  66     65%     70  63  63     Avg - 65.3  61%         65, 63, 65  Avg 64.33 53%                          35%                    39%           85% LSI              Hamstrings at 90               3/13/24 64  64  64        62,64,67  Av.3      64, 71, 61  Avg 65.33 57  56  54        53,53,47  Av  79%    67, 57, 59  Average = 61 14%                    21%       91% LSI       6" step down: 1 (still with compensations in the frontal plane)     Treatment     Karla received the treatments listed below:    therapeutic exercises to develop strength and ROM for 50 minutes including:  Reassessment as above  Dynamic stretching: knees to chest, quad pulls, lunges forward and lateral   Patient eduction     therapeutic activities to improve functional performance for 10 minutes, including:  Elliptical 10 mins    Patient Education and Home Exercises       Education provided:   - HEP, POC, answered patient questions, obtaining gym membership      Written Home Exercises Provided: yes. Exercises were reviewed and Karla was able to demonstrate them prior to the end of the session.  Karla demonstrated good  understanding of the education provided. See EMR under Patient Instructions for exercises provided during therapy sessions    Assessment   Karla has progressed very well in PT. She presents with an 85% quad LSI when compared to the unaffected side and a " 90% for hamstrings. Function is normal as she completes an 8'' lat step down with no compensations. She is ready for d/c at this time.     Karla Is progressing well towards her goals.   Pt prognosis is Excellent.     Pt will continue to benefit from skilled outpatient physical therapy to address the deficits listed in the problem list box on initial evaluation, provide pt/family education and to maximize pt's level of independence in the home and community environment.     Pt's spiritual, cultural and educational needs considered and pt agreeable to plan of care and goals.     Anticipated barriers to physical therapy: none    Post-op Knee   Short Term Goals ( 4 Weeks ):   1. Pt will report reduced pain by 20 to 40% or greater for improved mobility, sleep  and ambulation.  MET  2. Patients knee edema reduced by 1/4 to 1/2 inch or greater to enhance flexion ROM and knee comfort.  MET  3. Pt to report minimal to no pain to palpation for improved level of comfort.  MET  4. Pt to demonstrate symmetrical weight bearing w/o increased pain for stability and functional ambulation . MET  5. Pts neuromuscular response will be enhanced for unilateral standing stability and balance  MET  6. Pt to achieve 90 degrees of passive knee flexion for restoring functional knee mobility, ambulating with proper gait pattern and sit to stand ability. MET   7 Pt to report understanding of all instruction pertinent to patient safety , gait instruction and HEP.  MET  8. Pt to exhibit correct return demonstration of exercises for self-management and independence with HEP MET     Long Term Goals (32 Weeks):  1. Pt will demonstrate increased knee flexion AROM to 120 degrees or greater for return to functional activity MET  2. Pt will demonstrate increased knee extension AROM to 0 degrees for standing stability  MET  3. Pt will demonstrate increased RLE strength by 1/2 to 1 grade for improved functional stability Progressing  4. Pt to demonstrate  standing stability unsupported on dynamic surfaces for functional return to ADL and recreational activities.   MET  5.The patient will be independent amb with no assistive device on all surfaces for community distances. MET  6. Pt to demonstrate independence with HEP for self management JAMIN  7. Patient will return to exercising in gym for fitness and recreation 3x/wk within 6 months. MET   8. Patient will improve FOTO score to </= 15% limited to decrease perceived limitation with mobility. MET    NEW: Goals:    Long Term Goals: 52 weeks  Isokinetic quad/hamstring strength 80% or better on all test MET  Pt completes return to jogging program  MET 05/28/2024     Plan     D/C    Vernon Hart, PT, DPT

## 2024-06-19 NOTE — PROGRESS NOTES
POST-OPERATIVE EXAMINATION    23 y.o. Female who returns for follow after surgery. She is 12 months s/p:    Procedure:  1.  Left Knee Arthroscopy, anterior cruciate ligament reconstruction 85507  2.  Left Knee open lateral extra-articular tenodesis (modified Irma procedure)  3.  Left Knee Arthroscopy, with meniscus repair (medial AND lateral) 13682  4.  Left Knee open lateral collateral ligament repair  5.  Left Knee  arthroscopic loose body removal      She is doing well without any issues.       PHYSICAL EXAMINATION:  There were no vitals taken for this visit.  General: Well-developed well-nourished 23 y.o. female in no acute distress   Cardiovascular: Regular rhythm   Lungs: No labored breathing or wheezing appreciated   Neuro: Alert and oriented ×3   Psychiatric: well oriented to person, place and time, demonstrates normal mood and affect   Skin: No rashes, lesions or ulcers, normal temperature, turgor, and texture on involved extremity    ORTHOPEDIC EXAM:  Normal post-operative swelling  Normal post-operative scarring  Strength: WNL  ROM: -  Tests:  Negative Lachman's.  Negative anterior drawer.  1+ pivot glide.    KNEE EXAM - right    Inspection:   Normal skin color and appearance with no scars, no ecchymosis and no effusion.      ROM:   0° - 145°.      Palpation:   There is no tenderness along medial joint line, medial plica, medial collateral ligament, pes bursa, lateral joint line, iliotibial band, lateral collateral ligament, popliteal fossa, patellar tendon, or quadriceps tendon.    Stability:  -anterior drawer, - Lachman's, 1+ pivot shift, negative posterior drawer    No instability with varus or valgus stress at 0° or 30°. Negative dial  test at 30° and 90°.    Tests:   - Andreinas test.  - patellar compression - grind test, - crepitus.  There is no patellar apprehension.     - J Sign. - Victor Manuel's. - patellar tilt. - Ana. Lateral patella translation  2 quadrants. Medial patella translation 2  quadrants    Motor:   Quadriceps strength is 5/5 and hamstrings strength is 5/5. Hamstrings show no tightness.      Neuro:   Distal neurovascular status is normal    Vascular: Negative Homans and no palpable popliteal cords. 2+ pedal pulse with brisk cap refill    Gait Normal      ASSESSMENT:      ICD-10-CM ICD-9-CM   1. S/P ACL reconstruction  Z98.890 V45.89         PLAN:

## 2024-06-28 ENCOUNTER — OFFICE VISIT (OUTPATIENT)
Dept: SPORTS MEDICINE | Facility: CLINIC | Age: 24
End: 2024-06-28
Payer: COMMERCIAL

## 2024-06-28 VITALS — WEIGHT: 211.63 LBS | HEIGHT: 61 IN | BODY MASS INDEX: 39.95 KG/M2

## 2024-06-28 DIAGNOSIS — Z98.890 S/P ACL RECONSTRUCTION: Primary | ICD-10-CM

## 2024-06-28 PROCEDURE — 99999 PR PBB SHADOW E&M-EST. PATIENT-LVL III: CPT | Mod: PBBFAC,,, | Performed by: ORTHOPAEDIC SURGERY

## 2024-06-28 NOTE — PROGRESS NOTES
POST-OPERATIVE EXAMINATION    23 y.o. Female who returns for follow after surgery. She is 12 months s/p:    Procedure: 6/13/2023  1.  Left Knee Arthroscopy, anterior cruciate ligament reconstruction 02180  2.  Left Knee open lateral extra-articular tenodesis (modified Irma procedure)  3.  Left Knee Arthroscopy, with meniscus repair (medial AND lateral) 19768  4.  Left Knee open lateral collateral ligament repair  5.  Left Knee  arthroscopic loose body removal      She is doing well without any issues.       PHYSICAL EXAMINATION:  There were no vitals taken for this visit.  General: Well-developed well-nourished 23 y.o. female in no acute distress   Cardiovascular: Regular rhythm   Lungs: No labored breathing or wheezing appreciated   Neuro: Alert and oriented ×3   Psychiatric: well oriented to person, place and time, demonstrates normal mood and affect   Skin: No rashes, lesions or ulcers, normal temperature, turgor, and texture on involved extremity    ORTHOPEDIC EXAM:  Normal post-operative swelling  Normal post-operative scarring  Strength: WNL  ROM: -  Tests:  Negative Lachman's.  Negative anterior drawer.  1+ pivot glide.    KNEE EXAM - right    Inspection:   Normal skin color and appearance with no scars, no ecchymosis and no effusion.      ROM:   0° - 145°.      Palpation:   There is no tenderness along medial joint line, medial plica, medial collateral ligament, pes bursa, lateral joint line, iliotibial band, lateral collateral ligament, popliteal fossa, patellar tendon, or quadriceps tendon.    Stability:  -anterior drawer, - Lachman's, 1+ pivot shift, negative posterior drawer    No instability with varus or valgus stress at 0° or 30°. Negative dial  test at 30° and 90°.    Tests:   - Andreinas test.  - patellar compression - grind test, - crepitus.  There is no patellar apprehension.     - J Sign. - Victor Manuel's. - patellar tilt. - Ana. Lateral patella translation  2 quadrants. Medial patella  translation 2 quadrants    Motor:   Quadriceps strength is 5/5 and hamstrings strength is 5/5. Hamstrings show no tightness.      Neuro:   Distal neurovascular status is normal    Vascular: Negative Homans and no palpable popliteal cords. 2+ pedal pulse with brisk cap refill    Gait Normal      ASSESSMENT:      ICD-10-CM ICD-9-CM   1. S/P ACL reconstruction  Z98.890 V45.89       PLAN:       RTC in 3 months with Cybex testing  We discussed anti-inflammatories when she gets sore at the end of work.  I advised her this is likely due to weakness in her left lower extremity.  If her symptoms persist I recommended starting a prescription strength anti-inflammatories if needed.

## 2024-08-08 ENCOUNTER — TELEPHONE (OUTPATIENT)
Dept: FAMILY MEDICINE | Facility: CLINIC | Age: 24
End: 2024-08-08
Payer: COMMERCIAL

## 2024-08-16 ENCOUNTER — OFFICE VISIT (OUTPATIENT)
Dept: FAMILY MEDICINE | Facility: CLINIC | Age: 24
End: 2024-08-16
Payer: COMMERCIAL

## 2024-08-16 ENCOUNTER — LAB VISIT (OUTPATIENT)
Dept: LAB | Facility: HOSPITAL | Age: 24
End: 2024-08-16
Attending: FAMILY MEDICINE
Payer: COMMERCIAL

## 2024-08-16 VITALS
OXYGEN SATURATION: 99 % | SYSTOLIC BLOOD PRESSURE: 125 MMHG | HEIGHT: 60 IN | DIASTOLIC BLOOD PRESSURE: 85 MMHG | BODY MASS INDEX: 42.82 KG/M2 | HEART RATE: 73 BPM | WEIGHT: 218.13 LBS

## 2024-08-16 DIAGNOSIS — Z23 IMMUNIZATION DUE: ICD-10-CM

## 2024-08-16 DIAGNOSIS — Z00.00 ROUTINE PHYSICAL EXAMINATION: ICD-10-CM

## 2024-08-16 DIAGNOSIS — E66.01 MORBID OBESITY: ICD-10-CM

## 2024-08-16 DIAGNOSIS — Z00.00 ROUTINE PHYSICAL EXAMINATION: Primary | ICD-10-CM

## 2024-08-16 DIAGNOSIS — Z11.1 SCREENING-PULMONARY TB: ICD-10-CM

## 2024-08-16 PROBLEM — M25.562 ACUTE PAIN OF LEFT KNEE: Status: RESOLVED | Noted: 2023-06-15 | Resolved: 2024-08-16

## 2024-08-16 PROBLEM — M25.662 KNEE STIFFNESS, LEFT: Status: RESOLVED | Noted: 2023-04-03 | Resolved: 2024-08-16

## 2024-08-16 PROBLEM — S83.512A LEFT ANTERIOR CRUCIATE LIGAMENT TEAR: Status: RESOLVED | Noted: 2023-06-13 | Resolved: 2024-08-16

## 2024-08-16 PROBLEM — M23.52: Chronic | Status: RESOLVED | Noted: 2023-06-13 | Resolved: 2024-08-16

## 2024-08-16 PROBLEM — M25.562 LEFT KNEE PAIN: Status: RESOLVED | Noted: 2023-04-05 | Resolved: 2024-08-16

## 2024-08-16 PROBLEM — M62.81 MUSCLE WEAKNESS: Status: RESOLVED | Noted: 2023-04-03 | Resolved: 2024-08-16

## 2024-08-16 LAB
ALBUMIN SERPL BCP-MCNC: 3.8 G/DL (ref 3.5–5.2)
ALP SERPL-CCNC: 109 U/L (ref 55–135)
ALT SERPL W/O P-5'-P-CCNC: 18 U/L (ref 10–44)
ANION GAP SERPL CALC-SCNC: 12 MMOL/L (ref 8–16)
AST SERPL-CCNC: 23 U/L (ref 10–40)
BASOPHILS # BLD AUTO: 0.07 K/UL (ref 0–0.2)
BASOPHILS NFR BLD: 0.6 % (ref 0–1.9)
BILIRUB SERPL-MCNC: 0.2 MG/DL (ref 0.1–1)
BUN SERPL-MCNC: 10 MG/DL (ref 6–20)
CALCIUM SERPL-MCNC: 9.8 MG/DL (ref 8.7–10.5)
CHLORIDE SERPL-SCNC: 107 MMOL/L (ref 95–110)
CHOLEST SERPL-MCNC: 244 MG/DL (ref 120–199)
CHOLEST/HDLC SERPL: 5.1 {RATIO} (ref 2–5)
CO2 SERPL-SCNC: 21 MMOL/L (ref 23–29)
CREAT SERPL-MCNC: 0.7 MG/DL (ref 0.5–1.4)
DIFFERENTIAL METHOD BLD: ABNORMAL
EOSINOPHIL # BLD AUTO: 0.2 K/UL (ref 0–0.5)
EOSINOPHIL NFR BLD: 1.8 % (ref 0–8)
ERYTHROCYTE [DISTWIDTH] IN BLOOD BY AUTOMATED COUNT: 13.7 % (ref 11.5–14.5)
EST. GFR  (NO RACE VARIABLE): >60 ML/MIN/1.73 M^2
ESTIMATED AVG GLUCOSE: 100 MG/DL (ref 68–131)
GLUCOSE SERPL-MCNC: 74 MG/DL (ref 70–110)
HBA1C MFR BLD: 5.1 % (ref 4–5.6)
HCT VFR BLD AUTO: 48.2 % (ref 37–48.5)
HDLC SERPL-MCNC: 48 MG/DL (ref 40–75)
HDLC SERPL: 19.7 % (ref 20–50)
HGB BLD-MCNC: 14.9 G/DL (ref 12–16)
IMM GRANULOCYTES # BLD AUTO: 0.09 K/UL (ref 0–0.04)
IMM GRANULOCYTES NFR BLD AUTO: 0.8 % (ref 0–0.5)
LDLC SERPL CALC-MCNC: 178.2 MG/DL (ref 63–159)
LYMPHOCYTES # BLD AUTO: 3.3 K/UL (ref 1–4.8)
LYMPHOCYTES NFR BLD: 28 % (ref 18–48)
MCH RBC QN AUTO: 29.9 PG (ref 27–31)
MCHC RBC AUTO-ENTMCNC: 30.9 G/DL (ref 32–36)
MCV RBC AUTO: 97 FL (ref 82–98)
MONOCYTES # BLD AUTO: 0.8 K/UL (ref 0.3–1)
MONOCYTES NFR BLD: 6.6 % (ref 4–15)
NEUTROPHILS # BLD AUTO: 7.3 K/UL (ref 1.8–7.7)
NEUTROPHILS NFR BLD: 62.2 % (ref 38–73)
NONHDLC SERPL-MCNC: 196 MG/DL
NRBC BLD-RTO: 0 /100 WBC
PLATELET # BLD AUTO: 327 K/UL (ref 150–450)
PMV BLD AUTO: 12.1 FL (ref 9.2–12.9)
POTASSIUM SERPL-SCNC: 4 MMOL/L (ref 3.5–5.1)
PROT SERPL-MCNC: 8.7 G/DL (ref 6–8.4)
RBC # BLD AUTO: 4.99 M/UL (ref 4–5.4)
SODIUM SERPL-SCNC: 140 MMOL/L (ref 136–145)
T4 FREE SERPL-MCNC: 0.89 NG/DL (ref 0.71–1.51)
TRIGL SERPL-MCNC: 89 MG/DL (ref 30–150)
TSH SERPL DL<=0.005 MIU/L-ACNC: 3.58 UIU/ML (ref 0.4–4)
WBC # BLD AUTO: 11.66 K/UL (ref 3.9–12.7)

## 2024-08-16 PROCEDURE — 86480 TB TEST CELL IMMUN MEASURE: CPT | Performed by: FAMILY MEDICINE

## 2024-08-16 PROCEDURE — 80053 COMPREHEN METABOLIC PANEL: CPT | Performed by: FAMILY MEDICINE

## 2024-08-16 PROCEDURE — 86706 HEP B SURFACE ANTIBODY: CPT | Performed by: FAMILY MEDICINE

## 2024-08-16 PROCEDURE — 84443 ASSAY THYROID STIM HORMONE: CPT | Performed by: FAMILY MEDICINE

## 2024-08-16 PROCEDURE — 86765 RUBEOLA ANTIBODY: CPT | Performed by: FAMILY MEDICINE

## 2024-08-16 PROCEDURE — 86762 RUBELLA ANTIBODY: CPT | Performed by: FAMILY MEDICINE

## 2024-08-16 PROCEDURE — 84439 ASSAY OF FREE THYROXINE: CPT | Performed by: FAMILY MEDICINE

## 2024-08-16 PROCEDURE — 86735 MUMPS ANTIBODY: CPT | Performed by: FAMILY MEDICINE

## 2024-08-16 PROCEDURE — 80061 LIPID PANEL: CPT | Performed by: FAMILY MEDICINE

## 2024-08-16 PROCEDURE — 83036 HEMOGLOBIN GLYCOSYLATED A1C: CPT | Performed by: FAMILY MEDICINE

## 2024-08-16 PROCEDURE — 99999 PR PBB SHADOW E&M-EST. PATIENT-LVL III: CPT | Mod: PBBFAC,,, | Performed by: FAMILY MEDICINE

## 2024-08-16 PROCEDURE — 86787 VARICELLA-ZOSTER ANTIBODY: CPT | Performed by: FAMILY MEDICINE

## 2024-08-16 PROCEDURE — 85025 COMPLETE CBC W/AUTO DIFF WBC: CPT | Performed by: FAMILY MEDICINE

## 2024-08-16 NOTE — PROGRESS NOTES
[facility]  Progress Note    SUBJECTIVE:     Chief Complaint   Patient presents with    Annual Exam       HPI   Karla Price  is a 23 y.o. female here for physical exam. Patient has no other new complaints/problems at this time.      Review of patient's allergies indicates:  No Known Allergies    Past Medical History:   Diagnosis Date    Eczema      Past Surgical History:   Procedure Laterality Date    ARTHROSCOPY,KNEE,WITH MENISCUS REPAIR Left 6/13/2023    Procedure: ARTHROSCOPY,KNEE,WITH MENISCUS REPAIR;  Surgeon: Cecilio Payton MD;  Location: [facility] OR;  Service: Orthopedics;  Laterality: Left;    FIXATION OF TENDON Left 6/13/2023    Procedure: FIXATION, TENDON-LATERAL EXTRA ARTICULAR TENODESIS;  Surgeon: Cecilio Payton MD;  Location: [facility] OR;  Service: Orthopedics;  Laterality: Left;    RECONSTRUCTION OF LIGAMENT Left 6/13/2023    Procedure: RECONSTRUCTION, ACL-QUAD AUTO;  Surgeon: Cecilio Payton MD;  Location: [facility] OR;  Service: Orthopedics;  Laterality: Left;  REGIONAL BLOCK     Family History   Problem Relation Name Age of Onset    Breast cancer Maternal Aunt  30        maternal great aunt with breast cancer    Fibrocystic breast disease Mother      No Known Problems Sister      Liver cancer Maternal Grandmother      No Known Problems Father      No Known Problems Brother      No Known Problems Maternal Uncle      No Known Problems Paternal Aunt      No Known Problems Paternal Uncle      No Known Problems Maternal Grandfather      No Known Problems Paternal Grandmother      No Known Problems Paternal Grandfather      Colon cancer Neg Hx      Ovarian cancer Neg Hx      Amblyopia Neg Hx      Blindness Neg Hx      Cancer Neg Hx      Cataracts Neg Hx      Diabetes Neg Hx      Glaucoma Neg Hx      Hypertension Neg Hx      Macular degeneration Neg Hx      Retinal detachment Neg Hx      Strabismus Neg Hx      Stroke Neg Hx      Thyroid disease Neg Hx       Social History     Tobacco Use    Smoking  status: Never    Smokeless tobacco: Never   Substance Use Topics    Alcohol use: No    Drug use: No        Review of Systems   Constitutional:  Negative for chills, diaphoresis and fever.   HENT:  Negative for congestion, ear pain and sore throat.    Eyes:  Negative for photophobia and discharge.   Respiratory:  Negative for cough, shortness of breath and wheezing.    Cardiovascular:  Negative for chest pain and palpitations.   Gastrointestinal:  Negative for abdominal pain, constipation, diarrhea, nausea and vomiting.   Genitourinary:  Negative for dysuria and hematuria.   Musculoskeletal:  Negative for back pain and myalgias.   Skin:  Negative for itching and rash.   Neurological:  Negative for dizziness, sensory change, focal weakness, weakness and headaches.   All other systems reviewed and are negative.    OBJECTIVE:     Vitals:    08/16/24 0901   BP: 125/85   Pulse: 73     Body mass index is 42.6 kg/m².    Physical Exam  Constitutional:       General: She is not in acute distress.     Appearance: She is not diaphoretic.   HENT:      Head: Normocephalic and atraumatic.      Right Ear: Tympanic membrane and ear canal normal. No hemotympanum. Tympanic membrane is not perforated, erythematous or bulging.      Left Ear: Tympanic membrane and ear canal normal. No hemotympanum. Tympanic membrane is not perforated, erythematous or bulging.   Eyes:      Conjunctiva/sclera: Conjunctivae normal.      Pupils: Pupils are equal, round, and reactive to light.   Neck:      Thyroid: No thyromegaly.   Cardiovascular:      Rate and Rhythm: Normal rate and regular rhythm.      Heart sounds: Normal heart sounds. No murmur heard.     No friction rub. No gallop.   Pulmonary:      Effort: Pulmonary effort is normal. No respiratory distress.      Breath sounds: Normal breath sounds. No wheezing or rales.   Abdominal:      General: Bowel sounds are normal. There is no distension.      Palpations: Abdomen is soft.      Tenderness: There  is no abdominal tenderness. There is no guarding or rebound.   Musculoskeletal:         General: No tenderness. Normal range of motion.   Skin:     General: Skin is warm.      Findings: No erythema or rash.   Neurological:      Mental Status: She is alert and oriented to person, place, and time.         Old records were reviewed. Labs and/or images were independently reviewed.    ASSESSMENT     1. Routine physical examination    2. Immunization due    3. Screening-pulmonary TB    4. Morbid obesity        PLAN:     Routine physical examination  -     CBC Auto Differential; Future  -     Comprehensive Metabolic Panel; Future  -     Hemoglobin A1C; Future  -     Lipid Panel; Future  -     TSH; Future  -     T4, Free; Future  -     Rubella antibody, IgG; Future; Expected date: 08/16/2024  -     Rubeola antibody IgG; Future; Expected date: 08/16/2024  -     Mumps, IgG Screen; Future; Expected date: 08/16/2024  -     Hepatitis B Surface Antibody, Qual/Quant; Future; Expected date: 08/16/2024  -     Varicella zoster antibody, IgG; Future; Expected date: 08/16/2024  -     We briefly discussed diet, exercise, and routine preventive exams. All questions and comments addressed.    Immunization due  -     Tdap vaccine injection 0.5 mL  -     VFC-hpv vaccine,9-austin (GARDASIL 9) vaccine 0.5 mL    Screening-pulmonary TB  -     Quantiferon Gold TB; Future; Expected date: 08/16/2024    Morbid obesity   -     Counseled patient about healthy diet, exercise habits, and to increase physical activity.    RTC KATHRINE Francisco MD  08/16/2024 9:16 AM

## 2024-08-17 LAB
GAMMA INTERFERON BACKGROUND BLD IA-ACNC: 0.04 IU/ML
M TB IFN-G CD4+ BCKGRND COR BLD-ACNC: -0 IU/ML
M TB IFN-G CD4+ BCKGRND COR BLD-ACNC: 0.01 IU/ML
MITOGEN IGNF BCKGRD COR BLD-ACNC: 9.96 IU/ML
TB GOLD PLUS: NEGATIVE

## 2024-08-19 LAB
HBV SURFACE AB SER QL IA: NEGATIVE
HBV SURFACE AB SERPL IA-ACNC: <3 MIU/ML
MUMPS IGG INTERPRETATION: POSITIVE
MUMPS IGG SCREEN: 51.7 AU/ML
RUBEOLA IGG ANTIBODY: 206 AU/ML
RUBEOLA INTERPRETATION: POSITIVE
RUBV IGG SER-ACNC: 30 IU/ML
RUBV IGG SER-IMP: REACTIVE
VARICELLA INTERPRETATION: NEGATIVE
VARICELLA ZOSTER IGG: 81.15

## 2024-08-28 ENCOUNTER — OFFICE VISIT (OUTPATIENT)
Dept: SPORTS MEDICINE | Facility: CLINIC | Age: 24
End: 2024-08-28
Payer: COMMERCIAL

## 2024-08-28 VITALS
DIASTOLIC BLOOD PRESSURE: 88 MMHG | SYSTOLIC BLOOD PRESSURE: 135 MMHG | HEIGHT: 60 IN | BODY MASS INDEX: 42.68 KG/M2 | WEIGHT: 217.38 LBS

## 2024-08-28 DIAGNOSIS — Z98.890 S/P ACL RECONSTRUCTION: Primary | ICD-10-CM

## 2024-08-28 PROCEDURE — 3075F SYST BP GE 130 - 139MM HG: CPT | Mod: CPTII,S$GLB,, | Performed by: ORTHOPAEDIC SURGERY

## 2024-08-28 PROCEDURE — 99213 OFFICE O/P EST LOW 20 MIN: CPT | Mod: S$GLB,,, | Performed by: ORTHOPAEDIC SURGERY

## 2024-08-28 PROCEDURE — 1159F MED LIST DOCD IN RCRD: CPT | Mod: CPTII,S$GLB,, | Performed by: ORTHOPAEDIC SURGERY

## 2024-08-28 PROCEDURE — 3044F HG A1C LEVEL LT 7.0%: CPT | Mod: CPTII,S$GLB,, | Performed by: ORTHOPAEDIC SURGERY

## 2024-08-28 PROCEDURE — 3079F DIAST BP 80-89 MM HG: CPT | Mod: CPTII,S$GLB,, | Performed by: ORTHOPAEDIC SURGERY

## 2024-08-28 PROCEDURE — 3008F BODY MASS INDEX DOCD: CPT | Mod: CPTII,S$GLB,, | Performed by: ORTHOPAEDIC SURGERY

## 2024-08-28 PROCEDURE — 99999 PR PBB SHADOW E&M-EST. PATIENT-LVL III: CPT | Mod: PBBFAC,,, | Performed by: ORTHOPAEDIC SURGERY

## 2024-08-28 NOTE — PROGRESS NOTES
POST-OPERATIVE EXAMINATION    23 y.o. Female who returns for follow after surgery. She is 14 months s/p:    Procedure: 6/13/2023  1.  Left Knee Arthroscopy, anterior cruciate ligament reconstruction 07047  2.  Left Knee open lateral extra-articular tenodesis (modified Irma procedure)  3.  Left Knee Arthroscopy, with meniscus repair (medial AND lateral) 52218  4.  Left Knee open lateral collateral ligament repair  5.  Left Knee  arthroscopic loose body removal      She is doing well without any issues.  She has no problems running or with day-to-day activities.      PHYSICAL EXAMINATION:  There were no vitals taken for this visit.  General: Well-developed well-nourished 23 y.o. female in no acute distress   Cardiovascular: Regular rhythm   Lungs: No labored breathing or wheezing appreciated   Neuro: Alert and oriented ×3   Psychiatric: well oriented to person, place and time, demonstrates normal mood and affect   Skin: No rashes, lesions or ulcers, normal temperature, turgor, and texture on involved extremity    ORTHOPEDIC EXAM:  Normal post-operative swelling  Normal post-operative scarring  Strength: WNL  ROM: -  Tests:  Negative Lachman's.  Negative anterior drawer.  1+ pivot glide.    KNEE EXAM - right    Inspection:   Normal skin color and appearance with no scars, no ecchymosis and no effusion.      ROM:   0° - 145°.      Palpation:   There is no tenderness along medial joint line, medial plica, medial collateral ligament, pes bursa, lateral joint line, iliotibial band, lateral collateral ligament, popliteal fossa, patellar tendon, or quadriceps tendon.    Stability:  -anterior drawer, - Lachman's, 1+ pivot shift, negative posterior drawer    No instability with varus or valgus stress at 0° or 30°. Negative dial  test at 30° and 90°.    Tests:   - Andreinas test.  - patellar compression - grind test, - crepitus.  There is no patellar apprehension.     - J Sign. - Victor Manuel's. - patellar tilt. - Ana.  Lateral patella translation  2 quadrants. Medial patella translation 2 quadrants    Motor:   Quadriceps strength is 5/5 and hamstrings strength is 5/5. Hamstrings show no tightness.      Neuro:   Distal neurovascular status is normal    Vascular: Negative Homans and no palpable popliteal cords. 2+ pedal pulse with brisk cap refill    Gait Normal      ASSESSMENT:      ICD-10-CM ICD-9-CM   1. S/P ACL reconstruction  Z98.890 V45.89         PLAN:       Continue all activities as tolerated  Return to clinic p.r.n.  She states she is fine and does not have any interest in continue any returned to play programs or completing a final Cybex.

## 2024-09-19 ENCOUNTER — PATIENT MESSAGE (OUTPATIENT)
Dept: FAMILY MEDICINE | Facility: CLINIC | Age: 24
End: 2024-09-19
Payer: COMMERCIAL

## 2024-09-19 ENCOUNTER — PATIENT MESSAGE (OUTPATIENT)
Dept: SPORTS MEDICINE | Facility: CLINIC | Age: 24
End: 2024-09-19
Payer: COMMERCIAL

## 2025-05-22 ENCOUNTER — TELEPHONE (OUTPATIENT)
Dept: SPORTS MEDICINE | Facility: CLINIC | Age: 25
End: 2025-05-22
Payer: COMMERCIAL

## 2025-05-22 NOTE — TELEPHONE ENCOUNTER
Spoke with patient's mom, patients had prior surgery with Dr. Gonzales who's no longer here.   Looking to establish care with new surgeon. Scheduled patient an appointment with Dr. William for left ACL on 6/27. Patient bringing MRI and XR on desk       ----- Message from Hola sent at 5/22/2025 12:06 PM CDT -----  Regarding: FW: PT'S RETURNING A CALL REGARDIGN SCHEDULING HER SURGERY  Contact: PT    ----- Message -----  From: Kyra Salinas  Sent: 5/20/2025   3:28 PM CDT  To: Hola Nava  Subject: PT'S RETURNING A CALL REGARDIGN SCHEDULING H#    Confirmed contact info below:Contact Name: Karla PricePhone Number: [phone]

## 2025-06-25 ENCOUNTER — OFFICE VISIT (OUTPATIENT)
Dept: SPORTS MEDICINE | Facility: CLINIC | Age: 25
End: 2025-06-25
Payer: COMMERCIAL

## 2025-06-25 VITALS
DIASTOLIC BLOOD PRESSURE: 85 MMHG | SYSTOLIC BLOOD PRESSURE: 134 MMHG | HEART RATE: 91 BPM | WEIGHT: 173.75 LBS | BODY MASS INDEX: 34.11 KG/M2 | HEIGHT: 60 IN

## 2025-06-25 DIAGNOSIS — Z98.890 S/P ACL RECONSTRUCTION: Primary | ICD-10-CM

## 2025-06-25 DIAGNOSIS — S83.511A COMPLETE TEAR OF RIGHT ACL, INITIAL ENCOUNTER: ICD-10-CM

## 2025-06-25 PROCEDURE — 3075F SYST BP GE 130 - 139MM HG: CPT | Mod: CPTII,S$GLB,, | Performed by: ORTHOPAEDIC SURGERY

## 2025-06-25 PROCEDURE — 99204 OFFICE O/P NEW MOD 45 MIN: CPT | Mod: S$GLB,,, | Performed by: ORTHOPAEDIC SURGERY

## 2025-06-25 PROCEDURE — 99999 PR PBB SHADOW E&M-EST. PATIENT-LVL III: CPT | Mod: PBBFAC,,, | Performed by: ORTHOPAEDIC SURGERY

## 2025-06-25 PROCEDURE — 1159F MED LIST DOCD IN RCRD: CPT | Mod: CPTII,S$GLB,, | Performed by: ORTHOPAEDIC SURGERY

## 2025-06-25 PROCEDURE — 3079F DIAST BP 80-89 MM HG: CPT | Mod: CPTII,S$GLB,, | Performed by: ORTHOPAEDIC SURGERY

## 2025-06-25 PROCEDURE — 3008F BODY MASS INDEX DOCD: CPT | Mod: CPTII,S$GLB,, | Performed by: ORTHOPAEDIC SURGERY

## 2025-06-25 NOTE — PROGRESS NOTES
CC: Left knee pain with instability    24 y.o. Female reports knee pain with instability after recent injury. History of ACL reconstruction with Dr. Payton in 2023. She was doing well until her knee buckled in April 2025 causing her pain and instability. She was seen by Dr. Wesley Staley at [facility] for MRI and is back in [place] for surgical consult.     Patient reports feeling a pop in knee and subsequent knee effusion.    Is affecting ADLs.     no mechanical symptoms, + instability    Date of Procedure: 6/13/2023   Procedure:  1. Left Knee Arthroscopy, anterior cruciate ligament reconstruction 05784  2.  Left Knee open lateral extra-articular tenodesis (modified Irma procedure)  3.  Left Knee Arthroscopy, with meniscus repair (medial AND lateral) 28226  4.  Left Knee open lateral collateral ligament repair  5.  Left Knee arthroscopic loose body removal   Surgeon(s) and Role:     * Cecilio Payton MD - Primary    Review of Systems   Constitution: Negative. Negative for chills, fever and night sweats.   HENT: Negative for congestion and headaches.    Eyes: Negative for blurred vision, left vision loss and right vision loss.   Cardiovascular: Negative for chest pain and syncope.   Respiratory: Negative for cough and shortness of breath.    Endocrine: Negative for polydipsia, polyphagia and polyuria.   Hematologic/Lymphatic: Negative for bleeding problem. Does not bruise/bleed easily.   Skin: Negative for dry skin, itching and rash.   Musculoskeletal: Negative for falls and muscle weakness.   Gastrointestinal: Negative for abdominal pain and bowel incontinence.   Genitourinary: Negative for bladder incontinence and nocturia.   Neurological: Negative for disturbances in coordination, loss of balance and seizures.   Psychiatric/Behavioral: Negative for depression. The patient does not have insomnia.    Allergic/Immunologic: Negative for hives and persistent infections.     PAST MEDICAL HISTORY:   Past Medical  History:   Diagnosis Date    Eczema      PAST SURGICAL HISTORY:   Past Surgical History:   Procedure Laterality Date    ARTHROSCOPY,KNEE,WITH MENISCUS REPAIR Left 6/13/2023    Procedure: ARTHROSCOPY,KNEE,WITH MENISCUS REPAIR;  Surgeon: Cecilio Payton MD;  Location: [facility] OR;  Service: Orthopedics;  Laterality: Left;    FIXATION OF TENDON Left 6/13/2023    Procedure: FIXATION, TENDON-LATERAL EXTRA ARTICULAR TENODESIS;  Surgeon: Cecilio Payton MD;  Location: [facility] OR;  Service: Orthopedics;  Laterality: Left;    RECONSTRUCTION OF LIGAMENT Left 6/13/2023    Procedure: RECONSTRUCTION, ACL-QUAD AUTO;  Surgeon: Cecilio Payton MD;  Location: [facility] OR;  Service: Orthopedics;  Laterality: Left;  REGIONAL BLOCK     FAMILY HISTORY:   Family History   Problem Relation Name Age of Onset    Breast cancer Maternal Aunt  30        maternal great aunt with breast cancer    Fibrocystic breast disease Mother      No Known Problems Sister      Liver cancer Maternal Grandmother      No Known Problems Father      No Known Problems Brother      No Known Problems Maternal Uncle      No Known Problems Paternal Aunt      No Known Problems Paternal Uncle      No Known Problems Maternal Grandfather      No Known Problems Paternal Grandmother      No Known Problems Paternal Grandfather      Colon cancer Neg Hx      Ovarian cancer Neg Hx      Amblyopia Neg Hx      Blindness Neg Hx      Cancer Neg Hx      Cataracts Neg Hx      Diabetes Neg Hx      Glaucoma Neg Hx      Hypertension Neg Hx      Macular degeneration Neg Hx      Retinal detachment Neg Hx      Strabismus Neg Hx      Stroke Neg Hx      Thyroid disease Neg Hx       SOCIAL HISTORY: Social History[1]    MEDICATIONS: Current Medications[2]  ALLERGIES: Review of patient's allergies indicates:  No Known Allergies    VITAL SIGNS: /85 (Patient Position: Sitting)   Pulse 91   Ht 5' (1.524 m)   Wt 78.8 kg (173 lb 11.6 oz)   BMI 33.93 kg/m²      PHYSICAL EXAMINATION    General:  The  patient is alert and oriented x 3.  Mood is pleasant.  Observation of ears, eyes and nose reveal no gross abnormalities.  No labored breathing observed.    Right KNEE EXAMINATION     OBSERVATION / INSPECTION   Gait:   Nonantalgic   Alignment:  Neutral   Scars:   None   Muscle atrophy: Mild  Effusion:  None   Warmth:  None   Discoloration:   none     TENDERNESS / CREPITUS (T / C):          T / C      T / C   Patella   - / -   Lateral joint line   - / -   Peripatellar medial  -  Medial joint line    - / -   Peripatellar lateral -  Medial plica   - / -   Patellar tendon -   Popliteal fossa  - / -   Quad tendon   -   Gastrocnemius   -   Prepatellar Bursa - / -   Quadricep   -   Tibial tubercle  -  Thigh/hamstring  -   Pes anserine/HS -  Fibula    -   ITB   - / -  Tibia     -   Tib/fib joint  - / -  LCL    -     MFC   - / -   MCL: Proximal  -    LFC   - / -    Distal   -          ROM: (* = pain)  PASSIVE   ACTIVE    Left :   5 / 0 / 145   5 / 0 / 145     Right :    5 / 0 / 145   5 / 0 / 145    Patellofemoral examination:  See above noted areas of tenderness.   Patella position    Subluxation / dislocation: Centered           Sup. / Inf;   Normal   Crepitus (PF):    Absent   Patellar Mobility:       Medial-lateral:   Normal    Superior-inferior:  Normal    Inferior tilt   Normal    Patellar tendon:  Normal   Lateral tilt:    Normal   J-sign:     None   Patellofemoral grind:   No pain       MENISCAL SIGNS:     Pain on terminal extension:  -  Pain on terminal flexion:  -  Andreinas maneuver:  - for pain  Squat     - posterior joint pain    LIGAMENT EXAMINATION:  ACL / Lachman:  2b positive    PCL-Post.  drawer: normal 0 to 2mm  MCL- Valgus:  normal 0 to 2mm  LCL- Varus:  normal 0 to 2mm  Pivot shift: Positive shift, patient guarding during examination   Dial Test: difference c/w other side   At 30° flexion: normal (< 5°)    At 90° flexion: normal (< 5°)   Reverse Pivot Shift:   normal (Equal)     STRENGTH: (* = with  pain) PAINFUL SIDE   Quadricep   4/5   Hamstrin/5    EXTREMITY NEURO-VASCULAR EXAMINATION:   Sensation:  Grossly intact to light touch all dermatomal regions.   Motor Function:  Fully intact motor function at hip, knee, foot and ankle    DTRs;  quadriceps and  achilles 2+.  No clonus and downgoing Babinski.    Vascular status:  DP and PT pulses 2+, brisk capillary refill, symmetric.     Other Findings:        ASSESSMENT:    Left Knee ACL tear, meniscus tear     PLAN:     All questions were answered, pt will contact us for questions or concerns in the interim.    MRI reviewed personally by me:  Shows right knee  ACL tear & medial meniscus tear, chondromalacia.     ASSESSMENT:    Right Knee ACL tear.      she would benefit from knee arthroscopy, possible plica excision, possible chondroplasty with possible meniscectomy given the above.     PLAN: We have discussed the surgery and recovery of arthroscopic knee surgery. she understands that there may be limited weightbearing up to several weeks after surgery depending on procedures that are performed at the time of surgery.    The spectrum of treatment options were discussed with the patient, including nonoperative and operative options.  After thorough discussion, the patient has elected to undergo surgical treatment to include:  right   A. Knee arthroscopic Anterior Cruciate Ligament reconstruction with patellar tendon autograft    B. Knee arthroscopic lateral meniscectomy  possible meniscus repair   C. Knee arthroscopic possible plica excision   D. Knee arthroscopic possible chondroplasty   E. Knee arthroscopic-assisted ligament supplementation with Internal brace graft    The details of the surgical procedure were explained, including the location of probable incisions and a description of likely hardware and/or grafts to be used.  The patient understands the likely convalescence after surgery.  Also, we have thoroughly discussed the risks, benefits and  alternatives to surgery, including, but not limited to, the risk of infection, joint stiffness, blood clot (including DVT and/or pulmonary embolus), neurologic and vascular injury.  It was explained that, if tissue has been repaired or reconstructed, there is a chance of failure, which may require further management.               [1]   Social History  Socioeconomic History    Marital status: Single   Tobacco Use    Smoking status: Never    Smokeless tobacco: Never   Substance and Sexual Activity    Alcohol use: No    Drug use: No    Sexual activity: Yes     Partners: Female     Social Drivers of Health     Financial Resource Strain: Medium Risk (6/25/2025)    Overall Financial Resource Strain (CARDIA)     Difficulty of Paying Living Expenses: Somewhat hard   Food Insecurity: No Food Insecurity (6/25/2025)    Hunger Vital Sign     Worried About Running Out of Food in the Last Year: Never true     Ran Out of Food in the Last Year: Never true   Transportation Needs: No Transportation Needs (6/25/2025)    PRAPARE - Transportation     Lack of Transportation (Medical): No     Lack of Transportation (Non-Medical): No   Physical Activity: Sufficiently Active (6/25/2025)    Exercise Vital Sign     Days of Exercise per Week: 6 days     Minutes of Exercise per Session: 40 min   Stress: Stress Concern Present (6/25/2025)    [employer] - Occupational Stress Questionnaire     Feeling of Stress : To some extent   Housing Stability: Low Risk  (6/25/2025)    Housing Stability Vital Sign     Unable to Pay for Housing in the Last Year: No     Number of Times Moved in the Last Year: 1     Homeless in the Last Year: No   [2]   Current Outpatient Medications:     ciprofloxacin-dexAMETHasone 0.3-0.1% (CIPRODEX) 0.3-0.1 % DrpS, Place 4 drops into both ears 2 (two) times daily. (Patient not taking: Reported on 8/28/2024), Disp: 7.5 mL, Rfl: 0    hydrOXYzine HCL (ATARAX) 25 MG tablet, Take 1 tablet (25 mg  total) by mouth every 6 (six) hours. (Patient not taking: Reported on 8/28/2024), Disp: 12 tablet, Rfl: 0    ibuprofen (ADVIL,MOTRIN) 800 MG tablet, Take 1 tablet (800 mg total) by mouth every 6 (six) hours as needed for Pain. (Patient not taking: Reported on 6/25/2025), Disp: 40 tablet, Rfl: 0    medroxyPROGESTERone (PROVERA) 10 MG tablet, TAKE 1 TABLET(10 MG) BY MOUTH EVERY DAY FOR 7 DAYS (Patient not taking: Reported on 8/28/2024), Disp: 7 tablet, Rfl: 3

## 2025-06-27 ENCOUNTER — PATIENT MESSAGE (OUTPATIENT)
Dept: PREADMISSION TESTING | Facility: HOSPITAL | Age: 25
End: 2025-06-27
Payer: COMMERCIAL

## 2025-06-27 DIAGNOSIS — M94.261 CHONDROMALACIA OF KNEE, RIGHT: ICD-10-CM

## 2025-06-27 DIAGNOSIS — M67.51 PLICA SYNDROME OF KNEE, RIGHT: ICD-10-CM

## 2025-06-27 DIAGNOSIS — S83.511A ANTERIOR CRUCIATE LIGAMENT COMPLETE TEAR, RIGHT, INITIAL ENCOUNTER: Primary | ICD-10-CM

## 2025-06-27 DIAGNOSIS — M23.200 OLD TEAR OF LATERAL MENISCUS OF RIGHT KNEE, UNSPECIFIED TEAR TYPE: ICD-10-CM

## 2025-06-27 NOTE — ANESTHESIA PAT ROS NOTE
06/27/2025  Karla Price is a 24 y.o., female.      Pre-op Assessment    I have reviewed the Patient Summary Reports.       I have reviewed the Medications.     Review of Systems  Anesthesia Hx:  No problems with previous Anesthesia   History of prior surgery of interest to airway management or planning:  Previous anesthesia: General, Nerve Block 6/13/2023  Left Knee Arthroscopy, ACL Reconstruction with general anesthesia.  Procedure performed at an [facility].     for 6/13/2023 Left Knee Arthroscopy, ACL Recondtruction.  Procedure performed at an [facility].  Airway issues documented on chart review include mask, easy, GETA, videolaryngoscope used  , view on video-laryngoscopy Grade I      Denies Personal Hx of Anesthesia complications.                    Social:  Non-Smoker, No Alcohol Use       Hematology/Oncology:  Hematology Normal   Oncology Normal                                   EENT/Dental:  EENT/Dental Normal           Cardiovascular:  Cardiovascular Normal Exercise tolerance: good        Denies Dysrhythmias.         Denies ALONSO.    Patient not on beta blockers                          Pulmonary:  Pulmonary Normal    Denies Asthma.   Denies Shortness of breath.                  Renal/:  Renal/ Normal                 Hepatic/GI:  Hepatic/GI Normal     Denies GERD.    Not Taking GLP-1 Agonists            Musculoskeletal:     Anterior cruciate ligament complete tear, right,   Old tear of lateral meniscus of right knee,   Plica syndrome of knee, right,  Chondromalacia of knee, right,  H/O Left Knee Arthroscopy, ACL Reconstruction, Meniscus Repair, Fixation of Tendon              Neurological:  Neurology Normal      Denies Seizures.                                Endocrine:  Denies Diabetes. Denies Hypothyroidism.        Obesity / BMI > 30  Dermatological:  Eczema   Psych:  Psychiatric  Normal                   Past Medical History:   Diagnosis Date    Eczema      Past Surgical History:   Procedure Laterality Date    ARTHROSCOPY,KNEE,WITH MENISCUS REPAIR Left 6/13/2023    Procedure: ARTHROSCOPY,KNEE,WITH MENISCUS REPAIR;  Surgeon: Cecilio Payton MD;  Location: [facility] OR;  Service: Orthopedics;  Laterality: Left;    FIXATION OF TENDON Left 6/13/2023    Procedure: FIXATION, TENDON-LATERAL EXTRA ARTICULAR TENODESIS;  Surgeon: Cecilio Payton MD;  Location: [facility] OR;  Service: Orthopedics;  Laterality: Left;    RECONSTRUCTION OF LIGAMENT Left 6/13/2023    Procedure: RECONSTRUCTION, ACL-QUAD AUTO;  Surgeon: Cecilio Payton MD;  Location: [facility] OR;  Service: Orthopedics;  Laterality: Left;  REGIONAL BLOCK       Anesthesia Assessment: Preoperative EQUATION    Planned Procedure: Procedure(s) (LRB):  RECONSTRUCTION, KNEE, ACL, ARTHROSCOPIC (Right)  ARTHROSCOPY, KNEE, WITH MENISCECTOMY (Right)  EXCISION, PLICA, KNEE, ARTHROSCOPIC (Right)  Requested Anesthesia Type:General  Surgeon: Charity William MD  Service: Orthopedics  Known or anticipated Date of Surgery:7/10/2025    Surgeon notes: reviewed    Electronic QUestionnaire Assessment completed via nurse interview with patient.        Triage considerations:     The patient has no apparent active cardiac condition (No unstable coronary Syndrome such as severe unstable angina or recent [<1 month] myocardial infarction, decompensated CHF, severe valvular   disease or significant arrhythmia)    Previous anesthesia records:GETA, Nerve block for post-op pain, Easy airway, Easy intubation, and No problems, Video laryngoscopy, Hoskins 3, ETT 7.0, Grade 1    Last PCP note: 6-12 months ago , within [facility]   Subspecialty notes: Ortho    Other important co-morbidities: Obesity      Tests already available:  Results have been reviewed.             Instructions given. (See in Nurse's note) Preop medication instructions sent via portal message.     Optimization:  Anesthesia  Preop Clinic Assessment Not Indicated    Medical Opinion Indicated: No       Sub-specialist consult indicated: No      Plan:  Consultation:medical clearance is not requested.       Navigation:  Straight Line to surgery.               No tests, anesthesia preop clinic visit, or consult required.                        Patient is OK to proceed with surgery at [facility].       Ht: 5'  Wt: 78.8 kg (173 lb)  BMI: 33.93

## 2025-07-08 ENCOUNTER — TELEPHONE (OUTPATIENT)
Dept: SPORTS MEDICINE | Facility: CLINIC | Age: 25
End: 2025-07-08
Payer: COMMERCIAL

## 2025-07-08 NOTE — TELEPHONE ENCOUNTER
Copied from CRM #[id number]. Topic: Appointments - Same Day Access  >> Jul 7, 2025  8:54 AM Leah wrote:  .Type:  Needs Medical Advice    Who Called: pt   Symptoms (please be specific): stated she missed her PO appt today. Pls call to r/s   Would the patient rather a call back or a response via [facility]? Call   Best Call Back Number: [phone]

## 2025-07-09 ENCOUNTER — OFFICE VISIT (OUTPATIENT)
Dept: SPORTS MEDICINE | Facility: CLINIC | Age: 25
End: 2025-07-09
Payer: COMMERCIAL

## 2025-07-09 VITALS
DIASTOLIC BLOOD PRESSURE: 88 MMHG | BODY MASS INDEX: 34.83 KG/M2 | HEART RATE: 163 BPM | WEIGHT: 177.38 LBS | HEIGHT: 60 IN | SYSTOLIC BLOOD PRESSURE: 120 MMHG

## 2025-07-09 DIAGNOSIS — S83.511A ANTERIOR CRUCIATE LIGAMENT COMPLETE TEAR, RIGHT, INITIAL ENCOUNTER: Primary | ICD-10-CM

## 2025-07-09 PROCEDURE — 99999 PR PBB SHADOW E&M-EST. PATIENT-LVL IV: CPT | Mod: PBBFAC,,, | Performed by: PHYSICIAN ASSISTANT

## 2025-07-09 PROCEDURE — 99499 UNLISTED E&M SERVICE: CPT | Mod: S$GLB,,, | Performed by: PHYSICIAN ASSISTANT

## 2025-07-09 RX ORDER — OXYCODONE HCL 10 MG/1
10 TABLET, FILM COATED, EXTENDED RELEASE ORAL
Refills: 0 | OUTPATIENT
Start: 2025-07-09 | End: 2025-07-09

## 2025-07-09 RX ORDER — OXYCODONE AND ACETAMINOPHEN 10; 325 MG/1; MG/1
TABLET ORAL
Qty: 21 TABLET | Refills: 0 | Status: SHIPPED | OUTPATIENT
Start: 2025-07-09

## 2025-07-09 RX ORDER — PREGABALIN 75 MG/1
75 CAPSULE ORAL
OUTPATIENT
Start: 2025-07-09 | End: 2025-07-09

## 2025-07-09 RX ORDER — SODIUM CHLORIDE 9 MG/ML
INJECTION, SOLUTION INTRAVENOUS CONTINUOUS
OUTPATIENT
Start: 2025-07-09

## 2025-07-09 RX ORDER — PROMETHAZINE HYDROCHLORIDE 25 MG/1
25 TABLET ORAL EVERY 6 HOURS PRN
Qty: 8 TABLET | Refills: 0 | Status: SHIPPED | OUTPATIENT
Start: 2025-07-09

## 2025-07-09 RX ORDER — NAPROXEN SODIUM 220 MG/1
81 TABLET, FILM COATED ORAL DAILY
Qty: 28 TABLET | Refills: 0 | Status: SHIPPED | OUTPATIENT
Start: 2025-07-09 | End: 2026-07-09

## 2025-07-09 RX ORDER — SUZETRIGINE 50 MG/1
TABLET, FILM COATED ORAL
Qty: 30 TABLET | Refills: 0 | Status: SHIPPED | OUTPATIENT
Start: 2025-07-09

## 2025-07-09 RX ORDER — DOCUSATE SODIUM 100 MG/1
100 CAPSULE, LIQUID FILLED ORAL 2 TIMES DAILY PRN
Qty: 20 CAPSULE | Refills: 0 | Status: SHIPPED | OUTPATIENT
Start: 2025-07-09

## 2025-07-09 RX ORDER — KETOROLAC TROMETHAMINE 10 MG/1
10 TABLET, FILM COATED ORAL EVERY 8 HOURS PRN
Qty: 9 TABLET | Refills: 0 | Status: SHIPPED | OUTPATIENT
Start: 2025-07-09

## 2025-07-09 RX ORDER — TRAMADOL HYDROCHLORIDE 50 MG/1
50-100 TABLET, FILM COATED ORAL EVERY 6 HOURS PRN
Qty: 21 TABLET | Refills: 0 | Status: SHIPPED | OUTPATIENT
Start: 2025-07-09

## 2025-07-09 NOTE — H&P (VIEW-ONLY)
Karla Price  is here for a completion of her perioperative paperwork. she  Is scheduled to undergo     right              A. Knee arthroscopic Anterior Cruciate Ligament reconstruction with patellar tendon autograft               B. Knee arthroscopic lateral meniscectomy  possible meniscus repair              C. Knee arthroscopic possible plica excision              D. Knee arthroscopic possible chondroplasty              E. Knee arthroscopic-assisted ligament supplementation with Internal brace graft on 7/15/25.      She is a healthy individual and does not need clearance for this procedure.     PAST MEDICAL HISTORY:   Past Medical History:   Diagnosis Date    Eczema      PAST SURGICAL HISTORY:   Past Surgical History:   Procedure Laterality Date    ARTHROSCOPY,KNEE,WITH MENISCUS REPAIR Left 6/13/2023    Procedure: ARTHROSCOPY,KNEE,WITH MENISCUS REPAIR;  Surgeon: Cecilio Payton MD;  Location: [facility] OR;  Service: Orthopedics;  Laterality: Left;    FIXATION OF TENDON Left 6/13/2023    Procedure: FIXATION, TENDON-LATERAL EXTRA ARTICULAR TENODESIS;  Surgeon: Cecilio Payton MD;  Location: [facility] OR;  Service: Orthopedics;  Laterality: Left;    RECONSTRUCTION OF LIGAMENT Left 6/13/2023    Procedure: RECONSTRUCTION, ACL-QUAD AUTO;  Surgeon: Cecilio Payton MD;  Location: [facility] OR;  Service: Orthopedics;  Laterality: Left;  REGIONAL BLOCK     FAMILY HISTORY:   Family History   Problem Relation Name Age of Onset    Breast cancer Maternal Aunt  30        maternal great aunt with breast cancer    Fibrocystic breast disease Mother      No Known Problems Sister      Liver cancer Maternal Grandmother      No Known Problems Father      No Known Problems Brother      No Known Problems Maternal Uncle      No Known Problems Paternal Aunt      No Known Problems Paternal Uncle      No Known Problems Maternal Grandfather      No Known Problems Paternal Grandmother      No Known Problems Paternal Grandfather      Colon cancer Neg Hx       Ovarian cancer Neg Hx      Amblyopia Neg Hx      Blindness Neg Hx      Cancer Neg Hx      Cataracts Neg Hx      Diabetes Neg Hx      Glaucoma Neg Hx      Hypertension Neg Hx      Macular degeneration Neg Hx      Retinal detachment Neg Hx      Strabismus Neg Hx      Stroke Neg Hx      Thyroid disease Neg Hx       SOCIAL HISTORY: Social History[1]    MEDICATIONS: Current Medications[2]  ALLERGIES: Review of patient's allergies indicates:  No Known Allergies    VITAL SIGNS: /88 (Patient Position: Sitting)   Pulse (!) 163   Ht 5' (1.524 m)   Wt 80.4 kg (177 lb 5.8 oz)   BMI 34.64 kg/m²      Risks, indications and benefits of the surgical procedure were discussed with the patient. All questions with regard to surgery, rehab, expected return to functional activities, activities of daily living and recreational endeavors were answered to her satisfaction.    It was explained to the patient that there may be an increase in surgical risks if the patient has certain co-morbidities such as but not limited to: Obesity, Cardiovascular issues (CHF, CAD, Arrhythmias), chronic pulmonary issues, previous or current neurovascular/neurological issues, previous strokes, diabetes mellitus, previous wound healing issues, previous wound or skin infections, PVD, clotting disorders, if the patient uses chronic steroids, if the patient takes or has immune compromising medications or diseases, or has previously or currently used tobacco products.     The patient verbalized that he/she does not have any additional clotting, bleeding, or blood disorders, other than what is list in her chart on today's review.     Then a brief history and physical exam were performed.    Review of Systems   Constitution: Negative. Negative for chills, fever and night sweats.   HENT: Negative for congestion and headaches.    Eyes: Negative for blurred vision, left vision loss and right vision loss.   Cardiovascular: Negative for chest pain and  syncope.   Respiratory: Negative for cough and shortness of breath.    Endocrine: Negative for polydipsia, polyphagia and polyuria.   Hematologic/Lymphatic: Negative for bleeding problem. Does not bruise/bleed easily.   Skin: Negative for dry skin, itching and rash.   Musculoskeletal: Negative for falls and muscle weakness.   Gastrointestinal: Negative for abdominal pain and bowel incontinence.   Genitourinary: Negative for bladder incontinence and nocturia.   Neurological: Negative for disturbances in coordination, loss of balance and seizures.   Psychiatric/Behavioral: Negative for depression. The patient does not have insomnia.    Allergic/Immunologic: Negative for hives and persistent infections.     PHYSICAL EXAM:  GEN: A&Ox3, WD WN NAD  HEENT: WNL  CHEST: CTAB, no W/R/R  HEART: RRR, no M/R/G  ABD: Soft, NT ND, BS x4 QUADS  MS; See Epic  NEURO: CN II-XII intact       The surgical consent was then reviewed with the patient, who agreed with all the contents of the consent form and it was signed. she was then given the surgery packet to bring with her to surgery center for the anesthesia portion of her perioperative paperwork (if needed)   For all physicians except for Dr. Martinez, we will email and possibly fax the consent forms and booking sheets to [facility].    The patient was given the opportunity to ask questions about the surgical plan and consent form, and once no other questions were asked, I proceeded with the pre-op appointment.    PHYSICAL THERAPY:  She was also instructed regarding physical therapy and will begin POD1 . She was given a copy of the original prescription to schedule. Another copy of this prescription was also faxed to [facility].    POST OP CARE:instructions were reviewed including care of the wound and dressing after surgery and when she can shower. Patient was told not sleep or lay on there surgical extremity following surgery as this could cause repair damage, tissue  damage, or nerve injury.    An extensive amount of time was spent on discussion of the following information based on what type of surgery the patient was having. Patient expressed understanding of the material below:    Shoulder surgery or upper extremity surgery requiring post-op sling:  It was explained to the patient that they should remove their arm from the sling approximately 6 times per day to do full elbow ROM (flexion and extension) and full supination and pronation of the elbow for approximately 5 minutes at a time to help prevent elbow stiffness, nerve pain or problems, or nerve injury. They were told to contact us if they begin having numbness and tingling of there surgical extremity that persists longer then 1 day without relief.     Extremity surgery requiring a splint:   It was explained to patient on how to properly elevated position there extremity to prevent pressure ulcers from occurring. I made sure that the patient understood that that surgical site may be numb following surgery and prevent them from feeling pressure pain that they would normal feel if a pressure injury was occurring. Pressure ulcers and there causes were discussed with the patient today.     Post-operative splint:  It was explain to the patient that they can contact us at anytime if they feel that there is a problem with their splint or under their splint that needs evaluation. If there is concern, questions, or discomfort with the splint then they can present to either our clinic or the [facility] ED for removal, evaluation, and replacement of the splint.    CRUTCHES OR WALKER: It was explained to the patient that if they are having a lower extremity surgery that they will require either a walker or crutches to ambulate safely with after surgery. It was explained that a cane or other assistive devices are not sufficient to safely ambulate with after surgery. I explained to the patient that I will place an order for  them to receive either crutches or a walker after surgery to go home with. It was explained that if they have crutches or a walker at home already, that they are REQUIRED to bring them to the hospital on the day of surgery. It was explained that if they do not have them at the hospital on the day of surgery that they WILL be provided a new pair or crutches or a walker to go home with to ensure ambulation will be safe if the patient needs to stop somewhere on the way home.      If the patient's mobility limitation cannot be sufficiently resolved by the use of crutches then it is necessary for the patient's functional mobility deficit to be sufficiently resolved with the use of a (Rolling Walker or Walker). Patient's mobility limitation significantly impairs their ability to participate in one of more activities of daily living. The use of a (Rolling Walker or Walker) will significantly improve the patient's ability to participate in MRADLS and the patient will use it on regular basis in the home.      PAIN MANAGEMENT: Karla Price was also given a pain management regime, which includes the option of getting a TENS unit given to her by [facility] (if patient chooses) along with the education required for its use. She was also instructed regarding the Polar ice unit or gel ice packs (chosen by patient) that will be in place after surgery and her postoperative pain medications.     Patient understands that Polar Ice machine has to be bought today if they want it. It cannot be bought on day of surgery at surgery center.     PAIN MEDICATION:  Percocet 10/325mg 1 po q 4-6 hours prn pain  Ultram 50 mg Take 1-2 p.o. q.6 hours p.r.n. breakthrough pain,   Phenergan 25 mg one p.o. q.6 hours p.r.n. nausea and vomiting.    Toradol 10mg q8h x 3 days.     Journavx 50mg tablets to be started on the day prior to surgery. Take 2 tablets po on an empty stomach for 1st dose and then 1 tablet po q12h.       DVT prophylaxis was discussed  with the patient today including risk factors for developing DVTs and history of DVTs. The patient was asked if any specific recommendations were given from the doctor/s that did pre-operative surgical clearance. The patient was then given an education sheet about DVTs and PE with warning signs and symptoms of both and steps to take if they suspect either of these.    This along with the Modified Caprini risk assessment model for VTE in general surgical patients was used to determine the patient's DVT risk.     From: Hakeem MK, Lv DA, Brandee SM, et al. Prevention of VTE in nonorthopedic surgical patients: antithrombotic therapy and prevention of thrombosis, 9th ed: American College of Chest Physicians evidence-based clinical practical guidelines. Chest 2012; 141:e227S. Copyright © 2012. Reproduced with permission from the American College of Chest Physicians.    The below listed DVT prophylaxis regimen along with bilateral DYANA compression stockings will be used post-op. Length of treatment has been determined to be 10-42 days post-op by the above noted Caprini assessment model.     The patient was instructed to buy and take:  Aspirin 81mg QD x 4 weeks for DVT prophylaxis starting on the morning after surgery.  Patient will also use bilateral TEDs on lower extremities, SCDs during surgery, and early ambulation post-op. If the patient was previously taking 81mg baby aspirin, they were told to not take it starting 5 days prior to surgery and to restart the 81mg aspirin after surgery.       Patient was also told to buy over the counter Prilosec medication if needed and take it once daily for GI protection as long as they are taking NSAIDs or Aspirin.   Patient verbally denies history of blood clots or DVTs when asked today.   Patient denies history of seizures.       The patient was told that narcotic pain medications may make them drowsy and instructions were given to not sign legal documents, drive or operate heavy  machinery, cars, or equipment while under the influence of narcotic medications. The patient was told and understands that narcotic pain medications should only be used as needed to control pain and that other options of pain control include TENs unit and ice packs/unit.     As there were no other questions to be asked, she was given my business card along with Charity William MD business card if she has any questions or concerns prior to surgery or in the postop period.             [1]   Social History  Socioeconomic History    Marital status: Single   Tobacco Use    Smoking status: Never    Smokeless tobacco: Never   Substance and Sexual Activity    Alcohol use: No    Drug use: No    Sexual activity: Yes     Partners: Female     Social Drivers of Health     Financial Resource Strain: Medium Risk (6/25/2025)    Overall Financial Resource Strain (CARDIA)     Difficulty of Paying Living Expenses: Somewhat hard   Food Insecurity: No Food Insecurity (6/25/2025)    Hunger Vital Sign     Worried About Running Out of Food in the Last Year: Never true     Ran Out of Food in the Last Year: Never true   Transportation Needs: No Transportation Needs (6/25/2025)    PRAPARE - Transportation     Lack of Transportation (Medical): No     Lack of Transportation (Non-Medical): No   Physical Activity: Sufficiently Active (6/25/2025)    Exercise Vital Sign     Days of Exercise per Week: 6 days     Minutes of Exercise per Session: 40 min   Stress: Stress Concern Present (6/25/2025)    [employer] - Occupational Stress Questionnaire     Feeling of Stress : To some extent   Housing Stability: Low Risk  (6/25/2025)    Housing Stability Vital Sign     Unable to Pay for Housing in the Last Year: No     Number of Times Moved in the Last Year: 1     Homeless in the Last Year: No   [2]   Current Outpatient Medications:     aspirin 81 MG Chew, Take 1 tablet (81 mg total) by mouth once daily. For 4 weeks., Disp: 28  tablet, Rfl: 0    docusate sodium (COLACE) 100 MG capsule, Take 1 capsule (100 mg total) by mouth 2 (two) times daily as needed for Constipation., Disp: 20 capsule, Rfl: 0    ibuprofen (ADVIL,MOTRIN) 800 MG tablet, Take 1 tablet (800 mg total) by mouth every 6 (six) hours as needed for Pain. (Patient not taking: Reported on 7/9/2025), Disp: 40 tablet, Rfl: 0    ketorolac (TORADOL) 10 mg tablet, Take 1 tablet (10 mg total) by mouth every 8 (eight) hours as needed (breakthrough pain). Take with food., Disp: 9 tablet, Rfl: 0    oxyCODONE-acetaminophen (PERCOCET)  mg per tablet, Take 1 tablet by mouth every 4-6 hours as needed for pain. Take stool softener with this medication., Disp: 21 tablet, Rfl: 0    promethazine (PHENERGAN) 25 MG tablet, Take 1 tablet (25 mg total) by mouth every 6 (six) hours as needed for Nausea., Disp: 8 tablet, Rfl: 0    suzetrigine (JOURNAVX) 50 mg Tab, Take 2 tablets by mouth on an empty stomach for 1st dose and then take 1 tablet by mouth every 12 hours for pain control., Disp: 30 tablet, Rfl: 0    traMADoL (ULTRAM) 50 mg tablet, Take 1-2 tablets ( mg total) by mouth every 6 (six) hours as needed for Pain., Disp: 21 tablet, Rfl: 0

## 2025-07-09 NOTE — H&P
Karla Price  is here for a completion of her perioperative paperwork. she  Is scheduled to undergo     right              A. Knee arthroscopic Anterior Cruciate Ligament reconstruction with patellar tendon autograft               B. Knee arthroscopic lateral meniscectomy  possible meniscus repair              C. Knee arthroscopic possible plica excision              D. Knee arthroscopic possible chondroplasty              E. Knee arthroscopic-assisted ligament supplementation with Internal brace graft on 7/15/25.      She is a healthy individual and does not need clearance for this procedure.     PAST MEDICAL HISTORY:   Past Medical History:   Diagnosis Date    Eczema      PAST SURGICAL HISTORY:   Past Surgical History:   Procedure Laterality Date    ARTHROSCOPY,KNEE,WITH MENISCUS REPAIR Left 6/13/2023    Procedure: ARTHROSCOPY,KNEE,WITH MENISCUS REPAIR;  Surgeon: Cecilio Payton MD;  Location: [facility] OR;  Service: Orthopedics;  Laterality: Left;    FIXATION OF TENDON Left 6/13/2023    Procedure: FIXATION, TENDON-LATERAL EXTRA ARTICULAR TENODESIS;  Surgeon: Cecilio Payton MD;  Location: [facility] OR;  Service: Orthopedics;  Laterality: Left;    RECONSTRUCTION OF LIGAMENT Left 6/13/2023    Procedure: RECONSTRUCTION, ACL-QUAD AUTO;  Surgeon: Cecilio Payton MD;  Location: [facility] OR;  Service: Orthopedics;  Laterality: Left;  REGIONAL BLOCK     FAMILY HISTORY:   Family History   Problem Relation Name Age of Onset    Breast cancer Maternal Aunt  30        maternal great aunt with breast cancer    Fibrocystic breast disease Mother      No Known Problems Sister      Liver cancer Maternal Grandmother      No Known Problems Father      No Known Problems Brother      No Known Problems Maternal Uncle      No Known Problems Paternal Aunt      No Known Problems Paternal Uncle      No Known Problems Maternal Grandfather      No Known Problems Paternal Grandmother      No Known Problems Paternal Grandfather      Colon cancer Neg Hx       Ovarian cancer Neg Hx      Amblyopia Neg Hx      Blindness Neg Hx      Cancer Neg Hx      Cataracts Neg Hx      Diabetes Neg Hx      Glaucoma Neg Hx      Hypertension Neg Hx      Macular degeneration Neg Hx      Retinal detachment Neg Hx      Strabismus Neg Hx      Stroke Neg Hx      Thyroid disease Neg Hx       SOCIAL HISTORY: Social History[1]    MEDICATIONS: Current Medications[2]  ALLERGIES: Review of patient's allergies indicates:  No Known Allergies    VITAL SIGNS: /88 (Patient Position: Sitting)   Pulse (!) 163   Ht 5' (1.524 m)   Wt 80.4 kg (177 lb 5.8 oz)   BMI 34.64 kg/m²      Risks, indications and benefits of the surgical procedure were discussed with the patient. All questions with regard to surgery, rehab, expected return to functional activities, activities of daily living and recreational endeavors were answered to her satisfaction.    It was explained to the patient that there may be an increase in surgical risks if the patient has certain co-morbidities such as but not limited to: Obesity, Cardiovascular issues (CHF, CAD, Arrhythmias), chronic pulmonary issues, previous or current neurovascular/neurological issues, previous strokes, diabetes mellitus, previous wound healing issues, previous wound or skin infections, PVD, clotting disorders, if the patient uses chronic steroids, if the patient takes or has immune compromising medications or diseases, or has previously or currently used tobacco products.     The patient verbalized that he/she does not have any additional clotting, bleeding, or blood disorders, other than what is list in her chart on today's review.     Then a brief history and physical exam were performed.    Review of Systems   Constitution: Negative. Negative for chills, fever and night sweats.   HENT: Negative for congestion and headaches.    Eyes: Negative for blurred vision, left vision loss and right vision loss.   Cardiovascular: Negative for chest pain and  syncope.   Respiratory: Negative for cough and shortness of breath.    Endocrine: Negative for polydipsia, polyphagia and polyuria.   Hematologic/Lymphatic: Negative for bleeding problem. Does not bruise/bleed easily.   Skin: Negative for dry skin, itching and rash.   Musculoskeletal: Negative for falls and muscle weakness.   Gastrointestinal: Negative for abdominal pain and bowel incontinence.   Genitourinary: Negative for bladder incontinence and nocturia.   Neurological: Negative for disturbances in coordination, loss of balance and seizures.   Psychiatric/Behavioral: Negative for depression. The patient does not have insomnia.    Allergic/Immunologic: Negative for hives and persistent infections.     PHYSICAL EXAM:  GEN: A&Ox3, WD WN NAD  HEENT: WNL  CHEST: CTAB, no W/R/R  HEART: RRR, no M/R/G  ABD: Soft, NT ND, BS x4 QUADS  MS; See Epic  NEURO: CN II-XII intact       The surgical consent was then reviewed with the patient, who agreed with all the contents of the consent form and it was signed. she was then given the surgery packet to bring with her to surgery center for the anesthesia portion of her perioperative paperwork (if needed)   For all physicians except for Dr. Martinez, we will email and possibly fax the consent forms and booking sheets to [facility].    The patient was given the opportunity to ask questions about the surgical plan and consent form, and once no other questions were asked, I proceeded with the pre-op appointment.    PHYSICAL THERAPY:  She was also instructed regarding physical therapy and will begin POD1 . She was given a copy of the original prescription to schedule. Another copy of this prescription was also faxed to [facility].    POST OP CARE:instructions were reviewed including care of the wound and dressing after surgery and when she can shower. Patient was told not sleep or lay on there surgical extremity following surgery as this could cause repair damage, tissue  damage, or nerve injury.    An extensive amount of time was spent on discussion of the following information based on what type of surgery the patient was having. Patient expressed understanding of the material below:    Shoulder surgery or upper extremity surgery requiring post-op sling:  It was explained to the patient that they should remove their arm from the sling approximately 6 times per day to do full elbow ROM (flexion and extension) and full supination and pronation of the elbow for approximately 5 minutes at a time to help prevent elbow stiffness, nerve pain or problems, or nerve injury. They were told to contact us if they begin having numbness and tingling of there surgical extremity that persists longer then 1 day without relief.     Extremity surgery requiring a splint:   It was explained to patient on how to properly elevated position there extremity to prevent pressure ulcers from occurring. I made sure that the patient understood that that surgical site may be numb following surgery and prevent them from feeling pressure pain that they would normal feel if a pressure injury was occurring. Pressure ulcers and there causes were discussed with the patient today.     Post-operative splint:  It was explain to the patient that they can contact us at anytime if they feel that there is a problem with their splint or under their splint that needs evaluation. If there is concern, questions, or discomfort with the splint then they can present to either our clinic or the [facility] ED for removal, evaluation, and replacement of the splint.    CRUTCHES OR WALKER: It was explained to the patient that if they are having a lower extremity surgery that they will require either a walker or crutches to ambulate safely with after surgery. It was explained that a cane or other assistive devices are not sufficient to safely ambulate with after surgery. I explained to the patient that I will place an order for  them to receive either crutches or a walker after surgery to go home with. It was explained that if they have crutches or a walker at home already, that they are REQUIRED to bring them to the hospital on the day of surgery. It was explained that if they do not have them at the hospital on the day of surgery that they WILL be provided a new pair or crutches or a walker to go home with to ensure ambulation will be safe if the patient needs to stop somewhere on the way home.      If the patient's mobility limitation cannot be sufficiently resolved by the use of crutches then it is necessary for the patient's functional mobility deficit to be sufficiently resolved with the use of a (Rolling Walker or Walker). Patient's mobility limitation significantly impairs their ability to participate in one of more activities of daily living. The use of a (Rolling Walker or Walker) will significantly improve the patient's ability to participate in MRADLS and the patient will use it on regular basis in the home.      PAIN MANAGEMENT: Karla Price was also given a pain management regime, which includes the option of getting a TENS unit given to her by [facility] (if patient chooses) along with the education required for its use. She was also instructed regarding the Polar ice unit or gel ice packs (chosen by patient) that will be in place after surgery and her postoperative pain medications.     Patient understands that Polar Ice machine has to be bought today if they want it. It cannot be bought on day of surgery at surgery center.     PAIN MEDICATION:  Percocet 10/325mg 1 po q 4-6 hours prn pain  Ultram 50 mg Take 1-2 p.o. q.6 hours p.r.n. breakthrough pain,   Phenergan 25 mg one p.o. q.6 hours p.r.n. nausea and vomiting.    Toradol 10mg q8h x 3 days.     Journavx 50mg tablets to be started on the day prior to surgery. Take 2 tablets po on an empty stomach for 1st dose and then 1 tablet po q12h.       DVT prophylaxis was discussed  with the patient today including risk factors for developing DVTs and history of DVTs. The patient was asked if any specific recommendations were given from the doctor/s that did pre-operative surgical clearance. The patient was then given an education sheet about DVTs and PE with warning signs and symptoms of both and steps to take if they suspect either of these.    This along with the Modified Caprini risk assessment model for VTE in general surgical patients was used to determine the patient's DVT risk.     From: Hakeem MK, Lv DA, Brnadee SM, et al. Prevention of VTE in nonorthopedic surgical patients: antithrombotic therapy and prevention of thrombosis, 9th ed: American College of Chest Physicians evidence-based clinical practical guidelines. Chest 2012; 141:e227S. Copyright © 2012. Reproduced with permission from the American College of Chest Physicians.    The below listed DVT prophylaxis regimen along with bilateral DYANA compression stockings will be used post-op. Length of treatment has been determined to be 10-42 days post-op by the above noted Caprini assessment model.     The patient was instructed to buy and take:  Aspirin 81mg QD x 4 weeks for DVT prophylaxis starting on the morning after surgery.  Patient will also use bilateral TEDs on lower extremities, SCDs during surgery, and early ambulation post-op. If the patient was previously taking 81mg baby aspirin, they were told to not take it starting 5 days prior to surgery and to restart the 81mg aspirin after surgery.       Patient was also told to buy over the counter Prilosec medication if needed and take it once daily for GI protection as long as they are taking NSAIDs or Aspirin.   Patient verbally denies history of blood clots or DVTs when asked today.   Patient denies history of seizures.       The patient was told that narcotic pain medications may make them drowsy and instructions were given to not sign legal documents, drive or operate heavy  machinery, cars, or equipment while under the influence of narcotic medications. The patient was told and understands that narcotic pain medications should only be used as needed to control pain and that other options of pain control include TENs unit and ice packs/unit.     As there were no other questions to be asked, she was given my business card along with Charity William MD business card if she has any questions or concerns prior to surgery or in the postop period.             [1]   Social History  Socioeconomic History    Marital status: Single   Tobacco Use    Smoking status: Never    Smokeless tobacco: Never   Substance and Sexual Activity    Alcohol use: No    Drug use: No    Sexual activity: Yes     Partners: Female     Social Drivers of Health     Financial Resource Strain: Medium Risk (6/25/2025)    Overall Financial Resource Strain (CARDIA)     Difficulty of Paying Living Expenses: Somewhat hard   Food Insecurity: No Food Insecurity (6/25/2025)    Hunger Vital Sign     Worried About Running Out of Food in the Last Year: Never true     Ran Out of Food in the Last Year: Never true   Transportation Needs: No Transportation Needs (6/25/2025)    PRAPARE - Transportation     Lack of Transportation (Medical): No     Lack of Transportation (Non-Medical): No   Physical Activity: Sufficiently Active (6/25/2025)    Exercise Vital Sign     Days of Exercise per Week: 6 days     Minutes of Exercise per Session: 40 min   Stress: Stress Concern Present (6/25/2025)    [employer] - Occupational Stress Questionnaire     Feeling of Stress : To some extent   Housing Stability: Low Risk  (6/25/2025)    Housing Stability Vital Sign     Unable to Pay for Housing in the Last Year: No     Number of Times Moved in the Last Year: 1     Homeless in the Last Year: No   [2]   Current Outpatient Medications:     aspirin 81 MG Chew, Take 1 tablet (81 mg total) by mouth once daily. For 4 weeks., Disp: 28  tablet, Rfl: 0    docusate sodium (COLACE) 100 MG capsule, Take 1 capsule (100 mg total) by mouth 2 (two) times daily as needed for Constipation., Disp: 20 capsule, Rfl: 0    ibuprofen (ADVIL,MOTRIN) 800 MG tablet, Take 1 tablet (800 mg total) by mouth every 6 (six) hours as needed for Pain. (Patient not taking: Reported on 7/9/2025), Disp: 40 tablet, Rfl: 0    ketorolac (TORADOL) 10 mg tablet, Take 1 tablet (10 mg total) by mouth every 8 (eight) hours as needed (breakthrough pain). Take with food., Disp: 9 tablet, Rfl: 0    oxyCODONE-acetaminophen (PERCOCET)  mg per tablet, Take 1 tablet by mouth every 4-6 hours as needed for pain. Take stool softener with this medication., Disp: 21 tablet, Rfl: 0    promethazine (PHENERGAN) 25 MG tablet, Take 1 tablet (25 mg total) by mouth every 6 (six) hours as needed for Nausea., Disp: 8 tablet, Rfl: 0    suzetrigine (JOURNAVX) 50 mg Tab, Take 2 tablets by mouth on an empty stomach for 1st dose and then take 1 tablet by mouth every 12 hours for pain control., Disp: 30 tablet, Rfl: 0    traMADoL (ULTRAM) 50 mg tablet, Take 1-2 tablets ( mg total) by mouth every 6 (six) hours as needed for Pain., Disp: 21 tablet, Rfl: 0

## 2025-07-15 ENCOUNTER — HOSPITAL ENCOUNTER (OUTPATIENT)
Facility: HOSPITAL | Age: 25
Discharge: HOME | End: 2025-07-15
Attending: ORTHOPAEDIC SURGERY | Admitting: ORTHOPAEDIC SURGERY
Payer: COMMERCIAL

## 2025-07-15 ENCOUNTER — ANESTHESIA EVENT (OUTPATIENT)
Dept: SURGERY | Facility: HOSPITAL | Age: 25
End: 2025-07-15
Payer: COMMERCIAL

## 2025-07-15 ENCOUNTER — ANESTHESIA (OUTPATIENT)
Dept: SURGERY | Facility: HOSPITAL | Age: 25
End: 2025-07-15
Payer: COMMERCIAL

## 2025-07-15 VITALS
BODY MASS INDEX: 34.36 KG/M2 | TEMPERATURE: 98 F | DIASTOLIC BLOOD PRESSURE: 81 MMHG | HEART RATE: 76 BPM | WEIGHT: 175 LBS | OXYGEN SATURATION: 100 % | HEIGHT: 60 IN | RESPIRATION RATE: 16 BRPM | SYSTOLIC BLOOD PRESSURE: 133 MMHG

## 2025-07-15 DIAGNOSIS — S83.511A ANTERIOR CRUCIATE LIGAMENT COMPLETE TEAR, RIGHT, INITIAL ENCOUNTER: ICD-10-CM

## 2025-07-15 LAB
B-HCG UR QL: NEGATIVE
CTP QC/QA: YES

## 2025-07-15 PROCEDURE — 99499 UNLISTED E&M SERVICE: CPT | Mod: ,,, | Performed by: STUDENT IN AN ORGANIZED HEALTH CARE EDUCATION/TRAINING PROGRAM

## 2025-07-15 PROCEDURE — 25000003 PHARM REV CODE 250: Performed by: PHYSICIAN ASSISTANT

## 2025-07-15 PROCEDURE — 71000033 HC RECOVERY, INTIAL HOUR: Performed by: ORTHOPAEDIC SURGERY

## 2025-07-15 PROCEDURE — 27201423 OPTIME MED/SURG SUP & DEVICES STERILE SUPPLY: Performed by: ORTHOPAEDIC SURGERY

## 2025-07-15 PROCEDURE — 36000710: Performed by: ORTHOPAEDIC SURGERY

## 2025-07-15 PROCEDURE — 71000015 HC POSTOP RECOV 1ST HR: Performed by: ORTHOPAEDIC SURGERY

## 2025-07-15 PROCEDURE — 25000003 PHARM REV CODE 250: Performed by: NURSE ANESTHETIST, CERTIFIED REGISTERED

## 2025-07-15 PROCEDURE — 36000711: Performed by: ORTHOPAEDIC SURGERY

## 2025-07-15 PROCEDURE — 29882 ARTHRS KNE SRG MNISC RPR M/L: CPT | Mod: 51,RT,, | Performed by: ORTHOPAEDIC SURGERY

## 2025-07-15 PROCEDURE — 81025 URINE PREGNANCY TEST: CPT | Performed by: PHYSICIAN ASSISTANT

## 2025-07-15 PROCEDURE — 63600175 PHARM REV CODE 636 W HCPCS: Performed by: NURSE ANESTHETIST, CERTIFIED REGISTERED

## 2025-07-15 PROCEDURE — 37000009 HC ANESTHESIA EA ADD 15 MINS: Performed by: ORTHOPAEDIC SURGERY

## 2025-07-15 PROCEDURE — 25000003 PHARM REV CODE 250: Performed by: ANESTHESIOLOGY

## 2025-07-15 PROCEDURE — 29888 ARTHRS AID ACL RPR/AGMNTJ: CPT | Mod: RT,,, | Performed by: ORTHOPAEDIC SURGERY

## 2025-07-15 PROCEDURE — 94761 N-INVAS EAR/PLS OXIMETRY MLT: CPT

## 2025-07-15 PROCEDURE — 37000008 HC ANESTHESIA 1ST 15 MINUTES: Performed by: ORTHOPAEDIC SURGERY

## 2025-07-15 PROCEDURE — 99900035 HC TECH TIME PER 15 MIN (STAT)

## 2025-07-15 PROCEDURE — C1762 CONN TISS, HUMAN(INC FASCIA): HCPCS | Performed by: ORTHOPAEDIC SURGERY

## 2025-07-15 PROCEDURE — 63600175 PHARM REV CODE 636 W HCPCS: Performed by: PHYSICIAN ASSISTANT

## 2025-07-15 PROCEDURE — C1713 ANCHOR/SCREW BN/BN,TIS/BN: HCPCS | Performed by: ORTHOPAEDIC SURGERY

## 2025-07-15 PROCEDURE — 25000003 PHARM REV CODE 250: Performed by: ORTHOPAEDIC SURGERY

## 2025-07-15 PROCEDURE — 63600175 PHARM REV CODE 636 W HCPCS: Performed by: ORTHOPAEDIC SURGERY

## 2025-07-15 PROCEDURE — 71000039 HC RECOVERY, EACH ADD'L HOUR: Performed by: ORTHOPAEDIC SURGERY

## 2025-07-15 PROCEDURE — 63600175 PHARM REV CODE 636 W HCPCS: Performed by: ANESTHESIOLOGY

## 2025-07-15 DEVICE — SCRW,CANN. INT.,W/DISP SHTH
Type: IMPLANTABLE DEVICE | Site: KNEE | Status: FUNCTIONAL
Brand: ARTHREX®

## 2025-07-15 DEVICE — IMPLSYS 2NDRY FIXATN BIOSWVLK 4.75X19.1
Type: IMPLANTABLE DEVICE | Site: KNEE | Status: FUNCTIONAL
Brand: ARTHREX®

## 2025-07-15 DEVICE — 4.75MM BC KNOTLESS SWIVELOCK
Type: IMPLANTABLE DEVICE | Site: KNEE | Status: FUNCTIONAL
Brand: ARTHREX®

## 2025-07-15 DEVICE — FIBER CORTICAL ENHANCE 2.5CC: Type: IMPLANTABLE DEVICE | Site: KNEE | Status: FUNCTIONAL

## 2025-07-15 DEVICE — SCREW, CANN. INT., FULL THREAD
Type: IMPLANTABLE DEVICE | Site: KNEE | Status: FUNCTIONAL
Brand: ARTHREX®

## 2025-07-15 RX ORDER — CEFAZOLIN 2 G/1
2 INJECTION, POWDER, FOR SOLUTION INTRAMUSCULAR; INTRAVENOUS
Status: COMPLETED | OUTPATIENT
Start: 2025-07-15 | End: 2025-07-15

## 2025-07-15 RX ORDER — PROMETHAZINE HYDROCHLORIDE 25 MG/1
25 TABLET ORAL EVERY 6 HOURS PRN
Status: DISCONTINUED | OUTPATIENT
Start: 2025-07-15 | End: 2025-07-15 | Stop reason: HOSPADM

## 2025-07-15 RX ORDER — DEXMEDETOMIDINE HYDROCHLORIDE 100 UG/ML
INJECTION, SOLUTION INTRAVENOUS
Status: DISCONTINUED | OUTPATIENT
Start: 2025-07-15 | End: 2025-07-15

## 2025-07-15 RX ORDER — OXYCODONE HYDROCHLORIDE 5 MG/1
10 TABLET ORAL EVERY 4 HOURS PRN
Status: DISCONTINUED | OUTPATIENT
Start: 2025-07-15 | End: 2025-07-15 | Stop reason: HOSPADM

## 2025-07-15 RX ORDER — OXYCODONE HYDROCHLORIDE 5 MG/1
5 TABLET ORAL
Status: DISCONTINUED | OUTPATIENT
Start: 2025-07-15 | End: 2025-07-15 | Stop reason: HOSPADM

## 2025-07-15 RX ORDER — DEXAMETHASONE SODIUM PHOSPHATE 4 MG/ML
INJECTION, SOLUTION INTRA-ARTICULAR; INTRALESIONAL; INTRAMUSCULAR; INTRAVENOUS; SOFT TISSUE
Status: DISCONTINUED | OUTPATIENT
Start: 2025-07-15 | End: 2025-07-15

## 2025-07-15 RX ORDER — KETAMINE HCL IN 0.9 % NACL 50 MG/5 ML
SYRINGE (ML) INTRAVENOUS
Status: DISCONTINUED | OUTPATIENT
Start: 2025-07-15 | End: 2025-07-15

## 2025-07-15 RX ORDER — FAMOTIDINE 10 MG/ML
INJECTION, SOLUTION INTRAVENOUS
Status: DISCONTINUED | OUTPATIENT
Start: 2025-07-15 | End: 2025-07-15

## 2025-07-15 RX ORDER — GLUCAGON 1 MG
1 KIT INJECTION
Status: DISCONTINUED | OUTPATIENT
Start: 2025-07-15 | End: 2025-07-15 | Stop reason: HOSPADM

## 2025-07-15 RX ORDER — KETAMINE HYDROCHLORIDE 100 MG/ML
INJECTION, SOLUTION INTRAMUSCULAR; INTRAVENOUS
Status: DISCONTINUED | OUTPATIENT
Start: 2025-07-15 | End: 2025-07-15 | Stop reason: HOSPADM

## 2025-07-15 RX ORDER — ROPIVACAINE HYDROCHLORIDE 5 MG/ML
INJECTION, SOLUTION EPIDURAL; INFILTRATION; PERINEURAL
Status: DISCONTINUED | OUTPATIENT
Start: 2025-07-15 | End: 2025-07-15 | Stop reason: HOSPADM

## 2025-07-15 RX ORDER — HALOPERIDOL LACTATE 5 MG/ML
0.5 INJECTION, SOLUTION INTRAMUSCULAR EVERY 10 MIN PRN
Status: DISCONTINUED | OUTPATIENT
Start: 2025-07-15 | End: 2025-07-15 | Stop reason: HOSPADM

## 2025-07-15 RX ORDER — PROPOFOL 10 MG/ML
VIAL (ML) INTRAVENOUS
Status: DISCONTINUED | OUTPATIENT
Start: 2025-07-15 | End: 2025-07-15

## 2025-07-15 RX ORDER — OXYCODONE HCL 10 MG/1
10 TABLET, FILM COATED, EXTENDED RELEASE ORAL
Status: COMPLETED | OUTPATIENT
Start: 2025-07-15 | End: 2025-07-15

## 2025-07-15 RX ORDER — SODIUM CHLORIDE 9 MG/ML
INJECTION, SOLUTION INTRAVENOUS CONTINUOUS
Status: DISCONTINUED | OUTPATIENT
Start: 2025-07-15 | End: 2025-07-15 | Stop reason: HOSPADM

## 2025-07-15 RX ORDER — TRAMADOL HYDROCHLORIDE 50 MG/1
100 TABLET, FILM COATED ORAL EVERY 6 HOURS PRN
Status: DISCONTINUED | OUTPATIENT
Start: 2025-07-15 | End: 2025-07-15 | Stop reason: HOSPADM

## 2025-07-15 RX ORDER — VANCOMYCIN HYDROCHLORIDE 1 G/20ML
INJECTION, POWDER, LYOPHILIZED, FOR SOLUTION INTRAVENOUS
Status: DISCONTINUED | OUTPATIENT
Start: 2025-07-15 | End: 2025-07-15 | Stop reason: HOSPADM

## 2025-07-15 RX ORDER — KETOROLAC TROMETHAMINE 30 MG/ML
INJECTION, SOLUTION INTRAMUSCULAR; INTRAVENOUS
Status: DISCONTINUED | OUTPATIENT
Start: 2025-07-15 | End: 2025-07-15 | Stop reason: HOSPADM

## 2025-07-15 RX ORDER — FENTANYL CITRATE 50 UG/ML
INJECTION, SOLUTION INTRAMUSCULAR; INTRAVENOUS
Status: DISCONTINUED | OUTPATIENT
Start: 2025-07-15 | End: 2025-07-15

## 2025-07-15 RX ORDER — PREGABALIN 75 MG/1
75 CAPSULE ORAL ONCE
Status: COMPLETED | OUTPATIENT
Start: 2025-07-15 | End: 2025-07-15

## 2025-07-15 RX ORDER — HYDROMORPHONE HYDROCHLORIDE 1 MG/ML
0.2 INJECTION, SOLUTION INTRAMUSCULAR; INTRAVENOUS; SUBCUTANEOUS EVERY 5 MIN PRN
Status: DISCONTINUED | OUTPATIENT
Start: 2025-07-15 | End: 2025-07-15 | Stop reason: HOSPADM

## 2025-07-15 RX ORDER — MORPHINE SULFATE 2 MG/ML
2 INJECTION, SOLUTION INTRAMUSCULAR; INTRAVENOUS EVERY 10 MIN PRN
Status: DISCONTINUED | OUTPATIENT
Start: 2025-07-15 | End: 2025-07-15 | Stop reason: HOSPADM

## 2025-07-15 RX ORDER — MIDAZOLAM HYDROCHLORIDE 1 MG/ML
INJECTION INTRAMUSCULAR; INTRAVENOUS
Status: DISCONTINUED | OUTPATIENT
Start: 2025-07-15 | End: 2025-07-15

## 2025-07-15 RX ORDER — ONDANSETRON HYDROCHLORIDE 2 MG/ML
4 INJECTION, SOLUTION INTRAVENOUS EVERY 12 HOURS PRN
Status: DISCONTINUED | OUTPATIENT
Start: 2025-07-15 | End: 2025-07-15 | Stop reason: HOSPADM

## 2025-07-15 RX ORDER — LIDOCAINE HYDROCHLORIDE 20 MG/ML
INJECTION INTRAVENOUS
Status: DISCONTINUED | OUTPATIENT
Start: 2025-07-15 | End: 2025-07-15

## 2025-07-15 RX ORDER — PREGABALIN 75 MG/1
75 CAPSULE ORAL
Status: COMPLETED | OUTPATIENT
Start: 2025-07-15 | End: 2025-07-15

## 2025-07-15 RX ORDER — ONDANSETRON HYDROCHLORIDE 2 MG/ML
INJECTION, SOLUTION INTRAVENOUS
Status: DISCONTINUED | OUTPATIENT
Start: 2025-07-15 | End: 2025-07-15

## 2025-07-15 RX ORDER — SODIUM CHLORIDE 0.9 % (FLUSH) 0.9 %
10 SYRINGE (ML) INJECTION
Status: DISCONTINUED | OUTPATIENT
Start: 2025-07-15 | End: 2025-07-15 | Stop reason: HOSPADM

## 2025-07-15 RX ORDER — ROCURONIUM BROMIDE 10 MG/ML
INJECTION, SOLUTION INTRAVENOUS
Status: DISCONTINUED | OUTPATIENT
Start: 2025-07-15 | End: 2025-07-15

## 2025-07-15 RX ORDER — EPINEPHRINE 1 MG/ML
INJECTION, SOLUTION, CONCENTRATE INTRAVENOUS
Status: DISCONTINUED | OUTPATIENT
Start: 2025-07-15 | End: 2025-07-15 | Stop reason: HOSPADM

## 2025-07-15 RX ADMIN — CEFAZOLIN 2 G: 2 INJECTION, POWDER, FOR SOLUTION INTRAMUSCULAR; INTRAVENOUS at 09:07

## 2025-07-15 RX ADMIN — PREGABALIN 75 MG: 75 CAPSULE ORAL at 07:07

## 2025-07-15 RX ADMIN — PREGABALIN 75 MG: 75 CAPSULE ORAL at 12:07

## 2025-07-15 RX ADMIN — Medication 30 MG: at 08:07

## 2025-07-15 RX ADMIN — FENTANYL CITRATE 100 MCG: 50 INJECTION, SOLUTION INTRAMUSCULAR; INTRAVENOUS at 08:07

## 2025-07-15 RX ADMIN — SODIUM CHLORIDE: 0.9 INJECTION, SOLUTION INTRAVENOUS at 07:07

## 2025-07-15 RX ADMIN — HYDROMORPHONE HYDROCHLORIDE 0.2 MG: 1 INJECTION, SOLUTION INTRAMUSCULAR; INTRAVENOUS; SUBCUTANEOUS at 12:07

## 2025-07-15 RX ADMIN — MIDAZOLAM HYDROCHLORIDE 2 MG: 2 INJECTION, SOLUTION INTRAMUSCULAR; INTRAVENOUS at 08:07

## 2025-07-15 RX ADMIN — DEXMEDETOMIDINE 20 MCG: 100 INJECTION, SOLUTION, CONCENTRATE INTRAVENOUS at 09:07

## 2025-07-15 RX ADMIN — SUGAMMADEX 200 MG: 100 INJECTION, SOLUTION INTRAVENOUS at 11:07

## 2025-07-15 RX ADMIN — LIDOCAINE HYDROCHLORIDE 75 MG: 20 INJECTION INTRAVENOUS at 08:07

## 2025-07-15 RX ADMIN — DEXAMETHASONE SODIUM PHOSPHATE 8 MG: 4 INJECTION, SOLUTION INTRAMUSCULAR; INTRAVENOUS at 09:07

## 2025-07-15 RX ADMIN — Medication 20 MG: at 09:07

## 2025-07-15 RX ADMIN — OXYCODONE HYDROCHLORIDE 5 MG: 5 TABLET ORAL at 12:07

## 2025-07-15 RX ADMIN — ONDANSETRON 4 MG: 2 INJECTION INTRAMUSCULAR; INTRAVENOUS at 09:07

## 2025-07-15 RX ADMIN — OXYCODONE HYDROCHLORIDE 10 MG: 10 TABLET, FILM COATED, EXTENDED RELEASE ORAL at 07:07

## 2025-07-15 RX ADMIN — ROCURONIUM BROMIDE 50 MG: 10 INJECTION, SOLUTION INTRAVENOUS at 08:07

## 2025-07-15 RX ADMIN — HYDROMORPHONE HYDROCHLORIDE 0.2 MG: 1 INJECTION, SOLUTION INTRAMUSCULAR; INTRAVENOUS; SUBCUTANEOUS at 01:07

## 2025-07-15 RX ADMIN — FAMOTIDINE 20 MG: 10 INJECTION, SOLUTION INTRAVENOUS at 09:07

## 2025-07-15 RX ADMIN — SODIUM CHLORIDE, SODIUM GLUCONATE, SODIUM ACETATE, POTASSIUM CHLORIDE, MAGNESIUM CHLORIDE, SODIUM PHOSPHATE, DIBASIC, AND POTASSIUM PHOSPHATE: .53; .5; .37; .037; .03; .012; .00082 INJECTION, SOLUTION INTRAVENOUS at 09:07

## 2025-07-15 RX ADMIN — PROPOFOL 180 MG: 10 INJECTION, EMULSION INTRAVENOUS at 08:07

## 2025-07-15 NOTE — PLAN OF CARE
Preop complete. Pt has crutches for discharge with parents. Pt resting comfortably. Call light in reach,side rails up, bed in lowest position

## 2025-07-15 NOTE — OPERATIVE NOTE ADDENDUM
Certification of Assistant at Surgery       Surgery Date: 7/15/2025     Participating Surgeons:  Surgeons and Role:     * Charity William MD - Primary    Procedures:  Procedure(s) (LRB):  RECONSTRUCTION, KNEE, ACL, ARTHROSCOPIC WITH PATELLAR TENDON AUTOGRAFT (Right)  ARTHROSCOPY, KNEE, WITH MENISCECTOMY (Right)  EXCISION, PLICA, KNEE, ARTHROSCOPIC (Right)  ARTHROSCOPY, KNEE, W/ MENISCUS REPAIR (Right)    Assistant Surgeon's Certification of Necessity:  I understand that section 1842 (b) (6) (d) of the Social Security Act generally prohibits Medicare Part B reasonable charge payment for the services of assistants at surgery in teaching hospitals when qualified residents are available to furnish such services. I certify that the services for which payment is claimed were medically necessary, and that no qualified resident was available to perform the services. I further understand that these services are subject to post-payment review by the Medicare carrier.      Caleb Frye MD    07/15/2025  10:53 AM

## 2025-07-15 NOTE — ANESTHESIA PREPROCEDURE EVALUATION
"                                                                                                             07/15/2025  Pre-operative evaluation for Procedure(s) (LRB):  RECONSTRUCTION, KNEE, ACL, ARTHROSCOPIC (Right)  ARTHROSCOPY, KNEE, WITH MENISCECTOMY (Right)  EXCISION, PLICA, KNEE, ARTHROSCOPIC (Right)    Karla Price is a 24 y.o. female     Past Medical History:   Diagnosis Date    Eczema      Problem List[1]  Review of patient's allergies indicates:  No Known Allergies    Medications Ordered Prior to Encounter[2]    Past Surgical History:   Procedure Laterality Date    ARTHROSCOPY,KNEE,WITH MENISCUS REPAIR Left 6/13/2023    Procedure: ARTHROSCOPY,KNEE,WITH MENISCUS REPAIR;  Surgeon: Cecilio Payton MD;  Location: [facility] OR;  Service: Orthopedics;  Laterality: Left;    FIXATION OF TENDON Left 6/13/2023    Procedure: FIXATION, TENDON-LATERAL EXTRA ARTICULAR TENODESIS;  Surgeon: Ceciloi Payton MD;  Location: [facility] OR;  Service: Orthopedics;  Laterality: Left;    RECONSTRUCTION OF LIGAMENT Left 6/13/2023    Procedure: RECONSTRUCTION, ACL-QUAD AUTO;  Surgeon: Cecilio Payton MD;  Location: [facility] OR;  Service: Orthopedics;  Laterality: Left;  REGIONAL BLOCK       CBC: No results for input(s): "WBC", "HGB", "HCT", "PLT" in the last 72 hours.    CMP: No results for input(s): "NA", "K", "CL", "CO2", "BUN", "CREATININE", "GLU", "MG", "PHOS", "CALCIUM", "ALBUMIN", "PROT", "ALKPHOS", "ALT", "AST", "BILITOT" in the last 72 hours.    COAGS  No results for input(s): "PT", "INR", "PROTIME", "APTT" in the last 72 hours.    BP Readings from Last 3 Encounters:   07/09/25 120/88   06/25/25 134/85   08/28/24 135/88       Diagnostic Studies:    EKG:  No results found for this or any previous visit.    2D Echo:  No results found for this or any previous visit.        Pre-op Assessment    I have reviewed the Patient Summary Reports.     I have reviewed the Nursing Notes. I have reviewed the NPO Status.   I have reviewed the " Medications.     Review of Systems  Anesthesia Hx:  No problems with previous Anesthesia   History of prior surgery of interest to airway management or planning:  Previous anesthesia: General, Nerve Block 6/13/2023  Left Knee Arthroscopy, ACL Reconstruction with general anesthesia.  Procedure performed at an [facility].     for 6/13/2023 Left Knee Arthroscopy, ACL Recondtruction.  Procedure performed at an [facility].  Airway issues documented on chart review include mask, easy, GETA, videolaryngoscope used  , view on video-laryngoscopy Grade I      Denies Personal Hx of Anesthesia complications.                    Social:  Non-Smoker, No Alcohol Use       Hematology/Oncology:  Hematology Normal   Oncology Normal                                   EENT/Dental:  EENT/Dental Normal           Cardiovascular:  Cardiovascular Normal Exercise tolerance: good        Denies Dysrhythmias.         Denies ALONSO.    Patient not on beta blockers                          Pulmonary:  Pulmonary Normal    Denies Asthma.   Denies Shortness of breath.                  Renal/:  Renal/ Normal                 Hepatic/GI:  Hepatic/GI Normal     Denies GERD.    Not Taking GLP-1 Agonists            Musculoskeletal:     Anterior cruciate ligament complete tear, right,   Old tear of lateral meniscus of right knee,   Plica syndrome of knee, right,  Chondromalacia of knee, right,  H/O Left Knee Arthroscopy, ACL Reconstruction, Meniscus Repair, Fixation of Tendon              Neurological:  Neurology Normal      Denies Seizures.                                Endocrine:  Denies Diabetes. Denies Hypothyroidism.        Obesity / BMI > 30  Dermatological:  Eczema   Psych:  Psychiatric Normal                    Physical Exam  General: Well nourished, Cooperative and Alert    Airway:  Mallampati: III / II  Mouth Opening: Normal  TM Distance: Normal  Neck ROM: Normal ROM    Dental:  Intact        Anesthesia Plan  Type of Anesthesia, risks  & benefits discussed:    Anesthesia Type: Gen ETT  Intra-op Monitoring Plan: Standard ASA Monitors  Post Op Pain Control Plan: multimodal analgesia and IV/PO Opioids PRN  Induction:  IV  Informed Consent: Informed consent signed with the Patient and all parties understand the risks and agree with anesthesia plan.  All questions answered.   ASA Score: 2  Day of Surgery Review of History & Physical: H&P Update referred to the surgeon/provider.    Ready For Surgery From Anesthesia Perspective.     .           [1]   Patient Active Problem List  Diagnosis    Morbid obesity   [2]   No current facility-administered medications on file prior to encounter.     Current Outpatient Medications on File Prior to Encounter   Medication Sig Dispense Refill    ibuprofen (ADVIL,MOTRIN) 800 MG tablet Take 1 tablet (800 mg total) by mouth every 6 (six) hours as needed for Pain. (Patient not taking: Reported on 7/9/2025) 40 tablet 0

## 2025-07-15 NOTE — PLAN OF CARE
VSS. Pt able to tolerate oral liquids. Pt reports tolerable pain level for D/C. Ice packs and dressing intact. Mom received home meds per bedside delivery. Pt has crutches at home. No distress noted. Pt ready for D/C. D/C instructions reviewed with pt and mom, verbalized understanding.

## 2025-07-15 NOTE — TRANSFER OF CARE
Anesthesia Transfer of Care Note    Patient: Karla Price    Procedure(s) Performed: Procedure(s) (LRB):  RECONSTRUCTION, KNEE, ACL, ARTHROSCOPIC WITH PATELLAR TENDON AUTOGRAFT, WITH INTERNAL BRACE AUGMENTATION (Right)  EXCISION, PLICA, KNEE, ARTHROSCOPIC (Right)  ARTHROSCOPY, KNEE, W/ MEDIAL MENISCUS REPAIR (Right)    Patient location: PACU    Anesthesia Type: general    Transport from OR: Transported from OR on 6-10 L/min O2 by face mask with adequate spontaneous ventilation    Post pain: adequate analgesia    Post assessment: no apparent anesthetic complications and tolerated procedure well    Post vital signs: stable    Level of consciousness: sedated    Nausea/Vomiting: no nausea/vomiting    Complications: none    Transfer of care protocol was followed      Last vitals: Visit Vitals  BP (!) 142/91 (BP Location: Left arm, Patient Position: Lying)   Pulse 68   Temp 36.7 °C (98.1 °F) (Temporal)   Resp 18   Ht 5' (1.524 m)   Wt 79.4 kg (175 lb)   LMP 06/02/2025 (Approximate)   SpO2 100%   Breastfeeding No   BMI 34.18 kg/m²

## 2025-07-15 NOTE — ANESTHESIA PROCEDURE NOTES
Intubation    Date/Time: 7/15/2025 8:55 AM    Performed by: Karley Romero CRNA  Authorized by: Bhupendra Coronado III, MD    Intubation:     Induction:  Intravenous    Intubated:  Postinduction    Mask Ventilation:  Easy mask    Attempts:  1    Attempted By:  CRNA    Method of Intubation:  Video laryngoscopy    Blade:  Hoskins 3    Laryngeal View Grade: Grade I - full view of cords      Difficult Airway Encountered?: No      Complications:  None    Airway Device:  Oral endotracheal tube    Airway Device Size:  7.5    Style/Cuff Inflation:  Cuffed    Inflation Amount (mL):  8    Tube secured:  22    Secured at:  The lips    Placement Verified By:  Capnometry    Complicating Factors:  None    Findings Post-Intubation:  BS equal bilateral and atraumatic/condition of teeth unchanged

## 2025-07-15 NOTE — BRIEF OP NOTE
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]    Post-op Diagnosis:  Post-Op Diagnosis Codes:     * Anterior cruciate ligament complete tear, right, initial encounter [S83.511A]     * Old tear of lateral meniscus of right knee, unspecified tear type [M23.200]     * Plica syndrome of knee, right [M67.51]     * Chondromalacia of knee, 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]    Procedure(s) (LRB):  RECONSTRUCTION, KNEE, ACL, ARTHROSCOPIC WITH PATELLAR TENDON AUTOGRAFT (Right)  ARTHROSCOPY, KNEE, WITH MENISCECTOMY (Right)  EXCISION, PLICA, KNEE, ARTHROSCOPIC (Right)  ARTHROSCOPY, KNEE, W/ MENISCUS REPAIR (Right)    Anesthesia: General    Operative Findings: ACL rupture, medial meniscus tear, chondromalacia    Estimated Blood Loss: * No values recorded between 7/15/2025  8:39 AM and 7/15/2025 10:53 AM *         Specimens:   Specimen (24h ago, onward)      None            * No specimens in log *        Discharge Note    OUTCOME: Patient tolerated treatment/procedure well without complication and is now ready for discharge.    DISPOSITION: Home or Self Care    FINAL DIAGNOSIS: ACL rupture, medial meniscus tear, chondromalacia    FOLLOWUP: In clinic    DISCHARGE INSTRUCTIONS:    Discharge Procedure Orders   Diet general     Call MD for:  temperature >100.4     Call MD for:  persistent nausea and vomiting     Call MD for:  severe uncontrolled pain     Call MD for:  difficulty breathing, headache or visual disturbances     Call MD for:  redness, tenderness, or signs of infection (pain, swelling, redness, odor or green/yellow discharge around incision site)     Call MD for:  hives      Call MD for:  persistent dizziness or light-headedness     Ice to affected area   Order Comments: using barrier between ice and skin (specify duration&frequency)     No driving, operating heavy equipment or signing legal documents while taking pain medication     Remove dressing in 72 hours

## 2025-07-16 ENCOUNTER — CLINICAL SUPPORT (OUTPATIENT)
Dept: REHABILITATION | Facility: HOSPITAL | Age: 25
End: 2025-07-16
Payer: COMMERCIAL

## 2025-07-16 DIAGNOSIS — M62.81 QUADRICEPS WEAKNESS: Primary | ICD-10-CM

## 2025-07-16 DIAGNOSIS — S83.511A ANTERIOR CRUCIATE LIGAMENT COMPLETE TEAR, RIGHT, INITIAL ENCOUNTER: ICD-10-CM

## 2025-07-16 PROCEDURE — 97161 PT EVAL LOW COMPLEX 20 MIN: CPT

## 2025-07-16 PROCEDURE — 97110 THERAPEUTIC EXERCISES: CPT

## 2025-07-16 PROCEDURE — 97112 NEUROMUSCULAR REEDUCATION: CPT

## 2025-07-16 NOTE — PROGRESS NOTES
Outpatient Rehab    Physical Therapy Evaluation    Patient Name: Karla Price  MRN: [id number]  YOB: 2000  Encounter Date: 7/16/2025    Therapy Diagnosis:   Encounter Diagnoses   Name Primary?    Anterior cruciate ligament complete tear, right, initial encounter     Quadriceps weakness Yes     Physician: Riki Do III, *    Physician Orders: Eval and Treat  Medical Diagnosis: Anterior cruciate ligament complete tear, right, initial encounter  Surgical Diagnosis: Not applicable for this Episode   Surgical Date: Not applicable for this Episode  Days Since Last Surgery: Not applicable for this Episode    Visit # / Visits Authorized:  1 / 1  Insurance Authorization Period: 7/9/2025 to 12/31/2025  Date of Evaluation: 7/16/2025  Plan of Care Certification: 7/16/2025 to 7/16/2026     Time In: 1400   Time Out: 1500  Total Time (in minutes): 60   Total Billable Time (in minutes):      Intake Outcome Measure for FOTO Survey    Therapist reviewed FOTO scores for Karla Price on 7/16/2025.   FOTO report - see Media section or FOTO account episode details.     Intake Score: 40%    Precautions:       Subjective   History of Present Illness  Karla is a 24 y.o. female who reports to physical therapy with a chief concern of R ACL repair.                 History of Present Condition/Illness: She was walking about 2 months ago when she felt a pop. Ended up tearing her ACL on the right side. Pt with hx of ACL tear on the L. She lives in [place] right now, and is here for surgery and plans to stay until at least August and do PT here. She notes that living in [place] she has to do a lot more walking.     Pain     Patient reports a current pain level of 6/10. Pain at best is reported as 5/10. Pain at worst is reported as 9/10.   Location: R knee  Clinical Progression (since onset): Stable  Pain Qualities: Aching, Dull  Pain-Relieving Factors: Medications - prescription  Pain-Aggravating Factors: Bending          Employment  Employment Status: Student   Works with toddlers on the side of school       Past Medical History/Physical Systems Review:   Karla Price  has a past medical history of Eczema.    Karla Price  has a past surgical history that includes Reconstruction of ligament (Left, 6/13/2023); Fixation of tendon (Left, 6/13/2023); arthroscopy,knee,with meniscus repair (Left, 6/13/2023); Knee arthroscopy w/ ACL reconstruction (Right, 7/15/2025); Knee arthroscopy w/ plica excision (Right, 7/15/2025); and arthroscopy,knee,with meniscus repair (Right, 7/15/2025).    Karla has a current medication list which includes the following prescription(s): aspirin, docusate sodium, ketorolac, oxycodone-acetaminophen, promethazine, journavx, and tramadol.    Review of patient's allergies indicates:  No Known Allergies     Objective          Observation: alert and oriented.  Post-op dressing/brace in place.    Gait: Bilat axillary crutches NWB     Functional tests:               SL Squat: Not performed 2/2 post-op.              DL Squat: Not performed 2/2 post-op.              Step-down: Not performed 2/2 post-op.              SLS EO: Not performed 2/2 post-op.              SLS EC: Not performed 2/2 post-op.     Range of Motion:   Knee Right Passive Left Passive   Flexion 35 135   Extension 5 10      Lower Extremity Strength:  Formal MMT not performed 2/2 POD#1 and increased pain.  poor quad activation noted R LE.     Special Tests:  Not performed 2/2 post-op.     Joint Mobility: hypomobile patella as expected post-op.     Palpation: NT     Sensation:  Intact; diminished 2/2 residual nerve block.     Flexibility:  Grossly hypomobile quad, hamstring and gastroc as expected on post-op R LE     Edema: increased  as expected post-op     Treatment:  Therapeutic Exercise  TE 1: Pt education on precautions and 5 post op goals  TE 2: Supine knee ext LLLD 5 min  TE 3: Supine heel slides towel under distal femur -  x30  Balance/Neuromuscular Re-Education  NMR 1: QS x20  NMR 2: SLR, S/L abd brace donned - x10    Time Entry(in minutes):  PT Evaluation (Low) Time Entry: 37  Neuromuscular Re-Education Time Entry: 8  Therapeutic Exercise Time Entry: 15    Assessment & Plan   Assessment  Karla presents with a condition of Low complexity.   Presentation of Symptoms: Stable  Will Comorbidities Impact Care: No       Functional Limitations: Activity tolerance, Completing self-care activities, Proprioception, Range of motion, Participating in leisure activities, Pain with ADLs/IADLs, Gross motor coordination  Impairments: Pain with functional activity, Impaired physical strength  Personal Factors Affecting Prognosis: Pain    Prognosis: Excellent  Assessment Details: Pt with complaints of continued R knee stiffness and quad weakness consistent with referring dx limiting ADL's and functional activities 2/2 post-operative status. Upon evaluation patient presents with decreased ROM, joint mobility and flexibility restrictions, decreased strength and motor control contributing to limited functional status at this time. Patient would benefit from appropriate manual therapy, mobility, flexibility, strengthening and NM re-education in order to address the before-mentioned deficits and return to PLOF with ADLs.       Plan  From a physical therapy perspective, the patient would benefit from: Skilled Rehab Services    Planned therapy interventions include: Therapeutic exercise, Therapeutic activities, Neuromuscular re-education, Manual therapy, ADLs/IADLs, Other (Comment), and Gait training. Dry Needling (prn)  Planned modalities to include: Biofeedback, Electrical stimulation - attended, Electrical stimulation - passive/unattended, Thermotherapy (hot pack), and Cryotherapy (cold pack).        Visit Frequency: 2 times Per Week for 36 Weeks.       This plan was discussed with Patient.   Discussion participants: Agreed Upon Plan of Care  Plan  details: Frequency and duration of treatment to be adjusted as needed        The patient's spiritual, cultural, and educational needs were considered, and the patient is agreeable to the plan of care and goals.           Goals:   Active       LTG - 36 weeks       Pt completes return to jogging program       Start:  07/16/25    Expected End:  03/26/26            Isometric quad strength at 75 degree 90% or better LSI       Start:  07/16/25    Expected End:  03/26/26            Pt reports 90% or better improvement in function       Start:  07/16/25    Expected End:  03/26/26               STG 12 weeks       Full active hyper extension, flexion 120 degrees or better       Start:  07/16/25    Expected End:  10/09/25            Amb without deviations in community       Start:  07/16/25    Expected End:  10/09/25            Pt independent in initial hep       Start:  07/16/25    Expected End:  10/09/25                Vernon Hart PT

## 2025-07-16 NOTE — OP NOTE
DATE OF PROCEDURE: 7/15/2025    SURGEON:  Charity William M.D    ASSISTANT:  ULISES Frye MD PGY-6\  ASSISTANT: MARGOTH Downing MD PGY4  ASSISTANT: ANDREW Do PA-C      PREOPERATIVE DIAGNOSES:   right  1. knee ACL tear.   2. knee plica.   3. knee synovitis.   4. knee medial meniscus tear     POSTOPERATIVE DIAGNOSES:   right  1. knee ACL tear.   2. knee plica.   3. knee synovitis.   4. knee medial meniscus tear     PROCEDURE PERFORMED:   right  1. knee arthroscopic ACL reconstruction with patellar tendon autograft (CPT 51136)  2. knee application of xenograft (Internal Brace tissue reinforcement) to ACL (CPT 80072)  3. knee arthroscopic partial synovectomy/debridement (CPT 31323)  4. knee arthroscopic plica excision (CPT 22594)  5. knee open patellar tendon repair  6. knee arthroscopic medial meniscus repair (CPT 80722)   7. knee autologous bone grafting to patellar and tibial harvest sites    ANESTHESIA: General with local block.     BLOOD LOSS: Minimal.     DRAINS: None.     TOURNIQUET TIME: None.     COMPLICATIONS: None.     CONDITION ON TRANSFER: The patient was extubated and moved to the recovery room in stable condition, with compartments soft and capillary refill less than a   second in all digits.     BRIEF INDICATION OF MEDICAL NECESSITY: The patient is a 24 y.o. year-old female who has history and physical examination findings consistent with the above. Nonoperative versus operative options were discussed. The risks and benefits were discussed with the patient. The patient acknowledged understanding and wished to proceed with operative intervention. Informed consent was obtained prior to the procedure. Details of the surgical procedure were explained, including incisions and probable rehabilitation course. The patient understands the likely length of convalescence after surgery; and we have explained the risks, benefits, and alternatives of surgery. Reasonable expectations and potential complications were discussed  and acknowledged, including but not limited to infection, bleeding, blood clots, (DVT and/or PE), nerve injury, retear, instability, continued pain and stiffness. It was also explained that there was a chance of failure which may require further management. The patient agreed and understood and wished to proceed.     EXAMINATION UNDER ANESTHESIA of the OPERATIVE right KNEE: ROM 0-145 degrees, grade 3b Lachman, grade 2 pivot shift, stable to varus-valgus stress testing, negative effusion.     EXAMINATION UNDER ANESTHESIA of the NON-OPERATED left KNEE: ROM 0-145 degrees, negative Lachman, negative pivot shift, stable to varus-valgus stress testing, negative effusion.     ACL GRAFT: Autograft BTB:   After preparation measurements:   Tendon graft width: 10 mm, top: 30mm, middle: 28mm, bottom: 23mm  Total graft length 71 mm   Tendon length 37 mm   Bone block width: 10 mm   Tibial bone block 21 mm   Femoral bone block 20 mm   TUNNELS: Anatomic   Femoral: AM portal drilled, 25 mm drilled, 15 mm used for graft   Tibial: 55 degree Arthrex guide for drilling   Distance from medial edge of harvest to center of pin: 18 mm  Tibial tunnel screw notching clock position: 3:00    PROCEDURE IN DETAIL: The correct operative site was marked by the operative   surgeon and femoral nerve block was administered by the anesthesia team. The   patient was then taken to the operating room and placed supine on the operating   room table. General anesthesia was administered by the anesthesia team. All   pressure points were carefully padded and checked. Preoperative antibiotics   were administered. A well-padded tourniquet was placed high on the operative   thigh. Examination was begun with the above findings. The non-operative leg   was then placed a well-padded well-leg joseph, in a comfortable position. The   operative leg was placed in an arthroscopic leg joseph at the level of the   tourniquet. The operative leg was prepped and draped in  the usual sterile   fashion. After prepping and draping, the appropriate landmarks were noted on   the skin.    Attention was then turned to the patella tendon harvest. An 8 cm incision was   made over the medial aspect of the patella tendon. Paratenon was preserved and   blunt dissection was carried down through to the patella tendon with exposing   the distal patella and proximal tibia as well. Central patella tendon was   harvested in a standard fashion with bone blocks and tendon measuring as above.    Graft was carefully taken to the back table and prepared in the standard fashion with 2 drill holes and passing sutures thorough each bone block.     Graft was soaked in 1g Vancomycin powder diluted in 250cc Normal saline.    The knee was insufflated super-laterally with saline. Mid-lateral followed by infero-medial portals were created, and a systematic examination of the joint revealed the following:     There was no evidence of any suprapatellar pouch adhesions or compartmentalization.  There was no evidence of any loose bodies in the medial or lateral gutters.     There was no evidence of any chondral damage to the patella or trochlea.       Attention was then turned to the notch. The ACL was probed and found to be   incompetent and there was a positive empty-wall sign. The PCL was probed and   found to be stable.     There was a hypertrophic infrapatellar plica.  This was debrided using arthroscopic biter and shaver.    There was some scar about the ACL.  This was debrided with thermal device and lysis of adhesions was performed.    In the lateral compartment there was no evidence meniscal or chondral damage or meniscal instability.     Bucket-handle (90% depth) Posterior medial meniscus tear was then repaired, after debridement of inner edges of tear were debrided with shaver.  4 Arthrex Meniscal Cinch sutures were used to secure the meniscus nicely with vertical mattress.  Repair was solid and strong to  fixation.  Root and hoop fibers remained intact.  Tissue quality was fair.    4 sutures were used in all for the meniscus repair    Synovitis was debrided in the knee as needed to the areas of concern in medial   and lateral compartments.     Attention was then turned to the notch. Preparation of notch was perfomed with a   minimal notchplasty. Care was taken to remove the soft tissue in over-the-top   position using a curette and thermal device.     Femoral tunnel guidepin was placed into the medial portal at the anatomic   insertion site of the anteromedial and posterolateral bundle junction at the 10   o'clock position and 120 degrees of knee flexion. Guidepin was placed and then   reamed to 25 mm. Care was taken to avoid breach of the lateral cortex. Passing suture was placed and tunnel was cleared of all debris.    Tibial ACL guide was then placed in the appropriate position of the anatomic   insertion of the ACL and tibial guidepin was placed and tibial guidepin position   was found to be at the anatomic insertion site of the ACL and a tibial tunnel   was core drilled to a 9.0 mm, dilated to 9.5 mm and 10.0 mm, which was the size of   the graft.     Internal brace graft was fixed proximally to patellar tendon autograft with passing suture through the graft and bone block.  After proximal fixation with IF screw, Internal brace graft was placed in full extension.       The graft was then passed without difficulty and 8 x 20 mm Arthrex metal Delta screw was placed in the femoral tunnel (screw/bone/Internal brace graft) with solid fixation and fixation was stable to resistance distally. Under tension distally, the knee was cycled 15 times and was stable to resistance distally.     Tibial fixation was solid.  SwiveLock 4.75 x 19.1mm Arthrex BioComposite anchor was placed distal to tibial tunnel, securing the Internal Brace in full extension.    Next, 9 x 20 mm tibial Delta screw was placed in the tibia giving solid  fixation to the anterior cruciate ligament graft (screw/bone/Internal brace graft). The knee was stable with grade 1A Lachman.     SwiveLock 4.75 x 19.1mm Arthrex BioComposite anchor was placed distal to tibial tunnel, securing the tibial bone block supplementally in 20-30 degrees.    Autologous bone graft from reamings was placed, filling patellar harvest site and tibial harvest site was placed,after irrigation, as described below, was completed    Autologous bone graft from reamings was placed, filling patellar harvest site.  Demineralized cortical fibers allograft (MTF) 2.5cc to tibial harvest site was placed,after irrigation, as described below, was completed.    The knee and incisions were then copiously irrigated and fluid was extravasated   using suction. Subcutaneous tissues were closed in layers using inverted 0   Vicryl suture, closing the patellar tendon and paratenon in separate layers. The   subcutaneous tissues were closed using a 0 Vicryl suture in interrupted fashion.   Superficial subcutaneous tissues were closed using a 3-0 Vicryl suture in   interrupted fashion. The skin was closed using a running 4-0 Monocryl suture,   reapproximating the skin edges nicely. The arthroscopic portal incisions were   closed using inverted 4-0 Monocryl suture. Steri-Strips were placed with   Mastisol. Sterile TENS unit pads were placed which were medically necessary for   pain relief. Wounds were dressed with Xeroform, 4x4s, and cast padding. DYANA   hose was placed on the operative leg to match that of the DYANA hose placed   preoperatively on the non-operative leg. Iceman was secured and hinged knee   brace locked in extension was placed.     The patient was extubated and moved to the recovery room in stable condition   with compartments soft and capillary refill less than a second in all digits.     POSTOPERATIVE PLAN: We will be following the arthroscopic ACL reconstruction with meniscal repair guidelines. The  patient will remain toe-touch weightbearing for 4 weeks. We discussed this with the patient's family after surgery.   The patient will remainin the brace for 6 weeks, brace locked at 0-0 for ambulation x 6 weeks.    ROM 0-90 x 6 weeks.

## 2025-07-16 NOTE — ANESTHESIA POSTPROCEDURE EVALUATION
Anesthesia Post Evaluation    Patient: Kalra Price    Procedure(s) Performed: Procedure(s) (LRB):  RECONSTRUCTION, KNEE, ACL, ARTHROSCOPIC WITH PATELLAR TENDON AUTOGRAFT, WITH INTERNAL BRACE AUGMENTATION (Right)  EXCISION, PLICA, KNEE, ARTHROSCOPIC (Right)  ARTHROSCOPY, KNEE, W/ MEDIAL MENISCUS REPAIR (Right)    Final Anesthesia Type: general      Patient location during evaluation: PACU  Patient participation: Yes- Able to Participate  Level of consciousness: awake and alert  Post-procedure vital signs: reviewed and stable  Pain management: adequate  Airway patency: patent    PONV status at discharge: No PONV  Anesthetic complications: no      Cardiovascular status: blood pressure returned to baseline and hemodynamically stable  Respiratory status: unassisted  Hydration status: euvolemic  Follow-up not needed.              Vitals Value Taken Time   /81 07/15/25 14:32   Temp 36.7 °C (98.1 °F) 07/15/25 14:30   Pulse 86 07/15/25 14:34   Resp 28 07/15/25 14:33   SpO2 100 % 07/15/25 14:34   Vitals shown include unfiled device data.      Event Time   Out of Recovery 14:00:00         Pain/Joshua Score: Pain Rating Prior to Med Admin: 6 (7/15/2025  1:16 PM)  Pain Rating Post Med Admin: 6 (7/15/2025 12:59 PM)  Joshua Score: 9 (7/15/2025 12:13 PM)

## 2025-07-17 ENCOUNTER — CLINICAL SUPPORT (OUTPATIENT)
Dept: REHABILITATION | Facility: HOSPITAL | Age: 25
End: 2025-07-17
Payer: COMMERCIAL

## 2025-07-17 DIAGNOSIS — M62.81 QUADRICEPS WEAKNESS: Primary | ICD-10-CM

## 2025-07-17 PROCEDURE — 97112 NEUROMUSCULAR REEDUCATION: CPT | Mod: PN

## 2025-07-17 PROCEDURE — 97110 THERAPEUTIC EXERCISES: CPT | Mod: PN

## 2025-07-17 PROCEDURE — 97140 MANUAL THERAPY 1/> REGIONS: CPT | Mod: PN

## 2025-07-18 NOTE — PROGRESS NOTES
Outpatient Rehab    Physical Therapy Visit    Patient Name: Karla Price  MRN: [id number]  YOB: 2000  Encounter Date: 7/17/2025    Therapy Diagnosis:   Encounter Diagnosis   Name Primary?    Quadriceps weakness Yes     Physician: Riki Do III, *    Physician Orders: Eval and Treat  Medical Diagnosis: Anterior cruciate ligament complete tear, right, initial encounter  Surgical Diagnosis: Not applicable for this Episode   Surgical Date: Not applicable for this Episode  Days Since Last Surgery: Not applicable for this Episode    Visit # / Visits Authorized:  1 / 12  Insurance Authorization Period: 7/17/2025 to 12/31/2025  Date of Evaluation: 7/16/2025  Plan of Care Certification: 7/17/2025 to 7/16/2026      PT/PTA:     Number of PTA visits since last PT visit:   Time In: 1530   Time Out: 1630  Total Time (in minutes): 60   Total Billable Time (in minutes):      FOTO:  Intake Score (%): 40  Survey Score 2 (%): Not applicable for this Episode  Survey Score 3 (%): Not applicable for this Episode    Precautions:         Subjective   pain levels are low but starting to increae with the nerve block wearing off..  Pain reported as 5/10.      Objective            Treatment:  Therapeutic Exercise  TE 1: Pt education on importance of knee ext multiple times daily  TE 2: Supine knee ext LLLD 5 min + 3# weight and strap  TE 3: Knee ext hinge stretch with strap - 20x10s  TE 4: NMES: Quad sets with strap, Quad sets with towel under tiba 5 mins each  TE 5: EOM self assisted flexion x20  TE 6: Supine heel slides towel under distal femur - x30  Manual Therapy  MT 1: Patellar mobs 4D  MT 2: Fat pad mobs  MT 3: EOM passive flexion  MT 4: ext hinge mobs  Balance/Neuromuscular Re-Education  NMR 1: QS x20  NMR 2: SLR, S/L abd brace donned - 3x10    Time Entry(in minutes):  Manual Therapy Time Entry: 15  Neuromuscular Re-Education Time Entry: 15  Therapeutic Exercise Time Entry: 30    Assessment & Plan   Assessment: Pt  with excellent ext and able to achieve slight active hyper ext after NMES. Also able to complete SLR no brace with min lag, but fatigues very fast with this. Flexion slow to come back but improved 10 deg today.        The patient will continue to benefit from skilled outpatient physical therapy in order to address the deficits listed in the problem list on the initial evaluation, provide patient and family education, and maximize the patients level of independence in the home and community environments.     The patient's spiritual, cultural, and educational needs were considered, and the patient is agreeable to the plan of care and goals.           Plan: Cont POC    Goals:   Active       LTG - 36 weeks       Pt completes return to jogging program       Start:  07/16/25    Expected End:  03/26/26            Isometric quad strength at 75 degree 90% or better LSI       Start:  07/16/25    Expected End:  03/26/26            Pt reports 90% or better improvement in function       Start:  07/16/25    Expected End:  03/26/26               STG 12 weeks       Full active hyper extension, flexion 120 degrees or better       Start:  07/16/25    Expected End:  10/09/25            Amb without deviations in community       Start:  07/16/25    Expected End:  10/09/25            Pt independent in initial hep       Start:  07/16/25    Expected End:  10/09/25                Vernon Hart PT

## 2025-07-22 ENCOUNTER — CLINICAL SUPPORT (OUTPATIENT)
Dept: REHABILITATION | Facility: HOSPITAL | Age: 25
End: 2025-07-22
Payer: COMMERCIAL

## 2025-07-22 DIAGNOSIS — M62.81 QUADRICEPS WEAKNESS: Primary | ICD-10-CM

## 2025-07-22 PROCEDURE — 97110 THERAPEUTIC EXERCISES: CPT | Mod: PN

## 2025-07-22 PROCEDURE — 97112 NEUROMUSCULAR REEDUCATION: CPT | Mod: PN

## 2025-07-22 PROCEDURE — 97140 MANUAL THERAPY 1/> REGIONS: CPT | Mod: PN

## 2025-07-22 NOTE — PROGRESS NOTES
Outpatient Rehab    Physical Therapy Visit    Patient Name: Karla Price  MRN: [id number]  YOB: 2000  Encounter Date: 7/22/2025    Therapy Diagnosis:   Encounter Diagnosis   Name Primary?    Quadriceps weakness Yes       Physician: Riki Do III, *    Physician Orders: Eval and Treat  Medical Diagnosis: Anterior cruciate ligament complete tear, right, initial encounter  Surgical Diagnosis: Not applicable for this Episode   Surgical Date: Not applicable for this Episode  Days Since Last Surgery: Not applicable for this Episode    Visit # / Visits Authorized:  2 / 12  Insurance Authorization Period: 7/17/2025 to 12/31/2025  Date of Evaluation: 7/16/2025  Plan of Care Certification: 7/17/2025 to 7/16/2026      PT/PTA:     Number of PTA visits since last PT visit:   Time In: 1500   Time Out: 1600  Total Time (in minutes): 60   Total Billable Time (in minutes):      FOTO:  Intake Score (%): 40  Survey Score 2 (%): Not applicable for this Episode  Survey Score 3 (%): Not applicable for this Episode    Precautions:         Subjective   Its been hurting her a little bit more since Sunday. Notices that its more swollen..  Pain reported as 6/10.      Objective            Treatment:  Therapeutic Exercise  TE 1: wound inspection  TE 2: Supine knee ext LLLD 5 min + 3# weight and strap  TE 3: Knee ext hinge stretch with strap - 20x10s  TE 5: EOM self assisted flexion x20  TE 6: Supine heel slides towel under distal femur - x30  Manual Therapy  MT 1: Patellar mobs 4D  MT 2: Fat pad mobs  MT 3: EOM passive flexion  MT 4: ext hinge mobs  Balance/Neuromuscular Re-Education  NMR 1: NMES: Quad sets with strap, Quad sets with towel under tiba 5 mins each  NMR 2: SLR, S/L abd brace donned - 3x10    Time Entry(in minutes):  Manual Therapy Time Entry: 8  Neuromuscular Re-Education Time Entry: 14  Therapeutic Exercise Time Entry: 38    Assessment & Plan   Assessment: Despite increased swelling, no signs of symptoms  of infection. Incision looks good and healing well. Change dressing. Still struggling with flexion, but extension is excellent. Quad said still fair too good.         The patient will continue to benefit from skilled outpatient physical therapy in order to address the deficits listed in the problem list on the initial evaluation, provide patient and family education, and maximize the patients level of independence in the home and community environments.     The patient's spiritual, cultural, and educational needs were considered, and the patient is agreeable to the plan of care and goals.           Plan: Cont POC    Goals:   Active       LTG - 36 weeks       Pt completes return to jogging program       Start:  07/16/25    Expected End:  03/26/26            Isometric quad strength at 75 degree 90% or better LSI       Start:  07/16/25    Expected End:  03/26/26            Pt reports 90% or better improvement in function       Start:  07/16/25    Expected End:  03/26/26               STG 12 weeks       Full active hyper extension, flexion 120 degrees or better       Start:  07/16/25    Expected End:  10/09/25            Amb without deviations in community       Start:  07/16/25    Expected End:  10/09/25            Pt independent in initial hep       Start:  07/16/25    Expected End:  10/09/25                  Vernon Hart PT

## 2025-07-24 ENCOUNTER — CLINICAL SUPPORT (OUTPATIENT)
Dept: REHABILITATION | Facility: HOSPITAL | Age: 25
End: 2025-07-24
Payer: COMMERCIAL

## 2025-07-24 DIAGNOSIS — M62.81 QUADRICEPS WEAKNESS: Primary | ICD-10-CM

## 2025-07-24 PROCEDURE — 97140 MANUAL THERAPY 1/> REGIONS: CPT | Mod: PN

## 2025-07-24 PROCEDURE — 97110 THERAPEUTIC EXERCISES: CPT | Mod: PN

## 2025-07-24 PROCEDURE — 97112 NEUROMUSCULAR REEDUCATION: CPT | Mod: PN

## 2025-07-25 NOTE — PROGRESS NOTES
Outpatient Rehab    Physical Therapy Visit    Patient Name: Karla Price  MRN: [id number]  YOB: 2000  Encounter Date: 7/24/2025    Therapy Diagnosis:   Encounter Diagnosis   Name Primary?    Quadriceps weakness Yes       Physician: Riki Do III, *    Physician Orders: Eval and Treat  Medical Diagnosis: Anterior cruciate ligament complete tear, right, initial encounter  Surgical Diagnosis: Not applicable for this Episode   Surgical Date: Not applicable for this Episode  Days Since Last Surgery: Not applicable for this Episode    Visit # / Visits Authorized:  3 / 12  Insurance Authorization Period: 7/17/2025 to 12/31/2025  Date of Evaluation: 7/16/2025  Plan of Care Certification: 7/17/2025 to 7/16/2026      PT/PTA:     Number of PTA visits since last PT visit:   Time In: 1500   Time Out: 1600  Total Time (in minutes): 60   Total Billable Time (in minutes):      FOTO:  Intake Score (%): 40  Survey Score 2 (%): Not applicable for this Episode  Survey Score 3 (%): Not applicable for this Episode    Precautions:  Additional Precautions and Protocol Details: POSTOPERATIVE PLAN: We will be following the arthroscopic ACL reconstruction with meniscal repair guidelines. The patient will remain toe-touch weightbearing for 4 weeks. We discussed this with the patient's family after surgery.  The patient will remainin the brace for 6 weeks, brace locked at 0-0 for ambulation x 6 weeks.   ROM 0-90 x 6 weeks.       Subjective   better today, swelling has gone down a bit.  Pain reported as 5/10.      Objective            Treatment:  Therapeutic Exercise  TE 2: Supine knee ext LLLD 5 min + 3# weight and strap  TE 3: Knee ext hinge stretch with strap - 20x10s  TE 4: NMES: Quad sets with strap, Quad sets with towel under tiba 5 mins each  TE 5: EOM self assisted flexion x20  TE 6: Supine heel slides towel under distal femur - x30  Manual Therapy  MT 1: Patellar mobs 4D  MT 2: Fat pad mobs  MT 3: EOM passive  flexion  MT 4: ext hinge mobs  MT 5: Sit ups 10# 3x10  Balance/Neuromuscular Re-Education  NMR 1: NMES: Quad sets with strap, Quad sets with towel under tiba, no towel,  5 mins each  NMR 2: SLR, S/L abd brace donned - 3x10    Time Entry(in minutes):  Manual Therapy Time Entry: 8  Neuromuscular Re-Education Time Entry: 14  Therapeutic Exercise Time Entry: 38    Assessment & Plan   Assessment: Despite increased swelling, no signs of symptoms of infection. Incision looks good and healing well. flexion, better now.  Passive extension is excellent but has lost active hyper due to swelling an pain.         The patient will continue to benefit from skilled outpatient physical therapy in order to address the deficits listed in the problem list on the initial evaluation, provide patient and family education, and maximize the patients level of independence in the home and community environments.     The patient's spiritual, cultural, and educational needs were considered, and the patient is agreeable to the plan of care and goals.           Plan: Cont POC    Goals:   Active       LTG - 36 weeks       Pt completes return to jogging program       Start:  07/16/25    Expected End:  03/26/26            Isometric quad strength at 75 degree 90% or better LSI       Start:  07/16/25    Expected End:  03/26/26            Pt reports 90% or better improvement in function       Start:  07/16/25    Expected End:  03/26/26               STG 12 weeks       Full active hyper extension, flexion 120 degrees or better       Start:  07/16/25    Expected End:  10/09/25            Amb without deviations in community       Start:  07/16/25    Expected End:  10/09/25            Pt independent in initial hep       Start:  07/16/25    Expected End:  10/09/25                  Vernon Hart PT

## 2025-07-29 ENCOUNTER — CLINICAL SUPPORT (OUTPATIENT)
Dept: REHABILITATION | Facility: HOSPITAL | Age: 25
End: 2025-07-29
Payer: COMMERCIAL

## 2025-07-29 DIAGNOSIS — M62.81 QUADRICEPS WEAKNESS: Primary | ICD-10-CM

## 2025-07-29 PROCEDURE — 97110 THERAPEUTIC EXERCISES: CPT | Mod: PN

## 2025-07-29 PROCEDURE — 97140 MANUAL THERAPY 1/> REGIONS: CPT | Mod: PN

## 2025-07-29 PROCEDURE — 97112 NEUROMUSCULAR REEDUCATION: CPT | Mod: PN

## 2025-07-30 ENCOUNTER — OFFICE VISIT (OUTPATIENT)
Dept: SPORTS MEDICINE | Facility: CLINIC | Age: 25
End: 2025-07-30
Payer: COMMERCIAL

## 2025-07-30 VITALS
SYSTOLIC BLOOD PRESSURE: 137 MMHG | DIASTOLIC BLOOD PRESSURE: 87 MMHG | HEART RATE: 105 BPM | BODY MASS INDEX: 34.37 KG/M2 | HEIGHT: 60 IN | WEIGHT: 175.06 LBS

## 2025-07-30 DIAGNOSIS — Z98.890 S/P ACL RECONSTRUCTION: Primary | ICD-10-CM

## 2025-07-30 DIAGNOSIS — S83.511A ANTERIOR CRUCIATE LIGAMENT COMPLETE TEAR, RIGHT, INITIAL ENCOUNTER: ICD-10-CM

## 2025-07-30 PROCEDURE — 99999 PR PBB SHADOW E&M-EST. PATIENT-LVL III: CPT | Mod: PBBFAC,,, | Performed by: PHYSICIAN ASSISTANT

## 2025-07-30 PROCEDURE — 3075F SYST BP GE 130 - 139MM HG: CPT | Mod: CPTII,S$GLB,, | Performed by: PHYSICIAN ASSISTANT

## 2025-07-30 PROCEDURE — 3079F DIAST BP 80-89 MM HG: CPT | Mod: CPTII,S$GLB,, | Performed by: PHYSICIAN ASSISTANT

## 2025-07-30 PROCEDURE — 99024 POSTOP FOLLOW-UP VISIT: CPT | Mod: S$GLB,,, | Performed by: PHYSICIAN ASSISTANT

## 2025-07-30 PROCEDURE — 1159F MED LIST DOCD IN RCRD: CPT | Mod: CPTII,S$GLB,, | Performed by: PHYSICIAN ASSISTANT

## 2025-07-30 PROCEDURE — 1160F RVW MEDS BY RX/DR IN RCRD: CPT | Mod: CPTII,S$GLB,, | Performed by: PHYSICIAN ASSISTANT

## 2025-07-30 RX ORDER — TRAMADOL HYDROCHLORIDE 50 MG/1
50-100 TABLET, FILM COATED ORAL EVERY 8 HOURS PRN
Qty: 30 TABLET | Refills: 0 | Status: SHIPPED | OUTPATIENT
Start: 2025-07-30

## 2025-07-30 NOTE — PROGRESS NOTES
Outpatient Rehab    Physical Therapy Visit    Patient Name: Karla Price  MRN: [id number]  YOB: 2000  Encounter Date: 7/29/2025    Therapy Diagnosis:   Encounter Diagnosis   Name Primary?    Quadriceps weakness Yes       Physician: Riki Do III, *    Physician Orders: Eval and Treat  Medical Diagnosis: Anterior cruciate ligament complete tear, right, initial encounter  Surgical Diagnosis: Not applicable for this Episode   Surgical Date: Not applicable for this Episode  Days Since Last Surgery: Not applicable for this Episode    Visit # / Visits Authorized:  4 / 12  Insurance Authorization Period: 7/17/2025 to 12/31/2025  Date of Evaluation: 7/16/2025  Plan of Care Certification: 7/17/2025 to 7/16/2026      PT/PTA:     Number of PTA visits since last PT visit:   Time In: 1500   Time Out: 1600  Total Time (in minutes): 60   Total Billable Time (in minutes):      FOTO:  Intake Score (%): 40  Survey Score 2 (%): Not applicable for this Episode  Survey Score 3 (%): Not applicable for this Episode    Precautions:  Additional Precautions and Protocol Details: POSTOPERATIVE PLAN: We will be following the arthroscopic ACL reconstruction with meniscal repair guidelines. The patient will remain toe-touch weightbearing for 4 weeks. We discussed this with the patient's family after surgery.  The patient will remainin the brace for 6 weeks, brace locked at 0-0 for ambulation x 6 weeks.   ROM 0-90 x 6 weeks.       Subjective   Its still a little sore, but doing well. Swelling has gone down..  Pain reported as 4/10.      Objective            Treatment:  Therapeutic Exercise  TE 2: Supine knee ext LLLD 5 min + 3# weight and strap  TE 3: Knee ext hinge stretch with strap - 20x10s  TE 4: NMES: Quad sets with strap, Quad sets with towel under tiba 5 mins each  TE 5: EOM self assisted flexion x20  TE 6: Supine heel slides towel under distal femur - x30  Manual Therapy  MT 1: Patellar mobs 4D  MT 2: Fat pad  mobs  MT 3: EOM passive flexion  MT 4: ext hinge mobs  Balance/Neuromuscular Re-Education  NMR 1: NMES: Quad sets with strap, Quad sets with towel under tiba, no towel,  5 mins each  NMR 2: SLR, S/L abd brace donned - 3x10    Time Entry(in minutes):  Manual Therapy Time Entry: 8  Neuromuscular Re-Education Time Entry: 14  Therapeutic Exercise Time Entry: 38    Assessment & Plan   Assessment: Patient able to achieve slight active terminal knee extension with NMES today. 90° achieved in a heel slide of flexion. Sees Segundo ryder tomorrow, little rounds before patient sees PA.         The patient will continue to benefit from skilled outpatient physical therapy in order to address the deficits listed in the problem list on the initial evaluation, provide patient and family education, and maximize the patients level of independence in the home and community environments.     The patient's spiritual, cultural, and educational needs were considered, and the patient is agreeable to the plan of care and goals.           Plan: Continue protocol    Goals:   Active       LTG - 36 weeks       Pt completes return to jogging program       Start:  07/16/25    Expected End:  03/26/26            Isometric quad strength at 75 degree 90% or better LSI       Start:  07/16/25    Expected End:  03/26/26            Pt reports 90% or better improvement in function       Start:  07/16/25    Expected End:  03/26/26               STG 12 weeks       Full active hyper extension, flexion 120 degrees or better       Start:  07/16/25    Expected End:  10/09/25            Amb without deviations in community       Start:  07/16/25    Expected End:  10/09/25            Pt independent in initial hep       Start:  07/16/25    Expected End:  10/09/25                  Vernon Hart PT

## 2025-07-31 ENCOUNTER — CLINICAL SUPPORT (OUTPATIENT)
Dept: REHABILITATION | Facility: HOSPITAL | Age: 25
End: 2025-07-31
Payer: COMMERCIAL

## 2025-07-31 DIAGNOSIS — M62.81 QUADRICEPS WEAKNESS: Primary | ICD-10-CM

## 2025-07-31 PROCEDURE — 97112 NEUROMUSCULAR REEDUCATION: CPT | Mod: PN

## 2025-07-31 PROCEDURE — 97110 THERAPEUTIC EXERCISES: CPT | Mod: PN

## 2025-07-31 PROCEDURE — 97140 MANUAL THERAPY 1/> REGIONS: CPT | Mod: PN

## 2025-07-31 NOTE — PROGRESS NOTES
CC: post op 2 weeks,  Right knee ACL Reconstruction    Pain well tolerated and weaning off pain medication  Pain at a 3/10.   She takes pain medication intermittently.   She is doing PT here.     No issues reported    DATE OF PROCEDURE: 7/15/2025  SURGEON:  Charity William M.D  PROCEDURE PERFORMED:   right  1. knee arthroscopic ACL reconstruction with patellar tendon autograft (CPT 16366)  2. knee application of xenograft (Internal Brace tissue reinforcement) to ACL (CPT 96781)  3. knee arthroscopic partial synovectomy/debridement (CPT 60474)  4. knee arthroscopic plica excision (CPT 02243)  5. knee open patellar tendon repair  6. knee arthroscopic medial meniscus repair (CPT 81108)   7. knee autologous bone grafting to patellar and tibial harvest sites    Bucket-handle (90% depth) Posterior medial meniscus tear was then repaired, after debridement of inner edges of tear were debrided with shaver.  4 Arthrex Meniscal Cinch sutures were used to secure the meniscus nicely with vertical mattress.  Repair was solid and strong to fixation.  Root and hoop fibers remained intact.  Tissue quality was fair.       Review of Systems   Constitution: Negative. Negative for chills, fever and night sweats.    Cardiovascular: Negative for chest pain and syncope.   Respiratory: Negative for cough and shortness of breath.    Gastrointestinal: Negative for abdominal pain and bowel incontinence.    PE:    /87 (BP Location: Right arm, Patient Position: Sitting)   Pulse 105   Ht 5' (1.524 m)   Wt 79.4 kg (175 lb 0.7 oz)   LMP 06/02/2025 (Approximate)   BMI 34.19 kg/m²      Incision clean/dry/intact  No sign of infection  Minimal to no effusion  Compartments soft  Calves soft and not tender bilateral  Neurovascular status intact in extremity  Steris removed  Decreased quad strength      Assessment:  Appropriate ACL surgery recovery at 2 weeks    Plan:  1. Cont PT at this point; HEP demonstrated emphasizing ROM, Quad/hamstring sets,  patellar mobilization exercises, obtaining full extension  2. Pain medication as needed for PT; wean off narcotic use. Refilled tramadol  3. Conitnue crutches, and continue T scope brace at this time with only TTWB (25%)  4. Arthroscopic images reviewed and rehab discussed with the patient  5. Return to clinic in 4 weeks for 6 week post op follow up     The patient will remain toe-touch weightbearing for 4 weeks. We discussed this with the patient's family after surgery.   The patient will remainin the brace for 6 weeks, brace locked at 0-0 for ambulation x 6 weeks.    ROM 0-90 x 6 weeks.    All patients questions were answered. Patient had opportunity and has no other questions or concerns at this time.

## 2025-08-01 NOTE — PROGRESS NOTES
Outpatient Rehab    Physical Therapy Visit    Patient Name: Karla Price  MRN: [id number]  YOB: 2000  Encounter Date: 7/31/2025    Therapy Diagnosis:   Encounter Diagnosis   Name Primary?    Quadriceps weakness Yes         Physician: Riki Do III, *    Physician Orders: Eval and Treat  Medical Diagnosis: Anterior cruciate ligament complete tear, right, initial encounter  Surgical Diagnosis: Not applicable for this Episode   Surgical Date: Not applicable for this Episode  Days Since Last Surgery: Not applicable for this Episode    Visit # / Visits Authorized:  5 / 12  Insurance Authorization Period: 7/17/2025 to 12/31/2025  Date of Evaluation: 7/16/2025  Plan of Care Certification: 7/17/2025 to 7/16/2026      PT/PTA:     Number of PTA visits since last PT visit:   Time In: 1700   Time Out: 1800  Total Time (in minutes): 60   Total Billable Time (in minutes):      FOTO:  Intake Score (%): 40  Survey Score 2 (%): Not applicable for this Episode  Survey Score 3 (%): Not applicable for this Episode    Precautions:  Additional Precautions and Protocol Details: POSTOPERATIVE PLAN: We will be following the arthroscopic ACL reconstruction with meniscal repair guidelines. The patient will remain toe-touch weightbearing for 4 weeks. We discussed this with the patient's family after surgery.  The patient will remainin the brace for 6 weeks, brace locked at 0-0 for ambulation x 6 weeks.   ROM 0-90 x 6 weeks.       Subjective   visit with PA went well. still TTWB 0-90.         Objective            Treatment:  Therapeutic Exercise  TE 3: Knee ext hinge stretch with strap - 20x10s  TE 5: EOM self assisted flexion x20  Manual Therapy  MT 1: Patellar mobs 4D  MT 2: Fat pad mobs  MT 3: EOM passive flexion  MT 4: ext hinge mobs  MT 5: Sit ups 10# 3x10  Balance/Neuromuscular Re-Education  NMR 1: NMES: Quad sets with strap, Quad sets with towel under tiba, no towel,  5 mins each  NMR 2: SLR, S/L abd brace donned -  3x10  NMR 3: Sit ups 10# 3x10    Time Entry(in minutes):  Manual Therapy Time Entry: 8  Neuromuscular Re-Education Time Entry: 38  Therapeutic Exercise Time Entry: 14    Assessment & Plan   Assessment:           The patient will continue to benefit from skilled outpatient physical therapy in order to address the deficits listed in the problem list on the initial evaluation, provide patient and family education, and maximize the patients level of independence in the home and community environments.     The patient's spiritual, cultural, and educational needs were considered, and the patient is agreeable to the plan of care and goals.           Plan: Continue protocol    Goals:   Active       LTG - 36 weeks       Pt completes return to jogging program       Start:  07/16/25    Expected End:  03/26/26            Isometric quad strength at 75 degree 90% or better LSI       Start:  07/16/25    Expected End:  03/26/26            Pt reports 90% or better improvement in function       Start:  07/16/25    Expected End:  03/26/26               STG 12 weeks       Full active hyper extension, flexion 120 degrees or better       Start:  07/16/25    Expected End:  10/09/25            Amb without deviations in community       Start:  07/16/25    Expected End:  10/09/25            Pt independent in initial hep       Start:  07/16/25    Expected End:  10/09/25                    Vernon Hart PT

## 2025-08-05 ENCOUNTER — CLINICAL SUPPORT (OUTPATIENT)
Dept: REHABILITATION | Facility: HOSPITAL | Age: 25
End: 2025-08-05
Payer: COMMERCIAL

## 2025-08-05 DIAGNOSIS — M62.81 QUADRICEPS WEAKNESS: Primary | ICD-10-CM

## 2025-08-05 PROCEDURE — 97140 MANUAL THERAPY 1/> REGIONS: CPT | Mod: PN

## 2025-08-05 PROCEDURE — 97112 NEUROMUSCULAR REEDUCATION: CPT | Mod: PN

## 2025-08-05 PROCEDURE — 97110 THERAPEUTIC EXERCISES: CPT | Mod: PN

## 2025-08-07 ENCOUNTER — CLINICAL SUPPORT (OUTPATIENT)
Dept: REHABILITATION | Facility: HOSPITAL | Age: 25
End: 2025-08-07
Payer: COMMERCIAL

## 2025-08-07 DIAGNOSIS — M62.81 QUADRICEPS WEAKNESS: Primary | ICD-10-CM

## 2025-08-07 PROCEDURE — 97112 NEUROMUSCULAR REEDUCATION: CPT | Mod: PN

## 2025-08-07 PROCEDURE — 97110 THERAPEUTIC EXERCISES: CPT | Mod: PN

## 2025-08-07 PROCEDURE — 97140 MANUAL THERAPY 1/> REGIONS: CPT | Mod: PN

## 2025-08-07 NOTE — PROGRESS NOTES
"  Outpatient Rehab    Physical Therapy Visit    Patient Name: Karla Price  MRN: [id number]  YOB: 2000  Encounter Date: 8/7/2025    Therapy Diagnosis:   Encounter Diagnosis   Name Primary?    Quadriceps weakness Yes         Physician: Riki Do III, *    Physician Orders: Eval and Treat  Medical Diagnosis: Anterior cruciate ligament complete tear, right, initial encounter  Surgical Diagnosis: Not applicable for this Episode   Surgical Date: Not applicable for this Episode  Days Since Last Surgery: Not applicable for this Episode    Visit # / Visits Authorized:  7 / 12  Insurance Authorization Period: 7/17/2025 to 12/31/2025  Date of Evaluation: 7/16/2025  Plan of Care Certification: 7/17/2025 to 7/16/2026      PT/PTA: PT   Number of PTA visits since last PT visit:   Time In: 1600   Time Out: 1700  Total Time (in minutes): 60   Total Billable Time (in minutes): 60    FOTO:  Intake Score (%): 40  Survey Score 2 (%): Not applicable for this Episode  Survey Score 3 (%): Not applicable for this Episode    Precautions:  Additional Precautions and Protocol Details: POSTOPERATIVE PLAN: We will be following the arthroscopic ACL reconstruction with meniscal repair guidelines. The patient will remain toe-touch weightbearing for 4 weeks. We discussed this with the patient's family after surgery.  The patient will remainin the brace for 6 weeks, brace locked at 0-0 for ambulation x 6 weeks.   ROM 0-90 x 6 weeks.       Subjective   she is feeling okay today. Having a little achiness and tightness over the front of the knee, expecialy when she is working on bending it up to 90 degrees..         Objective            Treatment:  Therapeutic Exercise  TE 2: Supine knee ext LLLD 5 min + 3# weight and strap  TE 3: Knee ext hinge stretch with strap - 3 x 15 x 5" holds  Manual Therapy  MT 1: Patellar mobs 4D  MT 2: Fat pad mobs  MT 4: ext hinge mobs  Balance/Neuromuscular Re-Education  NMR 1: NMES: Quad sets with " strap, Quad sets with towel under tiba, no towel,  5 mins each  NMR 2: SLR, S/L abd brace donned - 3x10  NMR 3: Sit ups 10# 3x10    Time Entry(in minutes):  Manual Therapy Time Entry: 8  Neuromuscular Re-Education Time Entry: 37  Therapeutic Exercise Time Entry: 15    Assessment & Plan   Assessment: Karla continues to have good carryover knee extension upon arrival. Tolerated interventions with no pain. Continue to progress as tolerated.          The patient will continue to benefit from skilled outpatient physical therapy in order to address the deficits listed in the problem list on the initial evaluation, provide patient and family education, and maximize the patients level of independence in the home and community environments.     The patient's spiritual, cultural, and educational needs were considered, and the patient is agreeable to the plan of care and goals.           Plan: Continue protocol    Goals:   Active       LTG - 36 weeks       Pt completes return to jogging program (Progressing)       Start:  07/16/25    Expected End:  03/26/26            Isometric quad strength at 75 degree 90% or better LSI (Progressing)       Start:  07/16/25    Expected End:  03/26/26            Pt reports 90% or better improvement in function (Progressing)       Start:  07/16/25    Expected End:  03/26/26               STG 12 weeks       Full active hyper extension, flexion 120 degrees or better (Progressing)       Start:  07/16/25    Expected End:  10/09/25            Amb without deviations in community (Progressing)       Start:  07/16/25    Expected End:  10/09/25            Pt independent in initial hep (Progressing)       Start:  07/16/25    Expected End:  10/09/25                    Salbador Nair, PT

## 2025-08-12 ENCOUNTER — CLINICAL SUPPORT (OUTPATIENT)
Dept: REHABILITATION | Facility: HOSPITAL | Age: 25
End: 2025-08-12
Payer: COMMERCIAL

## 2025-08-12 DIAGNOSIS — M62.81 QUADRICEPS WEAKNESS: Primary | ICD-10-CM

## 2025-08-12 PROCEDURE — 97530 THERAPEUTIC ACTIVITIES: CPT | Mod: PN

## 2025-08-12 PROCEDURE — 97110 THERAPEUTIC EXERCISES: CPT | Mod: PN

## 2025-08-12 PROCEDURE — 97112 NEUROMUSCULAR REEDUCATION: CPT | Mod: PN

## 2025-08-12 PROCEDURE — 97140 MANUAL THERAPY 1/> REGIONS: CPT | Mod: PN

## 2025-08-14 ENCOUNTER — CLINICAL SUPPORT (OUTPATIENT)
Dept: REHABILITATION | Facility: HOSPITAL | Age: 25
End: 2025-08-14
Payer: COMMERCIAL

## 2025-08-14 DIAGNOSIS — M62.81 QUADRICEPS WEAKNESS: Primary | ICD-10-CM

## 2025-08-14 PROCEDURE — 97110 THERAPEUTIC EXERCISES: CPT | Mod: PN

## 2025-08-14 PROCEDURE — 97140 MANUAL THERAPY 1/> REGIONS: CPT | Mod: PN

## 2025-08-14 PROCEDURE — 97112 NEUROMUSCULAR REEDUCATION: CPT | Mod: PN

## 2025-08-19 ENCOUNTER — CLINICAL SUPPORT (OUTPATIENT)
Dept: REHABILITATION | Facility: HOSPITAL | Age: 25
End: 2025-08-19
Payer: COMMERCIAL

## 2025-08-19 DIAGNOSIS — M62.81 QUADRICEPS WEAKNESS: Primary | ICD-10-CM

## 2025-08-19 PROCEDURE — 97110 THERAPEUTIC EXERCISES: CPT | Mod: PN

## 2025-08-19 PROCEDURE — 97140 MANUAL THERAPY 1/> REGIONS: CPT | Mod: PN

## 2025-08-19 PROCEDURE — 97112 NEUROMUSCULAR REEDUCATION: CPT | Mod: PN

## 2025-08-21 ENCOUNTER — CLINICAL SUPPORT (OUTPATIENT)
Dept: REHABILITATION | Facility: HOSPITAL | Age: 25
End: 2025-08-21
Payer: COMMERCIAL

## 2025-08-21 DIAGNOSIS — M62.81 QUADRICEPS WEAKNESS: Primary | ICD-10-CM

## 2025-08-21 PROCEDURE — 97140 MANUAL THERAPY 1/> REGIONS: CPT | Mod: PN

## 2025-08-21 PROCEDURE — 97530 THERAPEUTIC ACTIVITIES: CPT | Mod: PN

## 2025-08-21 PROCEDURE — 97110 THERAPEUTIC EXERCISES: CPT | Mod: PN

## 2025-08-21 PROCEDURE — 97112 NEUROMUSCULAR REEDUCATION: CPT | Mod: PN

## 2025-08-26 ENCOUNTER — CLINICAL SUPPORT (OUTPATIENT)
Dept: REHABILITATION | Facility: HOSPITAL | Age: 25
End: 2025-08-26
Payer: COMMERCIAL

## 2025-08-26 DIAGNOSIS — M62.81 QUADRICEPS WEAKNESS: Primary | ICD-10-CM

## 2025-08-26 PROCEDURE — 97140 MANUAL THERAPY 1/> REGIONS: CPT | Mod: PN

## 2025-08-26 PROCEDURE — 97530 THERAPEUTIC ACTIVITIES: CPT | Mod: PN

## 2025-08-26 PROCEDURE — 97110 THERAPEUTIC EXERCISES: CPT | Mod: PN

## 2025-08-26 PROCEDURE — 97112 NEUROMUSCULAR REEDUCATION: CPT | Mod: PN

## 2025-08-27 ENCOUNTER — OFFICE VISIT (OUTPATIENT)
Dept: SPORTS MEDICINE | Facility: CLINIC | Age: 25
End: 2025-08-27
Payer: COMMERCIAL

## 2025-08-27 VITALS
HEART RATE: 78 BPM | BODY MASS INDEX: 33.11 KG/M2 | DIASTOLIC BLOOD PRESSURE: 89 MMHG | SYSTOLIC BLOOD PRESSURE: 142 MMHG | WEIGHT: 169.56 LBS

## 2025-08-27 DIAGNOSIS — Z98.890 S/P ACL RECONSTRUCTION: Primary | ICD-10-CM

## 2025-08-27 PROCEDURE — 99999 PR PBB SHADOW E&M-EST. PATIENT-LVL III: CPT | Mod: PBBFAC,,, | Performed by: ORTHOPAEDIC SURGERY

## 2025-08-28 ENCOUNTER — CLINICAL SUPPORT (OUTPATIENT)
Dept: REHABILITATION | Facility: HOSPITAL | Age: 25
End: 2025-08-28
Payer: COMMERCIAL

## 2025-08-28 DIAGNOSIS — M62.81 QUADRICEPS WEAKNESS: Primary | ICD-10-CM

## 2025-08-28 PROCEDURE — 97140 MANUAL THERAPY 1/> REGIONS: CPT | Mod: PN

## 2025-08-28 PROCEDURE — 97110 THERAPEUTIC EXERCISES: CPT | Mod: PN

## 2025-08-28 PROCEDURE — 97112 NEUROMUSCULAR REEDUCATION: CPT | Mod: PN

## 2025-08-28 PROCEDURE — 97530 THERAPEUTIC ACTIVITIES: CPT | Mod: PN

## 2025-09-02 ENCOUNTER — CLINICAL SUPPORT (OUTPATIENT)
Dept: REHABILITATION | Facility: HOSPITAL | Age: 25
End: 2025-09-02
Payer: COMMERCIAL

## 2025-09-02 DIAGNOSIS — M62.81 QUADRICEPS WEAKNESS: Primary | ICD-10-CM

## 2025-09-02 PROCEDURE — 97110 THERAPEUTIC EXERCISES: CPT | Mod: PN

## 2025-09-02 PROCEDURE — 97140 MANUAL THERAPY 1/> REGIONS: CPT | Mod: PN

## 2025-09-02 PROCEDURE — 97112 NEUROMUSCULAR REEDUCATION: CPT | Mod: PN

## 2025-09-02 PROCEDURE — 97530 THERAPEUTIC ACTIVITIES: CPT | Mod: PN

## (undated) DEVICE — SUT VICRYL 2-0 CT-2 VCP269H

## (undated) DEVICE — YANKAUER OPEN TIP W/O VENT

## (undated) DEVICE — ENDOBUTTON DRILLTIP 2.4MMX15IN
Type: IMPLANTABLE DEVICE | Site: KNEE | Status: NON-FUNCTIONAL
Removed: 2023-06-13

## (undated) DEVICE — CLOSURE SKIN STERI STRIP 1/2X4

## (undated) DEVICE — MARKER SKIN RULER STERILE

## (undated) DEVICE — DRAPE T EXTRM SURG 121X128X90

## (undated) DEVICE — BLADE 4.2MM PREBENT ULTRACUT

## (undated) DEVICE — BIT DRILL 1.5MM STRAIGHT

## (undated) DEVICE — GLOVE PROTEXIS LIGHT BROWN 8.5

## (undated) DEVICE — DRESSING XEROFORM NONADH 1X8IN

## (undated) DEVICE — COVER CAMERA OPERATING ROOM

## (undated) DEVICE — Device

## (undated) DEVICE — PUSHER NOVOCUT SUTURE MANAGER

## (undated) DEVICE — SOL IRR SOD CHL .9% POUR

## (undated) DEVICE — QUADPRO HARVESTER

## (undated) DEVICE — SUT CTD VICRYL 2.0

## (undated) DEVICE — MANIFOLD 4 PORT

## (undated) DEVICE — GOWN ECLIPSE REINF LVL4 TWL LG

## (undated) DEVICE — GLOVE SURGEON SYN PF SZ 9

## (undated) DEVICE — PAD ELECTRODE STER 1.5X3

## (undated) DEVICE — COVER TABLE REINF 50X90IN

## (undated) DEVICE — REAMER CORING 9MM & COLLAR PIN

## (undated) DEVICE — XACTPIN GRAFT PASSING GUIDE PIN, STERILE, 2.4 X 432 MM (.093 X 17 IN.)
Type: IMPLANTABLE DEVICE | Site: KNEE | Status: NON-FUNCTIONAL
Brand: XACTPIN
Removed: 2025-07-15

## (undated) DEVICE — SOL NACL IRR 1000ML BTL

## (undated) DEVICE — SUT VICRYL 0 27 CT-2

## (undated) DEVICE — BLADE ACL FINE 9.5X25.5X.4MM

## (undated) DEVICE — DRESSING N ADH OIL EMUL 3X3

## (undated) DEVICE — BRACE KNEE T SCOPE PREMIER

## (undated) DEVICE — BLADE SHAVER 15D 13CMX4.2MM

## (undated) DEVICE — GOWN ECLIPSE REINF L4 XLNG XXL

## (undated) DEVICE — TOURNIQUET SB QC DP 34X4IN

## (undated) DEVICE — WRAP KNEE ACCU THERM GEL PACK

## (undated) DEVICE — MAT QUICK 40X30 FLOOR FLUID LF

## (undated) DEVICE — BURR OVAL CUTTING 6 MM

## (undated) DEVICE — GOWN ECLIPSE REINF LV4 TWL 2XL

## (undated) DEVICE — KIT ACL DISP W/O SAW BLADE

## (undated) DEVICE — DRAPE TOP 53X102IN

## (undated) DEVICE — PAD DERMAPROX THCK 11X15X1IN

## (undated) DEVICE — SOL NACL IRR 3000ML

## (undated) DEVICE — BLADE GREAT WHITE 4.2 6/BX

## (undated) DEVICE — SPONGE COTTON TRAY 4X4IN

## (undated) DEVICE — PENCIL ELECTROSURG HOLST W/BLD

## (undated) DEVICE — CONTAINER SPECIMEN OR STER 4OZ

## (undated) DEVICE — PAD CAST SPECIALIST STRL 6

## (undated) DEVICE — NDL BOX COUNTER

## (undated) DEVICE — BOWL STERILE LARGE 32OZ

## (undated) DEVICE — DRAPE ARTHSCP FLD CTRL POUCH

## (undated) DEVICE — GLOVE PROTEXIS LTX MICRO 8

## (undated) DEVICE — BANDAGE ESMARK 6X12

## (undated) DEVICE — SUT VICRYL PLUS 0 CT1 18IN

## (undated) DEVICE — SUT MONOCRYL PLUS UD 3-0 27

## (undated) DEVICE — SUT 2 20 FIBERLOOP STR NDL

## (undated) DEVICE — PUSHER CUTTER KNOT FAST FX STR

## (undated) DEVICE — GUIDEWIRE 1.2MM X 18 STER
Type: IMPLANTABLE DEVICE | Site: KNEE | Status: NON-FUNCTIONAL
Removed: 2023-06-13

## (undated) DEVICE — SUT TAPE FIBERLOOP 1.3MM

## (undated) DEVICE — COVER LIGHT HANDLE 80/CA

## (undated) DEVICE — NDL SAFETY 22G X 1.5 ECLIPSE

## (undated) DEVICE — STRIP MEDI WND CLSR 1/2X4IN

## (undated) DEVICE — ELECTRODE REM PLYHSV RETURN 9

## (undated) DEVICE — SUT MCRYL PLUS 4-0 PS2 27IN

## (undated) DEVICE — BLADE KNIFE GRAFT 9MM PARALLEL

## (undated) DEVICE — DRESSING N ADH OIL EMUL 3X8

## (undated) DEVICE — GLOVE BIOGEL SKINSENSE PI 8.0

## (undated) DEVICE — ADHESIVE MASTISOL VIAL 48/BX

## (undated) DEVICE — BLADE SURG STAINLESS STEEL #15

## (undated) DEVICE — UNDERGLOVES BIOGEL PI SZ 7 LF

## (undated) DEVICE — SUT FIBERLINK TAPE BLU WHT 1.3

## (undated) DEVICE — BLADE SURG #15 CARBON STEEL

## (undated) DEVICE — DRAPE INCISE IOBAN 2 23X17IN

## (undated) DEVICE — DRAPE STERI INSTRUMENT 1018

## (undated) DEVICE — SUT ETHILON 3-0 PS2 18 BLK

## (undated) DEVICE — SUT PROLENE 3-0 PS-1 BL 18

## (undated) DEVICE — SYR B-D DISP CONTROL 10CC100/C

## (undated) DEVICE — UNDERGLOVES BIOGEL PI SIZE 7.5

## (undated) DEVICE — GLOVE BIOGEL SKINSENSE PI 6.5

## (undated) DEVICE — GLOVE BIOGEL SKINSENSE PI 7.0

## (undated) DEVICE — SYR IRRIGATION BULB STER 60ML

## (undated) DEVICE — SHAVER SYS 5.5 ULRAFRR

## (undated) DEVICE — FIBERTAPE 54 #2

## (undated) DEVICE — GLOVE ORTHO PF SZ 8.5

## (undated) DEVICE — SUT VICRYL PLUS 3-0 SH 18IN

## (undated) DEVICE — PENCIL ROCKER SWITCH 10FT CORD

## (undated) DEVICE — GAUZE SPONGE 4X4 12PLY

## (undated) DEVICE — BANDAGE ACE DOUBLE STER 6IN

## (undated) DEVICE — APPLICATOR CHLORAPREP ORN 26ML

## (undated) DEVICE — DRESSING ADAPTIC N ADH 3X8IN

## (undated) DEVICE — UNDERGLOVES BIOGEL PI SIZE 8

## (undated) DEVICE — IMPLANTABLE DEVICE
Type: IMPLANTABLE DEVICE | Site: KNEE | Status: NON-FUNCTIONAL
Brand: FIBERSTITCH™ IMPLANT 1.5, 24° CURVED WITH TWO POLYESTER IMPLANTS AND 2-0 FIBERWI
Removed: 2025-07-15

## (undated) DEVICE — KNIFE ACL GRAFT 10MM

## (undated) DEVICE — BIT DRILL 10X229 CANN SENTINEL

## (undated) DEVICE — BNDG COFLEX FOAM LF2 ST 6X5YD

## (undated) DEVICE — PAD ABDOMINAL STERILE 8X10IN

## (undated) DEVICE — KIT FIBERTAK DISP ANCHOR 2.6

## (undated) DEVICE — DRAPE THREE-QTR REINF 53X77IN

## (undated) DEVICE — GOWN ECLIPSE REINF LV4 XLNG XL

## (undated) DEVICE — NDL 18GA X1 1/2 REG BEVEL

## (undated) DEVICE — PROBE ARTHSCP EDGE ENERGY 50

## (undated) DEVICE — PADDING CAST 6IN DELTA ROLL

## (undated) DEVICE — SPONGE GAUZE 16PLY 4X4

## (undated) DEVICE — ENDOBUTTON 1.2 CL ULTRA DEVICE
Type: IMPLANTABLE DEVICE | Site: KNEE | Status: NON-FUNCTIONAL
Removed: 2023-06-13

## (undated) DEVICE — DRAPE STERI U-SHAPED 47X51IN

## (undated) DEVICE — DRESSING SPONGE 8PLY 4X4 STRL

## (undated) DEVICE — NDL ECLIPSE SAFETY 23G 1.5IN

## (undated) DEVICE — COVER PROXIMA MAYO STAND

## (undated) DEVICE — BLADE VORTEX SHAVER 4.5MMX13CM

## (undated) DEVICE — COVER MAYO STND XL 30X57IN

## (undated) DEVICE — GLOVE SENSICARE PI GRN 7.5

## (undated) DEVICE — ADHESIVE DERMABOND ADVANCED

## (undated) DEVICE — SUT SUTURETAPE TIGERLINK 1.3MM

## (undated) DEVICE — SET INFLOW TUBE ARTHSCP

## (undated) DEVICE — SUT CTD VICRYL 0 UND BR

## (undated) DEVICE — BIT DRILL CANNULATED 9MM

## (undated) DEVICE — NDL SPINAL 18GX3.5 SPINOCAN

## (undated) DEVICE — SUT SUTURETAPE X 1.3MM 40IN

## (undated) DEVICE — BANDAGE MATRIX HK LOOP 6IN 5YD

## (undated) DEVICE — SUT FIBERWIRE